# Patient Record
Sex: MALE | Race: WHITE | NOT HISPANIC OR LATINO | Employment: OTHER | URBAN - METROPOLITAN AREA
[De-identification: names, ages, dates, MRNs, and addresses within clinical notes are randomized per-mention and may not be internally consistent; named-entity substitution may affect disease eponyms.]

---

## 2018-07-19 DIAGNOSIS — Z12.11 SCREENING FOR COLON CANCER: Primary | ICD-10-CM

## 2018-09-05 DIAGNOSIS — Z12.11 SCREENING FOR COLON CANCER: ICD-10-CM

## 2018-09-13 ENCOUNTER — OUTSIDE FACILITY SERVICE (OUTPATIENT)
Dept: GASTROENTEROLOGY | Facility: CLINIC | Age: 73
End: 2018-09-13

## 2018-09-13 ENCOUNTER — LAB REQUISITION (OUTPATIENT)
Dept: LAB | Facility: HOSPITAL | Age: 73
End: 2018-09-13

## 2018-09-13 DIAGNOSIS — Z86.010 HISTORY OF COLONIC POLYPS: ICD-10-CM

## 2018-09-13 DIAGNOSIS — Z12.11 ENCOUNTER FOR SCREENING FOR MALIGNANT NEOPLASM OF COLON: ICD-10-CM

## 2018-09-13 PROCEDURE — 45385 COLONOSCOPY W/LESION REMOVAL: CPT | Performed by: INTERNAL MEDICINE

## 2018-09-13 PROCEDURE — 45380 COLONOSCOPY AND BIOPSY: CPT | Performed by: INTERNAL MEDICINE

## 2018-09-13 PROCEDURE — 88305 TISSUE EXAM BY PATHOLOGIST: CPT | Performed by: INTERNAL MEDICINE

## 2018-09-13 PROCEDURE — 88342 IMHCHEM/IMCYTCHM 1ST ANTB: CPT | Performed by: INTERNAL MEDICINE

## 2018-09-13 PROCEDURE — 45381 COLONOSCOPY SUBMUCOUS NJX: CPT | Performed by: INTERNAL MEDICINE

## 2018-09-20 LAB
CYTO UR: NORMAL
LAB AP CASE REPORT: NORMAL
LAB AP CLINICAL INFORMATION: NORMAL
LAB AP DIAGNOSIS COMMENT: NORMAL
LAB AP SPECIAL STAINS: NORMAL
PATH REPORT.FINAL DX SPEC: NORMAL
PATH REPORT.GROSS SPEC: NORMAL

## 2019-03-18 RX ORDER — METOPROLOL SUCCINATE 25 MG/1
TABLET, EXTENDED RELEASE ORAL
Qty: 30 TABLET | Refills: 5 | Status: SHIPPED | OUTPATIENT
Start: 2019-03-18 | End: 2019-06-06

## 2019-03-22 ENCOUNTER — APPOINTMENT (OUTPATIENT)
Dept: PREADMISSION TESTING | Facility: HOSPITAL | Age: 74
End: 2019-03-22

## 2019-03-22 VITALS — WEIGHT: 180 LBS | BODY MASS INDEX: 25.77 KG/M2 | HEIGHT: 70 IN

## 2019-03-22 LAB
DEPRECATED RDW RBC AUTO: 45.9 FL (ref 37–54)
ERYTHROCYTE [DISTWIDTH] IN BLOOD BY AUTOMATED COUNT: 13.6 % (ref 11.3–14.5)
HCT VFR BLD AUTO: 47.7 % (ref 38.9–50.9)
HGB BLD-MCNC: 15.6 G/DL (ref 13.1–17.5)
MCH RBC QN AUTO: 30.2 PG (ref 27–31)
MCHC RBC AUTO-ENTMCNC: 32.7 G/DL (ref 32–36)
MCV RBC AUTO: 92.4 FL (ref 80–99)
PLATELET # BLD AUTO: 218 10*3/MM3 (ref 150–450)
PMV BLD AUTO: 11.3 FL (ref 6–12)
RBC # BLD AUTO: 5.16 10*6/MM3 (ref 4.2–5.76)
WBC NRBC COR # BLD: 5.32 10*3/MM3 (ref 3.5–10.8)

## 2019-03-22 PROCEDURE — 85027 COMPLETE CBC AUTOMATED: CPT | Performed by: ANESTHESIOLOGY

## 2019-03-22 PROCEDURE — 93010 ELECTROCARDIOGRAM REPORT: CPT | Performed by: INTERNAL MEDICINE

## 2019-03-22 PROCEDURE — 36415 COLL VENOUS BLD VENIPUNCTURE: CPT

## 2019-03-22 PROCEDURE — 93005 ELECTROCARDIOGRAM TRACING: CPT

## 2019-03-22 RX ORDER — ALLOPURINOL 100 MG/1
100 TABLET ORAL EVERY MORNING
COMMUNITY
End: 2019-12-24

## 2019-03-22 RX ORDER — NIFEDIPINE 60 MG/1
60 TABLET, EXTENDED RELEASE ORAL DAILY
COMMUNITY
End: 2019-06-07 | Stop reason: HOSPADM

## 2019-03-22 RX ORDER — ENALAPRIL MALEATE 20 MG/1
20 TABLET ORAL 2 TIMES DAILY
COMMUNITY
End: 2019-06-11 | Stop reason: HOSPADM

## 2019-03-22 NOTE — PAT
EKG results and past EKG faxed to anesthesia and discussed with Dr. Taylor.  Per Dr. Taylor ok to proceed with procedure.      Patient instructed to drink 20 ounces (or until full) of Gatorade or 20 ounces of G2 (if diabetic) and complete 1 hour before arrival time for procedure (NO RED Gatorade or G2)    Patient verbalized understanding.

## 2019-03-25 PROBLEM — Z85.51 HISTORY OF BLADDER CANCER: Status: ACTIVE | Noted: 2019-03-25

## 2019-03-28 ENCOUNTER — ANESTHESIA EVENT (OUTPATIENT)
Dept: PERIOP | Facility: HOSPITAL | Age: 74
End: 2019-03-28

## 2019-03-28 RX ORDER — FAMOTIDINE 10 MG/ML
20 INJECTION, SOLUTION INTRAVENOUS ONCE
Status: CANCELLED | OUTPATIENT
Start: 2019-03-28 | End: 2019-03-28

## 2019-03-29 ENCOUNTER — ANESTHESIA (OUTPATIENT)
Dept: PERIOP | Facility: HOSPITAL | Age: 74
End: 2019-03-29

## 2019-03-29 ENCOUNTER — HOSPITAL ENCOUNTER (OUTPATIENT)
Facility: HOSPITAL | Age: 74
Setting detail: HOSPITAL OUTPATIENT SURGERY
Discharge: HOME | End: 2019-03-29
Attending: UROLOGY | Admitting: UROLOGY

## 2019-03-29 ENCOUNTER — DOCUMENTATION (OUTPATIENT)
Dept: ONCOLOGY | Facility: HOSPITAL | Age: 74
End: 2019-03-29

## 2019-03-29 VITALS
OXYGEN SATURATION: 95 % | BODY MASS INDEX: 25.77 KG/M2 | RESPIRATION RATE: 16 BRPM | HEIGHT: 70 IN | HEART RATE: 59 BPM | TEMPERATURE: 97.6 F | WEIGHT: 180 LBS | SYSTOLIC BLOOD PRESSURE: 178 MMHG | DIASTOLIC BLOOD PRESSURE: 76 MMHG

## 2019-03-29 DIAGNOSIS — C67.9 LOCAL RECURRENCE OF CANCER OF URINARY BLADDER (HCC): ICD-10-CM

## 2019-03-29 LAB — POTASSIUM BLDA-SCNC: 3.74 MMOL/L (ref 3.5–5.3)

## 2019-03-29 PROCEDURE — 25010000002 MITOMYCIN PER 5 MG: Performed by: UROLOGY

## 2019-03-29 PROCEDURE — 25010000002 ONDANSETRON PER 1 MG: Performed by: NURSE ANESTHETIST, CERTIFIED REGISTERED

## 2019-03-29 PROCEDURE — 25010000002 FENTANYL CITRATE (PF) 100 MCG/2ML SOLUTION: Performed by: NURSE ANESTHETIST, CERTIFIED REGISTERED

## 2019-03-29 PROCEDURE — 88305 TISSUE EXAM BY PATHOLOGIST: CPT | Performed by: UROLOGY

## 2019-03-29 PROCEDURE — 84132 ASSAY OF SERUM POTASSIUM: CPT | Performed by: ANESTHESIOLOGY

## 2019-03-29 PROCEDURE — 25010000002 PROPOFOL 10 MG/ML EMULSION: Performed by: NURSE ANESTHETIST, CERTIFIED REGISTERED

## 2019-03-29 PROCEDURE — 25010000002 DEXAMETHASONE PER 1 MG: Performed by: NURSE ANESTHETIST, CERTIFIED REGISTERED

## 2019-03-29 PROCEDURE — 25010000002 HYDROMORPHONE PER 4 MG: Performed by: NURSE ANESTHETIST, CERTIFIED REGISTERED

## 2019-03-29 PROCEDURE — 25010000002 VANCOMYCIN 10 G RECONSTITUTED SOLUTION: Performed by: UROLOGY

## 2019-03-29 RX ORDER — ONDANSETRON 2 MG/ML
4 INJECTION INTRAMUSCULAR; INTRAVENOUS ONCE AS NEEDED
Status: DISCONTINUED | OUTPATIENT
Start: 2019-03-29 | End: 2019-03-29 | Stop reason: HOSPADM

## 2019-03-29 RX ORDER — SODIUM CHLORIDE 0.9 % (FLUSH) 0.9 %
3-10 SYRINGE (ML) INJECTION AS NEEDED
Status: DISCONTINUED | OUTPATIENT
Start: 2019-03-29 | End: 2019-03-29 | Stop reason: HOSPADM

## 2019-03-29 RX ORDER — SODIUM CHLORIDE 0.9 % (FLUSH) 0.9 %
3 SYRINGE (ML) INJECTION EVERY 12 HOURS SCHEDULED
Status: DISCONTINUED | OUTPATIENT
Start: 2019-03-29 | End: 2019-03-29 | Stop reason: HOSPADM

## 2019-03-29 RX ORDER — FENTANYL CITRATE 50 UG/ML
50 INJECTION, SOLUTION INTRAMUSCULAR; INTRAVENOUS
Status: DISCONTINUED | OUTPATIENT
Start: 2019-03-29 | End: 2019-03-29 | Stop reason: HOSPADM

## 2019-03-29 RX ORDER — GABAPENTIN 300 MG/1
600 CAPSULE ORAL ONCE
Status: COMPLETED | OUTPATIENT
Start: 2019-03-29 | End: 2019-03-29

## 2019-03-29 RX ORDER — MELOXICAM 7.5 MG/1
15 TABLET ORAL ONCE
Status: COMPLETED | OUTPATIENT
Start: 2019-03-29 | End: 2019-03-29

## 2019-03-29 RX ORDER — IPRATROPIUM BROMIDE AND ALBUTEROL SULFATE 2.5; .5 MG/3ML; MG/3ML
3 SOLUTION RESPIRATORY (INHALATION) ONCE AS NEEDED
Status: DISCONTINUED | OUTPATIENT
Start: 2019-03-29 | End: 2019-03-29 | Stop reason: HOSPADM

## 2019-03-29 RX ORDER — ACETAMINOPHEN 500 MG
1000 TABLET ORAL ONCE
Status: COMPLETED | OUTPATIENT
Start: 2019-03-29 | End: 2019-03-29

## 2019-03-29 RX ORDER — MAGNESIUM HYDROXIDE 1200 MG/15ML
LIQUID ORAL AS NEEDED
Status: DISCONTINUED | OUTPATIENT
Start: 2019-03-29 | End: 2019-03-29 | Stop reason: HOSPADM

## 2019-03-29 RX ORDER — DEXAMETHASONE SODIUM PHOSPHATE 4 MG/ML
INJECTION, SOLUTION INTRA-ARTICULAR; INTRALESIONAL; INTRAMUSCULAR; INTRAVENOUS; SOFT TISSUE AS NEEDED
Status: DISCONTINUED | OUTPATIENT
Start: 2019-03-29 | End: 2019-03-29 | Stop reason: SURG

## 2019-03-29 RX ORDER — LABETALOL HYDROCHLORIDE 5 MG/ML
5 INJECTION, SOLUTION INTRAVENOUS
Status: DISCONTINUED | OUTPATIENT
Start: 2019-03-29 | End: 2019-03-29 | Stop reason: HOSPADM

## 2019-03-29 RX ORDER — PROPOFOL 10 MG/ML
VIAL (ML) INTRAVENOUS AS NEEDED
Status: DISCONTINUED | OUTPATIENT
Start: 2019-03-29 | End: 2019-03-29 | Stop reason: SURG

## 2019-03-29 RX ORDER — MEPERIDINE HYDROCHLORIDE 25 MG/ML
12.5 INJECTION INTRAMUSCULAR; INTRAVENOUS; SUBCUTANEOUS
Status: DISCONTINUED | OUTPATIENT
Start: 2019-03-29 | End: 2019-03-29 | Stop reason: HOSPADM

## 2019-03-29 RX ORDER — ONDANSETRON 2 MG/ML
INJECTION INTRAMUSCULAR; INTRAVENOUS AS NEEDED
Status: DISCONTINUED | OUTPATIENT
Start: 2019-03-29 | End: 2019-03-29 | Stop reason: SURG

## 2019-03-29 RX ORDER — SODIUM CHLORIDE 0.9 % (FLUSH) 0.9 %
1-10 SYRINGE (ML) INJECTION AS NEEDED
Status: DISCONTINUED | OUTPATIENT
Start: 2019-03-29 | End: 2019-03-29 | Stop reason: HOSPADM

## 2019-03-29 RX ORDER — FAMOTIDINE 20 MG/1
20 TABLET, FILM COATED ORAL ONCE
Status: COMPLETED | OUTPATIENT
Start: 2019-03-29 | End: 2019-03-29

## 2019-03-29 RX ORDER — SODIUM CHLORIDE, SODIUM LACTATE, POTASSIUM CHLORIDE, CALCIUM CHLORIDE 600; 310; 30; 20 MG/100ML; MG/100ML; MG/100ML; MG/100ML
9 INJECTION, SOLUTION INTRAVENOUS CONTINUOUS
Status: DISCONTINUED | OUTPATIENT
Start: 2019-03-29 | End: 2019-03-29 | Stop reason: HOSPADM

## 2019-03-29 RX ORDER — PROMETHAZINE HYDROCHLORIDE 25 MG/ML
6.25 INJECTION, SOLUTION INTRAMUSCULAR; INTRAVENOUS ONCE AS NEEDED
Status: DISCONTINUED | OUTPATIENT
Start: 2019-03-29 | End: 2019-03-29 | Stop reason: HOSPADM

## 2019-03-29 RX ORDER — LIDOCAINE HYDROCHLORIDE 10 MG/ML
INJECTION, SOLUTION EPIDURAL; INFILTRATION; INTRACAUDAL; PERINEURAL AS NEEDED
Status: DISCONTINUED | OUTPATIENT
Start: 2019-03-29 | End: 2019-03-29 | Stop reason: SURG

## 2019-03-29 RX ORDER — NALOXONE HCL 0.4 MG/ML
0.4 VIAL (ML) INJECTION AS NEEDED
Status: DISCONTINUED | OUTPATIENT
Start: 2019-03-29 | End: 2019-03-29 | Stop reason: HOSPADM

## 2019-03-29 RX ORDER — HYDROCODONE BITARTRATE AND ACETAMINOPHEN 5; 325 MG/1; MG/1
1 TABLET ORAL ONCE AS NEEDED
Status: DISCONTINUED | OUTPATIENT
Start: 2019-03-29 | End: 2019-03-29 | Stop reason: HOSPADM

## 2019-03-29 RX ORDER — HYDROMORPHONE HYDROCHLORIDE 1 MG/ML
0.5 INJECTION, SOLUTION INTRAMUSCULAR; INTRAVENOUS; SUBCUTANEOUS
Status: DISCONTINUED | OUTPATIENT
Start: 2019-03-29 | End: 2019-03-29 | Stop reason: HOSPADM

## 2019-03-29 RX ORDER — PROMETHAZINE HYDROCHLORIDE 25 MG/1
25 TABLET ORAL ONCE AS NEEDED
Status: DISCONTINUED | OUTPATIENT
Start: 2019-03-29 | End: 2019-03-29 | Stop reason: HOSPADM

## 2019-03-29 RX ORDER — HYDRALAZINE HYDROCHLORIDE 20 MG/ML
5 INJECTION INTRAMUSCULAR; INTRAVENOUS
Status: DISCONTINUED | OUTPATIENT
Start: 2019-03-29 | End: 2019-03-29 | Stop reason: HOSPADM

## 2019-03-29 RX ORDER — LIDOCAINE HYDROCHLORIDE 10 MG/ML
0.5 INJECTION, SOLUTION EPIDURAL; INFILTRATION; INTRACAUDAL; PERINEURAL ONCE AS NEEDED
Status: COMPLETED | OUTPATIENT
Start: 2019-03-29 | End: 2019-03-29

## 2019-03-29 RX ORDER — PROMETHAZINE HYDROCHLORIDE 25 MG/1
25 SUPPOSITORY RECTAL ONCE AS NEEDED
Status: DISCONTINUED | OUTPATIENT
Start: 2019-03-29 | End: 2019-03-29 | Stop reason: HOSPADM

## 2019-03-29 RX ORDER — FENTANYL CITRATE 50 UG/ML
INJECTION, SOLUTION INTRAMUSCULAR; INTRAVENOUS AS NEEDED
Status: DISCONTINUED | OUTPATIENT
Start: 2019-03-29 | End: 2019-03-29 | Stop reason: SURG

## 2019-03-29 RX ADMIN — DEXAMETHASONE SODIUM PHOSPHATE 4 MG: 4 INJECTION, SOLUTION INTRAMUSCULAR; INTRAVENOUS at 07:42

## 2019-03-29 RX ADMIN — HYDROMORPHONE HYDROCHLORIDE 0.5 MG: 1 INJECTION, SOLUTION INTRAMUSCULAR; INTRAVENOUS; SUBCUTANEOUS at 09:28

## 2019-03-29 RX ADMIN — PROPOFOL 200 MG: 10 INJECTION, EMULSION INTRAVENOUS at 07:43

## 2019-03-29 RX ADMIN — ONDANSETRON 4 MG: 2 INJECTION INTRAMUSCULAR; INTRAVENOUS at 07:51

## 2019-03-29 RX ADMIN — EPHEDRINE SULFATE 5 MG: 50 INJECTION INTRAMUSCULAR; INTRAVENOUS; SUBCUTANEOUS at 07:57

## 2019-03-29 RX ADMIN — FAMOTIDINE 20 MG: 20 TABLET ORAL at 06:25

## 2019-03-29 RX ADMIN — FENTANYL CITRATE 50 MCG: 50 INJECTION INTRAMUSCULAR; INTRAVENOUS at 09:15

## 2019-03-29 RX ADMIN — ACETAMINOPHEN 1000 MG: 500 TABLET ORAL at 06:25

## 2019-03-29 RX ADMIN — FENTANYL CITRATE 25 MCG: 50 INJECTION, SOLUTION INTRAMUSCULAR; INTRAVENOUS at 08:16

## 2019-03-29 RX ADMIN — VANCOMYCIN HYDROCHLORIDE 1250 MG: 10 INJECTION, POWDER, LYOPHILIZED, FOR SOLUTION INTRAVENOUS at 06:27

## 2019-03-29 RX ADMIN — LIDOCAINE HYDROCHLORIDE 50 MG: 10 INJECTION, SOLUTION EPIDURAL; INFILTRATION; INTRACAUDAL; PERINEURAL at 07:43

## 2019-03-29 RX ADMIN — FENTANYL CITRATE 25 MCG: 50 INJECTION, SOLUTION INTRAMUSCULAR; INTRAVENOUS at 07:51

## 2019-03-29 RX ADMIN — FENTANYL CITRATE 25 MCG: 50 INJECTION, SOLUTION INTRAMUSCULAR; INTRAVENOUS at 07:42

## 2019-03-29 RX ADMIN — GABAPENTIN 600 MG: 300 CAPSULE ORAL at 06:25

## 2019-03-29 RX ADMIN — LIDOCAINE HYDROCHLORIDE 0.2 ML: 10 INJECTION, SOLUTION EPIDURAL; INFILTRATION; INTRACAUDAL; PERINEURAL at 06:26

## 2019-03-29 RX ADMIN — FENTANYL CITRATE 25 MCG: 50 INJECTION, SOLUTION INTRAMUSCULAR; INTRAVENOUS at 08:03

## 2019-03-29 RX ADMIN — SODIUM CHLORIDE, POTASSIUM CHLORIDE, SODIUM LACTATE AND CALCIUM CHLORIDE 9 ML/HR: 600; 310; 30; 20 INJECTION, SOLUTION INTRAVENOUS at 06:26

## 2019-03-29 RX ADMIN — FENTANYL CITRATE 50 MCG: 50 INJECTION INTRAMUSCULAR; INTRAVENOUS at 09:22

## 2019-03-29 RX ADMIN — MELOXICAM 15 MG: 7.5 TABLET ORAL at 06:25

## 2019-03-29 RX ADMIN — EPHEDRINE SULFATE 5 MG: 50 INJECTION INTRAMUSCULAR; INTRAVENOUS; SUBCUTANEOUS at 08:12

## 2019-03-29 RX ADMIN — DEXAMETHASONE SODIUM PHOSPHATE 4 MG: 4 INJECTION, SOLUTION INTRAMUSCULAR; INTRAVENOUS at 07:47

## 2019-03-29 NOTE — ANESTHESIA POSTPROCEDURE EVALUATION
Patient: Jair Flores    Procedure Summary     Date:  03/29/19 Room / Location:   [facility]    Anesthesia Start:  0737 Anesthesia Stop:  0850    Procedure:  TURBT WITH MITOMYCIN (N/A Bladder) Diagnosis:      Surgeon:  Jair Braxton MD Provider:  Sylvain Escobedo MD    Anesthesia Type:  general ASA Status:  2          Anesthesia Type: general  Last vitals  BP   160/74   Temp   97.6 °F (36.4 °C) (03/29/19 0614)   Pulse 65   Resp 18   SpO2 95%     Post Anesthesia Care and Evaluation    Patient location during evaluation: PACU  Patient participation: complete - patient participated  Level of consciousness: awake  Pain score: 0  Pain management: adequate  Airway patency: patent  Anesthetic complications: No anesthetic complications  PONV Status: none  Cardiovascular status: hemodynamically stable and acceptable  Respiratory status: nonlabored ventilation, acceptable and nasal cannula  Hydration status: acceptable

## 2019-03-29 NOTE — ANESTHESIA PREPROCEDURE EVALUATION
Anesthesia Evaluation     Patient summary reviewed and Nursing notes reviewed   history of anesthetic complications: difficult airway               Airway   Mallampati: III  TM distance: >3 FB  Neck ROM: limited  Possible difficult intubation  Dental - normal exam     Pulmonary - normal exam   (+) sleep apnea (suspected),   Cardiovascular - normal exam    (+) hypertension,       Neuro/Psych- negative ROS  GI/Hepatic/Renal/Endo    (+)  GERD well controlled,      Musculoskeletal (-) negative ROS    Abdominal  - normal exam    Bowel sounds: normal.   Substance History - negative use     OB/GYN negative ob/gyn ROS         Other      history of cancer (bladder)                  Anesthesia Plan    ASA 2     general     intravenous induction   Anesthetic plan, all risks, benefits, and alternatives have been provided, discussed and informed consent has been obtained with: patient.    Plan discussed with CRNA.

## 2019-04-03 LAB
CYTO UR: NORMAL
LAB AP CASE REPORT: NORMAL
LAB AP CLINICAL INFORMATION: NORMAL
PATH REPORT.FINAL DX SPEC: NORMAL
PATH REPORT.GROSS SPEC: NORMAL

## 2019-04-29 PROBLEM — M19.90 OSTEOARTHRITIS: Status: ACTIVE | Noted: 2019-04-29

## 2019-04-29 PROBLEM — R31.9 HEMATURIA: Status: ACTIVE | Noted: 2019-04-29

## 2019-04-29 PROBLEM — I10 BENIGN ESSENTIAL HYPERTENSION: Status: ACTIVE | Noted: 2019-04-29

## 2019-04-29 PROBLEM — I35.9 AORTIC VALVE DISEASE: Status: ACTIVE | Noted: 2019-04-29

## 2019-04-29 PROBLEM — R09.89 RIGHT CAROTID BRUIT: Status: ACTIVE | Noted: 2019-04-29

## 2019-04-29 PROBLEM — C67.9 BLADDER CANCER (HCC): Status: ACTIVE | Noted: 2019-04-29

## 2019-04-29 PROBLEM — I05.9 MITRAL VALVE DISEASE: Status: ACTIVE | Noted: 2019-04-29

## 2019-04-29 PROBLEM — G47.00 INSOMNIA: Status: ACTIVE | Noted: 2019-04-29

## 2019-04-29 PROBLEM — E78.5 HYPERLIPIDEMIA: Status: ACTIVE | Noted: 2019-04-29

## 2019-04-29 PROBLEM — Z96.652 STATUS POST LEFT PARTIAL KNEE REPLACEMENT: Status: ACTIVE | Noted: 2019-04-29

## 2019-04-29 PROBLEM — M15.9 PRIMARY OSTEOARTHRITIS INVOLVING MULTIPLE JOINTS: Status: ACTIVE | Noted: 2019-04-29

## 2019-04-29 PROBLEM — Z87.898 HISTORY OF DISEASE: Status: ACTIVE | Noted: 2019-04-29

## 2019-04-29 PROBLEM — M10.9 ACUTE GOUT OF FOOT: Status: ACTIVE | Noted: 2019-04-29

## 2019-04-29 PROBLEM — J81.0 FLASH PULMONARY EDEMA (HCC): Status: ACTIVE | Noted: 2019-04-29

## 2019-05-14 ENCOUNTER — TELEPHONE (OUTPATIENT)
Dept: INTERNAL MEDICINE | Facility: CLINIC | Age: 74
End: 2019-05-14

## 2019-05-14 ENCOUNTER — OFFICE VISIT (OUTPATIENT)
Dept: INTERNAL MEDICINE | Facility: CLINIC | Age: 74
End: 2019-05-14

## 2019-05-14 ENCOUNTER — LAB (OUTPATIENT)
Dept: LAB | Facility: HOSPITAL | Age: 74
End: 2019-05-14

## 2019-05-14 VITALS
HEART RATE: 60 BPM | SYSTOLIC BLOOD PRESSURE: 128 MMHG | DIASTOLIC BLOOD PRESSURE: 60 MMHG | WEIGHT: 168 LBS | HEIGHT: 66 IN | BODY MASS INDEX: 27 KG/M2

## 2019-05-14 DIAGNOSIS — M10.079 IDIOPATHIC GOUT OF FOOT, UNSPECIFIED CHRONICITY, UNSPECIFIED LATERALITY: ICD-10-CM

## 2019-05-14 DIAGNOSIS — E78.2 MIXED HYPERLIPIDEMIA: ICD-10-CM

## 2019-05-14 DIAGNOSIS — I10 BENIGN ESSENTIAL HYPERTENSION: ICD-10-CM

## 2019-05-14 DIAGNOSIS — J81.0 FLASH PULMONARY EDEMA (HCC): ICD-10-CM

## 2019-05-14 DIAGNOSIS — Z96.652 STATUS POST LEFT PARTIAL KNEE REPLACEMENT: ICD-10-CM

## 2019-05-14 DIAGNOSIS — I10 BENIGN ESSENTIAL HYPERTENSION: Primary | ICD-10-CM

## 2019-05-14 DIAGNOSIS — C67.9 MALIGNANT NEOPLASM OF URINARY BLADDER, UNSPECIFIED SITE (HCC): ICD-10-CM

## 2019-05-14 PROBLEM — M10.9 GOUT: Status: ACTIVE | Noted: 2019-05-14

## 2019-05-14 LAB
ALBUMIN SERPL-MCNC: 3.6 G/DL (ref 3.5–5.2)
ALBUMIN/GLOB SERPL: 1.1 G/DL
ALP SERPL-CCNC: 67 U/L (ref 39–117)
ALT SERPL W P-5'-P-CCNC: 18 U/L (ref 1–41)
ANION GAP SERPL CALCULATED.3IONS-SCNC: 10.8 MMOL/L
AST SERPL-CCNC: 22 U/L (ref 1–40)
BASOPHILS # BLD AUTO: 0.02 10*3/MM3 (ref 0–0.2)
BASOPHILS NFR BLD AUTO: 0.5 % (ref 0–1.5)
BILIRUB SERPL-MCNC: 0.3 MG/DL (ref 0.2–1.2)
BUN BLD-MCNC: 23 MG/DL (ref 8–23)
BUN/CREAT SERPL: 23 (ref 7–25)
CALCIUM SPEC-SCNC: 9.4 MG/DL (ref 8.6–10.5)
CHLORIDE SERPL-SCNC: 102 MMOL/L (ref 98–107)
CHOLEST SERPL-MCNC: 213 MG/DL (ref 0–200)
CO2 SERPL-SCNC: 28.2 MMOL/L (ref 22–29)
CREAT BLD-MCNC: 1 MG/DL (ref 0.76–1.27)
DEPRECATED RDW RBC AUTO: 51.3 FL (ref 37–54)
EOSINOPHIL # BLD AUTO: 0.05 10*3/MM3 (ref 0–0.4)
EOSINOPHIL NFR BLD AUTO: 1.3 % (ref 0.3–6.2)
ERYTHROCYTE [DISTWIDTH] IN BLOOD BY AUTOMATED COUNT: 14.9 % (ref 12.3–15.4)
GFR SERPL CREATININE-BSD FRML MDRD: 73 ML/MIN/1.73
GLOBULIN UR ELPH-MCNC: 3.4 GM/DL
GLUCOSE BLD-MCNC: 110 MG/DL (ref 65–99)
HCT VFR BLD AUTO: 42.9 % (ref 37.5–51)
HDLC SERPL-MCNC: 39 MG/DL (ref 40–60)
HGB BLD-MCNC: 13.8 G/DL (ref 13–17.7)
IMM GRANULOCYTES # BLD AUTO: 0.01 10*3/MM3 (ref 0–0.05)
IMM GRANULOCYTES NFR BLD AUTO: 0.3 % (ref 0–0.5)
LDLC SERPL CALC-MCNC: 142 MG/DL (ref 0–100)
LDLC/HDLC SERPL: 3.64 {RATIO}
LYMPHOCYTES # BLD AUTO: 1.71 10*3/MM3 (ref 0.7–3.1)
LYMPHOCYTES NFR BLD AUTO: 43.8 % (ref 19.6–45.3)
MCH RBC QN AUTO: 30.1 PG (ref 26.6–33)
MCHC RBC AUTO-ENTMCNC: 32.2 G/DL (ref 31.5–35.7)
MCV RBC AUTO: 93.7 FL (ref 79–97)
MONOCYTES # BLD AUTO: 0.5 10*3/MM3 (ref 0.1–0.9)
MONOCYTES NFR BLD AUTO: 12.8 % (ref 5–12)
NEUTROPHILS # BLD AUTO: 1.61 10*3/MM3 (ref 1.7–7)
NEUTROPHILS NFR BLD AUTO: 41.3 % (ref 42.7–76)
NRBC BLD AUTO-RTO: 0 /100 WBC (ref 0–0.2)
PLATELET # BLD AUTO: 183 10*3/MM3 (ref 140–450)
PMV BLD AUTO: 10.7 FL (ref 6–12)
POTASSIUM BLD-SCNC: 3.6 MMOL/L (ref 3.5–5.2)
PROT SERPL-MCNC: 7 G/DL (ref 6–8.5)
RBC # BLD AUTO: 4.58 10*6/MM3 (ref 4.14–5.8)
SODIUM BLD-SCNC: 141 MMOL/L (ref 136–145)
TRIGL SERPL-MCNC: 160 MG/DL (ref 0–150)
TSH SERPL DL<=0.05 MIU/L-ACNC: 2.1 MIU/ML (ref 0.27–4.2)
URATE SERPL-MCNC: 5 MG/DL (ref 3.4–7)
VLDLC SERPL-MCNC: 32 MG/DL (ref 5–40)
WBC NRBC COR # BLD: 3.9 10*3/MM3 (ref 3.4–10.8)

## 2019-05-14 PROCEDURE — 93000 ELECTROCARDIOGRAM COMPLETE: CPT | Performed by: INTERNAL MEDICINE

## 2019-05-14 PROCEDURE — 36415 COLL VENOUS BLD VENIPUNCTURE: CPT

## 2019-05-14 PROCEDURE — 84443 ASSAY THYROID STIM HORMONE: CPT

## 2019-05-14 PROCEDURE — 85025 COMPLETE CBC W/AUTO DIFF WBC: CPT

## 2019-05-14 PROCEDURE — 80053 COMPREHEN METABOLIC PANEL: CPT

## 2019-05-14 PROCEDURE — 99397 PER PM REEVAL EST PAT 65+ YR: CPT | Performed by: INTERNAL MEDICINE

## 2019-05-14 PROCEDURE — 84550 ASSAY OF BLOOD/URIC ACID: CPT

## 2019-05-14 PROCEDURE — 80061 LIPID PANEL: CPT

## 2019-05-14 NOTE — ASSESSMENT & PLAN NOTE
History of mixed hyperlipidemia with lab today being cholesterol 213 and  patient is not on therapy he has lost weight down to 168 pounds with a BMI 27.1 suggest statin therapy Lipitor 10 mg daily.

## 2019-05-14 NOTE — ASSESSMENT & PLAN NOTE
Recent resection of recurrent bladder cancer March 29, 2019 was followed by urinary tract infection treated initially with doxycycline- allergy later changed to Septra DS for which she has finished therapy.

## 2019-05-14 NOTE — PROGRESS NOTES
[facility]     Jair Flores  1945   [id number]      Patient Care Team:  Osmar Clemons MD as PCP - General  Osmar Clemons MD as PCP - Family Medicine    Chief Complaint::   Chief Complaint   Patient presents with   • Annual Exam   • recurrent bladder cancer            HPI  Patient is 74-year-old male returning presents for annual preventative exam physical examination he is recently undergone cystoscopy and surgery for recurrent bladder carcinoma resected by Jair Braxton on March 29, 2019 this was followed by urinary tract infection which was treated initially with doxycycline-developed an allergy and intolerance finished with therapy of Septra DS with resolution and no intolerance.  The patient completed his annual wellness visit form sees Dr. Jair Braxton urology Dr. Matt Carlin ophthalmology has a regular dental exam regular eye exam does exercise 4-5 times per week with walking 1/2 to 2 miles per day the patient does not smoke consumes 3 cups of coffee per day he has occasional alcohol very infrequent his last colonoscopy was 2018 he had partial left knee replacement 2005 in [place] at which point he developed flash pulmonary edema likely related to volume overload during the procedure and a history of bladder cancer first diagnosed in 2015 resected with recurrence in 2019 resected March 29, 2019.  She overall feels well he has lost weight with the last procedure      Patient Active Problem List   Diagnosis   • History of bladder cancer   • Hematuria   • Insomnia   • Status post left partial knee replacement   • Benign essential hypertension   • Acute gout of foot   • Flash pulmonary edema (CMS/HCC)   • Hyperlipidemia   • Osteoarthritis   • Right carotid bruit   • Aortic valve disease   • Primary osteoarthritis involving multiple joints   • Mitral valve disease   • Bladder cancer (CMS/HCC)   • History of disease   • Gout        Past Medical History:   Diagnosis Date   • Cancer (CMS/HCC)      bladder; cysto surveillance 07/23/2017; free of disease   • Cataract     right eye   • Degenerative arthritis     right; knee replacement; successful   • Fluid overload 2015    after knee surgery   • GERD (gastroesophageal reflux disease)    • Hard to intubate     has been told he is difficult to intubate   • Hemorrhoids 09/12/2011    hemorrhoid ablation   • Hypertension    • Skin cancer     head   • Wears glasses        Past Surgical History:   Procedure Laterality Date   • COLONOSCOPY      routinely every 10 years   • CYSTOSCOPY      x3   • CYSTOSCOPY  07/23/2017    bladder cancer; cysto surveillance; free of disease   • EXCISIONAL HEMORRHOIDECTOMY      hemorrhoid ablation; hemorrhoid onset 09/12/2011   • EYE SURGERY      as a child after cutting eye with barbwire fence   • KNEE ARTHROPLASTY, PARTIAL REPLACEMENT Left    • SKIN CANCER EXCISION     • TONSILLECTOMY     • TOTAL KNEE ARTHROPLASTY Right     Dgen arthritis   • TRANSURETHRAL RESECTION OF BLADDER TUMOR N/A 3/29/2019    Procedure: TRANSURETHRAL RESECTION OF BLADDER TUMOR WITH MITOMYCIN;  Surgeon: Jair Braxton MD;  Location: [facility];  Service: Urology       No family history on file.    Social History     Socioeconomic History   • Marital status:      Spouse name: Not on file   • Number of children: Not on file   • Years of education: Not on file   • Highest education level: Not on file   Tobacco Use   • Smoking status: Never Smoker   • Smokeless tobacco: Never Used   Substance and Sexual Activity   • Alcohol use: Yes     Alcohol/week: 1.8 oz     Types: 3 Shots of liquor per week     Comment: socially   • Drug use: Defer   • Sexual activity: Defer       Allergies   Allergen Reactions   • Penicillins Anaphylaxis   • Rocephin [Ceftriaxone] Anaphylaxis   • Ciprofloxacin Swelling   • Macrobid [Nitrofurantoin Macrocrystal] Swelling         There is no immunization history on file for this patient.     Health Maintenance Due   Topic Date Due   •  "TDAP/TD VACCINES (1 - Tdap) 05/08/1965   • ZOSTER VACCINE (1 of 2) 05/08/1996   • PNEUMOCOCCAL VACCINES (65+ LOW/MEDIUM RISK) (1 of 2 - PCV13) 05/08/2011   • HEPATITIS C SCREENING  07/02/2018        Review of Systems     HEENT: Has retinal disease on the right is been told he has a mature cataract on the right and will probably have cataract surgery in the near future he sees Matt Carlin the patient is thinking about using Dr. Bonilla in [facility].    NECK: Denies pain stiffness swelling dysphasia or reflux or thyromegaly  CHEST: Denies cough wheeze shortness of breath is a non-smoker  CARDIAC: No chest pain pressure tightness palpitations remote history in 2005 for volume overload with flash pulmonary edema resolved  ABD: Denies nausea vomiting abdominal pain colonoscopy Jaime on 2018  : History of bladder cancer 2015 with recurrence 2019 resected with recent bladder infection currently stable  NEURO: Denies syncope concussion or neuropathy  PSYCH: Denies stress anxiety depression  EXTREM: Left knee remote partial replacement stable    Vital Signs  Vitals:    05/14/19 0902   BP: 128/60   Pulse: 60   Weight: 76.2 kg (168 lb)   Height: 167.6 cm (66\")     Body mass index is 27.12 kg/m².        Current Outpatient Medications:   •  allopurinol (ZYLOPRIM) 100 MG tablet, Take 100 mg by mouth Daily., Disp: , Rfl:   •  enalapril (VASOTEC) 20 MG tablet, Take 40 mg by mouth Daily., Disp: , Rfl:   •  metoprolol succinate XL (TOPROL-XL) 25 MG 24 hr tablet, TAKE 1 TABLET BY MOUTH DAILY, Disp: 30 tablet, Rfl: 5  •  Multiple Vitamins-Minerals (MULTIVITAMIN ADULT PO), Take 1 tablet by mouth Daily., Disp: , Rfl:   •  NIFEdipine XL (PROCARDIA XL) 60 MG 24 hr tablet, Take 60 mg by mouth Daily., Disp: , Rfl:   •  vitamin C (ASCORBIC ACID) 500 MG tablet, Take 500 mg by mouth Daily., Disp: , Rfl:     Physical Exam   HEENT: Pupils equal reactive ENT clear no facial asymmetry pharynx is clear  NECK: No masses bruits thyromegaly or " neck vein distention  CHEST: Clear to P&A without rales wheezes or rhonchi  CARDIAC: Regular sinus rhythm without gallop rub click or murmur  ABD: Liver and spleen normal positive bowel sounds no bruits  :   NEURO: CNS is intact no neuropathy  PSYCH: No evidence of anxiety depression  EXTREM: Remote left knee partial arthroplasty no edema  Skin: Clear     Results Review:    Fasting labs pending  EKG sinus rhythm inferior Q waves no ischemic change unchanged from previous.    ECG 12 Lead  Date/Time: 5/14/2019 5:48 PM  Performed by: Osmar Clemons MD  Authorized by: Osmar Clemons MD   Comparison: not compared with previous ECG   Rhythm: sinus rhythm  Rate: normal  Conduction: conduction normal  Q waves: II, III and aVF    ST Segments: ST segments normal  T Waves: T waves normal  QRS axis: normal  Other: no other findings    Clinical impression: abnormal EKG  Comments: Inferior wall Q waves sinus rhythm no ischemia no change from previous        Patient Wellness Counseling:   Plan of care reviewed with patient at the conclusion of today's visit. Education was provided in regards to diagnosis, diet and exercise, prostate cancer screening discussed including benefit of early detection, potential need for follow-up, and harms associated with additional management. Patient agrees to screening.    Nutrition, physical activity, healthy weight,ways to reduce stress, adequate sleep, injury prevention, misuse of tobacco, alcohol and drugs, sexual behavior and STD's, dental health, mental health, and immunizations.    Plan of care reviewed with patient at the conclusion of today's visit. Education was provided regarding diagnosis, management and any prescribed or recommended OTC medications.  Patient verbalizes understanding of and agreement with management plan.        Medication Review: Medications reviewed and noted    Patient wellness counseling  Exercise: Suggest walking exercise  Diet: Continue healthy cardiac  diet  Smoking: Non-smoker  Alcohol: Infrequent alcohol  Screening: Last colonoscopy 2018    Assessment/Plan:  Problem List Items Addressed This Visit        Cardiovascular and Mediastinum    Benign essential hypertension - Primary    Current Assessment & Plan     Patient has history of essential hypertension blood pressure 128/60 left and right sitting he is on enalapril 20 mg daily nifedipine 60 mg daily metoprolol succinate 25 mg daily pressure good control denies headache cough or leg swelling.         Relevant Medications    metoprolol succinate XL (TOPROL-XL) 25 MG 24 hr tablet    enalapril (VASOTEC) 20 MG tablet    NIFEdipine XL (PROCARDIA XL) 60 MG 24 hr tablet    Other Relevant Orders    CBC & Differential (Completed)    Comprehensive Metabolic Panel (Completed)    TSH (Completed)    ECG 12 Lead    Hyperlipidemia    Overview     Not on statin therapy         Current Assessment & Plan     History of mixed hyperlipidemia with lab today being cholesterol 213 and  patient is not on therapy he has lost weight down to 168 pounds with a BMI 27.1 suggest statin therapy Lipitor 10 mg daily.         Relevant Orders    Lipid Panel (Completed)       Respiratory    Flash pulmonary edema (CMS/HCC)    Overview     Episode of flash pulmonary edema thought to be related to volume overload at the time of left knee surgery in 2005 performed in [place] he has had no recurrence his EKG does show inferior Q waves which are unchanged.         Current Assessment & Plan     Currently asymptomatic with a stable abnormal EKG showing inferior Q waves            Genitourinary    Bladder cancer (CMS/HCC)    Overview     Regional surgery was 2015 with bladder carcinoma presenting as hematuria with recent resection of recurrent bladder carcinoma March 29 2019 patient is currently stable         Current Assessment & Plan     Recent resection of recurrent bladder cancer March 29, 2019 was followed by urinary tract infection treated  initially with doxycycline- allergy later changed to Septra DS for which she has finished therapy.            Other    Status post left partial knee replacement    Overview     Left knee surgery 2005 [place] with complication of flash pulmonary edema likely due to anesthesia total resolution without sequela but with stable abnormal EKG with inferior wall Q waves no change and asymptomatic.         Current Assessment & Plan     No pain in good range of motion.         Gout    Current Assessment & Plan     History of gouty arthritis on allopurinol 100 mg daily his uric acid this day was 5.0 he said no recent episodes of hyperuricemia or gouty arthritic pain.         Relevant Orders    Uric acid (Completed)           Patient Instructions   Await fasting lab-notify results  Continue current medical therapy  Follow-up with Dr. Braxton regards bladder  Follow-up with Dr. Carlin-and Dr. Bonilla regards cataract right eye  Return visit in 6 months or as needed.       CMP:  Lab Results   Component Value Date    BUN 23 05/14/2019    CREATININE 1.00 05/14/2019    EGFRIFNONA 73 05/14/2019    BCR 23.0 05/14/2019     05/14/2019    K 3.6 05/14/2019    CO2 28.2 05/14/2019    CALCIUM 9.4 05/14/2019    ALBUMIN 3.60 05/14/2019    BILITOT 0.3 05/14/2019    ALKPHOS 67 05/14/2019    AST 22 05/14/2019    ALT 18 05/14/2019     HbA1c:  No results found for: HGBA1C  Microalbumin:  No results found for: MICROALBUR, POCMALB, POCCREAT  Lipid Panel  Lab Results   Component Value Date    CHOL 213 (H) 05/14/2019    TRIG 160 (H) 05/14/2019    HDL 39 (L) 05/14/2019     (H) 05/14/2019    AST 22 05/14/2019    ALT 18 05/14/2019        Counseling was given to patient for the following topics: appropriate exercise     Plan of care reviewed with patient at the conclusion of today's visit. Education was provided regarding diagnosis, management, and any prescribed or recommended OTC medications.Patient verbalizes understanding of and agreement with  management plan.         Osmar Clemons MD    Note: Part of this note may be an electronic transcription/translation of spoken language to printed text using Dragon Dictation System.

## 2019-05-14 NOTE — PATIENT INSTRUCTIONS
Await fasting lab-notify results  Continue current medical therapy  Follow-up with Dr. Braxton regards bladder  Follow-up with Dr. Carlin-and Dr. Bonilla regards cataract right eye  Return visit in 6 months or as needed.

## 2019-05-14 NOTE — ASSESSMENT & PLAN NOTE
Patient has history of essential hypertension blood pressure 128/60 left and right sitting he is on enalapril 20 mg daily nifedipine 60 mg daily metoprolol succinate 25 mg daily pressure good control denies headache cough or leg swelling.

## 2019-05-14 NOTE — ASSESSMENT & PLAN NOTE
History of gouty arthritis on allopurinol 100 mg daily his uric acid this day was 5.0 he said no recent episodes of hyperuricemia or gouty arthritic pain.

## 2019-05-21 DIAGNOSIS — E78.2 MIXED HYPERLIPIDEMIA: Primary | ICD-10-CM

## 2019-05-21 RX ORDER — ATORVASTATIN CALCIUM 20 MG/1
20 TABLET, FILM COATED ORAL DAILY
Qty: 30 TABLET | Refills: 5 | Status: SHIPPED | OUTPATIENT
Start: 2019-05-21 | End: 2019-06-06

## 2019-06-06 ENCOUNTER — APPOINTMENT (OUTPATIENT)
Dept: CT IMAGING | Facility: HOSPITAL | Age: 74
End: 2019-06-06

## 2019-06-06 ENCOUNTER — APPOINTMENT (OUTPATIENT)
Dept: GENERAL RADIOLOGY | Facility: HOSPITAL | Age: 74
End: 2019-06-06

## 2019-06-06 ENCOUNTER — APPOINTMENT (OUTPATIENT)
Dept: MRI IMAGING | Facility: HOSPITAL | Age: 74
End: 2019-06-06

## 2019-06-06 ENCOUNTER — TELEPHONE (OUTPATIENT)
Dept: INTERNAL MEDICINE | Facility: CLINIC | Age: 74
End: 2019-06-06

## 2019-06-06 ENCOUNTER — HOSPITAL ENCOUNTER (INPATIENT)
Facility: HOSPITAL | Age: 74
LOS: 1 days | Discharge: HOME | End: 2019-06-07
Attending: EMERGENCY MEDICINE | Admitting: INTERNAL MEDICINE

## 2019-06-06 DIAGNOSIS — Z92.82 RECEIVED INTRAVENOUS TISSUE PLASMINOGEN ACTIVATOR (TPA) IN EMERGENCY DEPARTMENT: ICD-10-CM

## 2019-06-06 DIAGNOSIS — Z74.09 IMPAIRED FUNCTIONAL MOBILITY, BALANCE, GAIT, AND ENDURANCE: ICD-10-CM

## 2019-06-06 DIAGNOSIS — Z74.09 IMPAIRED MOBILITY AND ADLS: ICD-10-CM

## 2019-06-06 DIAGNOSIS — Z78.9 IMPAIRED MOBILITY AND ADLS: ICD-10-CM

## 2019-06-06 DIAGNOSIS — I63.9 ACUTE CVA (CEREBROVASCULAR ACCIDENT) (HCC): Primary | ICD-10-CM

## 2019-06-06 LAB
ALT SERPL W P-5'-P-CCNC: 15 U/L (ref 1–41)
APTT PPP: 33.2 SECONDS (ref 24–37)
AST SERPL-CCNC: 26 U/L (ref 1–40)
BASOPHILS # BLD AUTO: 0.02 10*3/MM3 (ref 0–0.2)
BASOPHILS NFR BLD AUTO: 0.4 % (ref 0–1.5)
BUN BLDA-MCNC: 20 MG/DL (ref 8–26)
CA-I BLDA-SCNC: 1.21 MMOL/L (ref 1.2–1.32)
CHLORIDE BLDA-SCNC: 104 MMOL/L (ref 98–109)
CO2 BLDA-SCNC: 26 MMOL/L (ref 24–29)
CREAT BLDA-MCNC: 1 MG/DL (ref 0.6–1.3)
DEPRECATED RDW RBC AUTO: 53.1 FL (ref 37–54)
EOSINOPHIL # BLD AUTO: 0.18 10*3/MM3 (ref 0–0.4)
EOSINOPHIL NFR BLD AUTO: 3.2 % (ref 0.3–6.2)
ERYTHROCYTE [DISTWIDTH] IN BLOOD BY AUTOMATED COUNT: 15.3 % (ref 12.3–15.4)
GLUCOSE BLDC GLUCOMTR-MCNC: 107 MG/DL (ref 70–130)
GLUCOSE BLDC GLUCOMTR-MCNC: 122 MG/DL (ref 70–130)
GLUCOSE BLDC GLUCOMTR-MCNC: 98 MG/DL (ref 70–130)
HBA1C MFR BLD: 5.2 % (ref 4.8–5.6)
HCT VFR BLD AUTO: 41.2 % (ref 37.5–51)
HCT VFR BLDA CALC: 41 % (ref 38–51)
HGB BLD-MCNC: 13.5 G/DL (ref 13–17.7)
HGB BLDA-MCNC: 13.9 G/DL (ref 12–17)
HOLD SPECIMEN: NORMAL
HOLD SPECIMEN: NORMAL
IMM GRANULOCYTES # BLD AUTO: 0.02 10*3/MM3 (ref 0–0.05)
IMM GRANULOCYTES NFR BLD AUTO: 0.4 % (ref 0–0.5)
INR PPP: 1 (ref 0.8–1.2)
LYMPHOCYTES # BLD AUTO: 2.03 10*3/MM3 (ref 0.7–3.1)
LYMPHOCYTES NFR BLD AUTO: 36.5 % (ref 19.6–45.3)
MCH RBC QN AUTO: 30.6 PG (ref 26.6–33)
MCHC RBC AUTO-ENTMCNC: 32.8 G/DL (ref 31.5–35.7)
MCV RBC AUTO: 93.4 FL (ref 79–97)
MONOCYTES # BLD AUTO: 0.67 10*3/MM3 (ref 0.1–0.9)
MONOCYTES NFR BLD AUTO: 12.1 % (ref 5–12)
NEUTROPHILS # BLD AUTO: 2.64 10*3/MM3 (ref 1.7–7)
NEUTROPHILS NFR BLD AUTO: 47.4 % (ref 42.7–76)
NRBC BLD AUTO-RTO: 0 /100 WBC (ref 0–0.2)
PLATELET # BLD AUTO: 171 10*3/MM3 (ref 140–450)
PMV BLD AUTO: 10.7 FL (ref 6–12)
POTASSIUM BLDA-SCNC: 3.7 MMOL/L (ref 3.5–4.9)
PROTHROMBIN TIME: 12.2 SECONDS (ref 12.8–15.2)
RBC # BLD AUTO: 4.41 10*6/MM3 (ref 4.14–5.8)
SODIUM BLDA-SCNC: 143 MMOL/L (ref 138–146)
TROPONIN T SERPL-MCNC: <0.01 NG/ML (ref 0–0.03)
WBC NRBC COR # BLD: 5.56 10*3/MM3 (ref 3.4–10.8)
WHOLE BLOOD HOLD SPECIMEN: NORMAL
WHOLE BLOOD HOLD SPECIMEN: NORMAL

## 2019-06-06 PROCEDURE — 71045 X-RAY EXAM CHEST 1 VIEW: CPT

## 2019-06-06 PROCEDURE — 99291 CRITICAL CARE FIRST HOUR: CPT

## 2019-06-06 PROCEDURE — 99223 1ST HOSP IP/OBS HIGH 75: CPT | Performed by: PSYCHIATRY & NEUROLOGY

## 2019-06-06 PROCEDURE — 0 GADOBENATE DIMEGLUMINE 529 MG/ML SOLUTION: Performed by: INTERNAL MEDICINE

## 2019-06-06 PROCEDURE — 99221 1ST HOSP IP/OBS SF/LOW 40: CPT | Performed by: PHYSICIAN ASSISTANT

## 2019-06-06 PROCEDURE — 85014 HEMATOCRIT: CPT

## 2019-06-06 PROCEDURE — 84484 ASSAY OF TROPONIN QUANT: CPT | Performed by: EMERGENCY MEDICINE

## 2019-06-06 PROCEDURE — 92523 SPEECH SOUND LANG COMPREHEN: CPT

## 2019-06-06 PROCEDURE — 83036 HEMOGLOBIN GLYCOSYLATED A1C: CPT | Performed by: INTERNAL MEDICINE

## 2019-06-06 PROCEDURE — 85025 COMPLETE CBC W/AUTO DIFF WBC: CPT | Performed by: EMERGENCY MEDICINE

## 2019-06-06 PROCEDURE — 82962 GLUCOSE BLOOD TEST: CPT

## 2019-06-06 PROCEDURE — 93005 ELECTROCARDIOGRAM TRACING: CPT | Performed by: EMERGENCY MEDICINE

## 2019-06-06 PROCEDURE — 84450 TRANSFERASE (AST) (SGOT): CPT | Performed by: EMERGENCY MEDICINE

## 2019-06-06 PROCEDURE — 85610 PROTHROMBIN TIME: CPT

## 2019-06-06 PROCEDURE — 70553 MRI BRAIN STEM W/O & W/DYE: CPT

## 2019-06-06 PROCEDURE — 80047 BASIC METABLC PNL IONIZED CA: CPT

## 2019-06-06 PROCEDURE — A9577 INJ MULTIHANCE: HCPCS | Performed by: INTERNAL MEDICINE

## 2019-06-06 PROCEDURE — 70450 CT HEAD/BRAIN W/O DYE: CPT

## 2019-06-06 PROCEDURE — 3E03317 INTRODUCTION OF OTHER THROMBOLYTIC INTO PERIPHERAL VEIN, PERCUTANEOUS APPROACH: ICD-10-PCS | Performed by: INTERNAL MEDICINE

## 2019-06-06 PROCEDURE — 70496 CT ANGIOGRAPHY HEAD: CPT

## 2019-06-06 PROCEDURE — 84460 ALANINE AMINO (ALT) (SGPT): CPT | Performed by: EMERGENCY MEDICINE

## 2019-06-06 PROCEDURE — 0 IOPAMIDOL PER 1 ML: Performed by: EMERGENCY MEDICINE

## 2019-06-06 PROCEDURE — 85730 THROMBOPLASTIN TIME PARTIAL: CPT | Performed by: EMERGENCY MEDICINE

## 2019-06-06 PROCEDURE — 0042T HC CT CEREBRAL PERFUSION W/WO CONTRAST: CPT

## 2019-06-06 PROCEDURE — 99291 CRITICAL CARE FIRST HOUR: CPT | Performed by: INTERNAL MEDICINE

## 2019-06-06 PROCEDURE — 25010000002 ALTEPLASE PER 1 MG: Performed by: EMERGENCY MEDICINE

## 2019-06-06 PROCEDURE — 70498 CT ANGIOGRAPHY NECK: CPT

## 2019-06-06 PROCEDURE — 25010000002 ALTEPLASE PER 1 MG

## 2019-06-06 RX ORDER — SODIUM CHLORIDE 0.9 % (FLUSH) 0.9 %
3 SYRINGE (ML) INJECTION EVERY 12 HOURS SCHEDULED
Status: DISCONTINUED | OUTPATIENT
Start: 2019-06-06 | End: 2019-06-07 | Stop reason: HOSPADM

## 2019-06-06 RX ORDER — ATORVASTATIN CALCIUM 40 MG/1
80 TABLET, FILM COATED ORAL NIGHTLY
Status: DISCONTINUED | OUTPATIENT
Start: 2019-06-06 | End: 2019-06-07 | Stop reason: HOSPADM

## 2019-06-06 RX ORDER — ACETAMINOPHEN 325 MG/1
650 TABLET ORAL EVERY 4 HOURS PRN
Status: DISCONTINUED | OUTPATIENT
Start: 2019-06-06 | End: 2019-06-07 | Stop reason: HOSPADM

## 2019-06-06 RX ORDER — ALUMINA, MAGNESIA, AND SIMETHICONE 2400; 2400; 240 MG/30ML; MG/30ML; MG/30ML
7.5 SUSPENSION ORAL EVERY 4 HOURS PRN
Status: DISCONTINUED | OUTPATIENT
Start: 2019-06-06 | End: 2019-06-06

## 2019-06-06 RX ORDER — ASPIRIN 325 MG
325 TABLET ORAL DAILY
Status: DISCONTINUED | OUTPATIENT
Start: 2019-06-07 | End: 2019-06-07 | Stop reason: HOSPADM

## 2019-06-06 RX ORDER — ASPIRIN 300 MG/1
300 SUPPOSITORY RECTAL DAILY
Status: DISCONTINUED | OUTPATIENT
Start: 2019-06-07 | End: 2019-06-07 | Stop reason: HOSPADM

## 2019-06-06 RX ORDER — SODIUM CHLORIDE 0.9 % (FLUSH) 0.9 %
10 SYRINGE (ML) INJECTION AS NEEDED
Status: DISCONTINUED | OUTPATIENT
Start: 2019-06-06 | End: 2019-06-07 | Stop reason: HOSPADM

## 2019-06-06 RX ORDER — BISACODYL 10 MG
10 SUPPOSITORY, RECTAL RECTAL DAILY PRN
Status: DISCONTINUED | OUTPATIENT
Start: 2019-06-06 | End: 2019-06-07 | Stop reason: HOSPADM

## 2019-06-06 RX ORDER — DOCUSATE SODIUM 100 MG/1
100 CAPSULE, LIQUID FILLED ORAL DAILY
Status: DISCONTINUED | OUTPATIENT
Start: 2019-06-06 | End: 2019-06-07 | Stop reason: HOSPADM

## 2019-06-06 RX ORDER — METOPROLOL SUCCINATE 25 MG/1
25 TABLET, EXTENDED RELEASE ORAL DAILY
Status: ON HOLD | COMMUNITY
End: 2019-06-11 | Stop reason: SDUPTHER

## 2019-06-06 RX ORDER — ONDANSETRON 2 MG/ML
4 INJECTION INTRAMUSCULAR; INTRAVENOUS EVERY 6 HOURS PRN
Status: DISCONTINUED | OUTPATIENT
Start: 2019-06-06 | End: 2019-06-07 | Stop reason: HOSPADM

## 2019-06-06 RX ORDER — ACETAMINOPHEN 650 MG/1
650 SUPPOSITORY RECTAL EVERY 4 HOURS PRN
Status: DISCONTINUED | OUTPATIENT
Start: 2019-06-06 | End: 2019-06-06

## 2019-06-06 RX ORDER — SODIUM CHLORIDE 0.9 % (FLUSH) 0.9 %
3-10 SYRINGE (ML) INJECTION AS NEEDED
Status: DISCONTINUED | OUTPATIENT
Start: 2019-06-06 | End: 2019-06-07 | Stop reason: HOSPADM

## 2019-06-06 RX ORDER — PANTOPRAZOLE SODIUM 40 MG/10ML
40 INJECTION, POWDER, LYOPHILIZED, FOR SOLUTION INTRAVENOUS
Status: DISCONTINUED | OUTPATIENT
Start: 2019-06-07 | End: 2019-06-06

## 2019-06-06 RX ADMIN — IOPAMIDOL 115 ML: 755 INJECTION, SOLUTION INTRAVENOUS at 09:40

## 2019-06-06 RX ADMIN — ALTEPLASE 62 MG: KIT at 09:58

## 2019-06-06 RX ADMIN — ATORVASTATIN CALCIUM 80 MG: 40 TABLET, FILM COATED ORAL at 20:08

## 2019-06-06 RX ADMIN — GADOBENATE DIMEGLUMINE 15 ML: 529 INJECTION, SOLUTION INTRAVENOUS at 17:30

## 2019-06-06 RX ADMIN — Medication 3 ML: at 21:06

## 2019-06-06 NOTE — ED PROVIDER NOTES
"Subjective   Mr. Jair Flores is a 74 y.o. male who presents to the ED with c/o right-sided numbness. He reports he was sitting at his desk at 0830 today when he experienced a sudden onset of right-sided numbness in his leg and lip. He is able to move his right side normally but states it \"feels like it's asleep\". The sensation is improving. He denies palpitations and trouble walking. He has a history of hypertension. He denies a history of stroke, atrial fibrillation, diabetes, and hyperlipidemia. There are no other acute complaints at this time.        History provided by:  Patient  Stroke   Presenting symptoms: sensory loss    Onset quality:  Sudden  Last known well:  0830  Timing:  Constant  Progression:  Improving  Similar to previous episodes: no        Review of Systems   Cardiovascular: Negative for palpitations.   Musculoskeletal: Negative for gait problem.   Neurological: Positive for facial asymmetry and numbness.   All other systems reviewed and are negative.      Past Medical History:   Diagnosis Date   • Cancer (CMS/HCC)     bladder; cysto surveillance 07/23/2017; free of disease   • Cataract     right eye   • Degenerative arthritis     right; knee replacement; successful   • Fluid overload 2015    after knee surgery   • GERD (gastroesophageal reflux disease)    • Hard to intubate     has been told he is difficult to intubate   • Hemorrhoids 09/12/2011    hemorrhoid ablation   • Hypertension    • Skin cancer     head   • Wears glasses        Allergies   Allergen Reactions   • Penicillins Anaphylaxis   • Rocephin [Ceftriaxone] Anaphylaxis   • Ciprofloxacin Swelling   • Macrobid [Nitrofurantoin Macrocrystal] Swelling       Past Surgical History:   Procedure Laterality Date   • COLONOSCOPY      routinely every 10 years   • CYSTOSCOPY      x3   • CYSTOSCOPY  07/23/2017    bladder cancer; cysto surveillance; free of disease   • EXCISIONAL HEMORRHOIDECTOMY      hemorrhoid ablation; hemorrhoid onset " 09/12/2011   • EYE SURGERY      as a child after cutting eye with barbwire fence   • KNEE ARTHROPLASTY, PARTIAL REPLACEMENT Left    • SKIN CANCER EXCISION     • TONSILLECTOMY     • TOTAL KNEE ARTHROPLASTY Right     Dgen arthritis   • TRANSURETHRAL RESECTION OF BLADDER TUMOR N/A 3/29/2019    Procedure: TRANSURETHRAL RESECTION OF BLADDER TUMOR WITH MITOMYCIN;  Surgeon: Jair Braxton MD;  Location: [facility];  Service: Urology       History reviewed. No pertinent family history.    Social History     Socioeconomic History   • Marital status:      Spouse name: Not on file   • Number of children: Not on file   • Years of education: Not on file   • Highest education level: Not on file   Tobacco Use   • Smoking status: Never Smoker   • Smokeless tobacco: Never Used   Substance and Sexual Activity   • Alcohol use: Yes     Alcohol/week: 1.8 oz     Types: 3 Shots of liquor per week     Comment: socially   • Drug use: Defer   • Sexual activity: Defer         Objective   Physical Exam   Constitutional: He is oriented to person, place, and time. He appears well-developed and well-nourished.   HENT:   Head: Normocephalic and atraumatic.   Nose: Nose normal.   Eyes: Conjunctivae are normal. No scleral icterus.   Neck: Normal range of motion. Neck supple.   Cardiovascular: Normal rate, regular rhythm and normal heart sounds.   No murmur heard.  Right carotid bruit.   Pulmonary/Chest: Effort normal and breath sounds normal. No respiratory distress.   Abdominal: Soft. There is no tenderness.   Musculoskeletal: Normal range of motion.   Neurological: He is alert and oriented to person, place, and time.   Mild right facial weakness.  Mild right lower extremity weakness.   Skin: Skin is warm and dry.   Psychiatric: His mood appears anxious.   The patient is anxious.   Nursing note and vitals reviewed.      Critical Care  Performed by: Brayden Mcfarland MD  Authorized by: Brayden Mcfarland MD     Critical care provider  statement:     Critical care time (minutes):  35    Critical care time was exclusive of:  Separately billable procedures and treating other patients    Critical care was necessary to treat or prevent imminent or life-threatening deterioration of the following conditions:  CNS failure or compromise    Critical care was time spent personally by me on the following activities:  Discussions with consultants, development of treatment plan with patient or surrogate, examination of patient, review of old charts, ordering and review of laboratory studies, ordering and review of radiographic studies, ordering and performing treatments and interventions, re-evaluation of patient's condition, obtaining history from patient or surrogate, pulse oximetry and evaluation of patient's response to treatment  Comments:      I attended to the patient immediately upon arrival.  Early recognition of CVA, recommendation and initiation of tPA therapy after prolonged and multiple discussions as he was reluctant to consent to tPA.  Stable hemodynamically and improved neurologically while in ED.               ED Course  ED Course as of Jun 06 1556   Thu Jun 06, 2019   0938 Talked to Mr. Flores about TPA and he requests I consult with Dr. Palmer before he makes a decision. -LJI  [HF]   0955 Mr. Flores has been hesitant to accept tPA recommendation after hearing about bleeding risks, has insisted on going to the bathroom twice, further delaying tPA decision/administration.  He finally accepted the recommendation after hearing that Dr. Palmer recommended he receive it.  He has no obvious contraindications.  He had a bladder procedure in March but no other more recent surgery, no history of head bleed, no melena.  BP borderline but within tPA guidelines.  [LI]   1047 Upon reexamination, the patient no longer has facial asymmetry or right lower extremity drift. -LJI  [HF]      ED Course User Index  [HF] Cristy Fajardo  [LI] Brayden Mcfarland,  MD       Recent Results (from the past 24 hour(s))   POC Protime / INR    Collection Time: 06/06/19  9:32 AM   Result Value Ref Range    Protime 12.2 (L) 12.8 - 15.2 seconds    INR 1.0 0.8 - 1.2   POC CHEM 8    Collection Time: 06/06/19  9:33 AM   Result Value Ref Range    Glucose 107 70 - 130 mg/dL    BUN 20 8 - 26 mg/dL    Creatinine 1.00 0.60 - 1.30 mg/dL    Sodium 143 138 - 146 mmol/L    Potassium 3.7 3.5 - 4.9 mmol/L    Chloride 104 98 - 109 mmol/L    Total CO2 26 24 - 29 mmol/L    Hemoglobin 13.9 12.0 - 17.0 g/dL    Hematocrit 41 38 - 51 %    Ionized Calcium 1.21 1.20 - 1.32 mmol/L   aPTT    Collection Time: 06/06/19  9:36 AM   Result Value Ref Range    PTT 33.2 24.0 - 37.0 seconds   Light Blue Top    Collection Time: 06/06/19  9:36 AM   Result Value Ref Range    Extra Tube hold for add-on    Troponin    Collection Time: 06/06/19  9:37 AM   Result Value Ref Range    Troponin T <0.010 0.000 - 0.030 ng/mL   AST    Collection Time: 06/06/19  9:37 AM   Result Value Ref Range    AST (SGOT) 26 1 - 40 U/L   ALT    Collection Time: 06/06/19  9:37 AM   Result Value Ref Range    ALT (SGPT) 15 1 - 41 U/L   Green Top (Gel)    Collection Time: 06/06/19  9:37 AM   Result Value Ref Range    Extra Tube Hold for add-ons.    Lavender Top    Collection Time: 06/06/19  9:37 AM   Result Value Ref Range    Extra Tube hold for add-on    Gold Top - SST    Collection Time: 06/06/19  9:37 AM   Result Value Ref Range    Extra Tube Hold for add-ons.    CBC Auto Differential    Collection Time: 06/06/19  9:37 AM   Result Value Ref Range    WBC 5.56 3.40 - 10.80 10*3/mm3    RBC 4.41 4.14 - 5.80 10*6/mm3    Hemoglobin 13.5 13.0 - 17.7 g/dL    Hematocrit 41.2 37.5 - 51.0 %    MCV 93.4 79.0 - 97.0 fL    MCH 30.6 26.6 - 33.0 pg    MCHC 32.8 31.5 - 35.7 g/dL    RDW 15.3 12.3 - 15.4 %    RDW-SD 53.1 37.0 - 54.0 fl    MPV 10.7 6.0 - 12.0 fL    Platelets 171 140 - 450 10*3/mm3    Neutrophil % 47.4 42.7 - 76.0 %    Lymphocyte % 36.5 19.6 - 45.3 %     Monocyte % 12.1 (H) 5.0 - 12.0 %    Eosinophil % 3.2 0.3 - 6.2 %    Basophil % 0.4 0.0 - 1.5 %    Immature Grans % 0.4 0.0 - 0.5 %    Neutrophils, Absolute 2.64 1.70 - 7.00 10*3/mm3    Lymphocytes, Absolute 2.03 0.70 - 3.10 10*3/mm3    Monocytes, Absolute 0.67 0.10 - 0.90 10*3/mm3    Eosinophils, Absolute 0.18 0.00 - 0.40 10*3/mm3    Basophils, Absolute 0.02 0.00 - 0.20 10*3/mm3    Immature Grans, Absolute 0.02 0.00 - 0.05 10*3/mm3    nRBC 0.0 0.0 - 0.2 /100 WBC   Hemoglobin A1c    Collection Time: 06/06/19  9:37 AM   Result Value Ref Range    Hemoglobin A1C 5.20 4.80 - 5.60 %     Note: In addition to lab results from this visit, the labs listed above may include labs taken at another facility or during a different encounter within the last 24 hours. Please correlate lab times with ED admission and discharge times for further clarification of the services performed during this visit.    XR Chest 1 View   Preliminary Result   Enlargement of the heart with no evidence of acute   parenchymal disease.       D:  06/06/2019   E:  06/06/2019                  CT Angiogram Head With & Without Contrast   Preliminary Result   1. Focal stenosis of the proximal left posterior cerebral artery,   difficult to characterize by NASCET criteria.   2. Suspected left M1/M2 stenosis.   3. Tortuosity of the distal left ICA mimicking an aneurysm on the   coronal series only. Remaining series appear normal and no aneurysm is   suspected.       D:  06/06/2019   E:  06/06/2019          CT Angiogram Neck With & Without Contrast   Preliminary Result   Irregular appearing noncalcified left ICA origin plaque,   cause only mild stenosis, but potentially unstable.   2. No evidence of hemodynamically significant carotid or vertebral   stenosis elsewhere in the neck.              CT Cerebral Perfusion With & Without Contrast   Preliminary Result   Old left frontoparietal infarct corresponding to CT scan   abnormality.  Otherwise negative perfusion  scan.       D:  06/06/2019   E:  06/06/2019          CT Head Without Contrast Stroke Protocol   Preliminary Result   Some low-density seen in the left posterior parietal region   as well as near the vertex on the left. There is some atrophy identified   near the left vertex. Findings suggest either old insult or possibly   underlying lesion in which MRI with contrast is recommended for further   evaluation.       Examination performed 06/06/2019 at 0920 hours and examination results   were given to the emergency room physician 06/06/2019 at the scanner at   time of performance of the examination.       D:  06/06/2019   E:  06/06/2019              CT Head Without Contrast    (Results Pending)   MRI Brain With & Without Contrast    (Results Pending)     Vitals:    06/06/19 1200 06/06/19 1215 06/06/19 1230 06/06/19 1300   BP: 148/58 131/61 141/55 160/57   BP Location:    Left arm   Patient Position:    Lying   Pulse: 56 51 51 57   Resp:    16   Temp:    98.3 °F (36.8 °C)   TempSrc:    Oral   SpO2: 94% 92% 93% 92%   Weight:       Height:         Medications   sodium chloride 0.9 % flush 10 mL (not administered)   sodium chloride 0.9 % flush 3 mL (not administered)   sodium chloride 0.9 % flush 3-10 mL (not administered)   atorvastatin (LIPITOR) tablet 80 mg (not administered)   aspirin tablet 325 mg (not administered)     Or   aspirin suppository 300 mg (not administered)   acetaminophen (TYLENOL) tablet 650 mg (not administered)   ondansetron (ZOFRAN) injection 4 mg (not administered)   docusate sodium (COLACE) capsule 100 mg (not administered)   bisacodyl (DULCOLAX) suppository 10 mg (not administered)   iopamidol (ISOVUE-370) 76 % injection 150 mL (115 mL Intravenous Given 6/6/19 0940)   alteplase (ACTIVASE) 0.09 mg/kg = 6.8 mg in sterile water (preservative free) 6.8 mL bolus (6.8 mg Intravenous Bolus from Bag 6/6/19 0957)   alteplase (ACTIVASE) 62 mg in sterile water (preservative free) 62 mL (1 mg/mL) infusion (0  mg/kg × 76.2 kg Intravenous Stopped 6/6/19 1058)     ECG/EMG Results (last 24 hours)     ** No results found for the last 24 hours. **        ECG 12 Lead   Final Result   Test Reason : Acute Stroke Protocol (onset < 12 hrs)   Blood Pressure : **/** mmHG   Vent. Rate : 061 BPM     Atrial Rate : 061 BPM      P-R Int : 180 ms          QRS Dur : 096 ms       QT Int : 460 ms       P-R-T Axes : 076 -09 -05 degrees      QTc Int : 463 ms      Normal sinus rhythm   Inferior infarct (cited on or before 22-MAR-2019)   Abnormal ECG   When compared with ECG of 22-MAR-2019 09:28,   No significant change was found   Confirmed by BRAYDEN MCFARLAND MD ([id number]) on 6/6/2019 10:43:07 AM      Referred By:  EDMD           Confirmed By:BRAYDEN MCFARLAND MD                        [facility]    Final diagnoses:   Acute CVA (cerebrovascular accident) (CMS/HCC)   Received intravenous tissue plasminogen activator (tPA) in emergency department       Documentation assistance provided by mayuri Fajardo.  Information recorded by the scribe was done at my direction and has been verified and validated by me.    I, Brayden Mcfarland MD, attest that this documentation has been prepared under the direction and in the presence of (Dr. Bartolo MD.)  Electronically signed: Brayden Mcfarland MD, carolibe. 06/06/19 3:56 PM         Cristy Fajardo  06/06/19 1015       Cristy Fajardo  06/06/19 1027       Brayden Mcfarland MD  06/06/19 7556

## 2019-06-06 NOTE — TELEPHONE ENCOUNTER
Pt's wife called wanting to inform you that pt had an episode this morning at work , he has numbing in cheek,ear and top of lip doesn't know which side ,funny feeling in shoulder  Pt is on the way to  in an ambulance

## 2019-06-06 NOTE — PLAN OF CARE
Problem: Patient Care Overview  Goal: Plan of Care Review  Outcome: Ongoing (interventions implemented as appropriate)   06/06/19 2702   Coping/Psychosocial   Plan of Care Reviewed With patient   SLP evaluation completed. No SLP intervention indicated. Will sign-off. Please see note for further details and recommendations.

## 2019-06-06 NOTE — PROGRESS NOTES
Discharge Planning Assessment  [facility]     Patient Name: Jair Flores  MRN: [id number]  Today's Date: 6/6/2019    Admit Date: 6/6/2019    Discharge Needs Assessment     Row Name 06/06/19 1104       Living Environment    Lives With  spouse pt resides in [facility]    Name(s) of Who Lives With Patient  wife- Carey    Current Living Arrangements  home/apartment/condo    Primary Care Provided by  self    Provides Primary Care For  no one    Family Caregiver if Needed  spouse    Family Caregiver Names  Carey    Quality of Family Relationships  helpful;involved;supportive    Able to Return to Prior Arrangements  yes       Transition Planning    Patient/Family Anticipates Transition to  home with family    Transportation Anticipated  family or friend will provide       Discharge Needs Assessment    Readmission Within the Last 30 Days  no previous admission in last 30 days    Concerns to be Addressed  discharge planning    Equipment Currently Used at Home  none    Anticipated Changes Related to Illness  none    Equipment Needed After Discharge  other (see comments) TBD        Discharge Plan     Row Name 06/06/19 1104       Plan    Plan  home    Patient/Family in Agreement with Plan  yes    Plan Comments  CM spoke with pt and wife Carey at bedside. Pt resides in [facility] and was independent of adl's prior to admission. Pt denies current home health or outpt services. Pt plans to return home and denies needs at this time. CM will continue to follow for discharge needs.     Final Discharge Disposition Code  01 - home or self-care        Destination      No service coordination in this encounter.      Durable Medical Equipment      No service coordination in this encounter.      Dialysis/Infusion      No service coordination in this encounter.      Home Medical Care      No service coordination in this encounter.      Therapy      No service coordination in this encounter.      Community Resources      No service  coordination in this encounter.          Demographic Summary     Row Name 06/06/19 1100       General Information    Referral Source  admission list    Reason for Consult  discharge planning    Preferred Language  English    General Information Comments  PCP- Osmar Clemons       Contact Information    Permission Granted to Share Info With      Contact Information Comments  [phone] or [phone]        Functional Status     Row Name 06/06/19 1100       Functional Status    Usual Activity Tolerance  good    Current Activity Tolerance  moderate       Functional Status, IADL    Medications  independent    Meal Preparation  independent    Housekeeping  independent    Laundry  independent    Shopping  independent       Employment/    Employment/ Comments  Pt confirms he has [employer] and medicare as secondary. Pt denies concerns or disruption in coverage. Pt confirms he has prescription drug coverage and denies issues obtaining or affording current medications.         Psychosocial    No documentation.       Abuse/Neglect    No documentation.       Legal    No documentation.       Substance Abuse    No documentation.       Patient Forms    No documentation.           Ele Mckinley

## 2019-06-06 NOTE — PLAN OF CARE
Problem: Patient Care Overview  Goal: Plan of Care Review  Outcome: Ongoing (interventions implemented as appropriate)   06/06/19 1600 06/06/19 1654   Coping/Psychosocial   Plan of Care Reviewed With patient --    Plan of Care Review   Progress --  no change   OTHER   Outcome Summary --  Patient admitted from ED with a CVA s/p TPA. Inital NIH was a 3 with facial, R leg, R foot numbness and weakness. Patient reported resolution of symptoms within 5 minutes of TPA administration. No neuro deficits at this time - NIH continues to be a 0 at this time. MRI with and without obtained per neurology, will get ECHO and carotid duplex as well. Patient is afebrile, HR 50-60s, -170s, RA and tolerating well. No complaints of pain, HA, vertigo, numbess, or tingling. Will continue to monitor in ICU and continue with post TPA protocol.     Goal: Discharge Needs Assessment   06/06/19 1104 06/06/19 1319 06/06/19 1324   Discharge Needs Assessment   Readmission Within the Last 30 Days no previous admission in last 30 days --  --    Concerns to be Addressed discharge planning --  --    Patient/Family Anticipates Transition to --  --  home with family   Patient/Family Anticipated Services at Transition --  --  none   Transportation Anticipated --  --  family or friend will provide   Anticipated Changes Related to Illness none --  --    Equipment Needed After Discharge other (see comments)  (TBD) --  --    Disability   Equipment Currently Used at Home --  none --      Goal: Interprofessional Rounds/Family Conf   06/06/19 1654   Interdisciplinary Rounds/Family Conf   Participants pharmacy;physician       Problem: Stroke (Ischemic) (Adult)  Intervention: Monitor/Assist with Self Care   06/06/19 1600 06/06/19 1654   Daily Care Interventions   Self-Care Promotion --  independence encouraged   Activity   Activity Assistance Provided assistance, 1 person --      Intervention: Provide Oxygenation/Ventilation/Perfusion Support   06/06/19  1600 06/06/19 1654   Positioning   Head of Bed (HOB) --  HOB at 30-45 degrees   Activity   Activity Management bedrest --      Intervention: Support Psychosocial Response to Stroke   06/06/19 1600 06/06/19 1654   Coping/Psychosocial Interventions   Supportive Measures --  active listening utilized;goal setting facilitated;positive reinforcement provided;self-reflection promoted   Environmental Support --  calm environment promoted   Psychosocial Support   Family/Support System Care involvement promoted;presence promoted --      Intervention: Manage Hypertension/Promote Hemodynamic Stability   06/06/19 1600 06/06/19 1654   Safety Interventions   Bleeding Precautions --  blood pressure closely monitored;monitored for signs of bleeding   Safety Management   Medication Review/Management medications reviewed --      Intervention: Protect/Optimize Cerebral Perfusion   06/06/19 1654   Neurological Interventions   Cerebral Edema Prevention/Management blood pressure monitored     Intervention: Protect Affected Joints and Extremities   06/06/19 1600   Positioning   Positioning/Transfer Devices pillows;in use   Body Position position maintained     Intervention: Prevent/Minimize Shear/Friction Injuries   06/06/19 1600 06/06/19 1654   Skin Interventions   Pressure Reduction Devices --  positioning supports utilized   Positioning   Positioning/Transfer Devices pillows;in use --      Intervention: Prevent/Manage DVT/VTE Risk   06/06/19 1300   Interventions   VTE Prevention/Management bleeding risk factor(s) identified  (s/p TPA)     Intervention: Prevent or Minimize Pressure   06/06/19 1600   Positioning   Body Position position maintained     Intervention: Promote/Optimize Sensory/Motor Ability   06/06/19 1654   Cognitive Interventions   Sensory Stimulation Regulation quiet environment promoted       Goal: Signs and Symptoms of Listed Potential Problems Will be Absent, Minimized or Managed (Stroke)  Outcome: Ongoing  (interventions implemented as appropriate)   06/06/19 2882   Goal/Outcome Evaluation   Problems Assessed (Stroke (Ischemic)) all   Problems Assessed (Stroke (Ischemic)) motor/sensory impairment

## 2019-06-06 NOTE — H&P
INTENSIVIST   HOSPITAL VISIT NOTE     Hospital:  LOS: 0 days     Subjective   S     Mr. Jair Flores, 74 y.o. male is followed for:    Stroke    As an Intensivist, we provide an integrated approach to the ICU patient and family, medical management of comorbid conditions, including but not limited to electrolytes, glycemic control, organ dysfunction, lead interdisciplinary rounds and coordinate the care with all other services, including those from other specialists.     HPI:  Mr. Flores is a 74 year-old male came to the ED this morning with complaints of right-sided numbness in his leg and lip.    He is being admitted to the ICU, s/p tPA administration. He is able to move his right side normally but says it feels like it's asleep.     Last known well time is approximately 0830. 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 He noticed numbness in his lips and right side of his body.    The sensation was improving in the ED even prior to beginning tPA, and resolved completely during its infusion. His original NIH score was a 3 ? NIH was a 0, post tPA.      PMH: He  has a past medical history of Cancer (CMS/[facility]), Cataract, Degenerative arthritis, Fluid overload (2015), GERD (gastroesophageal reflux disease), Hard to intubate, Hemorrhoids (09/12/2011), Hypertension, Skin cancer, and Wears glasses.   PSxH: He  has a past surgical history that includes Skin cancer excision; Tonsillectomy; Eye surgery; knee arthroplasty, partial replacement (Left); Colonoscopy; Cystoscopy; Transurethral resection of bladder tumor (N/A, 3/29/2019); Excisional hemorrhoidectomy; Total knee arthroplasty (Right); and Cystoscopy (07/23/2017).      Medications:  No current facility-administered medications on file prior to encounter.      Current Outpatient Medications on File Prior to Encounter   Medication Sig   • allopurinol (ZYLOPRIM) 100 MG tablet Take 100 mg by mouth Daily.   •  Ascorbic Acid (VITAMIN C) 500 MG chewable tablet Chew 500 mg Daily.   • enalapril (VASOTEC) 20 MG tablet Take 40 mg by mouth Daily. 2 tabs (40mg total) once daily   • metoprolol succinate XL (TOPROL-XL) 25 MG 24 hr tablet Take 25 mg by mouth Daily.   • Multiple Vitamins-Minerals (HM MULTIVITAMIN ADULT GUMMY) chewable tablet Chew 1 tablet Daily.   • NIFEdipine XL (PROCARDIA XL) 60 MG 24 hr tablet Take 60 mg by mouth Daily.   • [DISCONTINUED] metoprolol succinate XL (TOPROL-XL) 25 MG 24 hr tablet TAKE 1 TABLET BY MOUTH DAILY   • [DISCONTINUED] Multiple Vitamins-Minerals (MULTIVITAMIN ADULT PO) Take 1 tablet by mouth Daily.   • [DISCONTINUED] atorvastatin (LIPITOR) 20 MG tablet Take 1 tablet by mouth Daily.       Allergies: He is allergic to penicillins; rocephin [ceftriaxone]; ciprofloxacin; and macrobid [nitrofurantoin macrocrystal].   FH: His family history is not on file.   SH: He  reports that he has never smoked. He has never used smokeless tobacco. He reports that he drinks about 1.8 oz of alcohol per week. Drug use questions deferred to the physician.     ROS:   As described in the HPI. All other systems reviewed and negative.    Objective   O     .Vitals:  Temp: 98.1 °F (36.7 °C) (06/06/19 1000) Temp  Min: 98.1 °F (36.7 °C)  Max: 98.1 °F (36.7 °C)   BP: 158/61 (06/06/19 1045) BP  Min: 151/63  Max: 180/82   Pulse: 58 (06/06/19 1042) Pulse  Min: 58  Max: 75   Resp: 20 (06/06/19 0953) Resp  Min: 20  Max: 20   SpO2: 95 % (06/06/19 1049) SpO2  Min: 94 %  Max: 98 %   Device:      Flow Rate:   No Data Recorded     Physical Examination    Telemetry:            Constitutional:  No acute distress.   Cardiovascular: Normal rate, regular and rhythm. Normal heart sounds.  No murmurs, gallop or rub.   Respiratory: No respiratory distress.  Normal breath sounds  No adventitious sounds    Abdominal:  Soft with no tenderness  No distension. No HSM.   Extremities: Warm with no cyanosis.  No peripheral edema.   Neurological:    Alert and Oriented to person, place, and time.  Best Eye Response: 4-->(E4) spontaneous (06/06/19 1037)  Best Motor Response: 6-->(M6) obeys commands (06/06/19 1037)  Best Verbal Response: 5-->(V5) oriented (06/06/19 1037)  [place] Coma Scale Score: 15 (06/06/19 1037)   Lines/Drains/Airways: Peripheral IV(s)     NIH Stroke Scale  Interval: baseline (06/06/19 0935)  1a. Level of Consciousness: 0-->Alert, keenly responsive (06/06/19 1054)  1b. LOC Questions: 0-->Answers both questions correctly (06/06/19 1054)  1c. LOC Commands: 0-->Performs both tasks correctly (06/06/19 1054)  2. Best Gaze: 0-->Normal (06/06/19 1054)  3. Visual: 0-->No visual loss (06/06/19 1054)  4. Facial Palsy: 0-->Normal symmetrical movements (06/06/19 1054)  5a. Motor Arm, Left: 0-->No drift, limb holds 90 (or 45) degrees for full 10 secs (06/06/19 1054)  5b. Motor Arm, Right: 0-->No drift, limb holds 90 (or 45) degrees for full 10 secs (06/06/19 1054)  6a. Motor Leg, Left: 0-->No drift, leg holds 30 degree position for full 5 secs (06/06/19 1054)  6b. Motor Leg, Right: 0-->No drift, leg holds 30 degree position for full 5 secs (06/06/19 1054)  7. Limb Ataxia: 0-->Absent (06/06/19 1054)  8. Sensory: 0-->Normal, no sensory loss (06/06/19 1054)  9. Best Language: 0-->No aphasia, normal (06/06/19 1054)  10. Dysarthria: 0-->Normal (06/06/19 1054)  11. Extinction and Inattention (formerly Neglect): 0-->No abnormality (06/06/19 1054)  Total (NIH Stroke Scale): 0 (06/06/19 1054)    Hematology:  Results from last 7 days   Lab Units 06/06/19  0933   HEMOGLOBIN, POC g/dL 13.9     Chemistry:  Estimated Creatinine Clearance: 70.9 mL/min (by C-G formula based on SCr of 1 mg/dL).        Results from last 7 days   Lab Units 06/06/19  0933   CREATININE mg/dL 1.00     Hepatic:  Results from last 7 days   Lab Units 06/06/19  0937   ALT (SGPT) U/L 15   AST (SGOT) U/L 26     Images:  Ct Angiogram Head With & Without Contrast    Result Date: 6/6/2019  1. Focal  stenosis of the proximal left posterior cerebral artery, difficult to characterize by NASCET criteria. 2. Suspected left M1/M2 stenosis. 3. Tortuosity of the distal left ICA mimicking an aneurysm on the coronal series only. Remaining series appear normal and no aneurysm is suspected.  D:  06/06/2019 E:  06/06/2019      Ct Angiogram Neck With & Without Contrast    Result Date: 6/6/2019  Irregular appearing noncalcified left ICA origin plaque, cause only mild stenosis, but potentially unstable. 2. No evidence of hemodynamically significant carotid or vertebral stenosis elsewhere in the neck.       Xr Chest 1 View    Result Date: 6/6/2019  Enlargement of the heart with no evidence of acute parenchymal disease.  D:  06/06/2019 E:  06/06/2019        Ct Head Without Contrast Stroke Protocol    Result Date: 6/6/2019  Some low-density seen in the left posterior parietal region as well as near the vertex on the left. There is some atrophy identified near the left vertex. Findings suggest either old insult or possibly underlying lesion in which MRI with contrast is recommended for further evaluation.  Examination performed 06/06/2019 at 0920 hours and examination results were given to the emergency room physician 06/06/2019 at the scanner at time of performance of the examination.  D:  06/06/2019 E:  06/06/2019       Ct Cerebral Perfusion With & Without Contrast    Result Date: 6/6/2019  Old left frontoparietal infarct corresponding to CT scan abnormality.  Otherwise negative perfusion scan.  D:  06/06/2019 E:  06/06/2019        Echo:       Results: Reviewed.  I reviewed the patient's new laboratory and imaging results.  I independently reviewed the patient's new images.    Medications: Reviewed.    Assessment/Plan   A / P     Assessment:    74 y.o.male, admitted on 6/6/2019 with:    1. AIS s/p tPA  2. HTN  3. Dyslipidemia - He got a prescription to start statins 2 weeks ago, but has not filled it  yet.  4. GERD  5. Gout  6. Glucose: No h/o DM    Results from last 7 days   Lab Units 06/06/19  0933   GLUCOSE mg/dL 107     No results found for: HGBA1C    Diet: NPO Diet   Advance Directives: Code Status and Medical Interventions:   Ordered at: 06/06/19 1114     Code Status:    CPR     Medical Interventions (Level of Support Prior to Arrest):    Full        Plan:    1. ICU admission  2. CVA ischemic pathway, post tpa  3. F/u CT post tPA  4. MRI per Neuro  5. Maintain Oxygen saturation > 94%  6. Keep glucose 140-180 mg/dL  7. Temperature control, avoid fevers.  8. Dysphagia evaluation  9. PT/OT evaluation  10. Neurology consult: Discussed with Dr. Eid    Plan of care and goals reviewed during interdisciplinary rounds.  I discussed the patient's findings and my recommendations with patient and nursing staff    Level of Risk is High due to:  illness with threat to life or bodily function.     Time: was greater than 35 minutes.    (This excludes time spent performing separately reportable procedures and services).  Patient was at high risk of imminent or life-threatening deterioration in his condition due to Stroke.     Manolo Betancourt MD, FACP, FCCP, CNSC  Intensive Care Medicine, Nutrition Support and Pulmonary Medicine

## 2019-06-06 NOTE — CONSULTS
Neurology    Referring provider:   No referring provider defined for this encounter.    Reason for Consultation: Stroke    Chief complaint: Right arm leg and face tingling    History of present illness: 74-year-old man seen for Dr. Hernandez for evaluation of stroke.    He experiences at 8:30 in the morning and arrived here with an NIH score of 3 and got IV TPA.    He is currently asymptomatic.    He denies previous history of stroke.    He follows with Dr. Clemons and has had his blood pressure well controlled on 3 medications.  His last pressure in the office was 128 systolic.    He does have dyslipidemia with an LDL cholesterol of 142.    He does not smoke.    He exercises regularly.    He is not diabetic.    He drinks minimally.    Denies history of stroke or heart attack in the past.        Review of Systems: She had a concussion with a total loss of consciousness when he was in school.    He had an episode of malignant hypertension which presented with the vertigo with required hospitalization overnight under the care of Dr. Clemons in the remote past.    All other systems reviewed and are negative.        Home meds:   Medications Prior to Admission   Medication Sig Dispense Refill Last Dose   • allopurinol (ZYLOPRIM) 100 MG tablet Take 100 mg by mouth Daily.   6/6/2019 at Unknown time   • Ascorbic Acid (VITAMIN C) 500 MG chewable tablet Chew 500 mg Daily.   6/6/2019 at Unknown time   • enalapril (VASOTEC) 20 MG tablet Take 40 mg by mouth Daily. 2 tabs (40mg total) once daily   6/6/2019 at Unknown time   • metoprolol succinate XL (TOPROL-XL) 25 MG 24 hr tablet Take 25 mg by mouth Daily.   6/6/2019 at Unknown time   • Multiple Vitamins-Minerals (HM MULTIVITAMIN ADULT GUMMY) chewable tablet Chew 1 tablet Daily.   6/6/2019 at Unknown time   • NIFEdipine XL (PROCARDIA XL) 60 MG 24 hr tablet Take 60 mg by mouth Daily.   6/6/2019 at Unknown time       History  Past Medical History:   Diagnosis Date   • Cancer (CMS/HCC)   "   bladder; cysto surveillance 07/23/2017; free of disease   • Cataract     right eye   • Degenerative arthritis     right; knee replacement; successful   • Fluid overload 2015    after knee surgery   • GERD (gastroesophageal reflux disease)    • Hard to intubate     has been told he is difficult to intubate   • Hemorrhoids 09/12/2011    hemorrhoid ablation   • Hypertension    • Skin cancer     head   • Wears glasses    ,   Past Surgical History:   Procedure Laterality Date   • COLONOSCOPY      routinely every 10 years   • CYSTOSCOPY      x3   • CYSTOSCOPY  07/23/2017    bladder cancer; cysto surveillance; free of disease   • EXCISIONAL HEMORRHOIDECTOMY      hemorrhoid ablation; hemorrhoid onset 09/12/2011   • EYE SURGERY      as a child after cutting eye with barbwire fence   • KNEE ARTHROPLASTY, PARTIAL REPLACEMENT Left    • SKIN CANCER EXCISION     • TONSILLECTOMY     • TOTAL KNEE ARTHROPLASTY Right     Dgen arthritis   • TRANSURETHRAL RESECTION OF BLADDER TUMOR N/A 3/29/2019    Procedure: TRANSURETHRAL RESECTION OF BLADDER TUMOR WITH MITOMYCIN;  Surgeon: Jair Braxton MD;  Location: [facility];  Service: Urology   , History reviewed. No pertinent family history.,   Social History     Tobacco Use   • Smoking status: Never Smoker   • Smokeless tobacco: Never Used   Substance Use Topics   • Alcohol use: Yes     Alcohol/week: 1.8 oz     Types: 3 Shots of liquor per week     Comment: socially   • Drug use: Defer    and Allergies:  Penicillins; Rocephin [ceftriaxone]; Ciprofloxacin; and Macrobid [nitrofurantoin macrocrystal],    Vital Signs   Blood pressure 160/57, pulse 57, temperature 98.3 °F (36.8 °C), temperature source Oral, resp. rate 16, height 177.8 cm (70\"), weight 76.2 kg (168 lb), SpO2 92 %.  Body mass index is 24.11 kg/m².    Physical Exam:   General: Pleasant white male in no distress              Head: No signs of trauma              Neck: No bruits              Resp: Normal breath              Cor: " Regular rhythm              Extremties: No edema              Skin: Warm and dry              Neuro: Alert and oriented with normal memory attention and concentration.  Fund of knowledge is normal.    Speech is articulate with no word finding difficulties.  He has no comprehension difficulties.    Cranial nerves show full visual fields to confrontation.  His optic fundi are normal.  Pupils are equal eye movements are full he has no ptosis.  Facial movement and sensation are symmetrical and normal.    Palate elevates normally tongue protrudes normally.    Reflexes are 2+ right 1+ left.    Coordination is normal finger-to-nose and fine finger movements.    Motor testing shows equal  and no pronator drift.  He has antigravity extension both legs which are quite strong.    Sensory testing is normal including double simultaneous stimulation.        Results Review: Total cholesterol is 213 with an LDL cholesterol 142.    Random glucose is 107.  Her graph CT that was personally reviewed and shows low densities in the left posterior parietal as well as over the left vertex.    Perfusion shows an old left frontoparietal infarct corresponding to the abnormality on CT.  No acute changes are seen.    CT angiogram shows extensive irregular internal carotid artery origin plaque but only mild narrowing at 30%.  Vertebrals appear normal.    CT a of the head shows focal stenosis of the proximal left posterior cerebral artery with suspected left M1 M2 stenosis.    Questionable left ICA aneurysm        Labs:  Lab Results (last 72 hours)     Procedure Component Value Units Date/Time    [place] Draw [[id number]] Collected:  06/06/19 0936    Specimen:  Blood Updated:  06/06/19 1340    Narrative:       The following orders were created for panel order [place] Draw.  Procedure                               Abnormality         Status                     ---------                               -----------         ------                      Light Blue Top[[id number]]                                   Final result               Green Top (Gel)[[id number]]                                  Final result               Lavender Top[[id number]]                                     Final result               Gold Top - SST[[id number]]                                   Final result               Green Top (No Gel)[[id number]]                                                            Please view results for these tests on the individual orders.    aPTT [[id number]]  (Normal) Collected:  06/06/19 0936    Specimen:  Blood Updated:  06/06/19 1324     PTT 33.2 seconds     Narrative:       PTT = The equivalent PTT values for the therapeutic range of heparin levels at 0.3 to 0.5 U/ml are 55 to 70 seconds.    Light Blue Top [[id number]] Collected:  06/06/19 0936    Specimen:  Blood Updated:  06/06/19 1316     Extra Tube hold for add-on     Comment: Auto resulted       Lavender Top [[id number]] Collected:  06/06/19 0937    Specimen:  Blood Updated:  06/06/19 1131     Extra Tube hold for add-on     Comment: Auto resulted       CBC & Differential [[id number]] Collected:  06/06/19 0937    Specimen:  Blood Updated:  06/06/19 1124    Narrative:       The following orders were created for panel order CBC & Differential.  Procedure                               Abnormality         Status                     ---------                               -----------         ------                     CBC Auto Differential[[id number]]        Abnormal            Final result                 Please view results for these tests on the individual orders.    CBC Auto Differential [[id number]]  (Abnormal) Collected:  06/06/19 0937    Specimen:  Blood Updated:  06/06/19 1124     WBC 5.56 10*3/mm3      RBC 4.41 10*6/mm3      Hemoglobin 13.5 g/dL      Hematocrit 41.2 %      MCV 93.4 fL      MCH 30.6 pg      MCHC 32.8 g/dL      RDW 15.3 %      RDW-SD 53.1 fl      MPV 10.7 fL      Platelets 171 10*3/mm3       Neutrophil % 47.4 %      Lymphocyte % 36.5 %      Monocyte % 12.1 %      Eosinophil % 3.2 %      Basophil % 0.4 %      Immature Grans % 0.4 %      Neutrophils, Absolute 2.64 10*3/mm3      Lymphocytes, Absolute 2.03 10*3/mm3      Monocytes, Absolute 0.67 10*3/mm3      Eosinophils, Absolute 0.18 10*3/mm3      Basophils, Absolute 0.02 10*3/mm3      Immature Grans, Absolute 0.02 10*3/mm3      nRBC 0.0 /100 WBC     Green Top (Gel) [[id number]] Collected:  06/06/19 0937    Specimen:  Blood Updated:  06/06/19 1046     Extra Tube Hold for add-ons.     Comment: Auto resulted.       Gold Top - SST [[id number]] Collected:  06/06/19 0937    Specimen:  Blood Updated:  06/06/19 1046     Extra Tube Hold for add-ons.     Comment: Auto resulted.       Troponin [[id number]]  (Normal) Collected:  06/06/19 0937    Specimen:  Blood Updated:  06/06/19 1014     Troponin T <0.010 ng/mL     Narrative:       Troponin T Reference Range:  <= 0.03 ng/mL-   Negative for AMI  >0.03 ng/mL-     Abnormal for myocardial necrosis.  Clinicians would have to utilize clinical acumen, EKG, Troponin and serial changes to determine if it is an Acute Myocardial Infarction or myocardial injury due to an underlying chronic condition.     AST [[id number]]  (Normal) Collected:  06/06/19 0937    Specimen:  Blood Updated:  06/06/19 1014     AST (SGOT) 26 U/L     ALT [[id number]]  (Normal) Collected:  06/06/19 0937    Specimen:  Blood Updated:  06/06/19 1014     ALT (SGPT) 15 U/L     POC CHEM 8 [[id number]]  (Normal) Collected:  06/06/19 0933    Specimen:  Blood Updated:  06/06/19 0937     Glucose 107 mg/dL      BUN 20 mg/dL      Creatinine 1.00 mg/dL      Sodium 143 mmol/L      Potassium 3.7 mmol/L      Chloride 104 mmol/L      Total CO2 26 mmol/L      Hemoglobin 13.9 g/dL      Comment: Serial Number: [id number]:  [id number]        Hematocrit 41 %      Ionized Calcium 1.21 mmol/L     POC Protime / INR [[id number]]  (Abnormal) Collected:  06/06/19 0932    Specimen:   Blood Updated:  06/06/19 0936     Protime 12.2 seconds      INR 1.0     Comment: Serial Number: [id number]:  [id number]             Rads:  Imaging Results (last 72 hours)     Procedure Component Value Units Date/Time    XR Chest 1 View [[id number]] Collected:  06/06/19 1010     Updated:  06/06/19 1104    Narrative:       EXAMINATION: XR CHEST 1 VW-06/06/2019:      INDICATION: Acute Stroke Protocol (onset < 12 hrs); I63.9-Cerebral  infarction, unspecified.      COMPARISON: NONE.     FINDINGS: Portable chest reveals cardiac and mediastinal silhouettes  within normal limits. The lung fields are grossly clear. Degenerative  changes seen within the spine. Pulmonary vascularity is within normal  limits. No focal parenchymal opacification present.           Impression:       Enlargement of the heart with no evidence of acute  parenchymal disease.     D:  06/06/2019  E:  06/06/2019             CT Angiogram Head With & Without Contrast [[id number]] Collected:  06/06/19 1001     Updated:  06/06/19 1036    Narrative:       EXAMINATION: CT ANGIOGRAM HEAD W WO CONTRAST-      INDICATION: Sensation loss.      TECHNIQUE: Pre and postcontrast 0.75 mm axial images through the brain  with multiplanar 2-D reconstructions.     The radiation dose reduction device was turned on for each scan per the  ALARA (As Low as Reasonably Achievable) protocol.     COMPARISON: None.     FINDINGS: Distal vertebral arteries and basilar artery appear within  normal limits. Posterior cerebral arteries appear respectively normal on  the right, and appear to show proximal very focal stenoses on the left.  Left posterior cerebral artery appears to be supplied mostly or entirely  from the left posterior communicating artery. Please refer to axial  image 17 series 7. Remainder of the left posterior cerebral artery  appears grossly normal.     Coronal image #18 of series 6 gives the impression of a supraclinoid  left ICA aneurysm, however, sagittal and coronal  images show this  segment as normal in appearance, and this appears to represent vessel  tortuosity and volume averaging effect instead. ICAs otherwise appear  unremarkable. Anterior and middle cerebral arteries appear grossly  normal. Proximal right MCA branches appear normal. On the left, there is  a suggestion of a potentially significant [word not clear] to stenosis.  Please refer to coronal image 17 series 6.       Impression:       1. Focal stenosis of the proximal left posterior cerebral artery,  difficult to characterize by NASCET criteria.  2. Suspected left M1/M2 stenosis.  3. Tortuosity of the distal left ICA mimicking an aneurysm on the  coronal series only. Remaining series appear normal and no aneurysm is  suspected.     D:  06/06/2019  E:  06/06/2019       CT Angiogram Neck With & Without Contrast [[id number]] Collected:  06/06/19 1007     Updated:  06/06/19 1013    Narrative:       EXAMINATION: CT ANGIOGRAM NECK W WO CONTRAST-      INDICATION: Bruit, neck, and/or risk factors for CVD     TECHNIQUE: Pre and postcontrast 0.75 mm axial images through the neck  with multiplanar 2-D reconstructions of the carotid arteries and  vertebral arteries     The radiation dose reduction device was turned on for each scan per the  ALARA (As Low as Reasonably Achievable) protocol.     COMPARISON: NONE     FINDINGS: The brachiocephalic vessels.     Normally from the aortic arch. Both proximal subclavian arteries are  tortuous, but no significant stenosis is seen.     On the right, no significant common carotid stenosis is seen at the  level of the carotid bulb. There is mild calcified and noncalcified  plaquing but no more than 20% ICA stenosis is suspected. Remainder of  the ICA, and the right ECA appear normal.     On the left, common carotid appears normal up to level of the carotid  bulb. Left ECA is large. There is extensive and irregular left ICA  origin plaque as seen on axial image 46 of series 5. This appears  to  cause only relatively mild narrowing, less than 30%. Remainder left ICA  appears normal.     The vertebral arteries appear codominant and grossly normal.        No mass adenopathy or inflammatory change is appreciated in the neck  soft tissues.                Impression:       Irregular appearing noncalcified left ICA origin plaque,  cause only mild stenosis, but potentially unstable.  2. No evidence of hemodynamically significant carotid or vertebral  stenosis elsewhere in the neck.          CT Cerebral Perfusion With & Without Contrast [[id number]] Collected:  06/06/19 0958     Updated:  06/06/19 1008    Narrative:       EXAMINATION: CT CEREBRAL PERFUSION W WO CONTRAST- 06/06/2019      INDICATION: Focal neuro deficit, new, fixed or worsening, <6 hours     TECHNIQUE: Cerebral perfusion analysis was performed following dynamic  IV contrast infusion, with computer analysis and mapping forming transit  time and time to drain cerebral blood flow and cerebral blood volume.     The radiation dose reduction device was turned on for each scan per the  ALARA (As Low as Reasonably Achievable) protocol.     COMPARISON: Head CT scan of same date     FINDINGS: Previous exam report indicates probable left frontoparietal  encephalomalacia but no clearly acute disease.     Perfusion images show increased mean transit time and time to drain in  the area of encephalomalacia, decreased blood line and blood flow,  consistent with old infarct. No significant asymmetry of perfusion is  seen to suggest ischemia/infarct elsewhere.       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  Otherwise negative perfusion scan.     D:  06/06/2019  E:  06/06/2019       CT Head Without Contrast Stroke Protocol [[id number]] Collected:  06/06/19 0931     Updated:  06/06/19 1001    Narrative:       EXAMINATION: CT HEAD WO CONTRAST STROKE PROTOCOL- 06/06/2019      INDICATION: Stroke; right lower extremity  numbness     TECHNIQUE: Multiple axial CT imaging was obtained of the brain without  the administration of gadolinium contrast.     The radiation dose reduction device was turned on for each scan per the  ALARA (As Low as Reasonably Achievable) protocol.     COMPARISON: NONE     FINDINGS: There is low-density area seen posteriorly within the left  parietal lobe. There is some atrophy identified of the left vertex.  Findings may suggest prior insult however an underlying lesion at this  time cannot be completely excluded. MRI with contrast is recommended for  further evaluation. There is no hemorrhage or hydrocephalus. No definite  mass or midline shift. Bony structures are unremarkable.       Impression:       Some low-density seen in the left posterior parietal region  as well as near the vertex on the left. There is some atrophy identified  near the left vertex. Findings suggest either old insult or possibly  underlying lesion in which MRI with contrast is recommended for further  evaluation.     Examination performed 06/06/2019 at 0920 hours and examination results  were given to the emergency room physician 06/06/2019 at the scanner at  time of performance of the examination.     D:  06/06/2019  E:  06/06/2019                  Assessment: Acute stroke with excellent resolution on TPA administration    Stenosis proximal left posterior cerebral artery.    Mrs. affected left M1 M2 stenosis.    Extensive atherosclerosis of the left internal carotid origin without focal stenosis.    Abnormal CT scan suggestive of old stroke in the left parietal and frontal area       Plan:    MRI brain with and without infusion.    24 hours after TPA I think he should be on aspirin and Plavix.    Likely can resume home blood pressure medications after the CT scan is done tomorrow.    Comment:   Clinically the patient's neurologic exam other than minor reflex asymmetry is normal.  The changes on his CT scan actually are quite  extensive would suggest a possibility of a childhood injury.    Hopefully the MRI with and without infusion will further clarify this issue.    The extent of his atherosclerosis would suggest the likelihood of needing to be on aspirin and Plavix indefinitely.        I discussed the patients findings and my recommendations with patient, family and primary care team      Gordon Eid MD  06/06/19  2:22 PM

## 2019-06-06 NOTE — CONSULTS
[facility]    Referring Provider: Dr Mcfarland  Reason for Consultation: stroke  Primary Provider: Clemons    [phone]    Jair Flores is a 74 y.o. male     Chief Complaint   Patient presents with   • Stroke   • Numbness     HPI  73 yo  in his usual state of health, was as his office this am preparing a brief when he developed sudden numbness in the right side of his lips, arm and foot. 911 was called and in the ED his CT perfusion scan was negative and he received IV tpa. His symptoms did resolve. He feels he is at his baseline. No HA, vision change, speech or weakness.    Allergies   Allergen Reactions   • Penicillins Anaphylaxis   • Rocephin [Ceftriaxone] Anaphylaxis   • Ciprofloxacin Swelling   • Macrobid [Nitrofurantoin Macrocrystal] Swelling         Current Facility-Administered Medications:   •  acetaminophen (TYLENOL) tablet 650 mg, 650 mg, Oral, Q4H PRN **OR** [DISCONTINUED] acetaminophen (TYLENOL) suppository 650 mg, 650 mg, Rectal, Q4H PRN, Rosita Wagner APRN  •  [START ON 6/7/2019] aspirin tablet 325 mg, 325 mg, Oral, Daily **OR** [START ON 6/7/2019] aspirin suppository 300 mg, 300 mg, Rectal, Daily, Rosita Wagner APRN  •  atorvastatin (LIPITOR) tablet 80 mg, 80 mg, Oral, Nightly, Rosita Wagner APRN  •  bisacodyl (DULCOLAX) suppository 10 mg, 10 mg, Rectal, Daily PRN, Rosita Wagner APRN  •  docusate sodium (COLACE) capsule 100 mg, 100 mg, Oral, Daily, Rosita Wagner APRN  •  ondansetron (ZOFRAN) injection 4 mg, 4 mg, Intravenous, Q6H PRN, Rosita Wagner APRN  •  sodium chloride 0.9 % flush 10 mL, 10 mL, Intravenous, PRN, Brayden Mcfarland MD  •  sodium chloride 0.9 % flush 3 mL, 3 mL, Intravenous, Q12H, Rosita Wagner APRN  •  sodium chloride 0.9 % flush 3-10 mL, 3-10 mL, Intravenous, PRN, Bernard Rosita Tavo, ANTIONE    Past Medical History:   Diagnosis Date   • Cancer (CMS/HCC)     bladder;  "cysto surveillance 07/23/2017; free of disease   • Cataract     right eye   • Degenerative arthritis     right; knee replacement; successful   • Fluid overload 2015    after knee surgery   • GERD (gastroesophageal reflux disease)    • Hard to intubate     has been told he is difficult to intubate   • Hemorrhoids 09/12/2011    hemorrhoid ablation   • Hypertension    • Skin cancer     head   • Wears glasses        Past Surgical History:   Procedure Laterality Date   • COLONOSCOPY      routinely every 10 years   • CYSTOSCOPY      x3   • CYSTOSCOPY  07/23/2017    bladder cancer; cysto surveillance; free of disease   • EXCISIONAL HEMORRHOIDECTOMY      hemorrhoid ablation; hemorrhoid onset 09/12/2011   • EYE SURGERY      as a child after cutting eye with barbwire fence   • KNEE ARTHROPLASTY, PARTIAL REPLACEMENT Left    • SKIN CANCER EXCISION     • TONSILLECTOMY     • TOTAL KNEE ARTHROPLASTY Right     Dgen arthritis   • TRANSURETHRAL RESECTION OF BLADDER TUMOR N/A 3/29/2019    Procedure: TRANSURETHRAL RESECTION OF BLADDER TUMOR WITH MITOMYCIN;  Surgeon: Jair Braxton MD;  Location: [facility];  Service: Urology       Social History     Socioeconomic History   • Marital status:      Spouse name: Not on file   • Number of children: Not on file   • Years of education: Not on file   • Highest education level: Not on file   Tobacco Use   • Smoking status: Never Smoker   • Smokeless tobacco: Never Used   Substance and Sexual Activity   • Alcohol use: Yes     Alcohol/week: 1.8 oz     Types: 3 Shots of liquor per week     Comment: socially   • Drug use: Defer   • Sexual activity: Defer       History reviewed. No pertinent family history.    Review of Systems  H/o malignant htn with vertigo, remote.  14 systems complete and negative except per HPI    Physical Exam:  /53 (BP Location: Left arm, Patient Position: Lying)   Pulse 56   Temp 98.3 °F (36.8 °C) (Oral)   Resp 20   Ht 177.8 cm (70\")   Wt 76.2 kg (168 lb) "   SpO2 91%   BMI 24.11 kg/m²     HEENT:WNL, wears glasses  Neck:supple  Lungs:normal expansion  COR:RRR  Abd:non-tender  EXT:+pulses, -edema    Neuro Exam:    CRANIAL NERVES:  Cranial nerve II:  Visual fields are full to confrontation.  Cranial nerves III, IV and VI:  PERRLADC.  Extraocular movements are intact.  Nystagmus is not present.  Cranial nerve V:  Facial sensation is intact to light touch.  Cranial nerve VII:  Muscles of facial expression reveal no asymmetry.  Cranial nerve VIII:  Hearing is intact to finger rub bilaterally.  Cranial nerves IX and X:  Palate elevates symmetrically.  Cranial nerve XI:  Shoulder shrug is intact.  Cranial nerve XII:  Tongue is midline without evidence of atrophy or fasciculation.    Musculoskeletal exam: no focal weakness at exam time.    Radiological studies:  Imaging Results (last 24 hours)     Procedure Component Value Units Date/Time    MRI Brain With & Without Contrast [[id number]] Updated:  06/06/19 1719    XR Chest 1 View [[id number]] Collected:  06/06/19 1010     Updated:  06/06/19 1711    Narrative:       EXAMINATION: XR CHEST 1 VW-06/06/2019:      INDICATION: Acute Stroke Protocol (onset < 12 hrs); I63.9-Cerebral  infarction, unspecified.      COMPARISON: NONE.     FINDINGS: Portable chest reveals cardiac and mediastinal silhouettes  within normal limits. The lung fields are grossly clear. Degenerative  changes seen within the spine. Pulmonary vascularity is within normal  limits. No focal parenchymal opacification present.           Impression:       Enlargement of the heart with no evidence of acute  parenchymal disease.     D:  06/06/2019  E:  06/06/2019     This report was finalized on 6/6/2019 5:08 PM by Dr. Pati More MD.       CT Head Without Contrast Stroke Protocol [[id number]] Collected:  06/06/19 0931     Updated:  06/06/19 1711    Narrative:       EXAMINATION: CT HEAD WO CONTRAST STROKE PROTOCOL- 06/06/2019      INDICATION: Stroke; right lower  extremity numbness     TECHNIQUE: Multiple axial CT imaging was obtained of the brain without  the administration of gadolinium contrast.     The radiation dose reduction device was turned on for each scan per the  ALARA (As Low as Reasonably Achievable) protocol.     COMPARISON: NONE     FINDINGS: There is low-density area seen posteriorly within the left  parietal lobe. There is some atrophy identified of the left vertex.  Findings may suggest prior insult however an underlying lesion at this  time cannot be completely excluded. MRI with contrast is recommended for  further evaluation. There is no hemorrhage or hydrocephalus. No definite  mass or midline shift. Bony structures are unremarkable.       Impression:       Some low-density seen in the left posterior parietal region  as well as near the vertex on the left. There is some atrophy identified  near the left vertex. Findings suggest either old insult or possibly  underlying lesion in which MRI with contrast is recommended for further  evaluation.     Examination performed 06/06/2019 at 0920 hours and examination results  were given to the emergency room physician 06/06/2019 at the scanner at  time of performance of the examination.     D:  06/06/2019  E:  06/06/2019        This report was finalized on 6/6/2019 5:08 PM by Dr. Pati More MD.       CT Angiogram Head With & Without Contrast [[id number]] Collected:  06/06/19 1001     Updated:  06/06/19 1036    Narrative:       EXAMINATION: CT ANGIOGRAM HEAD W WO CONTRAST-      INDICATION: Sensation loss.      TECHNIQUE: Pre and postcontrast 0.75 mm axial images through the brain  with multiplanar 2-D reconstructions.     The radiation dose reduction device was turned on for each scan per the  ALARA (As Low as Reasonably Achievable) protocol.     COMPARISON: None.     FINDINGS: Distal vertebral arteries and basilar artery appear within  normal limits. Posterior cerebral arteries appear respectively normal  on  the right, and appear to show proximal very focal stenoses on the left.  Left posterior cerebral artery appears to be supplied mostly or entirely  from the left posterior communicating artery. Please refer to axial  image 17 series 7. Remainder of the left posterior cerebral artery  appears grossly normal.     Coronal image #18 of series 6 gives the impression of a supraclinoid  left ICA aneurysm, however, sagittal and coronal images show this  segment as normal in appearance, and this appears to represent vessel  tortuosity and volume averaging effect instead. ICAs otherwise appear  unremarkable. Anterior and middle cerebral arteries appear grossly  normal. Proximal right MCA branches appear normal. On the left, there is  a suggestion of a potentially significant [word not clear] to stenosis.  Please refer to coronal image 17 series 6.       Impression:       1. Focal stenosis of the proximal left posterior cerebral artery,  difficult to characterize by NASCET criteria.  2. Suspected left M1/M2 stenosis.  3. Tortuosity of the distal left ICA mimicking an aneurysm on the  coronal series only. Remaining series appear normal and no aneurysm is  suspected.     D:  06/06/2019  E:  06/06/2019       CT Angiogram Neck With & Without Contrast [[id number]] Collected:  06/06/19 1007     Updated:  06/06/19 1013    Narrative:       EXAMINATION: CT ANGIOGRAM NECK W WO CONTRAST-      INDICATION: Bruit, neck, and/or risk factors for CVD     TECHNIQUE: Pre and postcontrast 0.75 mm axial images through the neck  with multiplanar 2-D reconstructions of the carotid arteries and  vertebral arteries     The radiation dose reduction device was turned on for each scan per the  ALARA (As Low as Reasonably Achievable) protocol.     COMPARISON: NONE     FINDINGS: The brachiocephalic vessels.     Normally from the aortic arch. Both proximal subclavian arteries are  tortuous, but no significant stenosis is seen.     On the right, no  significant common carotid stenosis is seen at the  level of the carotid bulb. There is mild calcified and noncalcified  plaquing but no more than 20% ICA stenosis is suspected. Remainder of  the ICA, and the right ECA appear normal.     On the left, common carotid appears normal up to level of the carotid  bulb. Left ECA is large. There is extensive and irregular left ICA  origin plaque as seen on axial image 46 of series 5. This appears to  cause only relatively mild narrowing, less than 30%. Remainder left ICA  appears normal.     The vertebral arteries appear codominant and grossly normal.        No mass adenopathy or inflammatory change is appreciated in the neck  soft tissues.                Impression:       Irregular appearing noncalcified left ICA origin plaque,  cause only mild stenosis, but potentially unstable.  2. No evidence of hemodynamically significant carotid or vertebral  stenosis elsewhere in the neck.          CT Cerebral Perfusion With & Without Contrast [[id number]] Collected:  06/06/19 0958     Updated:  06/06/19 1008    Narrative:       EXAMINATION: CT CEREBRAL PERFUSION W WO CONTRAST- 06/06/2019      INDICATION: Focal neuro deficit, new, fixed or worsening, <6 hours     TECHNIQUE: Cerebral perfusion analysis was performed following dynamic  IV contrast infusion, with computer analysis and mapping forming transit  time and time to drain cerebral blood flow and cerebral blood volume.     The radiation dose reduction device was turned on for each scan per the  ALARA (As Low as Reasonably Achievable) protocol.     COMPARISON: Head CT scan of same date     FINDINGS: Previous exam report indicates probable left frontoparietal  encephalomalacia but no clearly acute disease.     Perfusion images show increased mean transit time and time to drain in  the area of encephalomalacia, decreased blood line and blood flow,  consistent with old infarct. No significant asymmetry of perfusion is  seen to  suggest ischemia/infarct elsewhere.       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  Otherwise negative perfusion scan.     D:  06/06/2019  E:  06/06/2019             LABS:   Lab Results (last 24 hours)     Procedure Component Value Units Date/Time    Hemoglobin A1c [[id number]]  (Normal) Collected:  06/06/19 0937    Specimen:  Blood Updated:  06/06/19 1514     Hemoglobin A1C 5.20 %     Narrative:       Hemoglobin A1C Ranges:    Increased Risk for Diabetes  5.7% to 6.4%  Diabetes                     >= 6.5%  Diabetic Goal                < 7.0%    [place] Draw [[id number]] Collected:  06/06/19 0936    Specimen:  Blood Updated:  06/06/19 1340    Narrative:       The following orders were created for panel order [place] Draw.  Procedure                               Abnormality         Status                     ---------                               -----------         ------                     Light Blue Top[[id number]]                                   Final result               Green Top (Gel)[[id number]]                                  Final result               Lavender Top[[id number]]                                     Final result               Gold Top - SST[[id number]]                                   Final result               Green Top (No Gel)[[id number]]                                                            Please view results for these tests on the individual orders.    aPTT [[id number]]  (Normal) Collected:  06/06/19 0936    Specimen:  Blood Updated:  06/06/19 1324     PTT 33.2 seconds     Narrative:       PTT = The equivalent PTT values for the therapeutic range of heparin levels at 0.3 to 0.5 U/ml are 55 to 70 seconds.    Light Blue Top [[id number]] Collected:  06/06/19 0936    Specimen:  Blood Updated:  06/06/19 1316     Extra Tube hold for add-on     Comment: Auto resulted       Lavender Top [[id number]] Collected:  06/06/19 0937    Specimen:  Blood  Updated:  06/06/19 1131     Extra Tube hold for add-on     Comment: Auto resulted       CBC & Differential [[id number]] Collected:  06/06/19 0937    Specimen:  Blood Updated:  06/06/19 1124    Narrative:       The following orders were created for panel order CBC & Differential.  Procedure                               Abnormality         Status                     ---------                               -----------         ------                     CBC Auto Differential[[id number]]        Abnormal            Final result                 Please view results for these tests on the individual orders.    CBC Auto Differential [[id number]]  (Abnormal) Collected:  06/06/19 0937    Specimen:  Blood Updated:  06/06/19 1124     WBC 5.56 10*3/mm3      RBC 4.41 10*6/mm3      Hemoglobin 13.5 g/dL      Hematocrit 41.2 %      MCV 93.4 fL      MCH 30.6 pg      MCHC 32.8 g/dL      RDW 15.3 %      RDW-SD 53.1 fl      MPV 10.7 fL      Platelets 171 10*3/mm3      Neutrophil % 47.4 %      Lymphocyte % 36.5 %      Monocyte % 12.1 %      Eosinophil % 3.2 %      Basophil % 0.4 %      Immature Grans % 0.4 %      Neutrophils, Absolute 2.64 10*3/mm3      Lymphocytes, Absolute 2.03 10*3/mm3      Monocytes, Absolute 0.67 10*3/mm3      Eosinophils, Absolute 0.18 10*3/mm3      Basophils, Absolute 0.02 10*3/mm3      Immature Grans, Absolute 0.02 10*3/mm3      nRBC 0.0 /100 WBC     Green Top (Gel) [[id number]] Collected:  06/06/19 0937    Specimen:  Blood Updated:  06/06/19 1046     Extra Tube Hold for add-ons.     Comment: Auto resulted.       Gold Top - SST [[id number]] Collected:  06/06/19 0937    Specimen:  Blood Updated:  06/06/19 1046     Extra Tube Hold for add-ons.     Comment: Auto resulted.       Troponin [[id number]]  (Normal) Collected:  06/06/19 0937    Specimen:  Blood Updated:  06/06/19 1014     Troponin T <0.010 ng/mL     Narrative:       Troponin T Reference Range:  <= 0.03 ng/mL-   Negative for AMI  >0.03 ng/mL-     Abnormal for  myocardial necrosis.  Clinicians would have to utilize clinical acumen, EKG, Troponin and serial changes to determine if it is an Acute Myocardial Infarction or myocardial injury due to an underlying chronic condition.     AST [[id number]]  (Normal) Collected:  06/06/19 0937    Specimen:  Blood Updated:  06/06/19 1014     AST (SGOT) 26 U/L     ALT [[id number]]  (Normal) Collected:  06/06/19 0937    Specimen:  Blood Updated:  06/06/19 1014     ALT (SGPT) 15 U/L     POC CHEM 8 [[id number]]  (Normal) Collected:  06/06/19 0933    Specimen:  Blood Updated:  06/06/19 0937     Glucose 107 mg/dL      BUN 20 mg/dL      Creatinine 1.00 mg/dL      Sodium 143 mmol/L      Potassium 3.7 mmol/L      Chloride 104 mmol/L      Total CO2 26 mmol/L      Hemoglobin 13.9 g/dL      Comment: Serial Number: [id number]:  [id number]        Hematocrit 41 %      Ionized Calcium 1.21 mmol/L     POC Protime / INR [[id number]]  (Abnormal) Collected:  06/06/19 0932    Specimen:  Blood Updated:  06/06/19 0936     Protime 12.2 seconds      INR 1.0     Comment: Serial Number: [id number]:  [id number]             MDM  1. New right face/ lip, arm, leg foot numbness and weakness  8:30am, tpa in the ED, NIH 3, now 0.  CT showed old left frontoparietal infarct, CTA neck consistent with ulcerative plaque in the left carotid bulb, CTA head showed focal stenosis of the proximal left posterior cerebral artery, M1 M2.   2. Left carotid ulcerative plaque, will determine best treatment after all studies are completed.  3. HTN - stable, on 3 meds  4. HLD - stable,    5. Required ICU admission after new onset stroke symptoms and IV tpa.  6. GERD  7. Gout      Elizabeth Rod PA-C  06/06/19  5:32 PM

## 2019-06-06 NOTE — THERAPY EVALUATION
Acute Care - Speech Language Pathology Initial Evaluation  [facility]   Cognitive-Communication Evaluation     Patient Name: Jair Flores  : 1945  MRN: [id number]  Today's Date: 2019               Admit Date: 2019     Visit Dx:    ICD-10-CM ICD-9-CM   1. Acute CVA (cerebrovascular accident) (CMS/HCC) I63.9 434.91   2. Received intravenous tissue plasminogen activator (tPA) in emergency department Z92.82 V45.88     Patient Active Problem List   Diagnosis   • History of bladder cancer   • Hematuria   • Insomnia   • Status post left partial knee replacement   • Benign essential hypertension   • Acute gout of foot   • Flash pulmonary edema (CMS/HCC)   • Hyperlipidemia   • Osteoarthritis   • Right carotid bruit   • Aortic valve disease   • Primary osteoarthritis involving multiple joints   • Mitral valve disease   • Bladder cancer (CMS/HCC)   • History of disease   • Gout   • AIS     Past Medical History:   Diagnosis Date   • Cancer (CMS/HCC)     bladder; cysto surveillance 2017; free of disease   • Cataract     right eye   • Degenerative arthritis     right; knee replacement; successful   • Fluid overload 2015    after knee surgery   • GERD (gastroesophageal reflux disease)    • Hard to intubate     has been told he is difficult to intubate   • Hemorrhoids 2011    hemorrhoid ablation   • Hypertension    • Skin cancer     head   • Wears glasses      Past Surgical History:   Procedure Laterality Date   • COLONOSCOPY      routinely every 10 years   • CYSTOSCOPY      x3   • CYSTOSCOPY  2017    bladder cancer; cysto surveillance; free of disease   • EXCISIONAL HEMORRHOIDECTOMY      hemorrhoid ablation; hemorrhoid onset 2011   • EYE SURGERY      as a child after cutting eye with barbwire fence   • KNEE ARTHROPLASTY, PARTIAL REPLACEMENT Left    • SKIN CANCER EXCISION     • TONSILLECTOMY     • TOTAL KNEE ARTHROPLASTY Right     Dgen arthritis   • TRANSURETHRAL RESECTION OF BLADDER  TUMOR N/A 3/29/2019    Procedure: TRANSURETHRAL RESECTION OF BLADDER TUMOR WITH MITOMYCIN;  Surgeon: Jair Braxton MD;  Location: [facility];  Service: Urology        SLP EVALUATION (last 72 hours)      SLP SLC Evaluation     Row Name 06/06/19 0529                   Communication Assessment/Intervention    Document Type  evaluation  -MP        Subjective Information  no complaints  -MP        Patient Observations  alert;cooperative  -MP        Patient/Family Observations  Spouse initially present  -MP        Patient Effort  good  -MP           General Information    Patient Profile Reviewed  yes  -MP        Pertinent History Of Current Problem  Presented to ED w/ c/o R side weakness. CT angio head: focal stenosis of proximal L posterior cerebral artery; suspected L M1/M2 stenosis. Now s/p tPA. Hx HTN, HLD, OA. Passed RN dysphagia screen.  -MP        Precautions/Limitations, Vision  WFL with corrective lenses  -MP        Precautions/Limitations, Hearing  WFL;for purposes of eval  -MP        Prior Level of Function-Communication  WFL  -MP        Plans/Goals Discussed with  patient;agreed upon  -MP        Barriers to Rehab  none identified  -MP        Patient's Goals for Discharge  patient did not state  -MP           Pain Assessment    Additional Documentation  Pain Scale: FACES Pre/Post-Treatment (Group)  -MP           Pain Scale: FACES Pre/Post-Treatment    Pain: FACES Scale, Pretreatment  0-->no hurt  -MP        Pain: FACES Scale, Post-Treatment  0-->no hurt  -MP           Comprehension Assessment/Intervention    Comprehension Assessment/Intervention  Auditory Comprehension;Reading Comprehension  -MP           Auditory Comprehension Assessment/Intervention    Auditory Comprehension (Communication)  WFL  -MP        Able to Identify Objects/Pictures (Communication)  body part;pictures of common objects;WFL  -MP        Answers Questions (Communication)  yes/no;wh questions;personal;simple;complex;WFL  -MP         Able to Follow Commands (Communication)  1-step;2-step;3-step;WFL  -MP        Narrative Discourse  conversational level;WFL  -MP           Reading Comprehension Assessment/Intervention    Reading Comprehension (Communication)  WFL  -MP        Scanning (Reading)  paragraphs;WFL  -MP        Paragraph Level  WFL  -MP           Expression Assessment/Intervention    Expression Assessment/Intervention  verbal expression  -MP           Verbal Expression Assessment/Intervention    Verbal Expression  WFL  -MP        Automatic Speech (Communication)  response to greeting;WFL  -MP        Phrase Completion  automatic/predictable;WFL  -MP        Responsive Naming  simple;WFL  -MP        Confrontational Naming  high frequency;WFL  -MP        Conversational Discourse/Fluency  WFL  -MP           Oral Motor Structure and Function    Oral Motor Structure and Function  WFL  -MP        Dentition Assessment  natural, present and adequate  -           Oral Musculature and Cranial Nerve Assessment    Oral Motor General Assessment  WFL  -MP           Motor Speech Assessment/Intervention    Motor Speech Function  WFL  -MP           Cognitive Assessment Intervention- SLP    Cognitive Function (Cognition)  WFL  -MP        Orientation Status (Cognition)  awareness of basic personal information;person;place;time;situation;WFL  -MP        Memory (Cognitive)  short-term;WFL  -MP        Attention (Cognitive)  selective;sustained;WFL  -MP        Thought Organization (Cognitive)  concrete convergent;abstract convergent;drawing conclusions;WFL  -MP        Reasoning (Cognitive)  simple;deductive;WFL  -MP        Problem Solving (Cognitive)  simple;WFL  -MP           SLP Clinical Impressions    SLP Diagnosis  Basic cog-comm skills WFL. No SLP intervention indicated. Will sign-off. Please re-consult if any further concerns.  -MP        SLC Criteria for Skilled Therapy Interventions Met  no problems identified which require skilled intervention  -MP            Recommendations    Therapy Frequency (SLP SLC)  evaluation only  -MP        Anticipated Dischage Disposition  unknown  -          User Key  (r) = Recorded By, (t) = Taken By, (c) = Cosigned By    Initials Name Effective Dates    Aaron Pal MS, ALEAH-SLP 08/15/18 -              EDUCATION  The patient has been educated in the following areas:     Cognitive Impairment Communication Impairment.    SLP Recommendation and Plan  SLP Diagnosis: Basic cog-comm skills WFL. No SLP intervention indicated. Will sign-off. Please re-consult if any further concerns.     SLC Criteria for Skilled Therapy Interventions Met: no problems identified which require skilled intervention  SLP Diagnosis: Basic cog-comm skills WFL. No SLP intervention indicated. Will sign-off. Please re-consult if any further concerns.  Anticipated Dischage Disposition: unknown          Plan of Care Reviewed With: patient  Plan of Care Review  Plan of Care Reviewed With: patient                  Time Calculation:     Time Calculation- SLP     Row Name 06/06/19 1528             Time Calculation- SLP    SLP Start Time  1440  -MP      SLP Received On  06/06/19  -        User Key  (r) = Recorded By, (t) = Taken By, (c) = Cosigned By    Initials Name Provider Type    Aaron Pal MS, CFY-SLP Speech and Language Pathologist          Therapy Charges for Today     Code Description Service Date Service Provider Modifiers Qty    [id number] HC ST EVAL SPEECH AND PROD W LANG  4 6/6/2019 Aaron Nevarez MS, ALEAH-SLP GN 1                     Aaron Nevarez MS, ALEAH-JANETH  6/6/2019

## 2019-06-07 ENCOUNTER — APPOINTMENT (OUTPATIENT)
Dept: CT IMAGING | Facility: HOSPITAL | Age: 74
End: 2019-06-07

## 2019-06-07 ENCOUNTER — APPOINTMENT (OUTPATIENT)
Dept: CARDIOLOGY | Facility: HOSPITAL | Age: 74
End: 2019-06-07

## 2019-06-07 VITALS
OXYGEN SATURATION: 97 % | RESPIRATION RATE: 16 BRPM | SYSTOLIC BLOOD PRESSURE: 173 MMHG | BODY MASS INDEX: 24.34 KG/M2 | HEIGHT: 70 IN | HEART RATE: 63 BPM | DIASTOLIC BLOOD PRESSURE: 65 MMHG | TEMPERATURE: 98.3 F | WEIGHT: 170 LBS

## 2019-06-07 DIAGNOSIS — I35.9 AORTIC VALVE DISEASE: ICD-10-CM

## 2019-06-07 DIAGNOSIS — I48.91 ATRIAL FIBRILLATION, UNSPECIFIED TYPE (HCC): ICD-10-CM

## 2019-06-07 DIAGNOSIS — I10 BENIGN ESSENTIAL HYPERTENSION: Primary | ICD-10-CM

## 2019-06-07 DIAGNOSIS — R55 SYNCOPE AND COLLAPSE: ICD-10-CM

## 2019-06-07 PROBLEM — K21.9 GERD (GASTROESOPHAGEAL REFLUX DISEASE): Status: ACTIVE | Noted: 2019-06-07

## 2019-06-07 LAB
ALBUMIN SERPL-MCNC: 3.3 G/DL (ref 3.5–5.2)
ALBUMIN/GLOB SERPL: 1.3 G/DL
ALP SERPL-CCNC: 53 U/L (ref 39–117)
ALT SERPL W P-5'-P-CCNC: 14 U/L (ref 1–41)
ANION GAP SERPL CALCULATED.3IONS-SCNC: 9 MMOL/L
AST SERPL-CCNC: 18 U/L (ref 1–40)
BH CV ECHO MEAS - AI DEC SLOPE: 329.4 CM/SEC^2
BH CV ECHO MEAS - AI MAX PG: 70.6 MMHG
BH CV ECHO MEAS - AI MAX VEL: 419.5 CM/SEC
BH CV ECHO MEAS - AI P1/2T: 373 MSEC
BH CV ECHO MEAS - AO ROOT AREA (BSA CORRECTED): 1.8
BH CV ECHO MEAS - AO ROOT AREA: 9.3 CM^2
BH CV ECHO MEAS - AO ROOT DIAM: 3.4 CM
BH CV ECHO MEAS - BSA(HAYCOCK): 2 M^2
BH CV ECHO MEAS - BSA: 1.9 M^2
BH CV ECHO MEAS - BZI_BMI: 24.4 KILOGRAMS/M^2
BH CV ECHO MEAS - BZI_METRIC_HEIGHT: 177.8 CM
BH CV ECHO MEAS - BZI_METRIC_WEIGHT: 77.1 KG
BH CV ECHO MEAS - EDV(CUBED): 228.2 ML
BH CV ECHO MEAS - EDV(MOD-SP2): 129 ML
BH CV ECHO MEAS - EDV(MOD-SP4): 159 ML
BH CV ECHO MEAS - EDV(TEICH): 187.7 ML
BH CV ECHO MEAS - EF(CUBED): 74 %
BH CV ECHO MEAS - EF(MOD-BP): 64 %
BH CV ECHO MEAS - EF(MOD-SP2): 62 %
BH CV ECHO MEAS - EF(MOD-SP4): 64.2 %
BH CV ECHO MEAS - EF(TEICH): 64.9 %
BH CV ECHO MEAS - ESV(CUBED): 59.3 ML
BH CV ECHO MEAS - ESV(MOD-SP2): 49 ML
BH CV ECHO MEAS - ESV(MOD-SP4): 57 ML
BH CV ECHO MEAS - ESV(TEICH): 65.9 ML
BH CV ECHO MEAS - FS: 36.2 %
BH CV ECHO MEAS - IVS/LVPW: 1
BH CV ECHO MEAS - IVSD: 1.2 CM
BH CV ECHO MEAS - LA DIMENSION: 4.4 CM
BH CV ECHO MEAS - LA/AO: 1.3
BH CV ECHO MEAS - LAD MAJOR: 6.3 CM
BH CV ECHO MEAS - LAT PEAK E' VEL: 7.5 CM/SEC
BH CV ECHO MEAS - LATERAL E/E' RATIO: 12.1
BH CV ECHO MEAS - LV DIASTOLIC VOL/BSA (35-75): 81.6 ML/M^2
BH CV ECHO MEAS - LV MASS(C)D: 319.5 GRAMS
BH CV ECHO MEAS - LV MASS(C)DI: 164 GRAMS/M^2
BH CV ECHO MEAS - LV SYSTOLIC VOL/BSA (12-30): 29.3 ML/M^2
BH CV ECHO MEAS - LVIDD: 6.1 CM
BH CV ECHO MEAS - LVIDS: 3.9 CM
BH CV ECHO MEAS - LVLD AP2: 8.1 CM
BH CV ECHO MEAS - LVLD AP4: 8.8 CM
BH CV ECHO MEAS - LVLS AP2: 8.1 CM
BH CV ECHO MEAS - LVLS AP4: 7.8 CM
BH CV ECHO MEAS - LVPWD: 1.2 CM
BH CV ECHO MEAS - MED PEAK E' VEL: 5.8 CM/SEC
BH CV ECHO MEAS - MEDIAL E/E' RATIO: 15.5
BH CV ECHO MEAS - MV A MAX VEL: 32.6 CM/SEC
BH CV ECHO MEAS - MV DEC TIME: 0.15 SEC
BH CV ECHO MEAS - MV E MAX VEL: 92.3 CM/SEC
BH CV ECHO MEAS - MV E/A: 2.8
BH CV ECHO MEAS - PA ACC SLOPE: 615.5 CM/SEC^2
BH CV ECHO MEAS - PA ACC TIME: 0.13 SEC
BH CV ECHO MEAS - PA PR(ACCEL): 22.6 MMHG
BH CV ECHO MEAS - SI(CUBED): 86.7 ML/M^2
BH CV ECHO MEAS - SI(MOD-SP2): 41.1 ML/M^2
BH CV ECHO MEAS - SI(MOD-SP4): 52.4 ML/M^2
BH CV ECHO MEAS - SI(TEICH): 62.5 ML/M^2
BH CV ECHO MEAS - SV(CUBED): 168.9 ML
BH CV ECHO MEAS - SV(MOD-SP2): 80 ML
BH CV ECHO MEAS - SV(MOD-SP4): 102 ML
BH CV ECHO MEAS - SV(TEICH): 121.8 ML
BH CV ECHO MEAS - TAPSE (>1.6): 2.4 CM2
BH CV ECHO MEASUREMENTS AVERAGE E/E' RATIO: 13.88
BH CV XLRA - RV BASE: 2.9 CM
BH CV XLRA - RV MID: 2.6 CM
BH CV XLRA - TDI S': 13.6 CM/SEC
BH CV XLRA MEAS LEFT DIST CCA EDV: 9.6 CM/SEC
BH CV XLRA MEAS LEFT DIST CCA PSV: 128.3 CM/SEC
BH CV XLRA MEAS LEFT DIST ICA EDV: 10.3 CM/SEC
BH CV XLRA MEAS LEFT DIST ICA PSV: 74.6 CM/SEC
BH CV XLRA MEAS LEFT ICA/CCA RATIO: 0.9
BH CV XLRA MEAS LEFT MID CCA EDV: 1.7 CM/SEC
BH CV XLRA MEAS LEFT MID CCA PSV: 87.3 CM/SEC
BH CV XLRA MEAS LEFT MID ICA EDV: 17.5 CM/SEC
BH CV XLRA MEAS LEFT MID ICA PSV: 116.1 CM/SEC
BH CV XLRA MEAS LEFT PROX CCA PSV: 127 CM/SEC
BH CV XLRA MEAS LEFT PROX ECA EDV: 1.3 CM/SEC
BH CV XLRA MEAS LEFT PROX ECA PSV: 138.3 CM/SEC
BH CV XLRA MEAS LEFT PROX ICA EDV: 19.2 CM/SEC
BH CV XLRA MEAS LEFT PROX ICA PSV: 106.5 CM/SEC
BH CV XLRA MEAS LEFT PROX SCLA PSV: 118.7 CM/SEC
BH CV XLRA MEAS LEFT VERTEBRAL A EDV: 0.87 CM/SEC
BH CV XLRA MEAS LEFT VERTEBRAL A PSV: 58.5 CM/SEC
BH CV XLRA MEAS RIGHT DIST CCA EDV: 0.87 CM/SEC
BH CV XLRA MEAS RIGHT DIST CCA PSV: 100.4 CM/SEC
BH CV XLRA MEAS RIGHT DIST ICA EDV: 15.7 CM/SEC
BH CV XLRA MEAS RIGHT DIST ICA PSV: 115.2 CM/SEC
BH CV XLRA MEAS RIGHT ICA/CCA RATIO: 1.3
BH CV XLRA MEAS RIGHT MID CCA EDV: 2.5 CM/SEC
BH CV XLRA MEAS RIGHT MID CCA PSV: 111.9 CM/SEC
BH CV XLRA MEAS RIGHT MID ICA EDV: 11.4 CM/SEC
BH CV XLRA MEAS RIGHT MID ICA PSV: 114.4 CM/SEC
BH CV XLRA MEAS RIGHT PROX CCA PSV: 99 CM/SEC
BH CV XLRA MEAS RIGHT PROX ECA EDV: 1.7 CM/SEC
BH CV XLRA MEAS RIGHT PROX ECA PSV: 147.6 CM/SEC
BH CV XLRA MEAS RIGHT PROX ICA EDV: 12.2 CM/SEC
BH CV XLRA MEAS RIGHT PROX ICA PSV: 127.5 CM/SEC
BH CV XLRA MEAS RIGHT PROX SCLA EDV: 173.8 CM/SEC
BH CV XLRA MEAS RIGHT PROX SCLA PSV: 207.8 CM/SEC
BH CV XLRA MEAS RIGHT VERTEBRAL A EDV: 11.4 CM/SEC
BH CV XLRA MEAS RIGHT VERTEBRAL A PSV: 100.4 CM/SEC
BILIRUB SERPL-MCNC: 0.4 MG/DL (ref 0.2–1.2)
BUN BLD-MCNC: 17 MG/DL (ref 8–23)
BUN/CREAT SERPL: 17 (ref 7–25)
CALCIUM SPEC-SCNC: 8.6 MG/DL (ref 8.6–10.5)
CHLORIDE SERPL-SCNC: 107 MMOL/L (ref 98–107)
CHOLEST SERPL-MCNC: 200 MG/DL (ref 0–200)
CO2 SERPL-SCNC: 28 MMOL/L (ref 22–29)
CREAT BLD-MCNC: 1 MG/DL (ref 0.76–1.27)
DEPRECATED RDW RBC AUTO: 52.4 FL (ref 37–54)
ERYTHROCYTE [DISTWIDTH] IN BLOOD BY AUTOMATED COUNT: 15.1 % (ref 12.3–15.4)
GFR SERPL CREATININE-BSD FRML MDRD: 73 ML/MIN/1.73
GLOBULIN UR ELPH-MCNC: 2.6 GM/DL
GLUCOSE BLD-MCNC: 101 MG/DL (ref 65–99)
HCT VFR BLD AUTO: 38.7 % (ref 37.5–51)
HDLC SERPL-MCNC: 31 MG/DL (ref 40–60)
HGB BLD-MCNC: 12.6 G/DL (ref 13–17.7)
LDLC SERPL CALC-MCNC: 134 MG/DL (ref 0–100)
LDLC/HDLC SERPL: 4.32 {RATIO}
LEFT ATRIUM VOLUME INDEX: 58.5 ML/M^2
LEFT ATRIUM VOLUME: 114 ML
MAXIMAL PREDICTED HEART RATE: 147 BPM
MCH RBC QN AUTO: 30.2 PG (ref 26.6–33)
MCHC RBC AUTO-ENTMCNC: 32.6 G/DL (ref 31.5–35.7)
MCV RBC AUTO: 92.8 FL (ref 79–97)
PLATELET # BLD AUTO: 163 10*3/MM3 (ref 140–450)
PMV BLD AUTO: 10.6 FL (ref 6–12)
POTASSIUM BLD-SCNC: 3.6 MMOL/L (ref 3.5–5.2)
PROT SERPL-MCNC: 5.9 G/DL (ref 6–8.5)
RBC # BLD AUTO: 4.17 10*6/MM3 (ref 4.14–5.8)
RIGHT ARM BP: NORMAL MMHG
SODIUM BLD-SCNC: 144 MMOL/L (ref 136–145)
STRESS TARGET HR: 125 BPM
TRIGL SERPL-MCNC: 175 MG/DL (ref 0–150)
VLDLC SERPL-MCNC: 35 MG/DL
WBC NRBC COR # BLD: 5.96 10*3/MM3 (ref 3.4–10.8)

## 2019-06-07 PROCEDURE — 80053 COMPREHEN METABOLIC PANEL: CPT | Performed by: NURSE PRACTITIONER

## 2019-06-07 PROCEDURE — 80061 LIPID PANEL: CPT | Performed by: NURSE PRACTITIONER

## 2019-06-07 PROCEDURE — 93306 TTE W/DOPPLER COMPLETE: CPT | Performed by: INTERNAL MEDICINE

## 2019-06-07 PROCEDURE — 85027 COMPLETE CBC AUTOMATED: CPT | Performed by: NURSE PRACTITIONER

## 2019-06-07 PROCEDURE — 99233 SBSQ HOSP IP/OBS HIGH 50: CPT | Performed by: PSYCHIATRY & NEUROLOGY

## 2019-06-07 PROCEDURE — 97165 OT EVAL LOW COMPLEX 30 MIN: CPT

## 2019-06-07 PROCEDURE — 93880 EXTRACRANIAL BILAT STUDY: CPT | Performed by: INTERNAL MEDICINE

## 2019-06-07 PROCEDURE — 99222 1ST HOSP IP/OBS MODERATE 55: CPT | Performed by: INTERNAL MEDICINE

## 2019-06-07 PROCEDURE — 93880 EXTRACRANIAL BILAT STUDY: CPT

## 2019-06-07 PROCEDURE — 99239 HOSP IP/OBS DSCHRG MGMT >30: CPT | Performed by: INTERNAL MEDICINE

## 2019-06-07 PROCEDURE — 97161 PT EVAL LOW COMPLEX 20 MIN: CPT

## 2019-06-07 PROCEDURE — 70450 CT HEAD/BRAIN W/O DYE: CPT

## 2019-06-07 PROCEDURE — 93306 TTE W/DOPPLER COMPLETE: CPT

## 2019-06-07 RX ORDER — CLOPIDOGREL BISULFATE 75 MG/1
75 TABLET ORAL DAILY
Qty: 30 TABLET | Refills: 2 | Status: ON HOLD | OUTPATIENT
Start: 2019-06-08 | End: 2019-06-25 | Stop reason: SDUPTHER

## 2019-06-07 RX ORDER — CLOPIDOGREL BISULFATE 75 MG/1
75 TABLET ORAL DAILY
Status: DISCONTINUED | OUTPATIENT
Start: 2019-06-08 | End: 2019-06-07 | Stop reason: HOSPADM

## 2019-06-07 RX ORDER — POTASSIUM CHLORIDE 750 MG/1
40 CAPSULE, EXTENDED RELEASE ORAL ONCE
Status: COMPLETED | OUTPATIENT
Start: 2019-06-07 | End: 2019-06-07

## 2019-06-07 RX ORDER — CLOPIDOGREL BISULFATE 75 MG/1
300 TABLET ORAL ONCE
Status: COMPLETED | OUTPATIENT
Start: 2019-06-07 | End: 2019-06-07

## 2019-06-07 RX ORDER — CLOPIDOGREL BISULFATE 75 MG/1
300 TABLET ORAL ONCE
Status: DISCONTINUED | OUTPATIENT
Start: 2019-06-07 | End: 2019-06-07

## 2019-06-07 RX ORDER — ATORVASTATIN CALCIUM 80 MG/1
80 TABLET, FILM COATED ORAL NIGHTLY
Qty: 30 TABLET | Refills: 2 | Status: ON HOLD | OUTPATIENT
Start: 2019-06-07 | End: 2019-06-25 | Stop reason: SDUPTHER

## 2019-06-07 RX ADMIN — ASPIRIN 325 MG ORAL TABLET 325 MG: 325 PILL ORAL at 12:14

## 2019-06-07 RX ADMIN — CLOPIDOGREL BISULFATE 300 MG: 75 TABLET ORAL at 14:45

## 2019-06-07 RX ADMIN — POTASSIUM CHLORIDE 40 MEQ: 750 CAPSULE, EXTENDED RELEASE ORAL at 10:40

## 2019-06-07 NOTE — CONSULTS
[facility] at [facility]  Consultation History and Physical  Jair Flores  1945      PCP:   Osmar Clemons MD        Date of  Consultation: 6/7/2019 6:55 PM     Reason for Consultation: Stroke, ? embolic    History of Present Illness:  Jair Flores  Is a 74 y.o. male with medical history including hypertension, hyperlipidemia.  He does not have any known coronary artery disease.  He presented to the ED with perioral numbness and facial numbness.  His initial NIH stroke scale was low at 3 he did undergo TPA with resolution of his symptoms.  Here has shown an old large left frontal and parietal stroke, no acute stroke identified on MRI.  Carotid duplex showed 50 to 69% right carotid stenosis and 0 to 49% left.  On CTA there was concern that while the left internal carotid artery stenosis is nonobstructive it is unstable appearing and could have been a source of embolus.  While here the patient has also undergone an echocardiogram which shows ejection fraction of 64% with moderate left ventricular dilation negative bubble study but severe aortic regurgitation.  The patient states he walks a mile and a half daily without any symptoms of chest pain or dyspnea and does frequent push-ups.  He does note that since shoveling show in February he occasionally has left biceps pain that radiates into his chest.  But this is not exacerbated by any of his exercise activity.  His EKG shows a possible inferior infarct which is been shown since at least March, his troponin here was negative.  He had no chest pain during this admission.  He had no atrial fibrillation during this admission.    Cardiology was consulted regarding his aortic valve disease, question of chest pain related to underlying coronary artery disease, and for further stroke work-up.      Patient Active Problem List    Diagnosis Date Noted   • *AIS 06/06/2019   • GERD (gastroesophageal reflux disease) 06/07/2019   • Gout  05/14/2019   • Hematuria 04/29/2019   • Insomnia 04/29/2019   • Status post left partial knee replacement 04/29/2019     Note Last Updated: 5/14/2019     Left knee surgery 2005 [place] with complication of flash pulmonary edema likely due to anesthesia total resolution without sequela but with stable abnormal EKG with inferior wall Q waves no change and asymptomatic.     • Benign essential hypertension 04/29/2019   • Acute gout of foot 04/29/2019   • Flash pulmonary edema (CMS/HCC) 04/29/2019     Note Last Updated: 5/14/2019     Episode of flash pulmonary edema thought to be related to volume overload at the time of left knee surgery in 2005 performed in [place] he has had no recurrence his EKG does show inferior Q waves which are unchanged.     • Hyperlipidemia 04/29/2019     Note Last Updated: 5/14/2019     Not on statin therapy     • Osteoarthritis 04/29/2019   • Right carotid bruit 04/29/2019   • Aortic valve disease 04/29/2019   • Primary osteoarthritis involving multiple joints 04/29/2019   • Mitral valve disease 04/29/2019   • Bladder cancer (CMS/HCC) 04/29/2019     Note Last Updated: 5/14/2019     Regional surgery was 2015 with bladder carcinoma presenting as hematuria with recent resection of recurrent bladder carcinoma March 29 2019 patient is currently stable     • History of disease 04/29/2019     Note Last Updated: 4/29/2019     Paronychia          • History of bladder cancer 03/25/2019       Allergies   Allergen Reactions   • Penicillins Anaphylaxis   • Rocephin [Ceftriaxone] Anaphylaxis   • Ciprofloxacin Swelling   • Macrobid [Nitrofurantoin Macrocrystal] Swelling       Social History     Socioeconomic History   • Marital status:      Spouse name: Not on file   • Number of children: Not on file   • Years of education: Not on file   • Highest education level: Not on file   Tobacco Use   • Smoking status: Never Smoker   • Smokeless tobacco: Never Used   Substance and Sexual Activity   • Alcohol  use: Yes     Alcohol/week: 1.8 oz     Types: 3 Shots of liquor per week     Comment: socially   • Drug use: Defer   • Sexual activity: Defer       History reviewed. No pertinent family history.    Current Medications:    Current Facility-Administered Medications:   •  acetaminophen (TYLENOL) tablet 650 mg, 650 mg, Oral, Q4H PRN **OR** [DISCONTINUED] acetaminophen (TYLENOL) suppository 650 mg, 650 mg, Rectal, Q4H PRN, Rosita Wagner APRN  •  aspirin tablet 325 mg, 325 mg, Oral, Daily, 325 mg at 06/07/19 1214 **OR** aspirin suppository 300 mg, 300 mg, Rectal, Daily, Rosita Wagner APRN  •  atorvastatin (LIPITOR) tablet 80 mg, 80 mg, Oral, Nightly, Rosita Wagner APRN, 80 mg at 06/06/19 2008  •  bisacodyl (DULCOLAX) suppository 10 mg, 10 mg, Rectal, Daily PRN, Rosita Wagner APRN  •  [START ON 6/8/2019] clopidogrel (PLAVIX) tablet 75 mg, 75 mg, Oral, Daily, Gordon Eid MD  •  docusate sodium (COLACE) capsule 100 mg, 100 mg, Oral, Daily, Rosita Wagner APRN  •  ondansetron (ZOFRAN) injection 4 mg, 4 mg, Intravenous, Q6H PRN, Rosita Wagner APRN  •  sodium chloride 0.9 % flush 10 mL, 10 mL, Intravenous, PRN, Brayden Mcfarland MD  •  sodium chloride 0.9 % flush 3 mL, 3 mL, Intravenous, Q12H, Rosita Wagner APRN, 3 mL at 06/06/19 2106  •  sodium chloride 0.9 % flush 3-10 mL, 3-10 mL, Intravenous, PRN, Rosita Wagner APRN    Current Outpatient Medications:   •  allopurinol (ZYLOPRIM) 100 MG tablet, Take 100 mg by mouth Daily., Disp: , Rfl:   •  Ascorbic Acid (VITAMIN C) 500 MG chewable tablet, Chew 500 mg Daily., Disp: , Rfl:   •  enalapril (VASOTEC) 20 MG tablet, Take 40 mg by mouth Daily. 2 tabs (40mg total) once daily, Disp: , Rfl:   •  metoprolol succinate XL (TOPROL-XL) 25 MG 24 hr tablet, Take 25 mg by mouth Daily., Disp: , Rfl:   •  Multiple Vitamins-Minerals (HM MULTIVITAMIN ADULT GUMMY) chewable tablet, Chew 1 tablet Daily., Disp: , Rfl:   •  aspirin 81  MG tablet, Take 1 tablet by mouth Daily., Disp: 30 tablet, Rfl: 2  •  atorvastatin (LIPITOR) 80 MG tablet, Take 1 tablet by mouth Every Night., Disp: 30 tablet, Rfl: 2  •  [START ON 6/8/2019] clopidogrel (PLAVIX) 75 MG tablet, Take 1 tablet by mouth Daily., Disp: 30 tablet, Rfl: 2     Review of Systems   Cardiovascular: Negative for chest pain, dyspnea on exertion, irregular heartbeat, leg swelling, near-syncope, orthopnea, palpitations and syncope.   Respiratory: Negative for shortness of breath.    Neurological: Positive for paresthesias. Negative for brief paralysis, disturbances in coordination, focal weakness, headaches and numbness.   All other systems reviewed and are negative.      OBJECTIVE:  Vitals:    06/07/19 1400 06/07/19 1500 06/07/19 1600 06/07/19 1700   BP: 158/63 148/60 150/66 173/65   BP Location:       Patient Position:       Pulse: 58 60 60 63   Resp:       Temp:       TempSrc:       SpO2: 92% 97% 96% 97%   Weight:       Height:         I/O last 3 completed shifts:  In: 1028.8 [P.O.:960; I.V.:68.8]  Out: 1150 [Urine:1150]  I/O this shift:  In: 480 [P.O.:480]  Out: 500 [Urine:500]  Intake & Output (last 3 days)       06/04 0701 - 06/05 0700 06/05 0701 - 06/06 0700 06/06 0701 - 06/07 0700 06/07 0701 - 06/08 0700    P.O.   960 480    I.V. (mL/kg)   68.8 (0.9)     Total Intake(mL/kg)   1028.8 (13.5) 480 (6.2)    Urine (mL/kg/hr)   1150 500 (0.5)    Stool   0     Total Output   1150 500    Net   -121.2 -20            Urine Unmeasured Occurrence   5 x     Stool Unmeasured Occurrence   1 x              Physical Exam   Constitutional: He is oriented to person, place, and time. He appears well-developed and well-nourished.   HENT:   Head: Normocephalic.   Eyes: EOM are normal.   Neck: Neck supple.   Soft right carotid bruit   Cardiovascular: Normal rate, regular rhythm, S1 normal, S2 normal and intact distal pulses.   Murmur heard.   Decrescendo diastolic murmur is present with a grade of  2/6.  Pulmonary/Chest: Effort normal and breath sounds normal.   Abdominal: Soft. There is no tenderness.   Musculoskeletal: Normal range of motion.   Neurological: He is alert and oriented to person, place, and time.   Skin: Skin is warm and dry.   Psychiatric: He has a normal mood and affect.       Diagnostic Data:  Lab Results (last 24 hours)     Procedure Component Value Units Date/Time    Comprehensive Metabolic Panel [[id number]]  (Abnormal) Collected:  06/07/19 0423    Specimen:  Blood Updated:  06/07/19 0627     Glucose 101 mg/dL      BUN 17 mg/dL      Creatinine 1.00 mg/dL      Sodium 144 mmol/L      Potassium 3.6 mmol/L      Chloride 107 mmol/L      CO2 28.0 mmol/L      Calcium 8.6 mg/dL      Total Protein 5.9 g/dL      Albumin 3.30 g/dL      ALT (SGPT) 14 U/L      AST (SGOT) 18 U/L      Alkaline Phosphatase 53 U/L      Total Bilirubin 0.4 mg/dL      eGFR Non African Amer 73 mL/min/1.73      Globulin 2.6 gm/dL      A/G Ratio 1.3 g/dL      BUN/Creatinine Ratio 17.0     Anion Gap 9.0 mmol/L     Narrative:       GFR Normal >60  Chronic Kidney Disease <60  Kidney Failure <15    Lipid Panel [[id number]]  (Abnormal) Collected:  06/07/19 0423    Specimen:  Blood Updated:  06/07/19 0627     Total Cholesterol 200 mg/dL      Triglycerides 175 mg/dL      HDL Cholesterol 31 mg/dL      LDL Cholesterol  134 mg/dL      VLDL Cholesterol 35 mg/dL      LDL/HDL Ratio 4.32    Narrative:       Cholesterol Reference Ranges  (U.S. Department of Health and Human Services ATP III Classifications)    Desirable          <200 mg/dL  Borderline High    200-239 mg/dL  High Risk          >240 mg/dL      Triglyceride Reference Ranges  (U.S. Department of Health and Human Services ATP III Classifications)    Normal           <150 mg/dL  Borderline High  150-199 mg/dL  High             200-499 mg/dL  Very High        >500 mg/dL    HDL Reference Ranges  (U.S. Department of Health and Human Services ATP III Classifcations)    Low     <40 mg/dl  (major risk factor for CHD)  High    >60 mg/dl ('negative' risk factor for CHD)        LDL Reference Ranges  (U.S. Department of Health and Human Services ATP III Classifcations)    Optimal          <100 mg/dL  Near Optimal     100-129 mg/dL  Borderline High  130-159 mg/dL  High             160-189 mg/dL  Very High        >189 mg/dL    CBC (No Diff) [[id number]]  (Abnormal) Collected:  06/07/19 0423    Specimen:  Blood Updated:  06/07/19 0555     WBC 5.96 10*3/mm3      RBC 4.17 10*6/mm3      Hemoglobin 12.6 g/dL      Hematocrit 38.7 %      MCV 92.8 fL      MCH 30.2 pg      MCHC 32.6 g/dL      RDW 15.1 %      RDW-SD 52.4 fl      MPV 10.6 fL      Platelets 163 10*3/mm3     POC Glucose Once [[id number]]  (Normal) Collected:  06/06/19 2301    Specimen:  Blood Updated:  06/06/19 2313     Glucose 98 mg/dL     POC Glucose Once [[id number]]  (Normal) Collected:  06/06/19 1743    Specimen:  Blood Updated:  06/06/19 1745     Glucose 122 mg/dL         ECG/EMG Results (last 24 hours)     Procedure Component Value Units Date/Time    Adult Transthoracic Echo Complete W/ Cont if Necessary Per Protocol (With Agitated Saline) [[id number]] Collected:  06/07/19 0959     Updated:  06/07/19 1300     BSA 1.9 m^2      IVSd 1.2 cm      LVIDd 6.1 cm      LVIDs 3.9 cm      LVPWd 1.2 cm      IVS/LVPW 1.0     FS 36.2 %      EDV(Teich) 187.7 ml      ESV(Teich) 65.9 ml      EF(Teich) 64.9 %      EDV(cubed) 228.2 ml      ESV(cubed) 59.3 ml      EF(cubed) 74.0 %      LV mass(C)d 319.5 grams      LV mass(C)dI 164.0 grams/m^2      SV(Teich) 121.8 ml      SI(Teich) 62.5 ml/m^2      SV(cubed) 168.9 ml      SI(cubed) 86.7 ml/m^2      Ao root diam 3.4 cm      Ao root area 9.3 cm^2      LA dimension 4.4 cm      LA/Ao 1.3     LAd major 6.3 cm      LVLd ap4 8.8 cm      EDV(MOD-sp4) 159.0 ml      LVLs ap4 7.8 cm      ESV(MOD-sp4) 57.0 ml      EF(MOD-sp4) 64.2 %      LVLd ap2 8.1 cm      EDV(MOD-sp2) 129.0 ml      LVLs ap2 8.1 cm      ESV(MOD-sp2) 49.0 ml       EF(MOD-sp2) 62.0 %      LA volume 114.0 ml      EF(MOD-bp) 64.0 %      SV(MOD-sp4) 102.0 ml      SI(MOD-sp4) 52.4 ml/m^2      SV(MOD-sp2) 80.0 ml      SI(MOD-sp2) 41.1 ml/m^2      Ao root area (BSA corrected) 1.8     LV Espinoza Vol (BSA corrected) 81.6 ml/m^2      LV Sys Vol (BSA corrected) 29.3 ml/m^2      LA Volume Index 58.5 ml/m^2      MV E max armani 92.3 cm/sec      MV A max armani 32.6 cm/sec      MV E/A 2.8     MV dec time 0.15 sec      AI max armani 419.5 cm/sec      AI max PG 70.6 mmHg      AI dec slope 329.4 cm/sec^2      AI P1/2t 373.0 msec      PA acc slope 615.5 cm/sec^2      PA acc time 0.13 sec      PA pr(Accel) 22.6 mmHg      Lat E/e'  12.1     Med E/e' 15.5     Lat Peak E' Armani 7.5 cm/sec      Med Peak E' Armani 5.8 cm/sec       CV ECHO HAN - BZI_BMI 24.4 kilograms/m^2       CV ECHO HAN - BSA(HAYCOCK) 2.0 m^2       CV ECHO HAN - BZI_METRIC_WEIGHT 77.1 kg       CV ECHO HAN - BZI_METRIC_HEIGHT 177.8 cm      Avg E/e' ratio 13.88     Target HR (85%) 125 bpm      Max. Pred. HR (100%) 147 bpm      TDI S' 13.60 cm/sec      RV Base 2.90 cm      RV Mid 2.60 cm      TAPSE (>1.6) 2.40 cm2     Narrative:       · Left ventricular systolic function is normal.  · Estimated EF appears to be in the range of 61 - 65%.  · Left ventricular wall thickness is consistent with mild concentric   hypertrophy.  · Left atrial cavity size is moderately dilated.  · The left ventricular cavity is mildly dilated.  · Severe aortic valve regurgitation is present.  · Saline test results are negative for right to left atrial level shunt.               AIS    Benign essential hypertension    Hyperlipidemia    GERD (gastroesophageal reflux disease)      Assessment/Plan:    1.  TIA  -Discussed case with Dr. Eid and Dr. Palmer  -MRI shows no acute stroke  -Suspected mechanism was unstable left carotid plaque  -Status post TPA  -Carotid ultrasound duplex showed no obstructive left-sided carotid plaque, right carotid 50 to 69% stenosis by  duplex  -Patient treated with aspirin and Plavix as well as high intensity statin  -LDL is 175  -Treated for hypertension with enalapril and metoprolol  -Is being considered for covered stent placement by Dr. Palmer in approximately 2 weeks for the left carotid disease.  -No A. fib on telemetry we will discharge with a 30-day mobile telemetry monitor to continue to screen  -Bubble study on echo was negative for PFO    2.  Carotid plaque, risk factors for coronary artery disease, atypical chest pain  -Discussed that when patient returns for carotid stenting we can perform diagnostic angiography to evaluate his underlying coronary anatomy  -EKG shows sinus rhythm, normal axis, no acute ischemic changes, there is an inferior infarct pattern which is been shown to be present since at least March 2019    3.  Severe aortic regurgitation  -Patient generally asymptomatic, left ventricular ejection fraction 64%, left ventricle mildly dilated but not meeting criteria for surgical replacement at present  -Discussed that when patient returns for above procedures we can perform BRENDON to further evaluate his aortic valve for any other source of embolus, mechanism of AI  -Arrange for coronary angiography as above to guide additional treatment that might be needed in the event he needs future valve replacement    We will schedule follow-up with me in 6 weeks.  He is expected to return in about 2 weeks for the above procedures.            Ranjan Chadwick MD

## 2019-06-07 NOTE — PLAN OF CARE
Problem: Patient Care Overview  Goal: Plan of Care Review  Outcome: Ongoing (interventions implemented as appropriate)   06/07/19 0803   Coping/Psychosocial   Plan of Care Reviewed With patient;spouse   OTHER   Outcome Summary OT completed a brief chart review. Pt supervision/indpendent for toileting and t/f. Pt with mild pre-existing weakness LUE. No further skilled IPOT needs at this time. Recommend D/C to home with assist.

## 2019-06-07 NOTE — CONSULTS
Patient does not meet diabetes education order criteria of educate if a1C >7.5% and his was 5.2%, therefore patient was not seen for diabetes education at this time.  Please re consult as needed.

## 2019-06-07 NOTE — THERAPY DISCHARGE NOTE
Acute Care - Physical Therapy Initial Eval/Discharge  [facility]     Patient Name: Jair Flores  : 1945  MRN: [id number]  Today's Date: 2019   Onset of Illness/Injury or Date of Surgery: 19  Date of Referral to PT: 19  Referring Physician: ANTIONE Wagner      Admit Date: 2019    Visit Dx:    ICD-10-CM ICD-9-CM   1. Acute CVA (cerebrovascular accident) (CMS/HCC) I63.9 434.91   2. Received intravenous tissue plasminogen activator (tPA) in emergency department Z92.82 V45.88   3. Impaired mobility and ADLs Z74.09 799.89   4. Impaired functional mobility, balance, gait, and endurance Z74.09 V49.89     Patient Active Problem List   Diagnosis   • History of bladder cancer   • Hematuria   • Insomnia   • Status post left partial knee replacement   • Benign essential hypertension   • Acute gout of foot   • Flash pulmonary edema (CMS/HCC)   • Hyperlipidemia   • Osteoarthritis   • Right carotid bruit   • Aortic valve disease   • Primary osteoarthritis involving multiple joints   • Mitral valve disease   • Bladder cancer (CMS/HCC)   • History of disease   • Gout   • AIS     Past Medical History:   Diagnosis Date   • Cancer (CMS/HCC)     bladder; cysto surveillance 2017; free of disease   • Cataract     right eye   • Degenerative arthritis     right; knee replacement; successful   • Fluid overload 2015    after knee surgery   • GERD (gastroesophageal reflux disease)    • Hard to intubate     has been told he is difficult to intubate   • Hemorrhoids 2011    hemorrhoid ablation   • Hypertension    • Skin cancer     head   • Wears glasses      Past Surgical History:   Procedure Laterality Date   • COLONOSCOPY      routinely every 10 years   • CYSTOSCOPY      x3   • CYSTOSCOPY  2017    bladder cancer; cysto surveillance; free of disease   • EXCISIONAL HEMORRHOIDECTOMY      hemorrhoid ablation; hemorrhoid onset 2011   • EYE SURGERY      as a child after cutting eye with barbwire  fence   • KNEE ARTHROPLASTY, PARTIAL REPLACEMENT Left    • SKIN CANCER EXCISION     • TONSILLECTOMY     • TOTAL KNEE ARTHROPLASTY Right     Dgen arthritis   • TRANSURETHRAL RESECTION OF BLADDER TUMOR N/A 3/29/2019    Procedure: TRANSURETHRAL RESECTION OF BLADDER TUMOR WITH MITOMYCIN;  Surgeon: Jair Braxton MD;  Location: [facility] OR;  Service: Urology          PT ASSESSMENT (last 12 hours)      Physical Therapy Evaluation     Row Name 06/07/19 0915          PT Evaluation Time/Intention    Subjective Information  no complaints  -LS     Document Type  discharge evaluation/summary  -LS     Mode of Treatment  physical therapy  -LS     Patient Effort  excellent  -LS     Row Name 06/07/19 0915          General Information    Patient Profile Reviewed?  yes  -LS     Onset of Illness/Injury or Date of Surgery  06/06/19  -LS     Referring Physician  ANTIONE Wagner  -LS     Patient Observations  alert;cooperative;agree to therapy  -LS     Prior Level of Function  independent:;gait;transfer;ADL's;bathing;dressing;work  -LS     Equipment Currently Used at Home  cane, straight  -LS     Pertinent History of Current Functional Problem  To [facility] with acute onset of lip and R-sided numbness. 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 S/p tPA.   -LS     Existing Precautions/Restrictions  no known precautions/restrictions  -LS     Risks Reviewed  patient:;spouse/S.O.:;LOB;dizziness;increased discomfort;change in vital signs  -LS     Benefits Reviewed  patient:;spouse/S.O.:;improve function;increase independence;increase strength;increase balance;increase knowledge  -LS     Barriers to Rehab  none identified  -LS     Row Name 06/07/19 0915          Relationship/Environment    Lives With  spouse  -LS     Row Name 06/07/19 0915          Resource/Environmental Concerns    Current Living Arrangements  home/apartment/condo  -LS     Row Name 06/07/19 0915          Home Main Entrance    Number of Stairs, Main Entrance  four  -LS      Row Name 06/07/19 0915          Stairs Within Home, Primary    Stairs, Within Home, Primary  13  -     Row Name 06/07/19 0915          Cognitive Assessment/Interventions    Additional Documentation  Cognitive Assessment/Intervention (Group)  -     Row Name 06/07/19 0915          Cognitive Assessment/Intervention- PT/OT    Affect/Mental Status (Cognitive)  WFL  -     Orientation Status (Cognition)  oriented x 4  -LS     Follows Commands (Cognition)  follows one step commands;over 90% accuracy  -LS     Cognitive Function (Cognitive)  WFL  -     Personal Safety Interventions  gait belt;supervised activity;nonskid shoes/slippers when out of bed  -     Row Name 06/07/19 0915          Bed Mobility Assessment/Treatment    Sit-Supine [place] (Bed Mobility)  conditional independence  -     Row Name 06/07/19 0915          Transfer Assessment/Treatment    Transfer Assessment/Treatment  sit-stand transfer;stand-sit transfer  -LS     Sit-Stand [place] (Transfers)  independent  -     Stand-Sit [place] (Transfers)  independent  -     Row Name 06/07/19 0915          Gait/Stairs Assessment/Training    [place] Level (Gait)  independent  -LS     Distance in Feet (Gait)  700  -LS     Comment (Gait/Stairs)  No noted LOB.  -     Row Name 06/07/19 0915          General ROM    GENERAL ROM COMMENTS  BLE WFL  -     Row Name 06/07/19 0915          MMT (Manual Muscle Testing)    General MMT Comments  BLE grossly 4+/5; pt reported to OT baseline L-weakness. None noted functionally for BLE.  -     Row Name 06/07/19 0915          Motor Assessment/Intervention    Additional Documentation  Balance (Group);Therapeutic Exercise (Group)  -     Row Name 06/07/19 0915          Gross Motor Coordination    Gross Motor Impairments  coordination  -LS     Gross Motor Skill, Impairments Detail  BLE heel shin slides WFL  -     Row Name 06/07/19 0915          Balance    Balance  static sitting balance;static  standing balance  -     Row Name 06/07/19 0915          Static Sitting Balance    Level of [place] (Unsupported Sitting, Static Balance)  independent  -     Row Name 06/07/19 0915          Static Standing Balance    Level of [place] (Supported Standing, Static Balance)  independent  -     Row Name 06/07/19 0915          Sensory Assessment/Intervention    Sensory General Assessment  no sensation deficits identified BLE  -     Row Name 06/07/19 0915          Pain Assessment    Additional Documentation  Pain Scale: Numbers Pre/Post-Treatment (Group)  -     Row Name 06/07/19 0915          Pain Scale: Numbers Pre/Post-Treatment    Pain Scale: Numbers, Pretreatment  0/10 - no pain  -     Pain Scale: Numbers, Post-Treatment  0/10 - no pain  -     Row Name 06/07/19 0915          Plan of Care Review    Plan of Care Reviewed With  patient;spouse  -     Row Name 06/07/19 0915          Physical Therapy Clinical Impression    Date of Referral to PT  06/06/19  -     PT Diagnosis (PT Clinical Impression)  impaired functional mobility, balance, gait  -     Patient/Family Goals Statement (PT Clinical Impression)  return to PLOF; go home  -     Criteria for Skilled Interventions Met (PT Clinical Impression)  no problems identified which require skilled intervention  -     Care Plan Review (PT)  evaluation/treatment results reviewed  -     Care Plan Review, Other Participant (PT Clinical Impression)  spouse  -     Row Name 06/07/19 0915          Vital Signs    Pre Systolic BP Rehab  -- WFL   -     Row Name 06/07/19 0915          Physical Therapy Goals    Bed Mobility Goal Selection (PT)  --  -     Row Name 06/07/19 0915          Positioning and Restraints    Pre-Treatment Position  sitting in chair/recliner  -     Post Treatment Position  bed  -LS     In Bed  notified nsg;fowlers;call light within reach;encouraged to call for assist;with family/caregiver;with other staff  -     Row Name  06/07/19 0915          Physical Therapy Discharge Summary    Additional Documentation  Discharge Summary, PT Eval (Group)  -     Row Name 06/07/19 0915          Discharge Summary, PT Eval    Reason for Discharge (PT Discharge Summary)  no further needs identified  -     Outcomes Achieved Upon Discharge (PT Discharge Summary)  evaluation only  -     Row Name 06/07/19 0915          Living Environment    Home Accessibility  stairs to enter home;stairs within home  -       User Key  (r) = Recorded By, (t) = Taken By, (c) = Cosigned By    Initials Name Provider Type    Nahomy Conrad, PT Physical Therapist          Physical Therapy Education     Title: PT OT SLP Therapies (In Progress)     Topic: Physical Therapy (In Progress)     Point: Mobility training (Done)     Learning Progress Summary           Patient Acceptance, E,D, VU,DU by  at 6/7/2019  9:15 AM   Significant Other Acceptance, E,D, VU,DU by  at 6/7/2019  9:15 AM                   Point: Body mechanics (Done)     Learning Progress Summary           Patient Acceptance, E,D, VU,DU by  at 6/7/2019  9:15 AM   Significant Other Acceptance, E,D, VU,DU by  at 6/7/2019  9:15 AM                   Point: Precautions (Done)     Learning Progress Summary           Patient Acceptance, E,D, VU,DU by  at 6/7/2019  9:15 AM   Significant Other Acceptance, E,D, VU,DU by  at 6/7/2019  9:15 AM                               User Key     Initials Effective Dates Name Provider Type [facility] 06/19/15 -  Nahomy Kelly, PT Physical Therapist PT                PT Recommendation and Plan  Anticipated Discharge Disposition (PT): home with assist  Therapy Frequency (PT Clinical Impression): evaluation only  Outcome Summary/Treatment Plan (PT)  Anticipated Discharge Disposition (PT): home with assist  Reason for Discharge (PT Discharge Summary): no further needs identified  Plan of Care Reviewed With: patient, spouse  Outcome Summary: PT initial evaluation  completed. No further PT goals established as pt demonstrates safety and appears to be at baseline re: functional mobility. Edu pt re: home safety; would benefit from further ambulation with NSG prior to d/c. Will d/c PT at this time. Recommend d/c home with assist.     Outcome Measures     Row Name 06/07/19 0915 06/07/19 0803          How much help from another person do you currently need...    Turning from your back to your side while in flat bed without using bedrails?  4  -LS  --     Moving from lying on back to sitting on the side of a flat bed without bedrails?  4  -LS  --     Moving to and from a bed to a chair (including a wheelchair)?  4  -LS  --     Standing up from a chair using your arms (e.g., wheelchair, bedside chair)?  4  -LS  --     Climbing 3-5 steps with a railing?  4  -LS  --     To walk in hospital room?  4  -LS  --     AM-PAC 6 Clicks Score  24  -LS  --        How much help from another is currently needed...    Putting on and taking off regular lower body clothing?  --  3  (Pended)   -CH     Bathing (including washing, rinsing, and drying)  --  3  (Pended)   -CH     Toileting (which includes using toilet bed pan or urinal)  --  4  (Pended)   -CH     Putting on and taking off regular upper body clothing  --  4  (Pended)   -CH     Taking care of personal grooming (such as brushing teeth)  --  4  (Pended)   -CH     Eating meals  --  4  (Pended)   -     Score  --  22  (Pended)   -LS (r) CH (t)        Modified Humza Scale    Pre-Stroke Modified Emmons Scale  0 - No Symptoms at all.  -LS  0 - No Symptoms at all.  (Pended)   -CH     Modified Humza Scale  0 - No Symptoms at all.  -LS  1 - No significant disability despite symptoms.  Able to carry out all usual duties and activities.  (Pended)   -        Functional Assessment    Outcome Measure Options  AM-PAC 6 Clicks Basic Mobility (PT)  -LS  AM-PAC 6 Clicks Daily Activity (OT);Modified Emmons  (Pended)   -       User Key  (r) = Recorded By,  (t) = Taken By, (c) = Cosigned By    Initials Name Provider Type    Nahomy Conrad, PT Physical Therapist    Don Gomez, OT Student OT Student           Time Calculation:   PT Charges     Row Name 06/07/19 0915             Time Calculation    PT Received On  06/07/19  -        User Key  (r) = Recorded By, (t) = Taken By, (c) = Cosigned By    Initials Name Provider Type    Nahomy Conrad, PT Physical Therapist        Therapy Charges for Today     Code Description Service Date Service Provider Modifiers Qty    [id number] HC PT EVAL LOW COMPLEXITY 3 6/7/2019 Nahomy Kelly, PT GP 1          PT G-Codes  Outcome Measure Options: AM-PAC 6 Clicks Basic Mobility (PT)  AM-PAC 6 Clicks Score: 24  Score: (P) 22  Modified Humza Scale: 0 - No Symptoms at all.    PT Discharge Summary  Anticipated Discharge Disposition (PT): home with assist  Reason for Discharge: Independent, At baseline function  Outcomes Achieved: Refer to plan of care for updates on goals achieved    Nahomy Kelly, PT  6/7/2019

## 2019-06-07 NOTE — THERAPY DISCHARGE NOTE
Acute Care - Occupational Therapy Initial Eval/Discharge  [facility]     Patient Name: Jair Flores  : 1945  MRN: [id number]  Today's Date: 2019  Onset of Illness/Injury or Date of Surgery: 19  Date of Referral to OT: (P) 19  Referring Physician: ANTIONE Wagner      Admit Date: 2019       ICD-10-CM ICD-9-CM   1. Acute CVA (cerebrovascular accident) (CMS/HCC) I63.9 434.91   2. Received intravenous tissue plasminogen activator (tPA) in emergency department Z92.82 V45.88   3. Impaired mobility and ADLs Z74.09 799.89   4. Impaired functional mobility, balance, gait, and endurance Z74.09 V49.89     Patient Active Problem List   Diagnosis   • History of bladder cancer   • Hematuria   • Insomnia   • Status post left partial knee replacement   • Benign essential hypertension   • Acute gout of foot   • Flash pulmonary edema (CMS/HCC)   • Hyperlipidemia   • Osteoarthritis   • Right carotid bruit   • Aortic valve disease   • Primary osteoarthritis involving multiple joints   • Mitral valve disease   • Bladder cancer (CMS/HCC)   • History of disease   • Gout   • AIS     Past Medical History:   Diagnosis Date   • Cancer (CMS/HCC)     bladder; cysto surveillance 2017; free of disease   • Cataract     right eye   • Degenerative arthritis     right; knee replacement; successful   • Fluid overload 2015    after knee surgery   • GERD (gastroesophageal reflux disease)    • Hard to intubate     has been told he is difficult to intubate   • Hemorrhoids 2011    hemorrhoid ablation   • Hypertension    • Skin cancer     head   • Wears glasses      Past Surgical History:   Procedure Laterality Date   • COLONOSCOPY      routinely every 10 years   • CYSTOSCOPY      x3   • CYSTOSCOPY  2017    bladder cancer; cysto surveillance; free of disease   • EXCISIONAL HEMORRHOIDECTOMY      hemorrhoid ablation; hemorrhoid onset 2011   • EYE SURGERY      as a child after cutting eye with barbwire  fence   • KNEE ARTHROPLASTY, PARTIAL REPLACEMENT Left    • SKIN CANCER EXCISION     • TONSILLECTOMY     • TOTAL KNEE ARTHROPLASTY Right     Dgen arthritis   • TRANSURETHRAL RESECTION OF BLADDER TUMOR N/A 3/29/2019    Procedure: TRANSURETHRAL RESECTION OF BLADDER TUMOR WITH MITOMYCIN;  Surgeon: Jair Braxton MD;  Location: [facility];  Service: Urology          OT ASSESSMENT FLOWSHEET (last 12 hours)      Occupational Therapy Evaluation     Row Name 06/07/19 0803                   OT Evaluation Time/Intention    Subjective Information  no complaints  (Pended)   -        Document Type  discharge evaluation/summary  (Pended)   -        Mode of Treatment  occupational therapy  (Pended)   -        Patient Effort  excellent  (Pended)   -        Symptoms Noted During/After Treatment  none  (Pended)   -           General Information    Patient Profile Reviewed?  yes  (Pended)   -        Onset of Illness/Injury or Date of Surgery  06/06/19  (Pended)   -        Referring Physician  ANTIONE Wagner  (Pended)   -        Patient Observations  alert;cooperative;agree to therapy  (Pended)   -        Patient/Family Observations  Pt present in bed. Spouse entered midway through evaluation.   (Pended)   -        Prior Level of Function  independent:;transfer;all household mobility;community mobility;feeding;grooming;dressing;bathing;cooking;cleaning  (Pended)   -        Equipment Currently Used at Home  cane, straight  (Pended)  Pt has straight cane but was not using.   -        Pertinent History of Current Functional Problem  Pt presented to ED with numbness of the lips and R side of the body. Pt is S/P TPA. Imaging (-) for acute infarct, but showed old ischemic insults to the L frontal parietal and the posterior parietal.  (Pended)   -        Existing Precautions/Restrictions  no known precautions/restrictions  (Pended)   -        Risks Reviewed  patient:;LOB;nausea/vomiting;dizziness;increased  discomfort;change in vital signs;lines disloged  (Pended)   -        Benefits Reviewed  patient:;improve function;increase independence;increase strength;increase balance;decrease pain;decrease risk of DVT;increase knowledge  (Pended)   -        Barriers to Rehab  none identified  (Pended)   -           Relationship/Environment    Primary Source of Support/Comfort  spouse  (Pended)   -        Lives With  spouse  (Pended)   -        Family Caregiver if Needed  spouse  (Pended)   -           Resource/Environmental Concerns    Current Living Arrangements  home/apartment/condo  (Pended)   -        Resource/Environmental Concerns  home accessibility  (Pended)   -        Home Accessibility Concerns  stairs to access bedroom or bathroom;stairs to enter home  (Pended)   -           Home Main Entrance    Number of Stairs, Main Entrance  four  (Pended)   -        Stair Railings, Main Entrance  railing on right side (ascending)  (Pended)   -           Stairs Within Home, Primary    Stairs, Within Home, Primary  Pt notes that he has 4 stairs to enter sunroom, 2 to enter the kitchen and a full flight of stairs to enter the bedrooms. Pt does have a bathroom on the main floor.   (Pended)   -        Number of Stairs, Within Home, Primary  --  (Pended)  13 to reach bedrooms  -           Cognitive Assessment/Interventions    Additional Documentation  Cognitive Assessment/Intervention (Group)  (Pended)   -           Cognitive Assessment/Intervention- PT/OT    Affect/Mental Status (Cognitive)  WFL  (Pended)   -        Orientation Status (Cognition)  oriented x 4  (Pended)   -        Follows Commands (Cognition)  follows one step commands;over 90% accuracy  (Pended)   -        Cognitive Function (Cognitive)  WFL  (Pended)   -        Safety Deficit (Cognitive)  mild deficit;safety precautions awareness  (Pended)   -        Personal Safety Interventions  nonskid shoes/slippers when out of bed;other  (see comments);fall prevention program maintained  (Pended)  Exit alarm deferred by NSG.  -           Safety Issues, Functional Mobility    Safety Issues Affecting Function (Mobility)  safety precaution awareness  (Pended)   -           Bed Mobility Assessment/Treatment    Bed Mobility Assessment/Treatment  scooting/bridging;supine-sit;sit-supine  (Pended)   -        Scooting/Bridging [place] (Bed Mobility)  independent  (Pended)   -        Supine-Sit [place] (Bed Mobility)  independent  (Pended)   -        Sit-Supine [place] (Bed Mobility)  independent  (Pended)   -        Assistive Device (Bed Mobility)  head of bed elevated  (Pended)   -           Functional Mobility    Functional Mobility- Comment  Defer to PT.  (Pended)   -           Transfer Assessment/Treatment    Transfer Assessment/Treatment  bed-chair transfer;sit-stand transfer;stand-sit transfer  (Pended)   -        Comment (Transfers)  Pt stand to sit, sit to stand to utilize urinal. Pt with no LOB during t/f.  (Pended)   -           Bed-Chair Transfer    Bed-Chair [place] (Transfers)  independent  (Pended)   -           Sit-Stand Transfer    Sit-Stand [place] (Transfers)  independent  (Pended)   -           Stand-Sit Transfer    Stand-Sit [place] (Transfers)  independent  (Pended)   -           ADL Assessment/Intervention    BADL Assessment/Intervention  toileting  (Pended)   -           Toileting Assessment/Training    [place] Level (Toileting)  adjust/manage clothing;supervision  (Pended)   -        Assistive Devices (Toileting)  urinal  (Pended)   -        Toileting Position  unsupported standing  (Pended)   -        Comment (Toileting)  Pt completed toileting with no LOB.   (Pended)   -           BADL Safety/Performance    Impairments, BADL Safety/Performance  strength  (Pended)   -           General ROM    GENERAL ROM COMMENTS  BUE WFL  (Pended)   -           MMT (Manual  Muscle Testing)    General MMT Comments  RUE 4+/5 shoulder flexion. Remaining RUE 5/5. LUE shoulder flexion, elbow flexion/extension and pronation/supination 4-/5. Bilateral  5/5. Pt and spouse noted that pt has had L sided weakness since February that they believe to be secondary to a medication.   (Pended)   -           Motor Assessment/Interventions    Additional Documentation  Balance (Group);Fine Motor Testing & Training (Group);Gross Motor Coordination (Group)  (Pended)   -           Gross Motor Coordination    Gross Motor Impairments  finger to nose  (Pended)   -        Gross Motor Skill, Impairments Detail  BUE intact.  (Pended)   -           Balance    Balance  static sitting balance;static standing balance  (Pended)   -           Static Sitting Balance    Level of [place] (Unsupported Sitting, Static Balance)  independent  (Pended)   -        Sitting Position (Unsupported Sitting, Static Balance)  sitting on edge of bed  (Pended)   -        Time Able to Maintain Position (Unsupported Sitting, Static Balance)  2 to 3 minutes  (Pended)   -           Static Standing Balance    Level of [place] (Supported Standing, Static Balance)  independent  (Pended)   -        Time Able to Maintain Position (Supported Standing, Static Balance)  1 to 2 minutes  (Pended)   -        Comment (Supported Standing, Static Balance)  Pt with no LOB during toileting.   (Pended)   -           Fine Motor Testing & Training    Fine Motor Tests  other (see comments)  (Pended)  Thumb to small finger opposition.   -        Comment, Fine Motor Coordination  BUE Intact.  (Pended)   -           Sensory Assessment/Intervention    Sensory General Assessment  no sensation deficits identified  (Pended)   -           Positioning and Restraints    Pre-Treatment Position  in bed  (Pended)   -        Post Treatment Position  chair  (Pended)   -        In Chair  notified nsg;reclined;call light within  reach;encouraged to call for assist;with family/caregiver;waffle cushion  (Pended)  NSG deferred exit alarm.  -           Pain Assessment    Additional Documentation  Pain Scale 2: FACES Pre/Post-Treatment (Group)  (Pended)   -           Pain Scale 2: FACES Pre/Post-Treatment    Pain 2: FACES Scale, Pretreatment  0-->no hurt  (Pended)   -        Pain 2: FACES Scale, Post-Treatment  0-->no hurt  (Pended)   -        Pre/Post Treatment Pain 2 Comment  tolerated  (Pended)   -           Plan of Care Review    Plan of Care Reviewed With  patient;spouse  (Pended)   -           Clinical Impression (OT)    Date of Referral to OT  06/06/19  (Pended)   -        OT Diagnosis  Pt at baseline ADL performance.   (Pended)   -        Patient/Family Goals Statement (OT Eval)  Return home  (Pended)   -        Criteria for Skilled Therapeutic Interventions Met (OT Eval)  no problems identified which require skilled intervention  (Pended)   -        Anticipated Discharge Disposition (OT)  home with assist  (Pended)   -           Vital Signs    Pre Systolic BP Rehab  138  (Pended)   -        Pre Treatment Diastolic BP  68  (Pended)   -        Post Systolic BP Rehab  135  (Pended)   -        Post Treatment Diastolic BP  54  (Pended)   -        Pretreatment Heart Rate (beats/min)  59  (Pended)   -        Posttreatment Heart Rate (beats/min)  60  (Pended)   -        Pre SpO2 (%)  94  (Pended)   -        O2 Delivery Pre Treatment  room air  (Pended)   -        Post SpO2 (%)  96  (Pended)   -        O2 Delivery Post Treatment  room air  (Pended)   -        Pre Patient Position  Supine  (Pended)   -        Intra Patient Position  Standing  (Pended)   -        Post Patient Position  Sitting  (Pended)   -           Discharge Summary (Occupational Therapy)    Additional Documentation  Discharge Summary, OT Eval (Group)  (Pended)   -           Discharge Summary, OT Eval    Reason for Discharge (OT  Discharge Summary)  no further needs identified  (Pended)   -           Living Environment    Home Accessibility  stairs to enter home;stairs within home  (Pended)  Walk in shower.  -          User Key  (r) = Recorded By, (t) = Taken By, (c) = Cosigned By    Initials Name Effective Dates     Don Sinha, OT Student 03/13/19 -           Occupational Therapy Education     Title: PT OT SLP Therapies (In Progress)     Topic: Occupational Therapy (In Progress)     Point: ADL training (Done)     Description: Instruct learner(s) on proper safety adaptation and remediation techniques during self care or transfers.   Instruct in proper use of assistive devices.    Learning Progress Summary           Patient Acceptance, E, VU by  at 6/7/2019  8:03 AM    Comment:  Pt edcuated on safe t/f techniques, the benefits of therapy and the role of OT.   Significant Other Acceptance, E, VU by  at 6/7/2019  8:03 AM    Comment:  Pt edcuated on safe t/f techniques, the benefits of therapy and the role of OT.                   Point: Precautions (Done)     Description: Instruct learner(s) on prescribed precautions during self-care and functional transfers.    Learning Progress Summary           Patient Acceptance, E, VU by  at 6/7/2019  8:03 AM    Comment:  Pt edcuated on safe t/f techniques, the benefits of therapy and the role of OT.   Significant Other Acceptance, E, VU by  at 6/7/2019  8:03 AM    Comment:  Pt edcuated on safe t/f techniques, the benefits of therapy and the role of OT.                   Point: Body mechanics (Done)     Description: Instruct learner(s) on proper positioning and spine alignment during self-care, functional mobility activities and/or exercises.    Learning Progress Summary           Patient Acceptance, E, VU by  at 6/7/2019  8:03 AM    Comment:  Pt edcuated on safe t/f techniques, the benefits of therapy and the role of OT.   Significant Other Acceptance, E, VU by  at 6/7/2019   8:03 AM    Comment:  Pt edcuated on safe t/f techniques, the benefits of therapy and the role of OT.                               User Key     Initials Effective Dates Name Provider Type Discipline     03/13/19 -  Don Sinha, OT Student OT Student OT                OT Recommendation and Plan  Outcome Summary/Treatment Plan (OT)  Anticipated Discharge Disposition (OT): (P) home with assist  Reason for Discharge (OT Discharge Summary): (P) no further needs identified  Plan of Care Review  Plan of Care Reviewed With: (P) patient, spouse  Plan of Care Reviewed With: (P) patient, spouse  Outcome Summary: (P) OT completed a brief chart review. Pt supervision/indpendent for toileting and t/f. Pt with mild pre-existing weakness LUE. No further skilled IPOT needs at this time. Recommend D/C to home with assist.     Rehab Goal Summary     Row Name 06/07/19 0915             Physical Therapy Goals    Bed Mobility Goal Selection (PT)  --  -LS        User Key  (r) = Recorded By, (t) = Taken By, (c) = Cosigned By    Initials Name Provider Type Discipline     Nahomy Kelly, PT Physical Therapist PT          Outcome Measures     Row Name 06/07/19 0915 06/07/19 0803          How much help from another person do you currently need...    Turning from your back to your side while in flat bed without using bedrails?  4  -LS  --     Moving from lying on back to sitting on the side of a flat bed without bedrails?  4  -LS  --     Moving to and from a bed to a chair (including a wheelchair)?  4  -LS  --     Standing up from a chair using your arms (e.g., wheelchair, bedside chair)?  4  -LS  --     Climbing 3-5 steps with a railing?  4  -LS  --     To walk in hospital room?  4  -LS  --     AM-PAC 6 Clicks Score  24  -LS  --        How much help from another is currently needed...    Putting on and taking off regular lower body clothing?  --  3  (Pended)   -     Bathing (including washing, rinsing, and drying)  --  3   (Pended)   -CH     Toileting (which includes using toilet bed pan or urinal)  --  4  (Pended)   -CH     Putting on and taking off regular upper body clothing  --  4  (Pended)   -CH     Taking care of personal grooming (such as brushing teeth)  --  4  (Pended)   -CH     Eating meals  --  4  (Pended)   -CH     Score  --  22  (Pended)   -LS (r) CH (t)        Modified [place] Scale    Pre-Stroke Modified [place] Scale  0 - No Symptoms at all.  -LS  0 - No Symptoms at all.  (Pended)   -CH     Modified [place] Scale  0 - No Symptoms at all.  -LS  1 - No significant disability despite symptoms.  Able to carry out all usual duties and activities.  (Pended)   -        Functional Assessment    Outcome Measure Options  AM-PAC 6 Clicks Basic Mobility (PT)  -LS  AM-PAC 6 Clicks Daily Activity (OT);Modified [place]  (Pended)   -       User Key  (r) = Recorded By, (t) = Taken By, (c) = Cosigned By    Initials Name Provider Type    Nahomy Conrad, PT Physical Therapist     Don Sinha, OT Student OT Student          Time Calculation:   Time Calculation- OT     Row Name 06/07/19 0803             Time Calculation- OT    OT Start Time  0803  (Pended)   -      OT Received On  06/07/19  (Pended)   -      OT Goal Re-Cert Due Date  06/17/19  (Pended)   -        User Key  (r) = Recorded By, (t) = Taken By, (c) = Cosigned By    Initials Name Provider Type     Don Sinha, OT Student OT Student        Therapy Suggested Charges     Code   Minutes Charges    None           Therapy Charges for Today     Code Description Service Date Service Provider Modifiers Qty    [id number]  OT EVAL LOW COMPLEXITY 4 6/7/2019 Don Sinha, OT Student GO 1               OT Discharge Summary  Anticipated Discharge Disposition (OT): (P) home with assist  Reason for Discharge: (P) At baseline function  Outcomes Achieved: (P) Refer to plan of care for updates on goals achieved  Discharge Destination: (P) Home with  assist    Don Sinha, OT Student  6/7/2019

## 2019-06-07 NOTE — PROGRESS NOTES
Continued Stay Note  [facility]     Patient Name: Jair Flores  MRN: [id number]  Today's Date: 6/7/2019    Admit Date: 6/6/2019    Discharge Plan     Row Name 06/07/19 0712       Plan    Plan  Update    Plan Comments  Plan remains the same,  home with spouse at discharge. CM will follow as needed.        Discharge Codes    No documentation.       Expected Discharge Date and Time     Expected Discharge Date Expected Discharge Time    Jun 12, 2019             Patria Syed RN

## 2019-06-07 NOTE — PROGRESS NOTES
"Neurology       Patient Care Team:  Osmar Clemons MD as PCP - General  Osmar Clemons MD as PCP - Family Medicine    Chief complaint: Right-sided numbness    History: Patient is doing well post TPA with no symptoms currently.    He tells me that he has been having some left arm discomfort radiating to his pectoralis area particularly when shoveling snow.    He has seen Dr. Mendoza but has not seen him for a number of years.    Again denies having irregularities in his heartbeat.          Past Medical History:   Diagnosis Date   • Cancer (CMS/HCC)     bladder; cysto surveillance 07/23/2017; free of disease   • Cataract     right eye   • Degenerative arthritis     right; knee replacement; successful   • Fluid overload 2015    after knee surgery   • GERD (gastroesophageal reflux disease)    • Hard to intubate     has been told he is difficult to intubate   • Hemorrhoids 09/12/2011    hemorrhoid ablation   • Hypertension    • Skin cancer     head   • Wears glasses        Vital Signs   Vitals:    06/07/19 1215 06/07/19 1221 06/07/19 1300 06/07/19 1400   BP:   145/59 158/63   BP Location:       Patient Position:       Pulse:   50 58   Resp:       Temp:       TempSrc:       SpO2:   95% 92%   Weight: 77.1 kg (170 lb) 77.1 kg (170 lb)     Height: 177 cm (69.69\") 177 cm (69.69\")         Physical Exam:   General: Awake and alert              Neuro: Oriented to person place and time.    Speech is normal.    The patient stands with out assistance and moves all extremities well.    Symmetrical    Results Review:  Total cholesterol is 200.    LDL cholesterol is 134.    HDL is 31.    Carotid duplex shows a 50 to 60% stenosis of the [place]'s plaque.    CT angiogram was reviewed with Dr. wagner who feels that he has ulcerative plaque in the left carotid as well as diffuse intracranial atherosclerosis.    Transthoracic echocardiogram shows normal left ventricular systolic function with an ejection fraction of 61 to 65%.    The left " ventricular wall thickness shows mild concentric hypertrophy.    The left atrial cavity is moderately dilated left ventricular cavity is mildly moderately dilated.    There is severe aortic valve regurgitation present.    A bubble study is negative for PFO.          Results from last 7 days   Lab Units 06/07/19  0423   WBC 10*3/mm3 5.96   HEMOGLOBIN g/dL 12.6*   HEMATOCRIT % 38.7   PLATELETS 10*3/mm3 163     Results from last 7 days   Lab Units 06/07/19  0423 06/06/19  0937 06/06/19  0933   SODIUM mmol/L 144  --   --    POTASSIUM mmol/L 3.6  --   --    CHLORIDE mmol/L 107  --   --    CO2 mmol/L 28.0  --   --    BUN mg/dL 17  --   --    CREATININE mg/dL 1.00  --  1.00   CALCIUM mg/dL 8.6  --   --    BILIRUBIN mg/dL 0.4  --   --    ALK PHOS U/L 53  --   --    ALT (SGPT) U/L 14 15  --    AST (SGOT) U/L 18 26  --    GLUCOSE mg/dL 101*  --   --        Imaging Results (last 24 hours)     Procedure Component Value Units Date/Time    CT Head Without Contrast [[id number]] Collected:  06/07/19 1123     Updated:  06/07/19 1142    Narrative:       EXAMINATION: CT HEAD WO CONTRAST-06/07/2019:      INDICATION: Stroke; I63.9-Cerebral infarction, unspecified;  Z92.82-Status post administration of tPA (rtPA) in a different facility  within the last 24 hours prior to admission to current facility;  Z74.09-Other reduced mobility; Z74.09-Other reduced mobility.     TECHNIQUE: CT scan of the head was performed at 5 mm. No intravenous  contrast was utilized.     The radiation dose reduction device was turned on for each scan per the  ALARA (As Low as Reasonably Achievable) protocol.     COMPARISON: MRI of the brain dated 06/06/2019.     FINDINGS: CT scan of the head was performed without intravenous  contrast. There is an old infarct with postinfarction encephalomalacia  in the left parietal region and to a lesser extent in the left frontal  lobe. Most importantly, there is no evidence of intracranial hemorrhage.  The examination is not  significant changed since an MR examination of  the previous day.       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 There is no intracranial  hemorrhage.     D:  06/07/2019  E:  06/07/2019             MRI Brain With & Without Contrast [[id number]] Collected:  06/07/19 0755     Updated:  06/07/19 1019    Narrative:       EXAMINATION: MRI BRAIN W WO CONTRAST-     INDICATION: Seizure; I63.9-Cerebral infarction, unspecified;  Z92.82-Status post administration of tPA (rtPA) in a different facility  within the last 24 hours prior to admission to current facility; mental  status change.     TECHNIQUE: Routine multiplanar imaging was obtained of the brain pre and  post administration of gadolinium contrast.     COMPARISON: None.     FINDINGS: There is no evidence of restricted diffusion to suggest  evidence of an acute ischemic insult. Abnormal signal intensity is again  seen diffusely throughout the left frontal parietal region near the  vertex and left posterior parietal region where there is extensive  gliotic changes identified from prior ischemic insult. There is no  abnormal contrast enhancement to suggest evidence of an underlying mass  or lesion. The visualized vascularity is unremarkable in appearance.  There is atrophy identified of the brain with encephalomalacia change  involving the left vertex. There are chronic changes seen diffusely  throughout the periventricular and subcortical white matter from prior  chronic small vessel ischemic change. There is no hemorrhage or  hydrocephalus. No mass, mass effect, or midline shift. No abnormal  extraaxial fluid collection is identified. Bony structures reveal no  gross abnormality. The visualized paranasal sinuses are clear. Globes  and orbits are intact. The mastoid air cells are patent. No  cerebellopontine mass is identified. Pituitary and sella are  unremarkable. Craniovertebral junction is preserved.       Impression:       Old ischemic insult seen within  the left frontal parietal  and posterior parietal regions with encephalomalacia change and gliotic  changes identified. There is no abnormal contrast enhancement to suggest  evidence of metastatic focus. There is no acute intracranial abnormality  identified.     D:  06/07/2019  E:  06/07/2019         This report was finalized on 6/7/2019 10:16 AM by Dr. Pati More MD.       CT Cerebral Perfusion With & Without Contrast [[id number]] Collected:  06/06/19 0958     Updated:  06/06/19 2212    Narrative:       EXAMINATION: CT CEREBRAL PERFUSION W WO CONTRAST- 06/06/2019      INDICATION: Focal neuro deficit, new, fixed or worsening, <6 hours     TECHNIQUE: Cerebral perfusion analysis was performed following dynamic  IV contrast infusion, with computer analysis and mapping forming transit  time and time to drain cerebral blood flow and cerebral blood volume.     The radiation dose reduction device was turned on for each scan per the  ALARA (As Low as Reasonably Achievable) protocol.     COMPARISON: Head CT scan of same date     FINDINGS: Previous exam report indicates probable left frontoparietal  encephalomalacia but no clearly acute disease.     Perfusion images show increased mean transit time and time to drain in  the area of encephalomalacia, decreased blood volume and blood flow,  consistent with old infarct. No significant asymmetry of perfusion is  seen to suggest ischemia/infarct elsewhere.       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  Otherwise negative perfusion scan.     D:  06/06/2019  E:  06/06/2019     This report was finalized on 6/6/2019 10:09 PM by DR. Jamie Herrera MD.       CT Angiogram Neck With & Without Contrast [[id number]] Collected:  06/06/19 1007     Updated:  06/06/19 2211    Narrative:       EXAMINATION: CT ANGIOGRAM NECK W WO CONTRAST-      INDICATION: Bruit, neck, and/or risk factors for CVD     TECHNIQUE: Pre and postcontrast 0.75 mm  axial images through the neck  with multiplanar 2-D reconstructions of the carotid arteries and  vertebral arteries     The radiation dose reduction device was turned on for each scan per the  ALARA (As Low as Reasonably Achievable) protocol.     COMPARISON: NONE     FINDINGS: The brachiocephalic vessels arise normally from the aortic  arch. Both proximal subclavian arteries are tortuous, but no significant  stenosis is seen.     On the right, no significant common carotid stenosis is seen at the  level of the carotid bulb. There is mild calcified and noncalcified  plaquing but no more than 20% ICA stenosis is suspected. Remainder of  the ICA, and the right ECA appear normal.     On the left, common carotid appears normal up to level of the carotid  bulb. Left ECA is large. There is extensive and irregular left ICA  origin plaque as seen on axial image 46 of series 5. This appears to  cause only relatively mild narrowing, less than 30%. Remainder left ICA  appears normal.     The vertebral arteries appear codominant and grossly normal.        No mass adenopathy or inflammatory change is appreciated in the neck  soft tissues.                Impression:       1. Irregular appearing noncalcified left ICA origin plaque,  causing only mild stenosis, but potentially unstable.  2. No evidence of hemodynamically significant carotid or vertebral  stenosis elsewhere in the neck.        This report was finalized on 6/6/2019 10:08 PM by DR. Jamie Herrera MD.       CT Angiogram Head With & Without Contrast [[id number]] Collected:  06/06/19 1001     Updated:  06/06/19 2210    Narrative:       EXAMINATION: CT ANGIOGRAM HEAD W WO CONTRAST-      INDICATION: Sensation loss.      TECHNIQUE: Pre and postcontrast 0.75 mm axial images through the brain  with multiplanar 2-D reconstructions.     The radiation dose reduction device was turned on for each scan per the  ALARA (As Low as Reasonably Achievable) protocol.     COMPARISON: None.      FINDINGS: Distal vertebral arteries and basilar artery appear within  normal limits. Posterior cerebral arteries appear respectively normal on  the right, and appear to show proximal very focal stenoses on the left.  Left posterior cerebral artery appears to be supplied mostly or entirely  from the left posterior communicating artery. Please refer to axial  image 17 series 7. Remainder of the left posterior cerebral artery  appears grossly normal.     Coronal image #18 of series 6 gives the impression of a supraclinoid  left ICA aneurysm, however, sagittal and coronal images show this  segment as normal in appearance, and this appears to represent vessel  tortuosity and volume averaging effect instead. ICAs otherwise appear  unremarkable. Anterior and middle cerebral arteries appear grossly  normal. Proximal right MCA branches appear normal. On the left, there is  a suggestion of a potentially significant M1-M2  stenosis. Please refer  to coronal image 17 series 6.       Impression:       1. Focal stenosis of the proximal left posterior cerebral artery,  difficult to characterize by NASCET criteria.  2. Suspected left M1/M2 stenosis.  3. Tortuosity of the distal left ICA mimicking an aneurysm on the  coronal series only. Remaining series appear normal and no aneurysm is  suspected.     D:  06/06/2019  E:  06/06/2019     This report was finalized on 6/6/2019 10:07 PM by DR. Jamie Herrera MD.       XR Chest 1 View [[id number]] Collected:  06/06/19 1010     Updated:  06/06/19 1711    Narrative:       EXAMINATION: XR CHEST 1 VW-06/06/2019:      INDICATION: Acute Stroke Protocol (onset < 12 hrs); I63.9-Cerebral  infarction, unspecified.      COMPARISON: NONE.     FINDINGS: Portable chest reveals cardiac and mediastinal silhouettes  within normal limits. The lung fields are grossly clear. Degenerative  changes seen within the spine. Pulmonary vascularity is within normal  limits. No focal parenchymal opacification present.            Impression:       Enlargement of the heart with no evidence of acute  parenchymal disease.     D:  06/06/2019  E:  06/06/2019     This report was finalized on 6/6/2019 5:08 PM by Dr. Pati More MD.       CT Head Without Contrast Stroke Protocol [[id number]] Collected:  06/06/19 0931     Updated:  06/06/19 1711    Narrative:       EXAMINATION: CT HEAD WO CONTRAST STROKE PROTOCOL- 06/06/2019      INDICATION: Stroke; right lower extremity numbness     TECHNIQUE: Multiple axial CT imaging was obtained of the brain without  the administration of gadolinium contrast.     The radiation dose reduction device was turned on for each scan per the  ALARA (As Low as Reasonably Achievable) protocol.     COMPARISON: NONE     FINDINGS: There is low-density area seen posteriorly within the left  parietal lobe. There is some atrophy identified of the left vertex.  Findings may suggest prior insult however an underlying lesion at this  time cannot be completely excluded. MRI with contrast is recommended for  further evaluation. There is no hemorrhage or hydrocephalus. No definite  mass or midline shift. Bony structures are unremarkable.       Impression:       Some low-density seen in the left posterior parietal region  as well as near the vertex on the left. There is some atrophy identified  near the left vertex. Findings suggest either old insult or possibly  underlying lesion in which MRI with contrast is recommended for further  evaluation.     Examination performed 06/06/2019 at 0920 hours and examination results  were given to the emergency room physician 06/06/2019 at the scanner at  time of performance of the examination.     D:  06/06/2019  E:  06/06/2019        This report was finalized on 6/6/2019 5:08 PM by Dr. Pati More MD.             Assessment:  Right-sided numbness resolved post TPA.    Ulcerative plaque, nonstenotic left internal carotid.    Diffuse intracranial atherosclerosis.    Dilated  left atrium and left ventricular hypertrophy.    Aortic regurgitation    Chest pain,?  Coronary artery disease    Plan:  Cardiology consult    Event monitor.    Discuss further management with Dr. Palmer.    Comment:  The patient's combination of findings is quite concerning.    The dilated left atrium is often a marker for paroxysmal atrial fibrillation and is not in need of his offices do not stroke risk.    For the moment the patient is demonstrated diffuse atherosclerotic disease and is now on Lipitor 80 mg, aspirin 81 mg, and getting Plavix 300 mg today with 75 mg dose daily.    He is to see the cardiologist today and have an event monitor at the very least.    Leave it to their discretion whether transesophageal echocardiogram is appropriate.    We will defer to Dr. Palmer whether he feels that the left carotid warrants aggressive intervention with a stent, but given the fact that the patient is asymptomatic referable to that area would certainly seem like a good idea.    I placed a call to him regarding all the above and he is in the middle of surgery but will be chatted with him before the days of her.             I discussed the patients findings and my recommendations with patient and family    Gordon Eid MD  06/07/19  2:39 PM

## 2019-06-07 NOTE — NURSING NOTE
Patient up to BS to use the urinal. This nurse has discussed potential risks and complications regarding post-tPA falls and patient verbalizes his understanding and wishes to continue to be up to bedside to use urinal without assist. Refuses bed alarm at this time. Will offer toileting during hourly rounds to decrease risks of fall. Wife at bedside.

## 2019-06-07 NOTE — PLAN OF CARE
Problem: Stroke (Ischemic) (Adult)  Goal: Signs and Symptoms of Listed Potential Problems Will be Absent, Minimized or Managed (Stroke)  Outcome: Ongoing (interventions implemented as appropriate)   06/07/19 2097   Goal/Outcome Evaluation   Problems Assessed (Stroke (Ischemic)) cognitive impairment;communication impairment;motor/sensory impairment   Problems Assessed (Stroke (Ischemic)) none

## 2019-06-07 NOTE — DISCHARGE SUMMARY
Discharge Summary    Patient name: Jair Flores  CSN: [id number]  MRN: [id number]  : 1945  Today's date: 2019     Date of Admission: 2019  Date of Discharge:  2019    Admitting Physician:  Dr. Betancourt  Primary Care Provider: Osmar Clemons MD  Consultations:   Neurology:  Dr. Eid  NS:  Dr. Palmer    Admission Diagnosis:  CVA     Discharge Diagnoses:   Active Hospital Problems    Diagnosis   • **AIS   • Benign essential hypertension   • Hyperlipidemia     Not on statin therapy     • GERD (gastroesophageal reflux disease)     Allergies:  Penicillins; Rocephin [ceftriaxone]; Ciprofloxacin; and Macrobid [nitrofurantoin macrocrystal]    Code Status:  Code Status and Medical Interventions:   Ordered at: 19 1114     Code Status:    CPR     Medical Interventions (Level of Support Prior to Arrest):    Full     Procedures:  19 Bilateral Carotid Duplex - Right internal carotid artery stenosis of 50-69%.  Proximal left internal carotid artery plaque without significant stenosis.  Left internal carotid artery stenosis of 0-49%.    19 TTE - Left ventricular systolic function is normal.  Estimated EF appears to be in the range of 61 - 65%.  Left ventricular wall thickness is consistent with mild concentric hypertrophy.  Left atrial cavity size is moderately dilated.  The left ventricular cavity is mildly dilated.  Severe aortic valve regurgitation is present.  Saline test results are negative for right to left atrial level shunt.    History of Present Illness:  Mr. Flores is a 74 year-old male came to the ED this morning with complaints of right-sided numbness in his leg and lip.     He is being admitted to the ICU, s/p tPA administration. He is able to move his right side normally but says it feels like it's asleep.      Last known well time is approximately 0830. 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 He noticed numbness in his  "lips and right side of his body.     The sensation was improving in the ED even prior to beginning tPA, and resolved completely during its infusion. His original NIH score was a 3 ? NIH was a 0, post tPA.    Hospital Course:  MRI Brain revealed old ischemic insult seen within the left frontal parietal and posterior parietal regions with encephalomalacia change and gliotic changes identified. There is no abnormal contrast enhancement to suggest evidence of metastatic focus. There is no acute intracranial abnormality identified.  24 hour s/p tPA CT Head revealed no hemorrhage.  Bilateral carotid duplex and TTE was done with results above.  Cardiology was consulted for BRENDON vs 30 day event monitor.  They recommended 30 day event monitor. He was started on Aspirin, Plavix and Lipitor.  NS was consulted for possible carotid stent in the future.  He will have further outpatient testing and Dr. Palmer will follow up with him.  He is back to baseline.  He is eating and ambulating well.  It is felt he is ready to go home.     Vitals:  /66   Pulse 60   Temp 98.3 °F (36.8 °C) (Oral)   Resp 16   Ht 177 cm (69.69\")   Wt 77.1 kg (170 lb)   SpO2 96%   BMI 24.61 kg/m²     Physical Exam:  Constitutional:  Appears well-developed and well-nourished. No distress.   HEENT:  Normocephalic and atraumatic. PERRL  Neck:  Neck supple. No JVD present.   CV: Normal rate, regular rhythm,  intact distal pulses.  No gallop, murmur or rub.  Pulmonary/Chest: Effort normal and breath sounds normal. No respiratory distress. No wheezes, rhonchi or rales.   Abdominal: Soft. +BS. No distension and no mass. There is no tenderness.   Musculoskeletal: Normal muscle tone and strength  Neurological: Alert and oriented to person, place, and time.  No focal deficits  Skin: Skin is warm and dry. No rash noted.   Extremities:  No clubbing, edema or cyanosis  Psychiatric: Normal mood and affect. Behavior is normal.     Interval: (return from CT)  1a. " Level of Consciousness: 0-->Alert, keenly responsive  1b. LOC Questions: 0-->Answers both questions correctly  1c. LOC Commands: 0-->Performs both tasks correctly  2. Best Gaze: 0-->Normal  3. Visual: 0-->No visual loss  4. Facial Palsy: 0-->Normal symmetrical movements  5a. Motor Arm, Left: 0-->No drift, limb holds 90 (or 45) degrees for full 10 secs  5b. Motor Arm, Right: 0-->No drift, limb holds 90 (or 45) degrees for full 10 secs  6a. Motor Leg, Left: 0-->No drift, leg holds 30 degree position for full 5 secs  6b. Motor Leg, Right: 0-->No drift, leg holds 30 degree position for full 5 secs  7. Limb Ataxia: 0-->Absent  8. Sensory: 0-->Normal, no sensory loss  9. Best Language: 0-->No aphasia, normal  10. Dysarthria: 0-->Normal  11. Extinction and Inattention (formerly Neglect): 0-->No abnormality    Total (NIH Stroke Scale): 0    Labs:  Results from last 7 days   Lab Units 06/07/19  0423   WBC 10*3/mm3 5.96   HEMOGLOBIN g/dL 12.6*   HEMATOCRIT % 38.7   PLATELETS 10*3/mm3 163     Results from last 7 days   Lab Units 06/07/19  0423   SODIUM mmol/L 144   POTASSIUM mmol/L 3.6   CHLORIDE mmol/L 107   CO2 mmol/L 28.0   BUN mg/dL 17   CREATININE mg/dL 1.00   CALCIUM mg/dL 8.6   BILIRUBIN mg/dL 0.4   ALK PHOS U/L 53   ALT (SGPT) U/L 14   AST (SGOT) U/L 18   GLUCOSE mg/dL 101*         No results found for: MG, PHOS              Discharge Medications      New Medications      Instructions Start Date   aspirin 81 MG tablet   81 mg, Oral, Daily      atorvastatin 80 MG tablet  Commonly known as:  LIPITOR   80 mg, Oral, Nightly      clopidogrel 75 MG tablet  Commonly known as:  PLAVIX   75 mg, Oral, Daily   Start Date:  6/8/2019        Continue These Medications      Instructions Start Date   allopurinol 100 MG tablet  Commonly known as:  ZYLOPRIM   100 mg, Oral, Daily      enalapril 20 MG tablet  Commonly known as:  VASOTEC   40 mg, Oral, Daily, 2 tabs (40mg total) once daily      HM MULTIVITAMIN ADULT GUMMY chewable tablet    1 tablet, Oral, Daily      metoprolol succinate XL 25 MG 24 hr tablet  Commonly known as:  TOPROL-XL   25 mg, Oral, Daily      Vitamin C 500 MG chewable tablet   500 mg, Oral, Daily         Stop These Medications    NIFEdipine XL 60 MG 24 hr tablet  Commonly known as:  PROCARDIA XL          Discharge Diet:   Diet Instructions     Diet: Regular, Cardiac; Thin      Discharge Diet:   Regular  Cardiac       Fluid Consistency:  Thin        Activity at Discharge:    Activity Instructions     Activity as Tolerated          Follow-up Appointments  Future Appointments   Date Time Provider Department Center   6/11/2019  9:45 AM Osmar Clemons MD [facility]   11/14/2019  8:45 AM Osmar Clemons MD [facility]     Additional Instructions for the Follow-ups that You Need to Schedule     Discharge Follow-up with PCP   As directed       Currently Documented PCP:    Osmar Clemons MD    PCP Phone Number:    [phone]     Follow Up Details:  one week to monitor blood pressure and heart rate, possibly restart Procardia         Discharge Follow-up with Specified Provider: Dr. Palmer   As directed      To:  Dr. Palmer    Follow Up Details:  Will call him next week         Discharge Follow-up with Specified Provider: Dr. Chadwick   As directed      To:  Dr. Chadwick    Follow Up Details:  4-6 weeks with event monitor             Discharge Instructions:  Ok to go home today  Follow up as above  Scripts sent electronically  To get 30 day event monitor prior to discharge     ANTIONE Ritchie, AGACNP-BC, FNP-BC  Pulmonary & Critical Care Medicine    Time: Discharge 45 min    CC: Osmar Clemons MD

## 2019-06-07 NOTE — PROGRESS NOTES
INTENSIVIST   HOSPITAL VISIT NOTE     Hospital:  LOS: 1 day     Subjective   S     Mr. Jair Flores, 74 y.o. male is followed for:    Stroke    As an Intensivist, we provide an integrated approach to the ICU patient and family, medical management of comorbid conditions, including but not limited to electrolytes, glycemic control, organ dysfunction, lead interdisciplinary rounds and coordinate the care with all other services, including those from other specialists.     HPI:  Uneventful night.  No deficits.     ROS:   Constitutional: Negative for fever or chills.   Respiratory: Negative for dyspnea or cough.   Cardiovascular: Negative for chest pain or palpitations.   Gastrointestinal: Negative for  nausea, vomiting or diarrhea.       Objective   O     .Vitals:  Temp: 97.8 °F (36.6 °C) (06/07/19 0800) Temp  Min: 97.8 °F (36.6 °C)  Max: 98.3 °F (36.8 °C)   BP: 147/52 (06/07/19 1114) BP  Min: 116/53  Max: 174/101   Pulse: 50 (06/07/19 1114) Pulse  Min: 43  Max: 67   Resp: 20 (06/07/19 0800) Resp  Min: 14  Max: 20   SpO2: 98 % (06/07/19 1114) SpO2  Min: 89 %  Max: 98 %   Device: room air (06/07/19 1000)    Flow Rate: 2 (06/07/19 0600) Flow (L/min)  Min: 2  Max: 2     Physical Examination    Telemetry:  Rhythm: sinus bradycardia (06/07/19 1000)         Constitutional:  No acute distress.   Cardiovascular: Normal rate, regular and rhythm. Normal heart sounds.  No murmurs, gallop or rub.   Respiratory: No respiratory distress.  Normal breath sounds  No adventitious sounds    Abdominal:  Soft with no tenderness  No distension. No HSM.   Extremities: Warm with no cyanosis.  No peripheral edema.   Neurological:   Alert and Oriented to person, place, and time.  Best Eye Response: 4-->(E4) spontaneous (06/07/19 1000)  Best Motor Response: 6-->(M6) obeys commands (06/07/19 1000)  Best Verbal Response: 5-->(V5) oriented (06/07/19 1000)  [place] Coma Scale Score: 15 (06/07/19 1000)   Lines/Drains/Airways: Peripheral IV(s)      Total (NIH Stroke Scale): 0 (06/07/19 0700)    Hematology:  Results from last 7 days   Lab Units 06/07/19  0423 06/06/19 0937 06/06/19 0933   WBC 10*3/mm3 5.96 5.56  --    HEMOGLOBIN g/dL 12.6* 13.5  --    HEMOGLOBIN, POC g/dL  --   --  13.9   MCV fL 92.8 93.4  --    PLATELETS 10*3/mm3 163 171  --      Chemistry:  Estimated Creatinine Clearance: 70.9 mL/min (by C-G formula based on SCr of 1 mg/dL).    Results from last 7 days   Lab Units 06/07/19  0423   SODIUM mmol/L 144   POTASSIUM mmol/L 3.6   CHLORIDE mmol/L 107   CO2 mmol/L 28.0   ANION GAP mmol/L 9.0   GLUCOSE mg/dL 101*   CALCIUM mg/dL 8.6     Results from last 7 days   Lab Units 06/07/19 0423 06/06/19 0933   BUN mg/dL 17  --    CREATININE mg/dL 1.00 1.00     Hepatic:  Results from last 7 days   Lab Units 06/07/19 0423 06/06/19 0937   ALK PHOS U/L 53  --    BILIRUBIN mg/dL 0.4  --    ALT (SGPT) U/L 14 15   AST (SGOT) U/L 18 26   ALBUMIN g/dL 3.30*  --      Images:  Ct Angiogram Head With & Without Contrast    Result Date: 6/6/2019  1. Focal stenosis of the proximal left posterior cerebral artery, difficult to characterize by NASCET criteria. 2. Suspected left M1/M2 stenosis. 3. Tortuosity of the distal left ICA mimicking an aneurysm on the coronal series only. Remaining series appear normal and no aneurysm is suspected.  D:  06/06/2019 E:  06/06/2019  This report was finalized on 6/6/2019 10:07 PM by DR. Jamie Herrera MD.      Ct Head Without Contrast    Result Date: 6/7/2019  Old left frontoparietal infarct. There is no intracranial hemorrhage.  D:  06/07/2019 E:  06/07/2019        Ct Angiogram Neck With & Without Contrast    Result Date: 6/6/2019  1. Irregular appearing noncalcified left ICA origin plaque, causing only mild stenosis, but potentially unstable. 2. No evidence of hemodynamically significant carotid or vertebral stenosis elsewhere in the neck.   This report was finalized on 6/6/2019 10:08 PM by DR. Jamie Herrera MD.      Mri Brain With &  Without Contrast    Result Date: 6/7/2019  Old ischemic insult seen within the left frontal parietal and posterior parietal regions with encephalomalacia change and gliotic changes identified. There is no abnormal contrast enhancement to suggest evidence of metastatic focus. There is no acute intracranial abnormality identified.  D:  06/07/2019 E:  06/07/2019    This report was finalized on 6/7/2019 10:16 AM by Dr. Pati More MD.      Xr Chest 1 View    Result Date: 6/6/2019  Enlargement of the heart with no evidence of acute parenchymal disease.  D:  06/06/2019 E:  06/06/2019  This report was finalized on 6/6/2019 5:08 PM by Dr. Pati More MD.      Ct Head Without Contrast Stroke Protocol    Result Date: 6/6/2019  Some low-density seen in the left posterior parietal region as well as near the vertex on the left. There is some atrophy identified near the left vertex. Findings suggest either old insult or possibly underlying lesion in which MRI with contrast is recommended for further evaluation.  Examination performed 06/06/2019 at 0920 hours and examination results were given to the emergency room physician 06/06/2019 at the scanner at time of performance of the examination.  D:  06/06/2019 E:  06/06/2019   This report was finalized on 6/6/2019 5:08 PM by Dr. Pati More MD.      Ct Cerebral Perfusion With & Without Contrast    Result Date: 6/6/2019  Old left frontoparietal infarct corresponding to CT scan abnormality.  Otherwise negative perfusion scan.  D:  06/06/2019 E:  06/06/2019  This report was finalized on 6/6/2019 10:09 PM by DR. Jamie Herrera MD.        Echo:       Results: Reviewed.  I reviewed the patient's new laboratory and imaging results.  I independently reviewed the patient's new images.    Medications: Reviewed.    Assessment/Plan   A / P     Assessment:    74 y.o.male, admitted on 6/6/2019 with:    1. AIS s/p tPA  1. 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  2. Mild stenosis of Left ICA as per CTA  2. HTN  3. Dyslipidemia - He got a prescription to start statins 2 weeks ago, but has not filled it yet.    Lab Results   Lab Value Date/Time    CHOL 200 06/07/2019 0423    HDL 31 (L) 06/07/2019 0423     (H) 06/07/2019 0423    TRIG 175 (H) 06/07/2019 0423     4. GERD  5. Gout  6. Glucose: No h/o DM    Results from last 7 days   Lab Units 06/06/19  2301 06/06/19  1743 06/06/19  0933   GLUCOSE mg/dL 98 122 107     Lab Results   Lab Value Date/Time    HGBA1C 5.20 06/06/2019 0937       Diet: Diet Regular; Cardiac   Advance Directives: Code Status and Medical Interventions:   Ordered at: 06/06/19 1114     Code Status:    CPR     Medical Interventions (Level of Support Prior to Arrest):    Full        Plan:    1. 24h CT: No bleed.  2. Awaiting final recommendations by Neurology  3. Disposition: Discharge Home. } Soon, once OK by Neurology  1. High dose statins  2. DAPT    Plan of care and goals reviewed during interdisciplinary rounds.  I discussed the patient's findings and my recommendations with patient and nursing staff    Level of Risk is High due to:  illness with threat to life or bodily function.     Time: 25 minutes, in direct patient care, with the patient and/or on the parks coordinating care with other health care providers.     I have spent > 50% percent of this time, counseling and discussing management.       Manolo Betancourt MD, FACP, FCCP, CNSC  Intensive Care Medicine, Nutrition Support and Pulmonary Medicine

## 2019-06-07 NOTE — PLAN OF CARE
Problem: Patient Care Overview  Goal: Plan of Care Review  Outcome: Ongoing (interventions implemented as appropriate)   06/07/19 0516   Coping/Psychosocial   Plan of Care Reviewed With patient   Plan of Care Review   Progress improving   OTHER   Outcome Summary Patient VSS and afebrile tonight. Initial NIH at start of shift was 0, with patient stating that he had returned to baseline and had no deficits. He has maintained this score throughout the night, remaining neurologically intact. His blood pressure has been somewhat labile, with diastolic pressures in the 40's and 50's. He is to have a follow-up CT at 1100 today and should be on the schedule for Echo and carotid duplex as well. He has had no complaints tonight outside of wishing to rest. Ashley is he will be discharged pending post-tPA CT and other workup today. Will continue to monitor.        Problem: Stroke (Ischemic) (Adult)  Goal: Signs and Symptoms of Listed Potential Problems Will be Absent, Minimized or Managed (Stroke)  Outcome: Ongoing (interventions implemented as appropriate)   06/07/19 0516   Goal/Outcome Evaluation   Problems Assessed (Stroke (Ischemic)) all   Problems Assessed (Stroke (Ischemic)) bladder/bowel dysfunction;motor/sensory impairment       Problem: Fall Risk (Adult)  Goal: Identify Related Risk Factors and Signs and Symptoms  Outcome: Ongoing (interventions implemented as appropriate)   06/07/19 0516   Fall Risk (Adult)   Related Risk Factors (Fall Risk) age-related changes;bladder function altered;impaired vision;sensory deficits;environment unfamiliar   Signs and Symptoms (Fall Risk) presence of risk factors     Goal: Absence of Fall  Outcome: Ongoing (interventions implemented as appropriate)   06/07/19 0516   Fall Risk (Adult)   Absence of Fall making progress toward outcome

## 2019-06-07 NOTE — PROGRESS NOTES
"Clinical Nutrition Note      Patient Name: Jair Flores  MRN: [id number]  Admission date: 6/6/2019      Multidisciplinary Rounds    Additional information obtained during MDR:  RN reports pt doing well after tPA- R sided numbness and weakness improved. CT shows old CVA, MRI ordered. Anticipate dc home today after ECHO , f/u CT do at 11 this am.    Current diet: Diet Regular; Cardiac      Pertinent medical data reviewed  Unintentional wt loss of 10lbs in past 2 months and MST score \"3\" noted on nursing screen. Pt w/ 7 % loss of body wt  in March r/t antibiotic tx. He does not meet criteria for malnutrition.      Intervention:  Menu provided; pt's wife  advised of alternate selections  Plan of care and goals reviewed    Monitor:  RD to follow per protocol      Paige Garcia MS,RD,LD  06/07/19 12:33 PM  Time: 20 mins       "

## 2019-06-07 NOTE — H&P
INTENSIVIST   HOSPITAL VISIT NOTE     Hospital:  LOS: 1 day     Subjective   S     Mr. Jair Flores, 74 y.o. male is followed for:    Stroke    As an Intensivist, we provide an integrated approach to the ICU patient and family, medical management of comorbid conditions, including but not limited to electrolytes, glycemic control, organ dysfunction, lead interdisciplinary rounds and coordinate the care with all other services, including those from other specialists.     HPI:  Uneventful night.  No deficits.     ROS:   Constitutional: Negative for fever or chills.   Respiratory: Negative for dyspnea or cough.   Cardiovascular: Negative for chest pain or palpitations.   Gastrointestinal: Negative for  nausea, vomiting or diarrhea.       Objective   O     .Vitals:  Temp: 97.8 °F (36.6 °C) (06/07/19 0800) Temp  Min: 97.8 °F (36.6 °C)  Max: 98.3 °F (36.8 °C)   BP: 147/52 (06/07/19 1114) BP  Min: 116/53  Max: 174/101   Pulse: 50 (06/07/19 1114) Pulse  Min: 43  Max: 67   Resp: 20 (06/07/19 0800) Resp  Min: 14  Max: 20   SpO2: 98 % (06/07/19 1114) SpO2  Min: 89 %  Max: 98 %   Device: room air (06/07/19 1000)    Flow Rate: 2 (06/07/19 0600) Flow (L/min)  Min: 2  Max: 2     Physical Examination    Telemetry:  Rhythm: sinus bradycardia (06/07/19 1000)         Constitutional:  No acute distress.   Cardiovascular: Normal rate, regular and rhythm. Normal heart sounds.  No murmurs, gallop or rub.   Respiratory: No respiratory distress.  Normal breath sounds  No adventitious sounds    Abdominal:  Soft with no tenderness  No distension. No HSM.   Extremities: Warm with no cyanosis.  No peripheral edema.   Neurological:   Alert and Oriented to person, place, and time.  Best Eye Response: 4-->(E4) spontaneous (06/07/19 1000)  Best Motor Response: 6-->(M6) obeys commands (06/07/19 1000)  Best Verbal Response: 5-->(V5) oriented (06/07/19 1000)  [place] Coma Scale Score: 15 (06/07/19 1000)   Lines/Drains/Airways: Peripheral IV(s)      Total (NIH Stroke Scale): 0 (06/07/19 0700)    Hematology:  Results from last 7 days   Lab Units 06/07/19  0423 06/06/19 0937 06/06/19 0933   WBC 10*3/mm3 5.96 5.56  --    HEMOGLOBIN g/dL 12.6* 13.5  --    HEMOGLOBIN, POC g/dL  --   --  13.9   MCV fL 92.8 93.4  --    PLATELETS 10*3/mm3 163 171  --      Chemistry:  Estimated Creatinine Clearance: 70.9 mL/min (by C-G formula based on SCr of 1 mg/dL).    Results from last 7 days   Lab Units 06/07/19  0423   SODIUM mmol/L 144   POTASSIUM mmol/L 3.6   CHLORIDE mmol/L 107   CO2 mmol/L 28.0   ANION GAP mmol/L 9.0   GLUCOSE mg/dL 101*   CALCIUM mg/dL 8.6     Results from last 7 days   Lab Units 06/07/19 0423 06/06/19 0933   BUN mg/dL 17  --    CREATININE mg/dL 1.00 1.00     Hepatic:  Results from last 7 days   Lab Units 06/07/19 0423 06/06/19 0937   ALK PHOS U/L 53  --    BILIRUBIN mg/dL 0.4  --    ALT (SGPT) U/L 14 15   AST (SGOT) U/L 18 26   ALBUMIN g/dL 3.30*  --      Images:  Ct Angiogram Head With & Without Contrast    Result Date: 6/6/2019  1. Focal stenosis of the proximal left posterior cerebral artery, difficult to characterize by NASCET criteria. 2. Suspected left M1/M2 stenosis. 3. Tortuosity of the distal left ICA mimicking an aneurysm on the coronal series only. Remaining series appear normal and no aneurysm is suspected.  D:  06/06/2019 E:  06/06/2019  This report was finalized on 6/6/2019 10:07 PM by DR. Jamie Herrera MD.      Ct Head Without Contrast    Result Date: 6/7/2019  Old left frontoparietal infarct. There is no intracranial hemorrhage.       Ct Angiogram Neck With & Without Contrast    Result Date: 6/6/2019  1. Irregular appearing noncalcified left ICA origin plaque, causing only mild stenosis, but potentially unstable. 2. No evidence of hemodynamically significant carotid or vertebral stenosis elsewhere in the neck.   This report was finalized on 6/6/2019 10:08 PM by DR. Jamie Herrera MD.      Mri Brain With & Without Contrast    Result Date:  6/7/2019  Old ischemic insult seen within the left frontal parietal and posterior parietal regions with encephalomalacia change and gliotic changes identified. There is no abnormal contrast enhancement to suggest evidence of metastatic focus. There is no acute intracranial abnormality identified.  D:  06/07/2019 E:  06/07/2019    This report was finalized on 6/7/2019 10:16 AM by Dr. Pati More MD.      Xr Chest 1 View    Result Date: 6/6/2019  Enlargement of the heart with no evidence of acute parenchymal disease.  D:  06/06/2019 E:  06/06/2019  This report was finalized on 6/6/2019 5:08 PM by Dr. Pati More MD.      Ct Head Without Contrast Stroke Protocol    Result Date: 6/6/2019  Some low-density seen in the left posterior parietal region as well as near the vertex on the left. There is some atrophy identified near the left vertex. Findings suggest either old insult or possibly underlying lesion in which MRI with contrast is recommended for further evaluation.  Examination performed 06/06/2019 at 0920 hours and examination results were given to the emergency room physician 06/06/2019 at the scanner at time of performance of the examination.  D:  06/06/2019 E:  06/06/2019   This report was finalized on 6/6/2019 5:08 PM by Dr. Pati More MD.      Ct Cerebral Perfusion With & Without Contrast    Result Date: 6/6/2019  Old left frontoparietal infarct corresponding to CT scan abnormality.  Otherwise negative perfusion scan.  D:  06/06/2019 E:  06/06/2019  This report was finalized on 6/6/2019 10:09 PM by DR. Jamie Herrera MD.        Echo:       Results: Reviewed.  I reviewed the patient's new laboratory and imaging results.  I independently reviewed the patient's new images.    Medications: Reviewed.    Assessment/Plan   A / P     Assessment:    74 y.o.male, admitted on 6/6/2019 with:    1. AIS s/p tPA  1. Old ischemic left frontal parietal insult and posterior parietal regions with encephalomacia  as per MRI and CT Perfusion scans.  2. Mild stenosis of Left ICA as per CTA  2. HTN  3. Dyslipidemia - He got a prescription to start statins 2 weeks ago, but has not filled it yet.    Lab Results   Lab Value Date/Time    CHOL 200 06/07/2019 0423    HDL 31 (L) 06/07/2019 0423     (H) 06/07/2019 0423    TRIG 175 (H) 06/07/2019 0423     4. GERD  5. Gout  6. Glucose: No h/o DM    Results from last 7 days   Lab Units 06/06/19  2301 06/06/19  1743 06/06/19  0933   GLUCOSE mg/dL 98 122 107     Lab Results   Lab Value Date/Time    HGBA1C 5.20 06/06/2019 0937       Diet: Diet Regular; Cardiac   Advance Directives: Code Status and Medical Interventions:   Ordered at: 06/06/19 1114     Code Status:    CPR     Medical Interventions (Level of Support Prior to Arrest):    Full        Plan:    1. Awaiting 24h CT follow up and final recommendations by Neurology  2. Disposition: Discharge Home. } Soon, once OK by Neurology  1. High dose statins  2. DAPT    Plan of care and goals reviewed during interdisciplinary rounds.  I discussed the patient's findings and my recommendations with patient and nursing staff    Level of Risk is High due to:  illness with threat to life or bodily function.     Time: 25 minutes, in direct patient care, with the patient and/or on the parks coordinating care with other health care providers.     I have spent > 50% percent of this time, counseling and discussing management.       Manolo Betancourt MD, FACP, FCCP, CNSC  Intensive Care Medicine, Nutrition Support and Pulmonary Medicine

## 2019-06-07 NOTE — PLAN OF CARE
Problem: Patient Care Overview  Goal: Plan of Care Review  Outcome: Ongoing (interventions implemented as appropriate)   06/07/19 3333   Coping/Psychosocial   Plan of Care Reviewed With patient;spouse   OTHER   Outcome Summary PT initial evaluation completed. No further PT goals established as pt demonstrates safety and appears to be at baseline re: functional mobility. Edu pt re: home safety; would benefit from further ambulation with NSG prior to d/c. Will d/c PT at this time. Recommend d/c home with assist.

## 2019-06-08 ENCOUNTER — READMISSION MANAGEMENT (OUTPATIENT)
Dept: CALL CENTER | Facility: HOSPITAL | Age: 74
End: 2019-06-08

## 2019-06-08 NOTE — OUTREACH NOTE
Prep Survey      Responses   Facility patient discharged from?  [facility]   Is patient eligible?  Yes   Discharge diagnosis   Acute ischemic stroke, benign essential HTN, HLD GERD   Does the patient have one of the following disease processes/diagnoses(primary or secondary)?  Stroke (TIA)   Does the patient have Home health ordered?  No   Is there a DME ordered?  No   Comments regarding appointments  See AVS   Prep survey completed?  Yes          Rakel Noe RN

## 2019-06-10 ENCOUNTER — READMISSION MANAGEMENT (OUTPATIENT)
Dept: CALL CENTER | Facility: HOSPITAL | Age: 74
End: 2019-06-10

## 2019-06-10 ENCOUNTER — APPOINTMENT (OUTPATIENT)
Dept: CT IMAGING | Facility: HOSPITAL | Age: 74
End: 2019-06-10

## 2019-06-10 ENCOUNTER — APPOINTMENT (OUTPATIENT)
Dept: MRI IMAGING | Facility: HOSPITAL | Age: 74
End: 2019-06-10

## 2019-06-10 ENCOUNTER — HOSPITAL ENCOUNTER (INPATIENT)
Facility: HOSPITAL | Age: 74
LOS: 1 days | Discharge: HOME | End: 2019-06-11
Attending: EMERGENCY MEDICINE | Admitting: INTERNAL MEDICINE

## 2019-06-10 ENCOUNTER — APPOINTMENT (OUTPATIENT)
Dept: CARDIOLOGY | Facility: HOSPITAL | Age: 74
End: 2019-06-10

## 2019-06-10 ENCOUNTER — APPOINTMENT (OUTPATIENT)
Dept: GENERAL RADIOLOGY | Facility: HOSPITAL | Age: 74
End: 2019-06-10

## 2019-06-10 DIAGNOSIS — Z74.09 IMPAIRED MOBILITY AND ADLS: ICD-10-CM

## 2019-06-10 DIAGNOSIS — I63.9 ACUTE EMBOLIC STROKE (HCC): Primary | ICD-10-CM

## 2019-06-10 DIAGNOSIS — R20.0 RIGHT ARM NUMBNESS: ICD-10-CM

## 2019-06-10 DIAGNOSIS — Z78.9 IMPAIRED MOBILITY AND ADLS: ICD-10-CM

## 2019-06-10 DIAGNOSIS — Z74.09 IMPAIRED FUNCTIONAL MOBILITY, BALANCE, GAIT, AND ENDURANCE: ICD-10-CM

## 2019-06-10 DIAGNOSIS — R55 SYNCOPE AND COLLAPSE: Primary | ICD-10-CM

## 2019-06-10 DIAGNOSIS — I65.22 CAROTID ARTERY PLAQUE, LEFT: ICD-10-CM

## 2019-06-10 PROBLEM — J81.0 FLASH PULMONARY EDEMA (HCC): Status: RESOLVED | Noted: 2019-04-29 | Resolved: 2019-06-10

## 2019-06-10 PROBLEM — M10.9 ACUTE GOUT OF FOOT: Status: RESOLVED | Noted: 2019-04-29 | Resolved: 2019-06-10

## 2019-06-10 PROBLEM — R31.9 HEMATURIA: Status: RESOLVED | Noted: 2019-04-29 | Resolved: 2019-06-10

## 2019-06-10 PROBLEM — Z96.652 STATUS POST LEFT PARTIAL KNEE REPLACEMENT: Status: RESOLVED | Noted: 2019-04-29 | Resolved: 2019-06-10

## 2019-06-10 LAB
ALBUMIN SERPL-MCNC: 3.9 G/DL (ref 3.5–5.2)
ALBUMIN/GLOB SERPL: 1.4 G/DL
ALP SERPL-CCNC: 78 U/L (ref 39–117)
ALT SERPL W P-5'-P-CCNC: 19 U/L (ref 1–41)
ANION GAP SERPL CALCULATED.3IONS-SCNC: 10 MMOL/L
AST SERPL-CCNC: 25 U/L (ref 1–40)
BASOPHILS # BLD AUTO: 0.03 10*3/MM3 (ref 0–0.2)
BASOPHILS NFR BLD AUTO: 0.5 % (ref 0–1.5)
BH CV VAS TCD LEFT ACA: 42 CM/SEC
BH CV VAS TCD LEFT DISTAL M1: 41 CM/SEC
BH CV VAS TCD LEFT MID M1: 34 CM/SEC
BH CV VAS TCD LEFT P1: 44 CM/SEC
BH CV VAS TCD LEFT P2: 23 CM/SEC
BH CV VAS TCD LEFT PROXIMAL M1: 38 CM/SEC
BH CV VAS TCD LEFT TERMINAL ICA: 29 CM/SEC
BH CV VAS TCD RIGHT ACA: 52 CM/SEC
BH CV VAS TCD RIGHT DISTAL M1: 40 CM/SEC
BH CV VAS TCD RIGHT MID M1: 49 CM/SEC
BH CV VAS TCD RIGHT P1: 39 CM/SEC
BH CV VAS TCD RIGHT P2: 30 CM/SEC
BH CV VAS TCD RIGHT PROXIMAL M1: 38 CM/SEC
BH CV VAS TCD RIGHT TERMINAL ICA: 28 CM/SEC
BILIRUB SERPL-MCNC: 0.4 MG/DL (ref 0.2–1.2)
BUN BLD-MCNC: 19 MG/DL (ref 8–23)
BUN BLDA-MCNC: 20 MG/DL (ref 8–26)
BUN/CREAT SERPL: 20.2 (ref 7–25)
CA-I BLDA-SCNC: 1.17 MMOL/L (ref 1.2–1.32)
CALCIUM SPEC-SCNC: 9.2 MG/DL (ref 8.6–10.5)
CHLORIDE BLDA-SCNC: 103 MMOL/L (ref 98–109)
CHLORIDE SERPL-SCNC: 104 MMOL/L (ref 98–107)
CO2 BLDA-SCNC: 29 MMOL/L (ref 24–29)
CO2 SERPL-SCNC: 29 MMOL/L (ref 22–29)
CREAT BLD-MCNC: 0.94 MG/DL (ref 0.76–1.27)
CREAT BLDA-MCNC: 1 MG/DL (ref 0.6–1.3)
CRP SERPL-MCNC: 1.28 MG/DL (ref 0–0.5)
DEPRECATED RDW RBC AUTO: 52.3 FL (ref 37–54)
EOSINOPHIL # BLD AUTO: 0.27 10*3/MM3 (ref 0–0.4)
EOSINOPHIL NFR BLD AUTO: 4.2 % (ref 0.3–6.2)
ERYTHROCYTE [DISTWIDTH] IN BLOOD BY AUTOMATED COUNT: 15.1 % (ref 12.3–15.4)
ERYTHROCYTE [SEDIMENTATION RATE] IN BLOOD: 19 MM/HR (ref 0–20)
GFR SERPL CREATININE-BSD FRML MDRD: 79 ML/MIN/1.73
GLOBULIN UR ELPH-MCNC: 2.7 GM/DL
GLUCOSE BLD-MCNC: 103 MG/DL (ref 65–99)
GLUCOSE BLDC GLUCOMTR-MCNC: 106 MG/DL (ref 70–130)
GLUCOSE BLDC GLUCOMTR-MCNC: 108 MG/DL (ref 70–130)
HCT VFR BLD AUTO: 41 % (ref 37.5–51)
HCT VFR BLDA CALC: 39 % (ref 38–51)
HGB BLD-MCNC: 13.5 G/DL (ref 13–17.7)
HGB BLDA-MCNC: 13.3 G/DL (ref 12–17)
IMM GRANULOCYTES # BLD AUTO: 0.01 10*3/MM3 (ref 0–0.05)
IMM GRANULOCYTES NFR BLD AUTO: 0.2 % (ref 0–0.5)
INR PPP: 1 (ref 0.8–1.2)
LYMPHOCYTES # BLD AUTO: 2.17 10*3/MM3 (ref 0.7–3.1)
LYMPHOCYTES NFR BLD AUTO: 34 % (ref 19.6–45.3)
MCH RBC QN AUTO: 30.8 PG (ref 26.6–33)
MCHC RBC AUTO-ENTMCNC: 32.9 G/DL (ref 31.5–35.7)
MCV RBC AUTO: 93.4 FL (ref 79–97)
MONOCYTES # BLD AUTO: 0.7 10*3/MM3 (ref 0.1–0.9)
MONOCYTES NFR BLD AUTO: 11 % (ref 5–12)
NEUTROPHILS # BLD AUTO: 3.2 10*3/MM3 (ref 1.7–7)
NEUTROPHILS NFR BLD AUTO: 50.1 % (ref 42.7–76)
NRBC BLD AUTO-RTO: 0 /100 WBC (ref 0–0.2)
NT-PROBNP SERPL-MCNC: 826 PG/ML (ref 5–900)
PA ADP PRP-ACNC: 44 PRU
PLATELET # BLD AUTO: 164 10*3/MM3 (ref 140–450)
PMV BLD AUTO: 10.4 FL (ref 6–12)
POTASSIUM BLD-SCNC: 3.5 MMOL/L (ref 3.5–5.2)
POTASSIUM BLDA-SCNC: 3.5 MMOL/L (ref 3.5–4.9)
PROT SERPL-MCNC: 6.6 G/DL (ref 6–8.5)
PROTHROMBIN TIME: 11.7 SECONDS (ref 12.8–15.2)
RBC # BLD AUTO: 4.39 10*6/MM3 (ref 4.14–5.8)
SODIUM BLD-SCNC: 143 MMOL/L (ref 136–145)
SODIUM BLDA-SCNC: 144 MMOL/L (ref 138–146)
TROPONIN T SERPL-MCNC: <0.01 NG/ML (ref 0–0.03)
WBC NRBC COR # BLD: 6.38 10*3/MM3 (ref 3.4–10.8)

## 2019-06-10 PROCEDURE — 92523 SPEECH SOUND LANG COMPREHEN: CPT

## 2019-06-10 PROCEDURE — 85576 BLOOD PLATELET AGGREGATION: CPT | Performed by: NEUROLOGICAL SURGERY

## 2019-06-10 PROCEDURE — A9577 INJ MULTIHANCE: HCPCS | Performed by: INTERNAL MEDICINE

## 2019-06-10 PROCEDURE — 72156 MRI NECK SPINE W/O & W/DYE: CPT

## 2019-06-10 PROCEDURE — 86140 C-REACTIVE PROTEIN: CPT | Performed by: NEUROLOGICAL SURGERY

## 2019-06-10 PROCEDURE — 71045 X-RAY EXAM CHEST 1 VIEW: CPT

## 2019-06-10 PROCEDURE — 84484 ASSAY OF TROPONIN QUANT: CPT | Performed by: EMERGENCY MEDICINE

## 2019-06-10 PROCEDURE — 0 GADOBENATE DIMEGLUMINE 529 MG/ML SOLUTION: Performed by: INTERNAL MEDICINE

## 2019-06-10 PROCEDURE — 70548 MR ANGIOGRAPHY NECK W/DYE: CPT

## 2019-06-10 PROCEDURE — 99223 1ST HOSP IP/OBS HIGH 75: CPT | Performed by: INTERNAL MEDICINE

## 2019-06-10 PROCEDURE — 70496 CT ANGIOGRAPHY HEAD: CPT

## 2019-06-10 PROCEDURE — 70551 MRI BRAIN STEM W/O DYE: CPT

## 2019-06-10 PROCEDURE — 0 IOPAMIDOL PER 1 ML: Performed by: EMERGENCY MEDICINE

## 2019-06-10 PROCEDURE — 93005 ELECTROCARDIOGRAM TRACING: CPT | Performed by: EMERGENCY MEDICINE

## 2019-06-10 PROCEDURE — 85014 HEMATOCRIT: CPT

## 2019-06-10 PROCEDURE — 99221 1ST HOSP IP/OBS SF/LOW 40: CPT | Performed by: NEUROLOGICAL SURGERY

## 2019-06-10 PROCEDURE — 99232 SBSQ HOSP IP/OBS MODERATE 35: CPT | Performed by: INTERNAL MEDICINE

## 2019-06-10 PROCEDURE — 99223 1ST HOSP IP/OBS HIGH 75: CPT | Performed by: PSYCHIATRY & NEUROLOGY

## 2019-06-10 PROCEDURE — 82962 GLUCOSE BLOOD TEST: CPT

## 2019-06-10 PROCEDURE — 85652 RBC SED RATE AUTOMATED: CPT | Performed by: NEUROLOGICAL SURGERY

## 2019-06-10 PROCEDURE — 80047 BASIC METABLC PNL IONIZED CA: CPT

## 2019-06-10 PROCEDURE — 85025 COMPLETE CBC W/AUTO DIFF WBC: CPT | Performed by: EMERGENCY MEDICINE

## 2019-06-10 PROCEDURE — 99291 CRITICAL CARE FIRST HOUR: CPT

## 2019-06-10 PROCEDURE — 83880 ASSAY OF NATRIURETIC PEPTIDE: CPT | Performed by: INTERNAL MEDICINE

## 2019-06-10 PROCEDURE — 85610 PROTHROMBIN TIME: CPT

## 2019-06-10 PROCEDURE — 80053 COMPREHEN METABOLIC PANEL: CPT | Performed by: EMERGENCY MEDICINE

## 2019-06-10 PROCEDURE — 93893 TCD STD ICR ART VEN-ART SHNT: CPT

## 2019-06-10 PROCEDURE — 70498 CT ANGIOGRAPHY NECK: CPT

## 2019-06-10 RX ORDER — SODIUM CHLORIDE 0.9 % (FLUSH) 0.9 %
10 SYRINGE (ML) INJECTION AS NEEDED
Status: DISCONTINUED | OUTPATIENT
Start: 2019-06-10 | End: 2019-06-11 | Stop reason: HOSPADM

## 2019-06-10 RX ORDER — CLOPIDOGREL BISULFATE 75 MG/1
75 TABLET ORAL DAILY
Status: DISCONTINUED | OUTPATIENT
Start: 2019-06-10 | End: 2019-06-11 | Stop reason: HOSPADM

## 2019-06-10 RX ORDER — ASCORBIC ACID 500 MG
500 TABLET ORAL DAILY
Status: DISCONTINUED | OUTPATIENT
Start: 2019-06-10 | End: 2019-06-11 | Stop reason: HOSPADM

## 2019-06-10 RX ORDER — ALLOPURINOL 100 MG/1
100 TABLET ORAL DAILY
Status: DISCONTINUED | OUTPATIENT
Start: 2019-06-10 | End: 2019-06-11 | Stop reason: HOSPADM

## 2019-06-10 RX ORDER — ONDANSETRON 2 MG/ML
4 INJECTION INTRAMUSCULAR; INTRAVENOUS EVERY 6 HOURS PRN
Status: DISCONTINUED | OUTPATIENT
Start: 2019-06-10 | End: 2019-06-11 | Stop reason: HOSPADM

## 2019-06-10 RX ORDER — ATORVASTATIN CALCIUM 40 MG/1
80 TABLET, FILM COATED ORAL NIGHTLY
Status: DISCONTINUED | OUTPATIENT
Start: 2019-06-10 | End: 2019-06-11 | Stop reason: HOSPADM

## 2019-06-10 RX ORDER — SODIUM CHLORIDE 0.9 % (FLUSH) 0.9 %
3-10 SYRINGE (ML) INJECTION AS NEEDED
Status: DISCONTINUED | OUTPATIENT
Start: 2019-06-10 | End: 2019-06-11 | Stop reason: HOSPADM

## 2019-06-10 RX ORDER — ASPIRIN 81 MG/1
81 TABLET, CHEWABLE ORAL DAILY
Status: DISCONTINUED | OUTPATIENT
Start: 2019-06-10 | End: 2019-06-10

## 2019-06-10 RX ORDER — SODIUM CHLORIDE 0.9 % (FLUSH) 0.9 %
3 SYRINGE (ML) INJECTION EVERY 12 HOURS SCHEDULED
Status: DISCONTINUED | OUTPATIENT
Start: 2019-06-10 | End: 2019-06-11 | Stop reason: HOSPADM

## 2019-06-10 RX ADMIN — APIXABAN 5 MG: 5 TABLET, FILM COATED ORAL at 21:13

## 2019-06-10 RX ADMIN — SODIUM CHLORIDE, PRESERVATIVE FREE 3 ML: 5 INJECTION INTRAVENOUS at 08:36

## 2019-06-10 RX ADMIN — CLOPIDOGREL BISULFATE 75 MG: 75 TABLET ORAL at 15:33

## 2019-06-10 RX ADMIN — GADOBENATE DIMEGLUMINE 16 ML: 529 INJECTION, SOLUTION INTRAVENOUS at 14:15

## 2019-06-10 RX ADMIN — OXYCODONE HYDROCHLORIDE AND ACETAMINOPHEN 500 MG: 500 TABLET ORAL at 15:33

## 2019-06-10 RX ADMIN — IOPAMIDOL 75 ML: 755 INJECTION, SOLUTION INTRAVENOUS at 03:15

## 2019-06-10 RX ADMIN — ATORVASTATIN CALCIUM 80 MG: 40 TABLET, FILM COATED ORAL at 20:35

## 2019-06-10 RX ADMIN — ALLOPURINOL 100 MG: 100 TABLET ORAL at 15:33

## 2019-06-10 RX ADMIN — ASPIRIN 81 MG CHEWABLE TABLET 81 MG: 81 TABLET CHEWABLE at 15:33

## 2019-06-10 RX ADMIN — APIXABAN 5 MG: 5 TABLET, FILM COATED ORAL at 03:38

## 2019-06-10 RX ADMIN — SODIUM CHLORIDE, PRESERVATIVE FREE 3 ML: 5 INJECTION INTRAVENOUS at 20:35

## 2019-06-10 RX ADMIN — SODIUM CHLORIDE 500 ML: 9 INJECTION, SOLUTION INTRAVENOUS at 02:41

## 2019-06-10 NOTE — CONSULTS
Attempted to see for diabetes education, current A1c 5.2, doesn't meet criteria for consult.  Thank you.

## 2019-06-10 NOTE — PROGRESS NOTES
"Neurosurgery follow-up:    This 74-year-old male well-known to neurosurgical services having recently been evaluated for left hemispheric transient cerebral ischemia.  Diagnostic studies at that time indicated a litany of etiologies including intra-and extracranial vascular disease and cardiac disease.  He was discharged from hospital with Plavix, aspirin and statin.  He had a recurrence of his symptoms all of which are sensory involving the left hemisphere manifesting as numbness in his right arm and leg which has subsequently resolved.    He has been placed on Eliquis.    There are several issues which, in my opinion, need to be resolved.  His long-standing and difficult control hypertension may well result from sleep apnea.  According to his wife he has clinical manifestations of sleep apnea but has not had a \"formal\" sleep analysis.  That can be arranged as an outpatient.    I believe he needs to have \"formal\" angiography of the extra and intracranial vascular to determine the degree of atheromatous disease present intracranially and the best management thereof.  His previous CTA and carotid duplex indicate stenosis more so on the right than the left although he has what appears to be a \"ulcerative plaque\" on the left with minimal stenosis.    His cardiac abnormalities likewise are problematic with significant aortic insufficiency with the possibility of intermittent atrial fibrillation or flutter.    I have asked cardiology to reevaluate.  In the meantime we should continue him with Eliquis.  "

## 2019-06-10 NOTE — PLAN OF CARE
Problem: Patient Care Overview  Goal: Plan of Care Review   06/10/19 1701   OTHER   Outcome Summary Pt is alert and oriented, pleasant. NIH 0. Pt refuses SCDs, up ad larisa in room,. Sinus Hector, VSS

## 2019-06-10 NOTE — PLAN OF CARE
Problem: Patient Care Overview  Goal: Plan of Care Review  Outcome: Ongoing (interventions implemented as appropriate)   06/10/19 0551   Coping/Psychosocial   Plan of Care Reviewed With patient;spouse   Plan of Care Review   Progress no change   OTHER   Outcome Summary A & O x 4, Sinus Hector, RA, NIH 1, no facial droop present/passed bedside dysphagia screen, still c/o tingling in R forearm, up with standby, SCDs in place, Mauri 20, wife at bedside, will continue to monitor        Problem: Stroke (Ischemic) (Adult)  Goal: Signs and Symptoms of Listed Potential Problems Will be Absent, Minimized or Managed (Stroke)  Outcome: Ongoing (interventions implemented as appropriate)      Problem: Fall Risk (Adult)  Goal: Identify Related Risk Factors and Signs and Symptoms  Outcome: Ongoing (interventions implemented as appropriate)    Goal: Absence of Fall  Outcome: Outcome(s) achieved Date Met: 06/10/19

## 2019-06-10 NOTE — THERAPY EVALUATION
Acute Care - Speech Language Pathology Initial Evaluation  [facility]   Cognitive-Communication Evaluation       Patient Name: Jair Flores  : 1945  MRN: [id number]  Today's Date: 6/10/2019               Admit Date: 6/10/2019     Visit Dx:    ICD-10-CM ICD-9-CM   1. Acute embolic stroke (CMS/HCC) I63.9 434.11   2. Right arm numbness R20.2 782.0     Patient Active Problem List   Diagnosis   • History of bladder cancer   • Insomnia   • Benign essential hypertension   • Hyperlipidemia   • Osteoarthritis   • Right carotid bruit   • Aortic valve disease   • Primary osteoarthritis involving multiple joints   • Mitral valve disease   • Bladder cancer (CMS/HCC)   • History of disease   • Gout   • GERD (gastroesophageal reflux disease)   • CVA (cerebral vascular accident) (CMS/HCC)   • Acute embolic stroke (CMS/HCC)     Past Medical History:   Diagnosis Date   • Acute gout of foot 2019   • Cancer (CMS/HCC)     bladder; cysto surveillance 2017; free of disease   • Cataract     right eye   • Degenerative arthritis     right; knee replacement; successful   • Flash pulmonary edema (CMS/HCC) 2019    Episode of flash pulmonary edema thought to be related to volume overload at the time of left knee surgery in  performed in [place] he has had no recurrence his EKG does show inferior Q waves which are unchanged.   • Fluid overload     after knee surgery   • GERD (gastroesophageal reflux disease)    • Hard to intubate     has been told he is difficult to intubate   • Hematuria 2019   • Hemorrhoids 2011    hemorrhoid ablation   • Hypertension    • Skin cancer     head   • Status post left partial knee replacement 2019    Left knee surgery  [place] with complication of flash pulmonary edema likely due to anesthesia total resolution without sequela but with stable abnormal EKG with inferior wall Q waves no change and asymptomatic.   • Wears glasses      Past Surgical History:    Procedure Laterality Date   • COLONOSCOPY      routinely every 10 years   • CYSTOSCOPY      x3   • CYSTOSCOPY  07/23/2017    bladder cancer; cysto surveillance; free of disease   • EXCISIONAL HEMORRHOIDECTOMY      hemorrhoid ablation; hemorrhoid onset 09/12/2011   • EYE SURGERY      as a child after cutting eye with barbwire fence   • KNEE ARTHROPLASTY, PARTIAL REPLACEMENT Left    • SKIN CANCER EXCISION     • TONSILLECTOMY     • TOTAL KNEE ARTHROPLASTY Right     Dgen arthritis   • TRANSURETHRAL RESECTION OF BLADDER TUMOR N/A 3/29/2019    Procedure: TRANSURETHRAL RESECTION OF BLADDER TUMOR WITH MITOMYCIN;  Surgeon: Jair Braxton MD;  Location: [facility];  Service: Urology        SLP EVALUATION (last 72 hours)      SLP SLC Evaluation     Row Name 06/10/19 1500                   Communication Assessment/Intervention    Document Type  evaluation  -AV        Subjective Information  no complaints  -AV        Patient Observations  alert;cooperative  -AV        Patient/Family Observations  wife present   -AV        Patient Effort  good  -AV           General Information    Patient Profile Reviewed  yes  -AV        Pertinent History Of Current Problem  recently inpatient for CVA work up with TPA, readmitted last night for new onset symptoms.   -AV        Precautions/Limitations, Vision  WFL with corrective lenses  -AV        Precautions/Limitations, Hearing  WFL;for purposes of eval  -AV        Patient Level of Education  law school   -AV        Prior Level of Function-Communication  WFL  -AV        Plans/Goals Discussed with  patient;spouse/S.O.  -AV        Barriers to Rehab  none identified  -AV        Patient's Goals for Discharge  return to all previous roles/activities  -AV        Family Goals for Discharge  patient able to return to previous activities/roles  -AV           Pain Assessment    Additional Documentation  Pain Scale: FACES Pre/Post-Treatment (Group)  -AV           Pain Scale: FACES Pre/Post-Treatment     Pain: FACES Scale, Pretreatment  0-->no hurt  -AV        Pain: FACES Scale, Post-Treatment  0-->no hurt  -AV           Comprehension Assessment/Intervention    Comprehension Assessment/Intervention  Auditory Comprehension  -AV           Auditory Comprehension Assessment/Intervention    Auditory Comprehension (Communication)  WFL  -AV        Able to Identify Objects/Pictures (Communication)  WFL  -AV        Answers Questions (Communication)  WFL  -AV        Able to Follow Commands (Communication)  WFL  -AV        Narrative Discourse  WFL  -AV           Expression Assessment/Intervention    Expression Assessment/Intervention  verbal expression  -AV           Verbal Expression Assessment/Intervention    Verbal Expression  WFL  -AV        Automatic Speech (Communication)  WFL  -AV        Repetition  WFL  -AV        Phrase Completion  WFL  -AV        Responsive Naming  WFL  -AV        Confrontational Naming  WFL  -AV        Spontaneous/Functional Words  WFL  -AV        Sentence Formulation  WFL  -AV        Conversational Discourse/Fluency  WFL  -AV           Oral Motor Structure and Function    Oral Motor Structure and Function  WFL  -AV        Dentition Assessment  natural, present and adequate  -AV        Mucosal Quality  moist, healthy  -AV           Oral Musculature and Cranial Nerve Assessment    Oral Motor General Assessment  WFL  -AV           Motor Speech Assessment/Intervention    Motor Speech Function  WFL  -AV           Cognitive Assessment Intervention- SLP    Cognitive Function (Cognition)  WFL  -AV        Orientation Status (Cognition)  WFL  -AV        Memory (Cognitive)  WFL  -AV        Attention (Cognitive)  WFL  -AV        Thought Organization (Cognitive)  WFL  -AV        Reasoning (Cognitive)  WFL  -AV        Problem Solving (Cognitive)  WFL  -AV        Functional Math (Cognitive)  WFL  -AV        Executive Function (Cognition)  WFL  -AV        Pragmatics (Communication)  WFL  -AV        Right  Hemisphere Function  WFL  -AV           SLP Clinical Impressions    SLP Diagnosis  baseline cog/comm function at this time   -AV        Rehab Potential/Prognosis  good  -AV        SLC Criteria for Skilled Therapy Interventions Met  baseline status  -AV        Functional Impact  no impact on function  -AV           Recommendations    Therapy Frequency (SLP SLC)  evaluation only  -AV        Anticipated Dischage Disposition  home  -AV          User Key  (r) = Recorded By, (t) = Taken By, (c) = Cosigned By    Initials Name Effective Dates    AV Roberta Riggs MS CCC-SLP 05/23/19 -              EDUCATION  The patient has been educated in the following areas:     Cognitive Impairment Communication Impairment.    SLP Recommendation and Plan  SLP Diagnosis: baseline cog/comm function at this time      SLC Criteria for Skilled Therapy Interventions Met: baseline status  SLP Diagnosis: baseline cog/comm function at this time   Anticipated Dischage Disposition: home          Plan of Care Reviewed With: patient, spouse  Plan of Care Review  Plan of Care Reviewed With: patient, spouse  Progress: (eval)                  Time Calculation:     Time Calculation- SLP     Row Name 06/10/19 1546             Time Calculation- SLP    SLP Start Time  1546  -AV      SLP Received On  06/10/19  -AV        User Key  (r) = Recorded By, (t) = Taken By, (c) = Cosigned By    Initials Name Provider Type    AV Roberta Riggs MS CCC-SLP Speech and Language Pathologist          Therapy Charges for Today     Code Description Service Date Service Provider Modifiers Qty    59402112683 HC ST EVAL SPEECH AND PROD W LANG  4 6/10/2019 Roberta Riggs, MS CCC-SLP GN 1                     Roberta Valente MS CCC-SLP  6/10/2019

## 2019-06-10 NOTE — CONSULTS
[facility] at [facility]  CARDIOLOGY CONSULTATION NOTE    Jair Flores  : 1945  MRN:[id number]    Date of Admission:6/10/2019  Date of Consultation: 06/10/19    PCP: Osmar Clemons MD    IDENTIFICATION: A 74 y.o. male resident of [place], [place]     Chief Complaint   Patient presents with   • Stroke     PROBLEM LIST:   1. Recurrent left hemispheric ischemic CVA  A. [facility] admission 19 with right sided numbness, s/p tPA with resolution of symptoms   B. MRI Brain 19: Old left frontoparietal and posterior parietal infarcts, no acute abnormality  C. CTA Head and Neck 19: ulcerated LICA plaque; focal stenosis of proximal left PCA, suspected left M1/M2 stenosis  D. Carotid duplex 19: LAURYN 50-69%, LICA <50%  E. Echo 19: EF 61-65%, LA is mod dilated, Severe AI, negative bubble study   F. Readmission 6/10 with recurrent right foot and arm paresthesias   G. MRI brain 6/10/19: multiple punctate foci along left hemisphere, suggestive of embolic infarcts  2. Aortic insufficiency  A. Severe AI by TTE 2019, EF 61-65%  3. Carotid artery stenosis   A. Carotid duplex 19: LAURYN 50-69%, <50% LICA stenosis however with signs of possible ulceration on CTA neck   4. Hypertension   5. Hyperlipidemia  6. Probable BEATRIZ  7. History of bladder cancer, s/p resection, followed by Dr. Braxton  8. Surgical history:   A. Left knee partial replacement   B. Bladder tumor resection    ALLERGIES:   Allergies   Allergen Reactions   • Penicillins Anaphylaxis   • Rocephin [Ceftriaxone] Anaphylaxis   • Ciprofloxacin Swelling   • Macrobid [Nitrofurantoin Macrocrystal] Swelling     HPI: Mr. Flores is a pleasant 75 y/o WM with history of HTN and HLD who was recently discharged from our hospital 3 days ago after admission for an acute left hemispheric CVA/TIA. He presented with right sided paresthesias and was given tPA in the ER with resolution of symptoms. MRI at the time did not demonstrate an acute  ischemic infarct, however did show old left frontoparietal and left posterior parietal infarcts, of which patient had been asymptomatic. He was found to have intracranial and extracranial disease at the time with ulcerated LICA plaque as well as intracranial stenoses. His bubble study on transthoracic echo was negative, however he was found to have severe AI with a normal LVEF. He was asymptomatic in this regard. He was discharged home on Friday wearing a mobile telemetry monitor and on DAPT with ASA and Plavix as well as high dose statin. Plan was for patient to return in 2 weeks for LICA stent placement, as well as possible coronary angiography at the same time. However, last night he woke up with recurrent right forearm and right foot numbness. MRI showed multiple punctate ischemic foci along left hemisphere. Cardiology has been re-consulted to rule out cardioembolic source. He was given a 1 time dose of Eliquis.      He has no symptoms of HF BUT has left arm pain with exertion, relieved by rest.    ROS: All systems have been reviewed and are negative with the exception of those mentioned in the HPI and problem list above.    Surgical History:   Past Surgical History:   Procedure Laterality Date   • COLONOSCOPY      routinely every 10 years   • CYSTOSCOPY      x3   • CYSTOSCOPY  07/23/2017    bladder cancer; cysto surveillance; free of disease   • EXCISIONAL HEMORRHOIDECTOMY      hemorrhoid ablation; hemorrhoid onset 09/12/2011   • EYE SURGERY      as a child after cutting eye with barbwire fence   • KNEE ARTHROPLASTY, PARTIAL REPLACEMENT Left    • SKIN CANCER EXCISION     • TONSILLECTOMY     • TOTAL KNEE ARTHROPLASTY Right     Dgen arthritis   • TRANSURETHRAL RESECTION OF BLADDER TUMOR N/A 3/29/2019    Procedure: TRANSURETHRAL RESECTION OF BLADDER TUMOR WITH MITOMYCIN;  Surgeon: Jair Braxton MD;  Location: [facility];  Service: Urology       Social History:   Social History     Socioeconomic History   •  "Marital status:    Tobacco Use   • Smoking status: Never Smoker   • Smokeless tobacco: Never Used   Substance and Sexual Activity   • Alcohol use: Yes     Alcohol/week: 1.8 oz     Types: 3 Shots of liquor per week     Comment: socially   • Drug use: Defer   • Sexual activity: Defer       Family History: History reviewed. No pertinent family history.    Objective     /61 (BP Location: Right arm, Patient Position: Lying)   Pulse 58   Temp 98.5 °F (36.9 °C) (Oral)   Resp 16   Ht 177.8 cm (70\")   Wt 78.5 kg (173 lb)   SpO2 92%   BMI 24.82 kg/m²     Intake/Output Summary (Last 24 hours) at 6/10/2019 1308  Last data filed at 6/10/2019 0840  Gross per 24 hour   Intake 500 ml   Output 300 ml   Net 200 ml       PHYSICAL EXAM:  CONSTITUTIONAL: Well nourished, cooperative, in no acute distress  HEENT: Normocephalic, atraumatic, PERRLA, no JVD  CARDIOVASCULAR:  Regular rhythm and normal rate, + diastolic murmur, gallop, rub. Peripheral pulses are present and equal bilaterally  RESPIRATORY: Few crackles in bases, normal respiratory effort, no wheezing, or rhonchi  GI: Soft, nontender, normal bowel sounds  MUSCULOSKELETAL: No gross deformities, no edema  SKIN: Warm, dry. No bleeding, bruising or rash  NEUROLOGICAL: No gross focal deficits, mild right facial droop; detailed exam per Neurology and NS  PSYCHIATRIC: Normal mood and affect. Behavior is normal     Labs/Diagnostic Data  Results from last 7 days   Lab Units 06/10/19  0240 06/10/19  0139 06/07/19  0423   SODIUM mmol/L 143  --  144   POTASSIUM mmol/L 3.5  --  3.6   CHLORIDE mmol/L 104  --  107   CO2 mmol/L 29.0  --  28.0   BUN mg/dL 19  --  17   CREATININE mg/dL 0.94 1.00 1.00   GLUCOSE mg/dL 103*  --  101*   CALCIUM mg/dL 9.2  --  8.6     Results from last 7 days   Lab Units 06/10/19  0240 06/06/19  0937   TROPONIN T ng/mL <0.010 <0.010     Results from last 7 days   Lab Units 06/10/19  0240 06/10/19  0139 06/07/19  0423 06/06/19  0937   WBC 10*3/mm3 " 6.38  --  5.96 5.56   HEMOGLOBIN g/dL 13.5  --  12.6* 13.5   HEMOGLOBIN, POC g/dL  --  13.3  --   --    HEMATOCRIT % 41.0  --  38.7 41.2   HEMATOCRIT POC %  --  39  --   --    PLATELETS 10*3/mm3 164  --  163 171         Results from last 7 days   Lab Units 06/07/19  0423   CHOLESTEROL mg/dL 200   TRIGLYCERIDES mg/dL 175*   HDL CHOL mg/dL 31*   LDL CHOL mg/dL 134*         Results from last 7 days   Lab Units 06/06/19  0937   HEMOGLOBIN A1C % 5.20     Results from last 7 days   Lab Units 06/10/19  0240   PROBNP pg/mL 826.0     Results from last 7 days   Lab Units 06/10/19  0137 06/06/19  0936 06/06/19  0932   PROTIME seconds 11.7*  --  12.2*   INR  1.0  --  1.0   APTT seconds  --  33.2  --        I personally reviewed the patient's EKG/Telemetry data    Radiology Data:   Echo 6/7/19:  Interpretation Summary     · Left ventricular systolic function is normal.  · Estimated EF appears to be in the range of 61 - 65%.  · Left ventricular wall thickness is consistent with mild concentric hypertrophy.  · Left atrial cavity size is moderately dilated.  · The left ventricular cavity is mildly dilated.  · Severe aortic valve regurgitation is present.  · Saline test results are negative for right to left atrial level shunt.     I reviewed his TTE.    Current Medications:      allopurinol 100 mg Oral Daily   aspirin 81 mg Oral Daily   atorvastatin 80 mg Oral Nightly   clopidogrel 75 mg Oral Daily   sodium chloride 3 mL Intravenous Q12H   vitamin C 500 mg Oral Daily          Assessment and Plan:     1. Recurrent acute left hemispheric CVA  - recent admission for the same, s/p tPA on 6/6  - intracranial and extracranial disease noted with ulcerated LICA   - TTE with normal LVEF, moderate AI, negative bubble study  - Negative TCD with agitated saline  - MRI brain this admission with multiple diffuse punctate foci of ischemia in left hemisphere  - on DAPT and high dose statin  - received 1 dose of Eliquis    2. Moderate AI  -  asymptomatic and with normal LVEF  - possible angina pectoris.    3. HTN  - elevated, anti-hypertensives on hold   - parameters per Neuro     I have interviewed and examined the patient.  I spoke with the patient and his family.  I spoke with Jair Palmer MD and Mauro King MD.  This patient's transcranial Doppler and transthoracic echo (both with bubble study) failed to reveal evidence of a patent foramen ovale.  In reference to his left internal carotid artery, I suspect that a plaque MRI would be helpful and would go ahead with this.  I discussed this with Dr. Palmer.    Because the underlying pathophysiology of the patient's neuro defects is still a bit uncertain, I would go ahead and begin apixaban with clopidogrel (without aspirin).  We are instituting that now.    Further cardiac work-up will be needed.    Once further information is available, this will be communicated.    I, Francisco Bo MD, personally performed the services described as documented by the above named individual. I have made any necessary edits and it is both accurate and complete 6/10/2019  7:52 PM    Scribed for Francisco Bo MD by Maria De Jesus Roy PA-C. 6/10/2019  1:08 PM      Thank you for allowing me to participate in the care of Jair KRANTHI Walkercharline. Feel free to contact me directly with any further questions or concerns.

## 2019-06-10 NOTE — CONSULTS
NEUROSURGERY CONSULTATION    Referring Provider: No ref. provider found    Patient Care Team:  Osmar Clemons MD as PCP - General  Osmar Clemons MD as PCP - Family Medicine    Chief Complaint: Evaluation for possible angiogram at the request of Dr. Palmer  History of Present Illness:   I saw this very pleasant 74-year-old gentleman well-known to me.  He is previously healthy last week presented with symptoms of stroke involving his right arm and face and was treated with TPA and had resolution of his arm and face symptoms.  He was discharged home Friday placed on aspirin and Plavix and represented with some vague right upper extremity symptoms in the similar distribution no additional symptoms such as speech disturbance etc. was noted.    Repeat MRI demonstrated the presence of restricted diffusion areas in the frontal corpus callosum left caudate head and distal centrum semi-ovale consistent with prior location of his deep infarcts.  He denies any fevers chills recent hospitalizations.    He is been hospitalized for pulmonary edema following a knee surgery in the past I denies any additional symptoms he does have obstructive sleep apnea.    Review of Systems:  Musculoskeletal and Neurological systems were reviewed and are negative except for:  Musculoskeletal: positive for See HPI  Neurological: positive for See HPI    History:  Past Medical History:   Diagnosis Date   • Acute gout of foot 4/29/2019   • Cancer (CMS/HCC)     bladder; cysto surveillance 07/23/2017; free of disease   • Cataract     right eye   • Degenerative arthritis     right; knee replacement; successful   • Flash pulmonary edema (CMS/HCC) 4/29/2019    Episode of flash pulmonary edema thought to be related to volume overload at the time of left knee surgery in 2005 performed in [place] he has had no recurrence his EKG does show inferior Q waves which are unchanged.   • Fluid overload 2015    after knee surgery   • GERD (gastroesophageal reflux  disease)    • Hard to intubate     has been told he is difficult to intubate   • Hematuria 4/29/2019   • Hemorrhoids 09/12/2011    hemorrhoid ablation   • Hypertension    • Skin cancer     head   • Status post left partial knee replacement 4/29/2019    Left knee surgery 2005 [place] with complication of flash pulmonary edema likely due to anesthesia total resolution without sequela but with stable abnormal EKG with inferior wall Q waves no change and asymptomatic.   • Wears glasses    ,   Past Surgical History:   Procedure Laterality Date   • COLONOSCOPY      routinely every 10 years   • CYSTOSCOPY      x3   • CYSTOSCOPY  07/23/2017    bladder cancer; cysto surveillance; free of disease   • EXCISIONAL HEMORRHOIDECTOMY      hemorrhoid ablation; hemorrhoid onset 09/12/2011   • EYE SURGERY      as a child after cutting eye with barbwire fence   • KNEE ARTHROPLASTY, PARTIAL REPLACEMENT Left    • SKIN CANCER EXCISION     • TONSILLECTOMY     • TOTAL KNEE ARTHROPLASTY Right     Dgen arthritis   • TRANSURETHRAL RESECTION OF BLADDER TUMOR N/A 3/29/2019    Procedure: TRANSURETHRAL RESECTION OF BLADDER TUMOR WITH MITOMYCIN;  Surgeon: Jair Braxton MD;  Location: [facility];  Service: Urology   , History reviewed. No pertinent family history.,   Social History     Tobacco Use   • Smoking status: Never Smoker   • Smokeless tobacco: Never Used   Substance Use Topics   • Alcohol use: Yes     Alcohol/week: 1.8 oz     Types: 3 Shots of liquor per week     Comment: socially   • Drug use: Defer   ,   Medications Prior to Admission   Medication Sig Dispense Refill Last Dose   • allopurinol (ZYLOPRIM) 100 MG tablet Take 100 mg by mouth Daily.   6/9/2019 at Unknown time   • enalapril (VASOTEC) 20 MG tablet Take 20 mg by mouth 2 (Two) Times a Day. 2 tabs (40mg total) once daily   6/9/2019 at Unknown time   • Ascorbic Acid (VITAMIN C) 500 MG chewable tablet Chew 500 mg Daily.   6/6/2019 at Unknown time   • aspirin 81 MG tablet Take 1  "tablet by mouth Daily. 30 tablet 2    • atorvastatin (LIPITOR) 80 MG tablet Take 1 tablet by mouth Every Night. 30 tablet 2    • clopidogrel (PLAVIX) 75 MG tablet Take 1 tablet by mouth Daily. 30 tablet 2    • metoprolol succinate XL (TOPROL-XL) 25 MG 24 hr tablet Take 25 mg by mouth Daily.   6/6/2019 at Unknown time   • Multiple Vitamins-Minerals (HM MULTIVITAMIN ADULT GUMMY) chewable tablet Chew 1 tablet Daily.   6/6/2019 at Unknown time    and Allergies:  Penicillins; Rocephin [ceftriaxone]; Ciprofloxacin; and Macrobid [nitrofurantoin macrocrystal]  I have reviewed this note template and all pertinent parts of the review of systems social, family history, surgical history and medication list    Physical Exam:  Vital Signs: Blood pressure 173/61, pulse 58, temperature 98.5 °F (36.9 °C), temperature source Oral, resp. rate 16, height 177.8 cm (70\"), weight 78.5 kg (173 lb), SpO2 92 %.  Vital signs were reviewed and documented in the chart  Patient appeared in good neurologic function with normal comprehension fluent speech he does however some dyscalculia and some astereognosis of his right palm he has no neglect  Mood and affect are normal  Sense of smell deferred    Pupils symmetric equally reactive funduscopic exam not visualized   Visual fields intact to confrontation  Extraocular movements intact  Face motor function is symmetric  Facial sensations normal  Hearing intact to finger rub hearing intact to finger rub  Tongue is midline  Palate symmetric  Swallowing normal  Shoulder shrug normal    Muscle bulk and tone normal  5 out of 5 strength no motor drift, very subtle right-sided motor drift  Gait normal intact  Cerebellar functions normal        Data Review:  I reviewed CTAs of his head neck as well as the cranial discussed the case with Dr. Palmer    Diagnosis:  1.  Has tandem disease of the left proximal M2 likely atherosclerotic in nature from a combination of high blood pressure.  Less likely with this " represent partial nonocclusive thrombus and/or some sort of vasculitis etc.  The lesion of interest is most likely is left-sided carotid which shows calcium but mostly ulceration and comparing this with duplex sonography is clearly less than 50% on CTA and ultrasound  Treatment Recommendations:  1.  Would appreciate cardiology input as discussed by Dr. Palmer regarding his heart his dilated ventricle and mitral regurgitation.    2.  This is likely nonoperative left carotid disease there is no role for intracranial stenting either at this point in time given the more proximal location of his infarctions.  Given these findings the question will be with diagnostic catheter angiogram offer us insights regarding the need for carotid revascularization.  I think given the findings in the early course of his medical therapy (less than 36 hours) this is a risk-benefit discussion we will follow-up with the doctors in [place] and will hold tight regarding interventional assessment.    Plan would be to continue maximal medical therapy monitor for neurologic changes work on judicious blood pressure control and cardiac work-up.    We will send sed rate to make sure there is no inflammatory arteriopathy present aswell    Mauro King MD  06/10/19  1:37 PM

## 2019-06-10 NOTE — PROGRESS NOTES
73 yo man with recent admission for L hemispheric TIA, presumed cardioembolic but no structural etiology identified on stroke work-up, other than possible L atrial dilation on TTE.    Re-presents to [facility] ER with recurrent symptoms, R-sided numbness. Now with scattered diffusion restriction on MRI. NIHSS 1 with last known well outside of tPA window.    Plan is to admit to hospital, level of care telemetry. I think it's reasonable to repeat vascular imaging of the cerebrovascular tree and rule out arterial plaque rupture, etc. I suspect this study will not be significantly changed compared to his CTA from several days ago, in which case starting a NOAC would be reasonable. Given his low NIHSS and LKW > 4.5 h ago, I would not give tPA, nor would I recommend an emergency CTP.

## 2019-06-10 NOTE — H&P
[facility] Medicine Services  HISTORY AND PHYSICAL    Patient Name: Jair Flores  : 1945  MRN: [id number]  Primary Care Physician: Osmar Clemons MD  Date of admission: 6/10/2019      Subjective   Subjective     Chief Complaint:  Numbness in ext.    HPI:  Jair Flores is a 74 y.o. male with a history of HTN, HLD, GERD, aortic and mitral valve disease, was discharged from here on 19 with an acute ischemic stroke s/p tPA, was back to baseline at discharge, with plans to follow up outpatient for stenting of his carotid artery with Dr. Palmer.  Patient presents to the ED with complaints of numbness from his right wrist to right elbow, and numbness in his right foot.  Last known normal at 2200.  He denies headache, difficulty with speech, difficulty swallowing, weakness.  MRI of the brain shows multiple punctate foci of restricted diffusion in the left temporal occipital lobe, rostrum of the corpus callosum, left parietal lobe, left frontal lobe, with appearance suggestive of embolic infarcts.  Patient was given one dose of Eliquis in the ED, and is being admitted to the Hospitalist for further evaluation and management.       Review of Systems   Constitutional: Negative.    HENT: Negative.    Eyes: Negative.    Respiratory: Negative.    Cardiovascular: Negative.    Gastrointestinal: Negative.    Endocrine: Negative.    Genitourinary: Negative.    Musculoskeletal: Negative.    Skin: Negative.    Neurological: Positive for numbness (left forearm and bottom of left foot). Negative for dizziness, tremors, seizures, syncope, facial asymmetry, speech difficulty, weakness, light-headedness and headaches.   Hematological: Negative.    Psychiatric/Behavioral: Negative.           Otherwise complete ROS reviewed and is negative except as mentioned in the HPI.    Personal History     Past Medical History:   Diagnosis Date   • Acute gout of foot 2019   • Cancer (CMS/HCC)      bladder; cysto surveillance 07/23/2017; free of disease   • Cataract     right eye   • Degenerative arthritis     right; knee replacement; successful   • Flash pulmonary edema (CMS/HCC) 4/29/2019    Episode of flash pulmonary edema thought to be related to volume overload at the time of left knee surgery in 2005 performed in [place] he has had no recurrence his EKG does show inferior Q waves which are unchanged.   • Fluid overload 2015    after knee surgery   • GERD (gastroesophageal reflux disease)    • Hard to intubate     has been told he is difficult to intubate   • Hematuria 4/29/2019   • Hemorrhoids 09/12/2011    hemorrhoid ablation   • Hypertension    • Skin cancer     head   • Status post left partial knee replacement 4/29/2019    Left knee surgery 2005 [place] with complication of flash pulmonary edema likely due to anesthesia total resolution without sequela but with stable abnormal EKG with inferior wall Q waves no change and asymptomatic.   • Wears glasses        Past Surgical History:   Procedure Laterality Date   • COLONOSCOPY      routinely every 10 years   • CYSTOSCOPY      x3   • CYSTOSCOPY  07/23/2017    bladder cancer; cysto surveillance; free of disease   • EXCISIONAL HEMORRHOIDECTOMY      hemorrhoid ablation; hemorrhoid onset 09/12/2011   • EYE SURGERY      as a child after cutting eye with barbwire fence   • KNEE ARTHROPLASTY, PARTIAL REPLACEMENT Left    • SKIN CANCER EXCISION     • TONSILLECTOMY     • TOTAL KNEE ARTHROPLASTY Right     Dgen arthritis   • TRANSURETHRAL RESECTION OF BLADDER TUMOR N/A 3/29/2019    Procedure: TRANSURETHRAL RESECTION OF BLADDER TUMOR WITH MITOMYCIN;  Surgeon: Jair Braxton MD;  Location: [facility];  Service: Urology       Family History: family history is not on file. Otherwise pertinent FHx was reviewed and unremarkable.     Social History:  reports that he has never smoked. He has never used smokeless tobacco. He reports that he drinks about 1.8 oz of alcohol  per week. Drug use questions deferred to the physician.  Social History     Social History Narrative   • Not on file       Medications:    Available home medication information reviewed.    (Not in a hospital admission)    Allergies   Allergen Reactions   • Penicillins Anaphylaxis   • Rocephin [Ceftriaxone] Anaphylaxis   • Ciprofloxacin Swelling   • Macrobid [Nitrofurantoin Macrocrystal] Swelling       Objective   Objective     Vital Signs:   Temp:  [97.6 °F (36.4 °C)] 97.6 °F (36.4 °C)  Heart Rate:  [58-60] 60  Resp:  [16] 16  BP: (173-196)/(61-90) 173/61   Total (NIH Stroke Scale): 1    Physical Exam   Constitutional: He is oriented to person, place, and time. He appears well-developed and well-nourished. No distress.   HENT:   Head: Normocephalic.   Eyes: Pupils are equal, round, and reactive to light.   Neck: Normal range of motion. Neck supple.   Cardiovascular: Regular rhythm, normal heart sounds and intact distal pulses. Exam reveals no gallop and no friction rub.   No murmur heard.  bradycardia   Pulmonary/Chest: Effort normal and breath sounds normal. No stridor. No respiratory distress. He has no wheezes. He has no rales. He exhibits no tenderness.   Abdominal: Soft. Bowel sounds are normal. He exhibits no distension and no mass. There is no tenderness. There is no rebound and no guarding.   Musculoskeletal: Normal range of motion. He exhibits no edema, tenderness or deformity.   Neurological: He is alert and oriented to person, place, and time. No cranial nerve deficit.   Speech is clear, strength equal bilaterally, no sensory deficit   Skin: Skin is warm and dry. No rash noted. He is not diaphoretic. No erythema. No pallor.   Psychiatric: He has a normal mood and affect. His behavior is normal. Judgment and thought content normal.        Results Reviewed:  I have personally reviewed current lab, radiology, and data and agree.    Results from last 7 days   Lab Units 06/10/19  0240  06/10/19  0137   WBC  10*3/mm3 6.38  --   --    HEMOGLOBIN g/dL 13.5  --   --    HEMOGLOBIN, POC   --    < >  --    HEMATOCRIT % 41.0  --   --    HEMATOCRIT POC   --    < >  --    PLATELETS 10*3/mm3 164  --   --    INR   --   --  1.0    < > = values in this interval not displayed.     Results from last 7 days   Lab Units 06/10/19  0240 06/06/19  0937   SODIUM mmol/L 143   < >  --    POTASSIUM mmol/L 3.5   < >  --    CHLORIDE mmol/L 104   < >  --    CO2 mmol/L 29.0   < >  --    BUN mg/dL 19   < >  --    CREATININE mg/dL 0.94   < >  --    GLUCOSE mg/dL 103*   < >  --    CALCIUM mg/dL 9.2   < >  --    ALT (SGPT) U/L 19   < > 15   AST (SGOT) U/L 25   < > 26   TROPONIN T ng/mL <0.010  --  <0.010    < > = values in this interval not displayed.     Estimated Creatinine Clearance: 76.3 mL/min (by C-G formula based on SCr of 0.94 mg/dL).  Brief Urine Lab Results     None        Imaging Results (last 24 hours)     Procedure Component Value Units Date/Time    MRI Brain Without Contrast [[id number]] Collected:  06/10/19 0241     Updated:  06/10/19 0241    Narrative:       MRI Brain WO     INDICATION:    Right arm stiffness from his elbow to his fingertips, numbness on bottom of right foot since last night sometime past 2200;4 days ago he developed numbness in his RUE, RLE, and lips on the right ;He was administered tPA, admitted to the hospital, and was  not given a definitive diagnosis; Hx of skin cancer and bladder cancer; No reported hx of CVA, seizures, or head injury.    Brain MRI:  No hemorrhage. There are multiple punctate foci of restricted diffusion for example left temporal occipital lobe (series 5#75), rostrum of the corpus callosum (series 5#18), left parietal lobe series 5#83 and left frontal lobe on the same image, left  parietal lobe (series 5#87). The appearance is suggestive of embolic infarcts. There is a large old left-sided frontoparietal infarct again noted with encephalomalacia. T2/FLAIR signal prolongation in the subcortical  and periventricular white matter is  nonspecific but most likely reflects small vessel ischemic disease. No hemorrhage detected on gradient images. Flow voids preserved within the large intracranial vessels. Cortical atrophy with ex vacuo ventricular and sulcal dilation. No mass lesion.    This is a preliminary wet read provided 2:34 AM 6/10/2019. Final interpretation will be provided by neuroradiology. Please refer to final interpretation for detailed findings and any further recommendations.    NOTIFICATION: Critical Value/emergent results were called by telephone at the time of interpretation on 6/10/2019 2:35 AM to KOURTNEY BARTHOLOMEW M.D. who verbally acknowledged these results.            Results for orders placed during the hospital encounter of 06/06/19   Adult Transthoracic Echo Complete W/ Cont if Necessary Per Protocol (With Agitated Saline)    Narrative · Left ventricular systolic function is normal.  · Estimated EF appears to be in the range of 61 - 65%.  · Left ventricular wall thickness is consistent with mild concentric   hypertrophy.  · Left atrial cavity size is moderately dilated.  · The left ventricular cavity is mildly dilated.  · Severe aortic valve regurgitation is present.  · Saline test results are negative for right to left atrial level shunt.          Assessment/Plan   Assessment / Plan     Active Hospital Problems    Diagnosis POA   • **CVA (cerebral vascular accident) (CMS/HCC) [I63.9] Yes   • GERD (gastroesophageal reflux disease) [K21.9] Yes   • Aortic valve disease [I35.9] Yes   • Benign essential hypertension [I10] Yes   • Hyperlipidemia [E78.5] Yes     Not on statin therapy     • Mitral valve disease [I05.9] Yes       ASSESSMENT and PLAN:    1.  CVA  -received tPA on 6/6/19   -was given 1 dose of Eliquis in the ED, will defer further dosage to neurology.   -consult neuro in the am   -neuro checks   -pt/ot/case management/speech consult in the am   -aspirin, high intensity  statin   -continue plavix   -CTA head and neck pending       2.  HTN   -hold vasotec, metoprolol for now    3.  HLD   -high intensity statin    4.  Aortic valve disease  5.  Mitral valve disease  6.  GERD      DVT prophylaxis:  1 dose of Eliquis in the ED, mechanical    CODE STATUS:    There are no questions and answers to display.       Admission Status:  I believe this patient meets OBSERVATION status, however if further evaluation or treatment plans warrant, status may change.  Based upon current information, I predict patient's care encounter to be less than or equal to 2 midnights.     Electronically signed by ANTIONE Ontiveros, 06/10/19, 3:26 AM.    Brief Attending Admission Attestation          Vitals:    06/10/19 0240   BP:  196/90   Pulse: 60   Resp:  16   Temp:  97.6   SpO2: 95% on room air       EXAM :  RS-bilateral inspiratory crackles more on the left base as compared to the right.  CVS- s1s2 regular, no murmur.  ABD- soft, non tender, not distended, no organomegaly.  EXT-minimal pedal edema  NEURO- AAO-3, no focal defecit, dec sensation on R.hand, R. Great toe.      Old records reviewed and summarized in PM hx.    HPI: 74-year-old male presented to ED  With a chief complaint of right upper extremity/right foot numbness/tightness- started around 12:00, denies any focal weakness, speech deficit or blurry vision.  Worrisome for stroke symptoms-NIH-1.  On work-up MRI was positive for embolic phenomenon- though patient had no documented A. fib on the monitor.  Neurologist/neurosurgeons were consulted from ED.  In ER patient got Eliquis 5 mg.  Patient was admitted from 6/6-6/7/2019-secondary to similar symptoms but also had some facial numbness-was thought to have acute stroke, treated with TPA, had extensive work-up including bilateral carotid duplex-right internal carotid showed 50 to 69% stenosis, left internal carotid 0 to 49% stenosis.  Patient was discharged on aspirin/Plavix, and Holter  monitoring.    TTE ejection fraction of 60%, severe AR, no shunt, LA was moderately dilated.    PM HX :  Hyperlipidemia-Lipitor 80 mg  CVA-aspirin/Plavix  Hypertension- metoprolol XL 25 mg/D, Enalapril 40 mg/D  Gout  History of bladder cancer    LABS:  Troponin less than 0.01, blood glucose 103, BUN/creatinine 19/0.9, LFTs within normal limit,  WBC of 6, hemoglobin of 15, platelet of 164,  MRI brain- no hemorrhage, multiple punctate foci of restricted diffusion in left occipital lobe, corpus callosum, left parietal lobe, left frontal lobe-suggestive of embolic phenomenon.  Large old left sided frontoparietal infarct with encephalomalacia again noted.  EKG-sinus rhythm bradycardic-56 bpm, , Q waves in lead III, aVF with T wave inversion, similar to old EKG.    A/P:  -Consult neurology-Dr. Eid/Dr. Palmer- further anticoagulation as per neurologist recommendation.  -Hypertension in ED- will allow systolic blood pressure around 180s, close neuro checks monitor for any bleeding.    See above for further detailed assessment and plan developed with APC which I have reviewed and/or edited.     I have discussed with findings, diagnosis and plans with the patient and family.     Arielle Eric MD 06/10/19 3:23 AM

## 2019-06-10 NOTE — PROGRESS NOTES
I just spoke with Dr. Francisco Bo of cardiology and he has requested a transcranial Doppler with saline and MRI of the carotids.  I will get the studies ordered and he will see him today.    Elizabeth Rod PA-C

## 2019-06-10 NOTE — CONSULTS
Neurology    Referring provider:   No referring provider defined for this encounter.    Reason for Consultation: Recurrent stroke symptoms    Chief complaint: Transient right arm and right foot numbness    History of present illness: 74-year-old man discharged from [facility] on Friday afternoon following a work-up for stroke.  He came in with a NIH of 1 got TPA with complete resolution of his symptoms.    He was found to have a variety of problems including intracranial athero-sclerosis, noncritical left carotid stenosis with an ulcerative plaque, old infarct in the left hemisphere which is asymptomatic.    He had some complaints of left arm pain with shoveling snow over the wintertime.    He also was found to have severe aortic regurgitation, moderately dilated left atrium, dilated left ventricle, and left ventricular hypertrophy.    He was discharged on aspirin Plavix and 80 mg Lipitor Friday afternoon with the thought that he would have a covered's stent in his left carotid by Dr. Palmer in several weeks.        Review of Systems: All systems reviewed and other than the HPI were negative    Home meds:   Medications Prior to Admission   Medication Sig Dispense Refill Last Dose   • allopurinol (ZYLOPRIM) 100 MG tablet Take 100 mg by mouth Daily.   6/9/2019 at Unknown time   • enalapril (VASOTEC) 20 MG tablet Take 20 mg by mouth 2 (Two) Times a Day. 2 tabs (40mg total) once daily   6/9/2019 at Unknown time   • Ascorbic Acid (VITAMIN C) 500 MG chewable tablet Chew 500 mg Daily.   6/6/2019 at Unknown time   • aspirin 81 MG tablet Take 1 tablet by mouth Daily. 30 tablet 2    • atorvastatin (LIPITOR) 80 MG tablet Take 1 tablet by mouth Every Night. 30 tablet 2    • clopidogrel (PLAVIX) 75 MG tablet Take 1 tablet by mouth Daily. 30 tablet 2    • metoprolol succinate XL (TOPROL-XL) 25 MG 24 hr tablet Take 25 mg by mouth Daily.   6/6/2019 at Unknown time   • Multiple Vitamins-Minerals (HM MULTIVITAMIN ADULT GUMMY)  chewable tablet Chew 1 tablet Daily.   6/6/2019 at Unknown time       History  Past Medical History:   Diagnosis Date   • Acute gout of foot 4/29/2019   • Cancer (CMS/HCC)     bladder; cysto surveillance 07/23/2017; free of disease   • Cataract     right eye   • Degenerative arthritis     right; knee replacement; successful   • Flash pulmonary edema (CMS/HCC) 4/29/2019    Episode of flash pulmonary edema thought to be related to volume overload at the time of left knee surgery in 2005 performed in [place] he has had no recurrence his EKG does show inferior Q waves which are unchanged.   • Fluid overload 2015    after knee surgery   • GERD (gastroesophageal reflux disease)    • Hard to intubate     has been told he is difficult to intubate   • Hematuria 4/29/2019   • Hemorrhoids 09/12/2011    hemorrhoid ablation   • Hypertension    • Skin cancer     head   • Status post left partial knee replacement 4/29/2019    Left knee surgery 2005 [place] with complication of flash pulmonary edema likely due to anesthesia total resolution without sequela but with stable abnormal EKG with inferior wall Q waves no change and asymptomatic.   • Wears glasses    ,   Past Surgical History:   Procedure Laterality Date   • COLONOSCOPY      routinely every 10 years   • CYSTOSCOPY      x3   • CYSTOSCOPY  07/23/2017    bladder cancer; cysto surveillance; free of disease   • EXCISIONAL HEMORRHOIDECTOMY      hemorrhoid ablation; hemorrhoid onset 09/12/2011   • EYE SURGERY      as a child after cutting eye with barbwire fence   • KNEE ARTHROPLASTY, PARTIAL REPLACEMENT Left    • SKIN CANCER EXCISION     • TONSILLECTOMY     • TOTAL KNEE ARTHROPLASTY Right     Dgen arthritis   • TRANSURETHRAL RESECTION OF BLADDER TUMOR N/A 3/29/2019    Procedure: TRANSURETHRAL RESECTION OF BLADDER TUMOR WITH MITOMYCIN;  Surgeon: Jair Braxton MD;  [facility];  Service: Urology   , History reviewed. No pertinent family history.,   Social History  "    Tobacco Use   • Smoking status: Never Smoker   • Smokeless tobacco: Never Used   Substance Use Topics   • Alcohol use: Yes     Alcohol/week: 1.8 oz     Types: 3 Shots of liquor per week     Comment: socially   • Drug use: Defer    and Allergies:  Penicillins; Rocephin [ceftriaxone]; Ciprofloxacin; and Macrobid [nitrofurantoin macrocrystal],    Vital Signs   Blood pressure 170/68, pulse 63, temperature 98.3 °F (36.8 °C), temperature source Oral, resp. rate 16, height 177.8 cm (70\"), weight 78.7 kg (173 lb 8 oz), SpO2 93 %.  Body mass index is 24.89 kg/m².    Physical Exam:   General: Well-developed white male in no distress              Head: No signs of trauma              Neck: No bruit              Resp: Normal breath sounds              Cor: Regular rhythm              Extremties: No edema              Skin: Dry              Neuro: Mentally alert and oriented with normal memory attention and concentration.    Speech is articulate with no word finding problems.    Coordination is normal and finger-nose and fine finger movements.    Cranial nerves show full eye movements symmetrical face.  He opens his mouth and sticks out his tongue without difficulty.    Reflexes again are 2+ right 1+ left.    Motor testing shows symmetrical strength and tone in all muscle groups.    Sensory testing is grossly normal    Results Review: MRI of the brain shows acute strokes in the left hemisphere including the left frontal and left occipitotemporal areas.    P2 Y 12 is 44.        Labs:  Lab Results (last 72 hours)     Procedure Component Value Units Date/Time    P2Y12 Platelet Inhibition [[id number]] Collected:  06/10/19 0908    Specimen:  Blood Updated:  06/10/19 0949     P2Y12 Reactivity Unit 44 PRU     Narrative:       P2Y12 Interpretation:  Pre-Drug normal reference range is 194-418 PRU.  Test results are reported in P2Y12 reaction units (PRU). This measures the extent of platelet aggregation in the presence of P2Y12 " inhibitor drugs, such as clopidogrel (Plavix), prasugrel (Effient), ticagrelor (Brilinta), ticlopidine (Ticlid).  P2Y12 values <194 PRU (low end of reference range) are specific evidence of a P2Y12 inhibitor effect.  Patients who have been treated with Glycoprotein IIb/IIIa inhibitors should not be tested until platelet function has recovered. This time period is approximately 14 days after discontinuation of abciximab (ReoPro) and up to 48 hours after discontinuation of eptifibatide (Integrilin) and tirofiban (Aggrastat).   The P2Y12 test results should be interpreted in conjunction with other clinical and lab data available to the clinician.    POC Glucose Once [[id number]]  (Normal) Collected:  06/10/19 0554    Specimen:  Blood Updated:  06/10/19 0555     Glucose 106 mg/dL     proBNP [[id number]]  (Normal) Collected:  06/10/19 0240    Specimen:  Blood Updated:  06/10/19 0500     proBNP 826.0 pg/mL     Narrative:       Among patients with dyspnea, NT-proBNP is highly sensitive for the detection of acute congestive heart failure. In addition NT-proBNP of <300 pg/ml effectively rules out acute congestive heart failure with 99% negative predictive value.    Comprehensive Metabolic Panel [[id number]]  (Abnormal) Collected:  06/10/19 0240    Specimen:  Blood Updated:  06/10/19 0308     Glucose 103 mg/dL      BUN 19 mg/dL      Creatinine 0.94 mg/dL      Sodium 143 mmol/L      Potassium 3.5 mmol/L      Chloride 104 mmol/L      CO2 29.0 mmol/L      Calcium 9.2 mg/dL      Total Protein 6.6 g/dL      Albumin 3.90 g/dL      ALT (SGPT) 19 U/L      AST (SGOT) 25 U/L      Comment: Specimen hemolyzed.  Results may be affected.        Alkaline Phosphatase 78 U/L      Total Bilirubin 0.4 mg/dL      eGFR Non African Amer 79 mL/min/1.73      Globulin 2.7 gm/dL      A/G Ratio 1.4 g/dL      BUN/Creatinine Ratio 20.2     Anion Gap 10.0 mmol/L     Narrative:       GFR Normal >60  Chronic Kidney Disease <60  Kidney Failure <15    Troponin  [[id number]]  (Normal) Collected:  06/10/19 0240    Specimen:  Blood Updated:  06/10/19 0304     Troponin T <0.010 ng/mL     Narrative:       Troponin T Reference Range:  <= 0.03 ng/mL-   Negative for AMI  >0.03 ng/mL-     Abnormal for myocardial necrosis.  Clinicians would have to utilize clinical acumen, EKG, Troponin and serial changes to determine if it is an Acute Myocardial Infarction or myocardial injury due to an underlying chronic condition.     CBC & Differential [[id number]] Collected:  06/10/19 0240    Specimen:  Blood Updated:  06/10/19 0248    Narrative:       The following orders were created for panel order CBC & Differential.  Procedure                               Abnormality         Status                     ---------                               -----------         ------                     CBC Auto Differential[[id number]]        Normal              Final result                 Please view results for these tests on the individual orders.    CBC Auto Differential [[id number]]  (Normal) Collected:  06/10/19 0240    Specimen:  Blood Updated:  06/10/19 0248     WBC 6.38 10*3/mm3      RBC 4.39 10*6/mm3      Hemoglobin 13.5 g/dL      Hematocrit 41.0 %      MCV 93.4 fL      MCH 30.8 pg      MCHC 32.9 g/dL      RDW 15.1 %      RDW-SD 52.3 fl      MPV 10.4 fL      Platelets 164 10*3/mm3      Neutrophil % 50.1 %      Lymphocyte % 34.0 %      Monocyte % 11.0 %      Eosinophil % 4.2 %      Basophil % 0.5 %      Immature Grans % 0.2 %      Neutrophils, Absolute 3.20 10*3/mm3      Lymphocytes, Absolute 2.17 10*3/mm3      Monocytes, Absolute 0.70 10*3/mm3      Eosinophils, Absolute 0.27 10*3/mm3      Basophils, Absolute 0.03 10*3/mm3      Immature Grans, Absolute 0.01 10*3/mm3      nRBC 0.0 /100 WBC     POC CHEM 8 [[id number]]  (Abnormal) Collected:  06/10/19 0139    Specimen:  Blood Updated:  06/10/19 0144     Glucose 108 mg/dL      BUN 20 mg/dL      Creatinine 1.00 mg/dL      Sodium 144 mmol/L      Potassium  3.5 mmol/L      Chloride 103 mmol/L      Total CO2 29 mmol/L      Hemoglobin 13.3 g/dL      Comment: Serial Number: [id number]:  [id number]        Hematocrit 39 %      Ionized Calcium 1.17 mmol/L     POC Protime / INR [[id number]]  (Abnormal) Collected:  06/10/19 0137    Specimen:  Blood Updated:  06/10/19 0140     Protime 11.7 seconds      INR 1.0     Comment: Serial Number: [id number]:  [id number]             Rads:  Imaging Results (last 72 hours)     Procedure Component Value Units Date/Time    MRI Brain Without Contrast [[id number]] Collected:  06/10/19 0241     Updated:  06/10/19 0824    Narrative:       MRI Brain WO     INDICATION:    Right arm stiffness from his elbow to his fingertips, numbness on bottom of right foot since last night sometime past 2200;4 days ago he developed numbness in his RUE, RLE, and lips on the right ;He was administered tPA, admitted to the hospital, and was  not given a definitive diagnosis; Hx of skin cancer and bladder cancer; No reported hx of CVA, seizures, or head injury.    Preliminary report:  No hemorrhage. There are multiple punctate foci of restricted diffusion for example left temporal occipital lobe (series 5#75), rostrum of the corpus callosum (series 5#18), left parietal lobe series 5#83 and left frontal lobe on the same image, left  parietal lobe (series 5#87). The appearance is suggestive of embolic infarcts. There is a large old left-sided frontoparietal infarct again noted with encephalomalacia. T2/FLAIR signal prolongation in the subcortical and periventricular white matter is  nonspecific but most likely reflects small vessel ischemic disease. No hemorrhage detected on gradient images. Flow voids preserved within the large intracranial vessels. Cortical atrophy with ex vacuo ventricular and sulcal dilation. No mass lesion.    This is a preliminary wet read provided 2:34 AM 6/10/2019, by Dr. Anuel Alberts. Final interpretation will be provided by neuroradiology.  Please refer to final interpretation for detailed findings and any further recommendations.    NOTIFICATION: Critical Value/emergent results were called by telephone at the time of interpretation on 6/10/2019 2:35 AM to KOURTNEY BARTHOLOMEW M.D. who verbally acknowledged these results.    Final report:    TECHNIQUE: Unenhanced brain MRI    FINDINGS: There is moderately advanced volume loss, and there are extensive chronic ischemic changes, but there is no hydrocephalus or extra-axial fluid collection, nor evidence of acute or chronic intracranial hemorrhage. Superimposed on this are  multiple small acute ischemic insults, in the right frontal and parietal cortex, right frontal periventricular white matter, and in the genuine of the corpus callosum. A posterior inferior temporal lobe cortical lesion is seen as well. These are mostly  tiny, few millimeters in size, and presumably represent small embolic infarcts. Mass effect is negligible.    Normal flow voids are seen in the cerebral vessels. The extracranial soft tissues are unremarkable.      Impression:       Final interpretation concurs with the wet read above. Extensive chronic ischemic changes with superimposed small punctate ischemic lesions mostly left-sided though there is a lesion in the genu the corpus callosum. No evidence of acute  intracranial hemorrhage. No other acute abnormality.    Signer Name: Derek Gil MD   Signed: 6/10/2019 8:22 AM   Workstation Name: [id number]       XR Chest 1 View [[id number]] Collected:  06/10/19 0409     Updated:  06/10/19 0411    Narrative:       CR Chest 1 Vw    SIGNS AND SYMPTOMS:  numbness; Cerebral infarction, unspecified; Paresthesia of skin Pt woke up a few hours ago with numbness in the right arm/hand.  (just left the hospital 3 days ago - HX: Acute ischemic stroke, benign essential HTN, HLD GERD, TIA)    COMPARISONS:  None    FINDINGS:    A portable AP view of the chest was obtained  Fully upright.    The lungs are  clear. The cardiomediastinal silhouette and pulmonary vascularity are normal. Descending aorta is tortuous. Esophageal hiatal hernia also suspected.No pneumothorax or pleural effusion is identified. The bones are normal for age.      Impression:         1.  No acute abnormality.    Signer Name: Anuel Alberts MD   Signed: 6/10/2019 4:09 AM   Workstation Name: [id number]       CT Angiogram Head With & Without Contrast [[id number]] Collected:  06/10/19 0355     Updated:  06/10/19 0407    Narrative:       CTA Head, CTA Neck, CTA Neck     INDICATION:    Cerebral infarction, unspecified; Paresthesia of skin Right facial droop and right arm numbness since around 2330 last evening; Abnormal MRI this admission; Reported tpa within past few days;    Head and neck CTA:    Neck - There is focal narrowing of the proximal left internal carotid artery (series 4#245 for example) just distal to the bifurcation due to hard and soft atherosclerotic plaque. Right internal carotid artery displays atherosclerotic vascular  calcification at the origin. No right cervical ICA high-grade stenosis seen. Vertebral arteries patent. No apparent dissection.   Head - there is focal stenosis of the proximal left M2 segment (series 8#23 for example). No apparent aneurysm. No dural sinus occlusion. There is an old left frontal parietal infarct present. The small foci of presumably embolic infarct within the left  hemisphere seen on recent MR not evident by CT.    This is a preliminary wet read provided at 3:54 AM 6/10/2019. Final interpretation will be provided by neuroradiology. Please refer to final interpretation for detailed findings and any further recommendations.    CT Angiogram Neck With & Without Contrast [[id number]] Collected:  06/10/19 0407     Updated:  06/10/19 0407    Narrative:       CTA Head, CTA Neck, CTA Neck     INDICATION:    Cerebral infarction, unspecified; Paresthesia of skin Right facial droop and right arm numbness since  around 2330 last evening; Abnormal MRI this admission; Reported tpa within past few days;    Head and neck CTA:    Neck - There is focal narrowing of the proximal left internal carotid artery (series 4#245 for example) just distal to the bifurcation due to hard and soft atherosclerotic plaque. Right internal carotid artery displays atherosclerotic vascular  calcification at the origin. No right cervical ICA high-grade stenosis seen. Vertebral arteries patent. No apparent dissection.   Head - there is focal stenosis of the proximal left M2 segment (series 8#23 for example). No apparent aneurysm. No dural sinus occlusion. There is an old left frontal parietal infarct present. The small foci of presumably embolic infarct within the left  hemisphere seen on recent MR not evident by CT.    This is a preliminary wet read provided at 3:54 AM 6/10/2019. Final interpretation will be provided by neuroradiology. Please refer to final interpretation for detailed findings and any further recommendations.            Assessment: Multiple acute left hemisphere infarcts widely distributed    Noncritical left carotid stenosis with ulcerative plaque    TTE showing dilated left atrium, dilated left ventricle, LVH, aortic regurgitation, negative bubble study for PFO    Hypertension    Dyslipidemia    History of left arm pain with shoveling snow     Plan:    Spoken to Dr. Bo and Dr. King regarding the case.  Dr. Bo would like an MRI of the neck with the crest to protocol for carotid disease.    Transcranial Doppler is also been ordered.    Eliquis is been discontinued.    Continue aspirin Plavix and Lipitor.        Comment:   Complex problem with multiple consultants.  A great deal have to do with whether or not the patient is done frustrated to have significant risk for cardioembolic disease.    The distribution of the acute emboli would suggest carotid rather than middle cerebral disease as etiology.  However does not exclude  the possibility that these are embolic from the heart    I discussed the patients findings and my recommendations with patient, family, nursing staff and consulting provider      Gordon Eid MD  06/10/19  11:10 AM

## 2019-06-10 NOTE — ED PROVIDER NOTES
"Subjective   Mr. Jair Flores is a 74 y.o. male who presents to the ED with c/o neurologic problem. He reports 4 days ago he developed numbness in his right lips, right upper extremity, and right lower extremity. He was seen here and administered tPA and he was admitted. He had multiple scans of his head including CT and MRI and was not given definitive diagnosis. He was discharged 3 days ago with plan for stenting of carotid artery. Last night he went to sleep at 2200 and woke up with \"tightness\" in his right upper extremity and numbness in his right foot. He denies chest pain, shortness of breath, abdominal pain, nausea, or vomiting. There are no other acute symptoms at this time.          History provided by:  Patient  Neurologic Problem   The patient's primary symptoms include focal sensory loss. This is a recurrent problem. The current episode started today. The neurological problem developed suddenly. The last time the patient was known to be well was 6/9/2019 9:30 PM.  The problem is unchanged. There was right-sided focality noted. Pertinent negatives include no abdominal pain, back pain, chest pain, confusion, dizziness, fatigue, fever, nausea, palpitations, shortness of breath or vomiting. Past treatments include nothing. The treatment provided no relief. His past medical history is significant for a CVA. There is no history of a bleeding disorder, dementia or head trauma.       Review of Systems   Constitutional: Negative for fatigue and fever.   HENT: Negative for congestion.    Respiratory: Negative for shortness of breath.    Cardiovascular: Negative for chest pain and palpitations.   Gastrointestinal: Negative for abdominal pain, nausea and vomiting.   Genitourinary: Negative for difficulty urinating, flank pain and frequency.   Musculoskeletal: Negative for back pain.   Skin: Negative for color change, rash and wound.   Allergic/Immunologic: Negative for immunocompromised state.   Neurological: " Positive for numbness. Negative for dizziness and speech difficulty.   Psychiatric/Behavioral: Negative for confusion.   All other systems reviewed and are negative.      Past Medical History:   Diagnosis Date   • Acute gout of foot 4/29/2019   • Cancer (CMS/HCC)     bladder; cysto surveillance 07/23/2017; free of disease   • Cataract     right eye   • Degenerative arthritis     right; knee replacement; successful   • Flash pulmonary edema (CMS/HCC) 4/29/2019    Episode of flash pulmonary edema thought to be related to volume overload at the time of left knee surgery in 2005 performed in [place] he has had no recurrence his EKG does show inferior Q waves which are unchanged.   • Fluid overload 2015    after knee surgery   • GERD (gastroesophageal reflux disease)    • Hard to intubate     has been told he is difficult to intubate   • Hematuria 4/29/2019   • Hemorrhoids 09/12/2011    hemorrhoid ablation   • Hypertension    • Skin cancer     head   • Status post left partial knee replacement 4/29/2019    Left knee surgery 2005 [place] with complication of flash pulmonary edema likely due to anesthesia total resolution without sequela but with stable abnormal EKG with inferior wall Q waves no change and asymptomatic.   • Wears glasses        Allergies   Allergen Reactions   • Penicillins Anaphylaxis   • Rocephin [Ceftriaxone] Anaphylaxis   • Ciprofloxacin Swelling   • Macrobid [Nitrofurantoin Macrocrystal] Swelling       Past Surgical History:   Procedure Laterality Date   • COLONOSCOPY      routinely every 10 years   • CYSTOSCOPY      x3   • CYSTOSCOPY  07/23/2017    bladder cancer; cysto surveillance; free of disease   • EXCISIONAL HEMORRHOIDECTOMY      hemorrhoid ablation; hemorrhoid onset 09/12/2011   • EYE SURGERY      as a child after cutting eye with barbwire fence   • KNEE ARTHROPLASTY, PARTIAL REPLACEMENT Left    • SKIN CANCER EXCISION     • TONSILLECTOMY     • TOTAL KNEE ARTHROPLASTY Right     Dgen arthritis    • TRANSURETHRAL RESECTION OF BLADDER TUMOR N/A 3/29/2019    Procedure: TRANSURETHRAL RESECTION OF BLADDER TUMOR WITH MITOMYCIN;  Surgeon: Jair Braxton MD;  Location: [facility];  Service: Urology       History reviewed. No pertinent family history.    Social History     Socioeconomic History   • Marital status:      Spouse name: Not on file   • Number of children: Not on file   • Years of education: Not on file   • Highest education level: Not on file   Tobacco Use   • Smoking status: Never Smoker   • Smokeless tobacco: Never Used   Substance and Sexual Activity   • Alcohol use: Yes     Alcohol/week: 1.8 oz     Types: 3 Shots of liquor per week     Comment: socially   • Drug use: Defer   • Sexual activity: Defer         Objective   Physical Exam   Constitutional: He is oriented to person, place, and time. He appears well-developed and well-nourished. No distress.   HENT:   Head: Normocephalic and atraumatic.   Nose: Nose normal.   Eyes: Conjunctivae are normal. No scleral icterus.   Neck: Normal range of motion. Neck supple.   Cardiovascular: Regular rhythm and normal heart sounds. Bradycardia present.   No murmur heard.  Pulmonary/Chest: Effort normal and breath sounds normal. No respiratory distress.   Abdominal: Soft. There is no tenderness.   Musculoskeletal: Normal range of motion. He exhibits no edema.   Neurological: He is alert and oriented to person, place, and time. He has normal strength. Coordination normal. GCS eye subscore is 4. GCS verbal subscore is 5. GCS motor subscore is 6.   Right face might be slightly lower than the left but this is not felt to be acute rather than chronic appearance. Wife and patient agree with this assesment. Speech is normal. He moves all extremities equally with normal strength. Subjectively reports decreased sensation in the sole of the right foot and right forearm.    Skin: Skin is warm and dry.   Psychiatric: He has a normal mood and affect. His behavior is  normal.   Nursing note and vitals reviewed.      Critical Care  Performed by: Niesha Max MD  Authorized by: Niesha Max MD     Critical care provider statement:     Critical care time (minutes):  45    Critical care time was exclusive of:  Separately billable procedures and treating other patients    Critical care was necessary to treat or prevent imminent or life-threatening deterioration of the following conditions:  CNS failure or compromise    Critical care was time spent personally by me on the following activities:  Blood draw for specimens, development of treatment plan with patient or surrogate, discussions with consultants, evaluation of patient's response to treatment, examination of patient, obtaining history from patient or surrogate, ordering and review of laboratory studies, ordering and performing treatments and interventions, ordering and review of radiographic studies, pulse oximetry, re-evaluation of patient's condition and review of old charts               ED Course     Recent Results (from the past 24 hour(s))   POC Protime / INR    Collection Time: 06/10/19  1:37 AM   Result Value Ref Range    Protime 11.7 (L) 12.8 - 15.2 seconds    INR 1.0 0.8 - 1.2   POC CHEM 8    Collection Time: 06/10/19  1:39 AM   Result Value Ref Range    Glucose 108 70 - 130 mg/dL    BUN 20 8 - 26 mg/dL    Creatinine 1.00 0.60 - 1.30 mg/dL    Sodium 144 138 - 146 mmol/L    Potassium 3.5 3.5 - 4.9 mmol/L    Chloride 103 98 - 109 mmol/L    Total CO2 29 24 - 29 mmol/L    Hemoglobin 13.3 12.0 - 17.0 g/dL    Hematocrit 39 38 - 51 %    Ionized Calcium 1.17 (L) 1.20 - 1.32 mmol/L   Comprehensive Metabolic Panel    Collection Time: 06/10/19  2:40 AM   Result Value Ref Range    Glucose 103 (H) 65 - 99 mg/dL    BUN 19 8 - 23 mg/dL    Creatinine 0.94 0.76 - 1.27 mg/dL    Sodium 143 136 - 145 mmol/L    Potassium 3.5 3.5 - 5.2 mmol/L    Chloride 104 98 - 107 mmol/L    CO2 29.0 22.0 - 29.0 mmol/L    Calcium 9.2  8.6 - 10.5 mg/dL    Total Protein 6.6 6.0 - 8.5 g/dL    Albumin 3.90 3.50 - 5.20 g/dL    ALT (SGPT) 19 1 - 41 U/L    AST (SGOT) 25 1 - 40 U/L    Alkaline Phosphatase 78 39 - 117 U/L    Total Bilirubin 0.4 0.2 - 1.2 mg/dL    eGFR Non African Amer 79 >60 mL/min/1.73    Globulin 2.7 gm/dL    A/G Ratio 1.4 g/dL    BUN/Creatinine Ratio 20.2 7.0 - 25.0    Anion Gap 10.0 mmol/L   Troponin    Collection Time: 06/10/19  2:40 AM   Result Value Ref Range    Troponin T <0.010 0.000 - 0.030 ng/mL   CBC Auto Differential    Collection Time: 06/10/19  2:40 AM   Result Value Ref Range    WBC 6.38 3.40 - 10.80 10*3/mm3    RBC 4.39 4.14 - 5.80 10*6/mm3    Hemoglobin 13.5 13.0 - 17.7 g/dL    Hematocrit 41.0 37.5 - 51.0 %    MCV 93.4 79.0 - 97.0 fL    MCH 30.8 26.6 - 33.0 pg    MCHC 32.9 31.5 - 35.7 g/dL    RDW 15.1 12.3 - 15.4 %    RDW-SD 52.3 37.0 - 54.0 fl    MPV 10.4 6.0 - 12.0 fL    Platelets 164 140 - 450 10*3/mm3    Neutrophil % 50.1 42.7 - 76.0 %    Lymphocyte % 34.0 19.6 - 45.3 %    Monocyte % 11.0 5.0 - 12.0 %    Eosinophil % 4.2 0.3 - 6.2 %    Basophil % 0.5 0.0 - 1.5 %    Immature Grans % 0.2 0.0 - 0.5 %    Neutrophils, Absolute 3.20 1.70 - 7.00 10*3/mm3    Lymphocytes, Absolute 2.17 0.70 - 3.10 10*3/mm3    Monocytes, Absolute 0.70 0.10 - 0.90 10*3/mm3    Eosinophils, Absolute 0.27 0.00 - 0.40 10*3/mm3    Basophils, Absolute 0.03 0.00 - 0.20 10*3/mm3    Immature Grans, Absolute 0.01 0.00 - 0.05 10*3/mm3    nRBC 0.0 0.0 - 0.2 /100 WBC   proBNP    Collection Time: 06/10/19  2:40 AM   Result Value Ref Range    proBNP 826.0 5.0 - 900.0 pg/mL     Note: In addition to lab results from this visit, the labs listed above may include labs taken at another facility or during a different encounter within the last 24 hours. Please correlate lab times with ED admission and discharge times for further clarification of the services performed during this visit.    XR Chest 1 View   Final Result      1.  No acute abnormality.      Signer  "Name: Anuel Alberts MD    Signed: 6/10/2019 4:09 AM    Workstation Name: [id number]          CT Angiogram Head With & Without Contrast         CT Angiogram Neck With & Without Contrast         MRI Brain Without Contrast           Vitals:    06/10/19 0240 06/10/19 0300 06/10/19 0400 06/10/19 0429   BP:  (!) 185/69 (!) 183/82 177/67   BP Location:    Right arm   Patient Position:    Lying   Pulse: 60  66 55   Resp:    16   Temp:    97.9 °F (36.6 °C)   TempSrc:    Oral   SpO2: 95%  96% 95%   Weight:    78.7 kg (173 lb 8 oz)   Height:    177.8 cm (70\")     Medications   sodium chloride 0.9 % flush 10 mL (not administered)   sodium chloride 0.9 % bolus 500 mL (0 mL Intravenous Stopped 6/10/19 0352)   apixaban (ELIQUIS) tablet 5 mg (5 mg Oral Given 6/10/19 0338)   iopamidol (ISOVUE-370) 76 % injection 150 mL (75 mL Intravenous Given 6/10/19 0315)     ECG/EMG Results (last 24 hours)     ** No results found for the last 24 hours. **        ECG 12 Lead                           MDM  Number of Diagnoses or Management Options  Acute embolic stroke (CMS/HCC): new and requires workup  Right arm numbness: new and requires workup  Diagnosis management comments: The patient presented with the complaint of right forearm numbness and right foot numbness.  His motor function, however, was still intact and he had normal strength and movement to the bilateral upper and lower extremities. He was felt to have right lower facial droop by nursing staff, and therefore given an NIH of 1. I, however, did not observe this and wife agrees that he does not appear to have acute facial droop by her observation.  The patient reports going to bed approximately at approximately 10 PM, and denies symptoms at that given time.  The wife reports that he turned the lights out to go to be at approximately 9 to 9:30 PM, and she reports he was asymptomatic at that time.    Patient denied any facial symptoms or speech abnormalities on today's current " presentation.    Patient recently presented approximately 4 days earlier with symptoms involving the right upper lip, right forearm, and the right foot.  He reports that his symptoms were improving while he was getting TPA on the previous work-up.  He ultimately had an MRI that did not show any acute intracranial normality.  He did have carotid evaluation that showed left internal carotid artery plaque questionable stability.    The patient, on previous hospitalization, also was identified to have a dilated left atrium with the theory that he may be having paroxysmal A. fib present.  A. fib was not definitely identified on previous evaluation.    AT the end of the patients previous hospitalization he was sent home with a combination of aspirin and Plavix, but does not take any anticoagulation.    I elected to send the patient directly to MRI while the patient was actively having symptoms due to the patient having an extensive recent evaluation, and having an NIH of 1 per nursing staff.     At the patient's initial, presentation he was approximate 3-1/2 to 4 1/2 hours since last known well time.  I discussed the patient with the neurologist, Dr. Victoria, who agrees with no TPA.    I discussed the patient with the neuro interventionalist, Dr. Eisenberg, who will not pursue acute intervention at this time, and agrees with no TPA.    Dr. Eisenberg agrees with performing CT angiograms of the head and neck, and recommends anticoagulation in the form of Eliquis.  This has been initiated.    Patient has remained stable throughout ER course.  The blood pressure has trended down still remains elevated.    The patient was discussed with the hospitalist, Dr. Eric, who will admit.       Amount and/or Complexity of Data Reviewed  Clinical lab tests: ordered and reviewed  Tests in the radiology section of CPT®: ordered and reviewed  Obtain history from someone other than the patient: yes  Review and summarize past medical records:  yes  Discuss the patient with other providers: yes  Independent visualization of images, tracings, or specimens: yes    Risk of Complications, Morbidity, and/or Mortality  Presenting problems: high  Diagnostic procedures: high  Management options: high    Critical Care  Total time providing critical care: 30-74 minutes    Patient Progress  Patient progress: stable      Final diagnoses:   Acute embolic stroke (CMS/HCC)   Right arm numbness       Documentation assistance provided by mayuri Medrano.  Information recorded by the scribe was done at my direction and has been verified and validated by me.     Abner Medrano  06/10/19 0157       Niesha Max MD  06/10/19 0595

## 2019-06-10 NOTE — PROGRESS NOTES
Discharge Planning Assessment  [facility]     Patient Name: Jair Flores  MRN: [id number]  Today's Date: 6/10/2019    Admit Date: 6/10/2019    Discharge Needs Assessment     Row Name 06/10/19 1621       Living Environment    Lives With  spouse    Name(s) of Who Lives With Patient  Carey Flores (spouse) [phone]    Current Living Arrangements  home/apartment/condo    Primary Care Provided by  self    Provides Primary Care For  no one    Family Caregiver if Needed  spouse    Family Caregiver Names  Carey Flores (spouse) [phone]    Quality of Family Relationships  helpful;involved;supportive    Able to Return to Prior Arrangements  yes       Resource/Environmental Concerns    Resource/Environmental Concerns  home accessibility    Home Accessibility Concerns  stairs to enter home 4 stairs to enter home, 14 stairs to bedroom/bathroom       Transition Planning    Patient/Family Anticipates Transition to  home with family    Patient/Family Anticipated Services at Transition      Transportation Anticipated  family or friend will provide       Discharge Needs Assessment    Concerns to be Addressed  discharge planning    Equipment Currently Used at Home  none    Anticipated Changes Related to Illness  none    Equipment Needed After Discharge  none    Offered/Gave Vendor List  no        Discharge Plan     Row Name 06/10/19 8187       Plan    Plan  Home with family    Patient/Family in Agreement with Plan  yes    Plan Comments  Spoke with patient at bedside.  Patient [place] wife.  States that he is independent at baseline.  He has no DME, home health or services at current.  States that his PCP is Osmar Clemons M.D. and insurance provider is Medicare A and B and [facility].  Patient states that he can afford his medications.  Plans for discharge home with family when medically ready.  Wife will transport.  Case management will continue to follow for discharge planning.     Final Discharge  Disposition Code  01 - home or self-care        Destination      No service coordination in this encounter.      Durable Medical Equipment      No service coordination in this encounter.      Dialysis/Infusion      No service coordination in this encounter.      Home Medical Care      No service coordination in this encounter.      Therapy      No service coordination in this encounter.      Community Resources      No service coordination in this encounter.        Expected Discharge Date and Time     Expected Discharge Date Expected Discharge Time    Jun 11, 2019         Demographic Summary     Row Name 06/10/19 1619       General Information    Admission Type  inpatient    Arrived From  home    Referral Source  admission list    Reason for Consult  discharge planning    Preferred Language  English     Used During This Interaction  no    General Information Comments  PCP:  Osmar Clemons M.D. confirmed with patient        Functional Status     Row Name 06/10/19 1620       Functional Status    Usual Activity Tolerance  good    Current Activity Tolerance  good       Functional Status, IADL    Medications  independent    Meal Preparation  independent    Housekeeping  independent    Laundry  independent    Shopping  independent        Psychosocial    No documentation.       Abuse/Neglect    No documentation.       Legal    No documentation.       Substance Abuse    No documentation.       Patient Forms    No documentation.           Ani Conway RN

## 2019-06-10 NOTE — PLAN OF CARE
Problem: Patient Care Overview  Goal: Plan of Care Review  Outcome: Ongoing (interventions implemented as appropriate)   06/10/19 7383   Coping/Psychosocial   Plan of Care Reviewed With patient;spouse   Plan of Care Review   Progress (eval)   SLP evaluation completed. Will sign-off cog/comm deficit. Please see note for further details and recommendations.

## 2019-06-11 VITALS
HEART RATE: 65 BPM | TEMPERATURE: 98.2 F | SYSTOLIC BLOOD PRESSURE: 158 MMHG | WEIGHT: 173 LBS | HEIGHT: 70 IN | RESPIRATION RATE: 18 BRPM | OXYGEN SATURATION: 95 % | BODY MASS INDEX: 24.77 KG/M2 | DIASTOLIC BLOOD PRESSURE: 61 MMHG

## 2019-06-11 PROBLEM — I35.1 NONRHEUMATIC AORTIC VALVE INSUFFICIENCY: Status: ACTIVE | Noted: 2019-04-29

## 2019-06-11 PROBLEM — I65.22 CAROTID ARTERY PLAQUE, LEFT: Status: ACTIVE | Noted: 2019-06-11

## 2019-06-11 PROCEDURE — 99231 SBSQ HOSP IP/OBS SF/LOW 25: CPT | Performed by: PSYCHIATRY & NEUROLOGY

## 2019-06-11 PROCEDURE — 97165 OT EVAL LOW COMPLEX 30 MIN: CPT

## 2019-06-11 PROCEDURE — 97161 PT EVAL LOW COMPLEX 20 MIN: CPT

## 2019-06-11 PROCEDURE — 86038 ANTINUCLEAR ANTIBODIES: CPT | Performed by: PSYCHIATRY & NEUROLOGY

## 2019-06-11 PROCEDURE — 99232 SBSQ HOSP IP/OBS MODERATE 35: CPT | Performed by: PHYSICIAN ASSISTANT

## 2019-06-11 PROCEDURE — 99239 HOSP IP/OBS DSCHRG MGMT >30: CPT | Performed by: INTERNAL MEDICINE

## 2019-06-11 RX ORDER — METOPROLOL SUCCINATE 25 MG/1
25 TABLET, EXTENDED RELEASE ORAL DAILY
Qty: 30 TABLET | Refills: 1
Start: 2019-06-11 | End: 2019-06-25 | Stop reason: HOSPADM

## 2019-06-11 RX ORDER — FAMOTIDINE 20 MG/1
20 TABLET, FILM COATED ORAL 2 TIMES DAILY
Qty: 60 TABLET | Refills: 1 | Status: SHIPPED | OUTPATIENT
Start: 2019-06-11 | End: 2019-06-12 | Stop reason: SDUPTHER

## 2019-06-11 RX ADMIN — APIXABAN 5 MG: 5 TABLET, FILM COATED ORAL at 08:58

## 2019-06-11 RX ADMIN — OXYCODONE HYDROCHLORIDE AND ACETAMINOPHEN 500 MG: 500 TABLET ORAL at 08:58

## 2019-06-11 RX ADMIN — CLOPIDOGREL BISULFATE 75 MG: 75 TABLET ORAL at 08:58

## 2019-06-11 RX ADMIN — ALLOPURINOL 100 MG: 100 TABLET ORAL at 08:58

## 2019-06-11 NOTE — PLAN OF CARE
Problem: Patient Care Overview  Goal: Plan of Care Review  Outcome: Outcome(s) achieved Date Met: 06/11/19 06/11/19 1020   Coping/Psychosocial   Plan of Care Reviewed With patient;spouse   OTHER   Outcome Summary OT IE completed. No POC established at pt is at baseline functional independence. Pt up ad larisa in the room/to BR - completed all showering and UB/LB dressing independently and without difficulty. OT to d/c at this time as skilled intervention is not indicated. Pt is safe to d/c home with spouse.

## 2019-06-11 NOTE — THERAPY DISCHARGE NOTE
Acute Care - Occupational Therapy Initial Eval/Discharge  [facility]     Patient Name: Jair Flores  : 1945  MRN: [id number]  Today's Date: 2019  Onset of Illness/Injury or Date of Surgery: 06/10/19  Date of Referral to OT: 06/10/19  Referring Physician: Himanshu      Admit Date: 6/10/2019       ICD-10-CM ICD-9-CM   1. Acute embolic stroke (CMS/HCC) I63.9 434.11   2. Right arm numbness R20.2 782.0   3. Impaired functional mobility, balance, gait, and endurance Z74.09 V49.89   4. Impaired mobility and ADLs Z74.09 799.89     Patient Active Problem List   Diagnosis   • History of bladder cancer   • Insomnia   • Benign essential hypertension   • Hyperlipidemia   • Osteoarthritis   • Right carotid bruit   • Aortic valve disease   • Primary osteoarthritis involving multiple joints   • Mitral valve disease   • Bladder cancer (CMS/HCC)   • History of disease   • Gout   • GERD (gastroesophageal reflux disease)   • CVA (cerebral vascular accident) (CMS/HCC)   • Acute embolic stroke (CMS/HCC)     Past Medical History:   Diagnosis Date   • Acute gout of foot 2019   • Cancer (CMS/HCC)     bladder; cysto surveillance 2017; free of disease   • Cataract     right eye   • Degenerative arthritis     right; knee replacement; successful   • Flash pulmonary edema (CMS/HCC) 2019    Episode of flash pulmonary edema thought to be related to volume overload at the time of left knee surgery in  performed in [place] he has had no recurrence his EKG does show inferior Q waves which are unchanged.   • Fluid overload     after knee surgery   • GERD (gastroesophageal reflux disease)    • Hard to intubate     has been told he is difficult to intubate   • Hematuria 2019   • Hemorrhoids 2011    hemorrhoid ablation   • Hypertension    • Skin cancer     head   • Status post left partial knee replacement 2019    Left knee surgery  [place] with complication of flash pulmonary edema likely due  "to anesthesia total resolution without sequela but with stable abnormal EKG with inferior wall Q waves no change and asymptomatic.   • Wears glasses      Past Surgical History:   Procedure Laterality Date   • COLONOSCOPY      routinely every 10 years   • CYSTOSCOPY      x3   • CYSTOSCOPY  07/23/2017    bladder cancer; cysto surveillance; free of disease   • EXCISIONAL HEMORRHOIDECTOMY      hemorrhoid ablation; hemorrhoid onset 09/12/2011   • EYE SURGERY      as a child after cutting eye with barbwire fence   • KNEE ARTHROPLASTY, PARTIAL REPLACEMENT Left    • SKIN CANCER EXCISION     • TONSILLECTOMY     • TOTAL KNEE ARTHROPLASTY Right     Dgen arthritis   • TRANSURETHRAL RESECTION OF BLADDER TUMOR N/A 3/29/2019    Procedure: TRANSURETHRAL RESECTION OF BLADDER TUMOR WITH MITOMYCIN;  Surgeon: Jair Braxton MD;  Location: [facility];  Service: Urology          OT ASSESSMENT FLOWSHEET (last 12 hours)      Occupational Therapy Evaluation     Row Name 06/11/19 1020                   OT Evaluation Time/Intention    Subjective Information  no complaints  -TB        Document Type  discharge evaluation/summary  -TB        Mode of Treatment  occupational therapy;individual therapy  -TB        Patient Effort  good  -TB        Symptoms Noted During/After Treatment  none  -TB        Comment  Pt reports acute/chronic \"tight sensation\" in R forearm - reports initially \"started years ago after taking Cipro\".  -TB           General Information    Patient Profile Reviewed?  yes  -TB        Onset of Illness/Injury or Date of Surgery  06/10/19  -TB        Referring Physician  Himanshu  -TB        Patient Observations  alert;cooperative  -TB        Patient/Family Observations  Spouse present  -TB        General Observations of Patient  Pt UIC, RA, tele, IV heplocked - Pt up ad larisa in room/to BR - has showered and dressed self without difficulty this morning  -TB        Prior Level of Function  independent:;all household " mobility;community mobility;ADL's;driving;shopping;using stairs;work  -TB        Equipment Currently Used at Home  none  -TB        Pertinent History of Current Functional Problem  Pt to ED with numbness R wrist to elbow (increased from baseline) and new onset R foot numbness. MRI (+) multiple embolic infarcts. Pt recently d/c'd from [facility] 6/7/2019 s/p CVA with tPA with plans to follow up for OP stent.  -TB        Existing Precautions/Restrictions  no known precautions/restrictions  -TB        Equipment Issued to Patient  -- None  -TB        Equipment Ordered for Patient  -- None  -TB        Risks Reviewed  patient and family:;LOB;increased discomfort;change in vital signs;lines disloged  -TB        Benefits Reviewed  patient and family:;improve function;increase strength;increase knowledge  -TB        Barriers to Rehab  none identified  -TB           Relationship/Environment    Primary Source of Support/Comfort  spouse  -TB        Name of Support/Comfort Primary Source  [place]  -TB        Lives With  spouse  -TB        Family Caregiver if Needed  spouse  -TB        Primary Roles/Responsibilities  wage earner, full time  -TB           Resource/Environmental Concerns    Current Living Arrangements  home/apartment/condo  -TB           Home Main Entrance    Number of Stairs, Main Entrance  five  -TB           Cognitive Assessment/Intervention- PT/OT    Orientation Status (Cognition)  oriented to;person;place;situation  -TB        Follows Commands (Cognition)  WFL  -TB           Safety Issues, Functional Mobility    Comment, Safety Issues/Impairments (Mobility)  Good safety  -TB           Bed Mobility Assessment/Treatment    Comment (Bed Mobility)  UIC - independent with PT  -TB           Transfer Assessment/Treatment    Transfer Assessment/Treatment  sit-stand transfer;stand-sit transfer;toilet transfer  -TB        Comment (Transfers)  Pt up ad larisa in room/to BR  -TB           Sit-Stand Transfer    Sit-Stand [place]  (Transfers)  independent  -TB           Stand-Sit Transfer    Stand-Sit [place] (Transfers)  independent  -TB           Toilet Transfer    [place] Level (Toilet Transfer)  independent  -TB           ADL Assessment/Intervention    BADL Assessment/Intervention  upper body dressing;lower body dressing;bathing  -TB           Bathing Assessment/Intervention    Bathing [place] Level  bathing skills;independent  -TB           Upper Body Dressing Assessment/Training    Upper Body Dressing [place] Level  upper body dressing skills;independent  -TB           Lower Body Dressing Assessment/Training    Lower Body Dressing [place] Level  lower body dressing skills;independent  -TB           BADL Safety/Performance    Impairments, BADL Safety/Performance  sensory awareness  -TB           General ROM    RT Upper Ext  Comments  -TB        LT Upper Ext  Comment  -TB           Right Upper Ext    Rt Upper Extremity Comments   AROM WFL - at baseline, minimal deficits at shoulder end range FE/ABduction  -TB           Left Upper Ext    Lt Upper Extremity Comments   AROM WFL - at baseline, minimal deficits at shoulder end range FE/ABduction  -TB           MMT (Manual Muscle Testing)    Rt Upper Ext  Comments  -TB        Lt Upper Ext  Comments  -TB           MMT Right Upper Ext    Rt Upper Extremity Comments   Generalized weakness; functionally 4/5  -TB           MMT Left Upper Ext    Lt Upper Extremity Comments   Generalized weakness; functionally 4/5  -TB           Motor Assessment/Interventions    Additional Documentation  Fine Motor Testing & Training (Group);Balance (Group);Therapeutic Exercise (Group);Therapeutic Exercise Interventions (Group)  -TB           Therapeutic Exercise    Therapeutic Exercise  seated, upper extremities  -TB        Additional Documentation  Therapeutic Exercise (Row)  -TB           Upper Extremity Seated Therapeutic Exercise    Performed, Seated Upper Extremity (Therapeutic Exercise)   shoulder flexion/extension;shoulder abduction/adduction;shoulder external/internal rotation;shoulder horizontal abduction/adduction;elbow flexion/extension;forearm supination/pronation;wrist flexion/extension;digit flexion/extension  -TB        Exercise Type, Seated Upper Extremity (Therapeutic Exercise)  AROM (active range of motion)  -TB        Restrictions, Seated Upper Extremity (Therapeutic Exercise)  None  -TB           Balance    Balance  dynamic sitting balance;dynamic standing balance  -TB           Dynamic Sitting Balance    Level of [place], Reaches Outside Midline (Sitting, Dynamic Balance)  independent  -TB           Dynamic Standing Balance    Level of [place], Reaches Outside Midline (Standing, Dynamic Balance)  independent  -TB           Fine Motor Testing & Training    Comment, Fine Motor Coordination  Pt able to use B hands cooperatively at midline to manage buttons, zipper; denies any difficulty managing utensils for self-feeding  -TB           Sensory Assessment/Intervention    Sensory General Assessment  light touch sensation deficits identified  -TB           Light Touch Sensation Assessment    Left Upper Extremity: Light Touch Sensation Assessment  intact  -TB        Right Upper Extremity: Light Touch Sensation Assessment  mild impairment, 75% or more correct responses  -TB        Comment, Right Upper Extremity: Light Touch Sensation Assessment  Pt reports loss of forearm sensation following course of Cipro (remote occurance) - presents with increased deficit at admission  -TB           Vision Assessment/Intervention    Vision Assessment Comment  Pt denies vision changes; no deficit noted in tracking; pt able to read without difficulty  -TB           Positioning and Restraints    Pre-Treatment Position  sitting in chair/recliner  -TB        Post Treatment Position  chair  -TB        In Chair  reclined;call light within reach;encouraged to call for assist;with family/caregiver;with  other staff MD present  -TB           Pain Assessment    Additional Documentation  Pain Scale: Numbers Pre/Post-Treatment (Group)  -TB           Pain Scale: Numbers Pre/Post-Treatment    Pain Scale: Numbers, Pretreatment  0/10 - no pain  -TB        Pain Scale: Numbers, Post-Treatment  0/10 - no pain  -TB           Coping    Observed Emotional State  calm;cooperative  -TB        Verbalized Emotional State  hopefulness  -TB           Plan of Care Review    Plan of Care Reviewed With  patient;spouse  -TB           Clinical Impression (OT)    Date of Referral to OT  06/10/19  -TB        OT Diagnosis  Impaired ADL  -TB        Criteria for Skilled Therapeutic Interventions Met (OT Eval)  no;treatment indicated  -TB        Therapy Frequency (OT Eval)  evaluation only  -TB        Care Plan Review (OT)  evaluation/treatment results reviewed;patient/other agree to care plan  -TB        Care Plan Review, Other Participant (OT Eval)  spouse  -TB        Anticipated Equipment Needs at Discharge (OT)  -- None  -TB        Anticipated Discharge Disposition (OT)  home  -TB           Vital Signs    Pre Systolic BP Rehab  149  -TB        Pre Treatment Diastolic BP  59  -TB        O2 Delivery Post Treatment  room air  -TB        Pre Patient Position  Sitting  -TB        Intra Patient Position  Standing  -TB        Post Patient Position  Sitting  -TB           Discharge Summary (Occupational Therapy)    Additional Documentation  Discharge Summary, OT Eval (Group)  -TB           Discharge Summary, OT Eval    Reason for Discharge (OT Discharge Summary)  no further needs identified  -TB           Living Environment    Home Accessibility  stairs to enter home;stairs within home walk-in shower  -TB          User Key  (r) = Recorded By, (t) = Taken By, (c) = Cosigned By    Initials Name Effective Dates    TB Geraldine Moran, OT 06/08/18 -           Occupational Therapy Education     Title: PT OT SLP Therapies (Done)     Topic:  Occupational Therapy (Done)     Point: ADL training (Done)     Description: Instruct learner(s) on proper safety adaptation and remediation techniques during self care or transfers.   Instruct in proper use of assistive devices.    Learning Progress Summary           Patient Acceptance, E, VU by TB at 6/11/2019 11:09 AM    Comment:  Education completed for benefits of activity, role of OT   Significant Other Acceptance, E, VU by TB at 6/11/2019 11:09 AM    Comment:  Education completed for benefits of activity, role of OT                               User Key     Initials Effective Dates Name Provider Type Discipline     06/08/18 -  Geraldine Moran, SOFI Occupational Therapist OT                OT Recommendation and Plan  Outcome Summary/Treatment Plan (OT)  Anticipated Equipment Needs at Discharge (OT): (None)  Anticipated Discharge Disposition (OT): home  Reason for Discharge (OT Discharge Summary): no further needs identified  Therapy Frequency (OT Eval): evaluation only  Plan of Care Review  Plan of Care Reviewed With: patient, spouse  Plan of Care Reviewed With: patient, spouse  Outcome Summary: OT IE completed. No POC established at pt is at baseline functional independence. Pt up ad larisa in the room/to BR - completed all showering and UB/LB dressing independently and without difficulty. OT to d/c at this time as skilled intervention is not indicated.  Pt is safe to d/c home with spouse.          Outcome Measures     Row Name 06/11/19 1020 06/11/19 0948          How much help from another person do you currently need...    Turning from your back to your side while in flat bed without using bedrails?  --  4  -CD     Moving from lying on back to sitting on the side of a flat bed without bedrails?  --  4  -CD     Moving to and from a bed to a chair (including a wheelchair)?  --  4  -CD     Standing up from a chair using your arms (e.g., wheelchair, bedside chair)?  --  4  -CD     Climbing 3-5 steps with a  railing?  --  4  -CD     To walk in hospital room?  --  4  -CD     AM-PAC 6 Clicks Score  --  24  -CD        How much help from another is currently needed...    Putting on and taking off regular lower body clothing?  4  -TB  --     Bathing (including washing, rinsing, and drying)  3  -TB  --     Toileting (which includes using toilet bed pan or urinal)  4  -TB  --     Putting on and taking off regular upper body clothing  4  -TB  --     Taking care of personal grooming (such as brushing teeth)  4  -TB  --     Eating meals  4  -TB  --     Score  23  -TB  --        Modified Humza Scale    Modified Humza Scale  1 - No significant disability despite symptoms.  Able to carry out all usual duties and activities.  -TB  1 - No significant disability despite symptoms.  Able to carry out all usual duties and activities.  -CD        Functional Assessment    Outcome Measure Options  AM-PAC 6 Clicks Daily Activity (OT);Modified [place]  -TB  AM-PAC 6 Clicks Basic Mobility (PT);Modified [place]  -CD       User Key  (r) = Recorded By, (t) = Taken By, (c) = Cosigned By    Initials Name Provider Type    TB Geraldine Moran OT Occupational Therapist    CD Shakila Carrera PT Physical Therapist          Time Calculation:   Time Calculation- OT     Row Name 06/11/19 1111             Time Calculation- OT    OT Start Time  1020  -TB      OT Received On  06/11/19  -TB        User Key  (r) = Recorded By, (t) = Taken By, (c) = Cosigned By    Initials Name Provider Type    Geraldine Boland OT Occupational Therapist        Therapy Suggested Charges     Code   Minutes Charges    None           Therapy Charges for Today     Code Description Service Date Service Provider Modifiers Qty    [id number]  OT EVAL LOW COMPLEXITY 4 6/11/2019 Geraldine Moran OT GO 1               OT Discharge Summary  Anticipated Discharge Disposition (OT): home    Geraldine Moran OT  6/11/2019

## 2019-06-11 NOTE — PROGRESS NOTES
Continued Stay Note   [facility]     Patient Name: Jair Flores  MRN: [id number]  Today's Date: 6/11/2019    Admit Date: 6/10/2019    Discharge Plan     Row Name 06/11/19 1237       Plan    Plan  Home    Patient/Family in Agreement with Plan  yes    Plan Comments  Met with patient and his wife in the room to discuss the discharge plan. PT and OT have recommended home at discharge without any recommendations for outpatient PT/OT. Patient denies any discharge needs at this time. CM will continue to follow.     Final Discharge Disposition Code  01 - home or self-care        Discharge Codes    No documentation.       Expected Discharge Date and Time     Expected Discharge Date Expected Discharge Time    Jun 12, 2019             Sharon García

## 2019-06-11 NOTE — THERAPY DISCHARGE NOTE
Acute Care - Physical Therapy Initial Eval/Discharge  [facility]     Patient Name: Jair Flores  : 1945  MRN: [id number]  Today's Date: 2019   Onset of Illness/Injury or Date of Surgery: 06/10/19  Date of Referral to PT: 06/10/19  Referring Physician: ANTIONE GOULD      Admit Date: 6/10/2019    Visit Dx:    ICD-10-CM ICD-9-CM   1. Acute embolic stroke (CMS/HCC) I63.9 434.11   2. Right arm numbness R20.2 782.0   3. Impaired functional mobility, balance, gait, and endurance Z74.09 V49.89     Patient Active Problem List   Diagnosis   • History of bladder cancer   • Insomnia   • Benign essential hypertension   • Hyperlipidemia   • Osteoarthritis   • Right carotid bruit   • Aortic valve disease   • Primary osteoarthritis involving multiple joints   • Mitral valve disease   • Bladder cancer (CMS/HCC)   • History of disease   • Gout   • GERD (gastroesophageal reflux disease)   • CVA (cerebral vascular accident) (CMS/HCC)   • Acute embolic stroke (CMS/HCC)     Past Medical History:   Diagnosis Date   • Acute gout of foot 2019   • Cancer (CMS/HCC)     bladder; cysto surveillance 2017; free of disease   • Cataract     right eye   • Degenerative arthritis     right; knee replacement; successful   • Flash pulmonary edema (CMS/HCC) 2019    Episode of flash pulmonary edema thought to be related to volume overload at the time of left knee surgery in  performed in [place] he has had no recurrence his EKG does show inferior Q waves which are unchanged.   • Fluid overload     after knee surgery   • GERD (gastroesophageal reflux disease)    • Hard to intubate     has been told he is difficult to intubate   • Hematuria 2019   • Hemorrhoids 2011    hemorrhoid ablation   • Hypertension    • Skin cancer     head   • Status post left partial knee replacement 2019    Left knee surgery  [place] with complication of flash pulmonary edema likely due to anesthesia total resolution  without sequela but with stable abnormal EKG with inferior wall Q waves no change and asymptomatic.   • Wears glasses      Past Surgical History:   Procedure Laterality Date   • COLONOSCOPY      routinely every 10 years   • CYSTOSCOPY      x3   • CYSTOSCOPY  07/23/2017    bladder cancer; cysto surveillance; free of disease   • EXCISIONAL HEMORRHOIDECTOMY      hemorrhoid ablation; hemorrhoid onset 09/12/2011   • EYE SURGERY      as a child after cutting eye with barbwire fence   • KNEE ARTHROPLASTY, PARTIAL REPLACEMENT Left    • SKIN CANCER EXCISION     • TONSILLECTOMY     • TOTAL KNEE ARTHROPLASTY Right     Dgen arthritis   • TRANSURETHRAL RESECTION OF BLADDER TUMOR N/A 3/29/2019    Procedure: TRANSURETHRAL RESECTION OF BLADDER TUMOR WITH MITOMYCIN;  Surgeon: Jair Braxton MD;  Location: Replaced by [facility];  Service: Urology          PT ASSESSMENT (last 12 hours)      Physical Therapy Evaluation     Row Name 06/11/19 0948          PT Evaluation Time/Intention    Subjective Information  complains of SYMTOMS RESOLVED EXCEPT TIGHTNESS SENSATION R FOREARM.   -CD     Document Type  discharge evaluation/summary  -CD     Mode of Treatment  physical therapy  -CD     Patient Effort  good  -CD     Row Name 06/11/19 0948          General Information    Patient Profile Reviewed?  yes  -CD     Onset of Illness/Injury or Date of Surgery  06/10/19  -CD     Referring Physician  ANTIONE GOULD  -CD     Patient Observations  alert;cooperative;agree to therapy  -CD     Patient/Family Observations  WIFE PRESENT.   -CD     General Observations of Patient  PT IN FOWLERS UPON ARRIVAL ON RA AND WITH IV HEPLOCKED. PT JUST FINISHED SHOWER AND HAS BEEN UP AD JOE.   -CD     Prior Level of Function  independent:;all household mobility;community mobility;ADL's;driving WORKS   -CD     Equipment Currently Used at Home  none HAS A STRAIGHT CANE AND R WALKER BUT WAS NOT USING.   -CD     Pertinent History of Current Functional Problem  PT TO ED WITH  NUMBNESS R WRIST TO ELBOW AND R FOOT. MRI REVEALED MULTIPLE EMBOLIC INFARCTS. PT RECENTLY D/C HOME FROM [facility] 6/7/19 FOLLOWING CVA S/P tPA WITH PLANS TO F/U FOR OP STENT.   -CD     Existing Precautions/Restrictions  no known precautions/restrictions  -CD     Risks Reviewed  patient and family:;change in vital signs;LOB  -CD     Benefits Reviewed  patient:;increase knowledge  -CD     Barriers to Rehab  none identified  -CD     Row Name 06/11/19 0948          Relationship/Environment    Primary Source of Support/Comfort  spouse  -CD     Lives With  spouse  -CD     Row Name 06/11/19 0948          Resource/Environmental Concerns    Current Living Arrangements  home/apartment/condo  -CD     Home Accessibility Concerns  stairs to enter home;stairs to access bedroom or bathroom  -CD     Row Name 06/11/19 0948          Home Main Entrance    Number of Stairs, Main Entrance  five  -CD     Stair Railings, Main Entrance  railings on both sides of stairs  -CD     Row Name 06/11/19 0948          Cognitive Assessment/Intervention- PT/OT    Orientation Status (Cognition)  oriented x 3  -CD     Follows Commands (Cognition)  WFL  -CD     Row Name 06/11/19 0948          Bed Mobility Assessment/Treatment    Bed Mobility Assessment/Treatment  supine-sit  -CD     Supine-Sit [place] (Bed Mobility)  independent  -CD     Row Name 06/11/19 0948          Transfer Assessment/Treatment    Transfer Assessment/Treatment  sit-stand transfer;stand-sit transfer  -CD     Sit-Stand [place] (Transfers)  independent  -CD     Stand-Sit [place] (Transfers)  independent  -CD     Row Name 06/11/19 0948          Gait/Stairs Assessment/Training    Gait/Stairs Assessment/Training  gait/ambulation independence;gait/ambulation assistive device;distance ambulated  -CD     [place] Level (Gait)  independent  -CD     Assistive Device (Gait)  -- GAIT BELT.   -CD     Distance in Feet (Gait)  350   -CD     Deviations/Abnormal Patterns (Gait)  -- TOE  IN ON R- PT/WIFE REPORTS IS BASELINE   -CD     Comment (Gait/Stairs)  PT ABLE TO WALK BACKWARDS, TURN 360 DEGREES AND RETRIEVE ITEM FROM FLOOR WITHOUT LOB. MILDLY GUARDED.   -CD     Row Name 06/11/19 0948          General ROM    GENERAL ROM COMMENTS  B LE AROM WFL' S  -CD     Row Name 06/11/19 0948          MMT (Manual Muscle Testing)    General MMT Comments  B LE GROSSLY 5/5.   -CD     Row Name 06/11/19 0948          Motor Assessment/Intervention    Additional Documentation  Gross Motor Coordination (Group)  -CD     Row Name 06/11/19 0948          Gross Motor Coordination    Gross Motor Skill, Impairments Detail  HEEL TO SHIN INTACT B.   -CD     Row Name 06/11/19 0948          Sensory Assessment/Intervention    Sensory General Assessment  other (see comments) REPORTS ODD SENSATION BOTTOM R FOOT.   -CD     Row Name 06/11/19 0948          Vision Assessment/Intervention    Vision Assessment Comment  DID NOT FORMALLY ASSESS. PT WEARS GLASSES. ABLE TO READ SIGNS IN DOZIER ON R/L WITHOUT DIFFICULTY.   -CD     Row Name 06/11/19 0948          Pain Scale: FACES Pre/Post-Treatment    Pain: FACES Scale, Pretreatment  0-->no hurt  -CD     Pain: FACES Scale, Post-Treatment  0-->no hurt  -CD     Row Name 06/11/19 0948          Plan of Care Review    Plan of Care Reviewed With  patient;spouse  -CD     Row Name 06/11/19 0948          Physical Therapy Clinical Impression    Date of Referral to PT  06/10/19  -CD     PT Diagnosis (PT Clinical Impression)  CVA.   -CD     Patient/Family Goals Statement (PT Clinical Impression)  TO GO HOME.   -CD     Criteria for Skilled Interventions Met (PT Clinical Impression)  no problems identified which require skilled intervention  -CD     Care Plan Review (PT)  evaluation/treatment results reviewed;care plan/treatment goals reviewed;risks/benefits reviewed;current/potential barriers reviewed;patient/other agree to care plan  -CD     Care Plan Review, Other Participant (PT Clinical Impression)   spouse  -CD     Row Name 06/11/19 0948          Vital Signs    Pre Systolic BP Rehab  145  -CD     Pre Treatment Diastolic BP  64  -CD     Post Systolic BP Rehab  149  -CD     Post Treatment Diastolic BP  59  -CD     Pretreatment Heart Rate (beats/min)  88  -CD     Posttreatment Heart Rate (beats/min)  75  -CD     Row Name 06/11/19 0948          Positioning and Restraints    Pre-Treatment Position  in bed  -CD     Post Treatment Position  chair  -CD     In Chair  reclined;call light within reach;notified nsg;encouraged to call for assist PT IS SAFE TO CONTINUE UP AD JOE WITH NSG OK.   -CD     Row Name 06/11/19 0948          Physical Therapy Discharge Summary    Additional Documentation  Discharge Summary, PT Eval (Group)  -CD     Row Name 06/11/19 0948          Discharge Summary, PT Eval    Reason for Discharge (PT Discharge Summary)  no further needs identified PT AT BASELINE WITH FUNCTIONAL MOBILITY.   -CD     Row Name 06/11/19 0948          Living Environment    Home Accessibility  stairs to enter home;stairs within home  -CD       User Key  (r) = Recorded By, (t) = Taken By, (c) = Cosigned By    Initials Name Provider Type    CD Shakila Carrera, PT Physical Therapist          Physical Therapy Education     Title: PT OT SLP Therapies (Done)     Topic: Physical Therapy (Done)     Point: Precautions (Done)     Learning Progress Summary           Patient Acceptance, E, VU by CD at 6/11/2019 10:41 AM    Comment:  BENEFITS OF OOB ACTIVITY. D/C PLANS.                               User Key     Initials Effective Dates Name Provider Type Discipline     06/19/15 -  Shakila Carrera, PT Physical Therapist PT                PT Recommendation and Plan  Anticipated Discharge Disposition (PT): home  Therapy Frequency (PT Clinical Impression): evaluation only  Outcome Summary/Treatment Plan (PT)  Anticipated Discharge Disposition (PT): home  Reason for Discharge (PT Discharge Summary): no further needs identified(PT AT BASELINE  "WITH FUNCTIONAL MOBILITY. )  Plan of Care Reviewed With: patient  Outcome Summary: GOALS NOT ESTABLISHED AS PT IS AT BASELINE WITH FUNCTIONAL MOBILITY. PT HAS BEEN UP AD JOE AND SHOWERED THIS MORNING. NOTED R TOE IN WITH GAIT BUT PT/WIFE STATE THIS IS BASELINE. PT AMBULATED 350 FEET INDEPENDENTLY WITHOUT LOB. RECOMMEND D/C HOME. PT REPORTS CONTINUED \"TIGHTNESS\" SENSATION R FOREARM AND BOTTOM OF R FOOT.     Outcome Measures     Row Name 06/11/19 0948             How much help from another person do you currently need...    Turning from your back to your side while in flat bed without using bedrails?  4  -CD      Moving from lying on back to sitting on the side of a flat bed without bedrails?  4  -CD      Moving to and from a bed to a chair (including a wheelchair)?  4  -CD      Standing up from a chair using your arms (e.g., wheelchair, bedside chair)?  4  -CD      Climbing 3-5 steps with a railing?  4  -CD      To walk in hospital room?  4  -CD      AM-PAC 6 Clicks Score  24  -CD         Modified [place] Scale    Modified [place] Scale  1 - No significant disability despite symptoms.  Able to carry out all usual duties and activities.  -CD         Functional Assessment    Outcome Measure Options  AM-PAC 6 Clicks Basic Mobility (PT);Modified [place]  -CD        User Key  (r) = Recorded By, (t) = Taken By, (c) = Cosigned By    Initials Name Provider Type    Shakila Camp PT Physical Therapist           Time Calculation:   PT Charges     Row Name 06/11/19 1046             Time Calculation    Start Time  0948  -CD      PT Received On  06/11/19  -        User Key  (r) = Recorded By, (t) = Taken By, (c) = Cosigned By    Initials Name Provider Type    Shakila Camp PT Physical Therapist        Therapy Charges for Today     Code Description Service Date Service Provider Modifiers Qty    10757113968 HC PT EVAL LOW COMPLEXITY 4 6/11/2019 Shakila Carrera PT GP 1          PT G-Codes  Outcome Measure Options: AM-PAC 6 " Clicks Basic Mobility (PT), Modified [place]  AM-PAC 6 Clicks Score: 24  Modified [place] Scale: 1 - No significant disability despite symptoms.  Able to carry out all usual duties and activities.    PT Discharge Summary  Anticipated Discharge Disposition (PT): home  Reason for Discharge: At baseline function  Discharge Destination: Home    Shakila Carrera, PT  6/11/2019

## 2019-06-11 NOTE — PROGRESS NOTES
Neurology       Patient Care Team:  Osmar Clemons MD as PCP - General  Osmar Clemons MD as PCP - Family Medicine    Chief complaint: Embolic stroke    History: Patient is feeling and doing well.    Dr. Bo and Dr. Palmer spent part of the evening with him yesterday and decided to have him on Plavix and Eliquis for the short run and review the MRI of the cervical carotid by Dr. Bo his friends at the [facility].      Past Medical History:   Diagnosis Date   • Acute gout of foot 4/29/2019   • Cancer (CMS/HCC)     bladder; cysto surveillance 07/23/2017; free of disease   • Cataract     right eye   • Degenerative arthritis     right; knee replacement; successful   • Flash pulmonary edema (CMS/HCC) 4/29/2019    Episode of flash pulmonary edema thought to be related to volume overload at the time of left knee surgery in 2005 performed in [place] he has had no recurrence his EKG does show inferior Q waves which are unchanged.   • Fluid overload 2015    after knee surgery   • GERD (gastroesophageal reflux disease)    • Hard to intubate     has been told he is difficult to intubate   • Hematuria 4/29/2019   • Hemorrhoids 09/12/2011    hemorrhoid ablation   • Hypertension    • Skin cancer     head   • Status post left partial knee replacement 4/29/2019    Left knee surgery 2005 [place] with complication of flash pulmonary edema likely due to anesthesia total resolution without sequela but with stable abnormal EKG with inferior wall Q waves no change and asymptomatic.   • Wears glasses        Vital Signs   Vitals:    06/10/19 2338 06/11/19 0342 06/11/19 0722 06/11/19 1142   BP: 174/63 167/61 166/66 169/59   BP Location: Right arm Right arm Right arm Left arm   Patient Position: Lying Lying Lying Sitting   Pulse: 61 51 53 60   Resp: 16 20 20 18   Temp: 98.9 °F (37.2 °C) 98 °F (36.7 °C) 98.8 °F (37.1 °C) 98.4 °F (36.9 °C)   TempSrc: Oral Oral Oral Oral   SpO2:   93% 95%   Weight:       Height:           Physical  Exam:   General: Awake and alert              Neuro: Oriented to person place and time.    Speech is normal.    All extremities move well.    Results Review:  No new result  Results from last 7 days   Lab Units 06/10/19  0240   WBC 10*3/mm3 6.38   HEMOGLOBIN g/dL 13.5   HEMATOCRIT % 41.0   PLATELETS 10*3/mm3 164     Results from last 7 days   Lab Units 06/10/19  0240 06/10/19  0139 06/07/19  0423 06/06/19  0937   SODIUM mmol/L 143  --  144  --    POTASSIUM mmol/L 3.5  --  3.6  --    CHLORIDE mmol/L 104  --  107  --    CO2 mmol/L 29.0  --  28.0  --    BUN mg/dL 19  --  17  --    CREATININE mg/dL 0.94 1.00 1.00  --    CALCIUM mg/dL 9.2  --  8.6  --    BILIRUBIN mg/dL 0.4  --  0.4  --    ALK PHOS U/L 78  --  53  --    ALT (SGPT) U/L 19  --  14 15   AST (SGOT) U/L 25  --  18 26   GLUCOSE mg/dL 103*  --  101*  --        Imaging Results (last 24 hours)     Procedure Component Value Units Date/Time    CT Angiogram Head With & Without Contrast [[id number]] Collected:  06/10/19 0355     Updated:  06/11/19 1252    Narrative:       CTA Head, CTA Neck     INDICATION:    Cerebral infarction, unspecified; Paresthesia of skin Right facial droop and right arm numbness since around 2330 last evening; Abnormal MRI this admission; Reported tpa within past few days;    Preliminary report:  Neck - There is focal narrowing of the proximal left internal carotid artery (series 4#245 for example) just distal to the bifurcation due to hard and soft atherosclerotic plaque. Right internal carotid artery displays atherosclerotic vascular  calcification at the origin. No right cervical ICA high-grade stenosis seen. Vertebral arteries patent. No apparent dissection.   Head - there is focal stenosis of the proximal left M2 segment (series 8#23 for example). No apparent aneurysm. No dural sinus occlusion. There is an old left frontal parietal infarct present. The small foci of presumably embolic infarct within the left  hemisphere seen on recent MR  not evident by CT.    This is a preliminary wet read provided at 3:54 AM 6/10/2019, by Dr. Anuel Alberts. Final interpretation will be provided by neuroradiology. Please refer to final interpretation for detailed findings and any further recommendations.    Final report/over read:    INDICATION:   Right side weakness and numbness/paresthesias    TECHNIQUE:   CT angiogram of the head and neck with contrast. 3-D postprocessing was performed and reviewed.  Evaluation for a significant carotid arterial stenosis is based on the NASCET criteria. Radiation dose reduction techniques included automated exposure  control or exposure modulation based on body size. Radiation audit for number of CT and nuclear cardiology exams performed in the last year:  4.      COMPARISON: The dural venous sinuses are normal.  Head and neck CT angiogram 6/6/2019    FINDINGS:     CTA neck:  There is a bovine type arch branching pattern without proximal great vessel stenosis. Both vertebral arteries are patent from origins both cervical common carotids are widely patent. Vertebral arteries are patent throughout the neck and both  contribute to the basilar artery    There is plaque at both cervical carotid bifurcations, with about 20% right ICA stenosis by NASCET criteria. On the left, there is ulcerated plaque at the base of the carotid bulb with about 50% left ICA stenosis by NASCET criteria. Both upper cervical  internal carotid arteries appear normal.    CTA head:  The basilar artery and both internal carotid arteries appear normally patent. There is a large left posterior communicator. The anterior communicator is patent. The right posterior communicators are not convincingly demonstrated. There is no  evidence of intracranial aneurysm or critical intracranial flow limiting stenosis, though there is narrowing just proximal to the left MCA bifurcation, with mild to moderate distal left M1 stenosis. There are also areas of narrowing with some  element of  stenosis in the right P2 posterior cerebral artery. The dural venous sinuses are normal.    Extravascular structures:There is no intracranial mass or abnormal enhancement, though there are chronic ischemic changes in the left hemisphere. The extracranial and cervical soft tissue structures are normal. The lung apices are normal. The bony  structures are unremarkable.      Impression:       Plaque at both cervical carotid bifurcations with about 20% right ICA stenosis, and ulcerated plaque in the left carotid bulb producing about 50% ICA stenosis by NASCET criteria.    Areas of intracranial mild to moderate stenosis but no intracranial aneurysm or branch vessel occlusion, mass or abnormal enhancement.    Final interpretation concurs with the initial wet reading    Signer Name: Derek Gil MD   Signed: 6/11/2019 12:50 PM   Workstation Name: [email or web address]       CT Angiogram Neck With & Without Contrast [[id number]] Collected:  06/10/19 0407     Updated:  06/11/19 1252    Narrative:       CTA Head, CTA Neck     INDICATION:    Cerebral infarction, unspecified; Paresthesia of skin Right facial droop and right arm numbness since around 2330 last evening; Abnormal MRI this admission; Reported tpa within past few days;    Preliminary report:  Neck - There is focal narrowing of the proximal left internal carotid artery (series 4#245 for example) just distal to the bifurcation due to hard and soft atherosclerotic plaque. Right internal carotid artery displays atherosclerotic vascular  calcification at the origin. No right cervical ICA high-grade stenosis seen. Vertebral arteries patent. No apparent dissection.   Head - there is focal stenosis of the proximal left M2 segment (series 8#23 for example). No apparent aneurysm. No dural sinus occlusion. There is an old left frontal parietal infarct present. The small foci of presumably embolic infarct within the left  hemisphere seen on recent MR not evident by  CT.    This is a preliminary wet read provided at 3:54 AM 6/10/2019, by Dr. Anuel Alberts. Final interpretation will be provided by neuroradiology. Please refer to final interpretation for detailed findings and any further recommendations.    Final report/over read:    INDICATION:   Right side weakness and numbness/paresthesias    TECHNIQUE:   CT angiogram of the head and neck with contrast. 3-D postprocessing was performed and reviewed.  Evaluation for a significant carotid arterial stenosis is based on the NASCET criteria. Radiation dose reduction techniques included automated exposure  control or exposure modulation based on body size. Radiation audit for number of CT and nuclear cardiology exams performed in the last year:  4.      COMPARISON: The dural venous sinuses are normal.  Head and neck CT angiogram 6/6/2019    FINDINGS:     CTA neck:  There is a bovine type arch branching pattern without proximal great vessel stenosis. Both vertebral arteries are patent from origins both cervical common carotids are widely patent. Vertebral arteries are patent throughout the neck and both  contribute to the basilar artery    There is plaque at both cervical carotid bifurcations, with about 20% right ICA stenosis by NASCET criteria. On the left, there is ulcerated plaque at the base of the carotid bulb with about 50% left ICA stenosis by NASCET criteria. Both upper cervical  internal carotid arteries appear normal.    CTA head:  The basilar artery and both internal carotid arteries appear normally patent. There is a large left posterior communicator. The anterior communicator is patent. The right posterior communicators are not convincingly demonstrated. There is no  evidence of intracranial aneurysm or critical intracranial flow limiting stenosis, though there is narrowing just proximal to the left MCA bifurcation, with mild to moderate distal left M1 stenosis. There are also areas of narrowing with some element  of  stenosis in the right P2 posterior cerebral artery. The dural venous sinuses are normal.    Extravascular structures:There is no intracranial mass or abnormal enhancement, though there are chronic ischemic changes in the left hemisphere. The extracranial and cervical soft tissue structures are normal. The lung apices are normal. The bony  structures are unremarkable.      Impression:       Plaque at both cervical carotid bifurcations with about 20% right ICA stenosis, and ulcerated plaque in the left carotid bulb producing about 50% ICA stenosis by NASCET criteria.    Areas of intracranial mild to moderate stenosis but no intracranial aneurysm or branch vessel occlusion, mass or abnormal enhancement.    Final interpretation concurs with the initial wet reading    Signer Name: Derek Gil MD   Signed: 6/11/2019 12:50 PM   Workstation Name: [email or web address]       MRI Angiogram Neck With Contrast [[id number]] Collected:  06/11/19 1151     Updated:  06/11/19 1153    Narrative:       MRA Neck W & W/O    INDICATION:    Right arm weakness and right arm and leg numbness and right perioral numbness    TECHNIQUE:   Axial time-of-flight MRA of the neck with 3-dimensional reformats, and coronally acquired gadolinium bolus MRA of the neck with 3-dimensional reformats.  Evaluation for a significant carotid arterial stenosis is based on the NASCET criteria.    COMPARISON:    Head and neck CTA images from 6/10/2019 3:23 AM    FINDINGS:  Both common carotid and vertebral arteries are patent. Both vertebral arteries contribute to the basilar artery which appears patent.    There is plaque at both cervical carotid bifurcations. On the right, plaque in the base of the bulb produces about 30% ICA stenosis by NASCET criteria. The upper cervical internal carotid is normally patent.    On the left, there is about 50% ICA stenosis by NASCET criteria. The upper cervical internal carotid is normally patent there is ulceration in the  plaque at the bulb of a left, but there is no MRA evidence to suggest acute dissection      Impression:       MRA findings correspond well with the CTA findings. There is plaque at both cervical carotid bifurcations with about 30% right and 50% left ICA stenosis by NASCET criteria.    There is ulceration in the plaque at the base of the bulb on the left, but no evidence suggests acute dissection.    Both vertebral arteries are normally patent.    Signer Name: Derek Gil MD   Signed: 6/11/2019 11:51 AM   [email or web address]             Assessment:  Discussed with Dr. Bo okay to discharge with the follow-up by he and Dr. Palmer    Plan:  Main consideration is whether to do a carotid stent or not.        Comment:  Follow-up with Dr. Bo         I discussed the patients findings and my recommendations with patient, family, primary care team and consulting provider    Gordon Eid MD  06/11/19  3:33 PM

## 2019-06-11 NOTE — PLAN OF CARE
"Problem: Patient Care Overview  Goal: Plan of Care Review  Outcome: Ongoing (interventions implemented as appropriate)   06/11/19 1042   Coping/Psychosocial   Plan of Care Reviewed With patient   OTHER   Outcome Summary GOALS NOT ESTABLISHED AS PT IS AT BASELINE WITH FUNCTIONAL MOBILITY. PT HAS BEEN UP AD JOE AND SHOWERED THIS MORNING. NOTED R TOE IN WITH GAIT BUT PT/WIFE STATE THIS IS BASELINE. PT AMBULATED 350 FEET INDEPENDENTLY WITHOUT LOB. RECOMMEND D/C HOME. PT REPORTS CONTINUED \"TIGHTNESS\" SENSATION R FOREARM AND BOTTOM OF R FOOT.        Problem: Stroke (Ischemic) (Adult)  Goal: Signs and Symptoms of Listed Potential Problems Will be Absent, Minimized or Managed (Stroke)  Outcome: Outcome(s) achieved Date Met: 06/11/19 06/11/19 1042   Goal/Outcome Evaluation   Problems Assessed (Stroke (Ischemic)) cognitive impairment;communication impairment;motor/sensory impairment   Problems Assessed (Stroke (Ischemic)) motor/sensory impairment         "

## 2019-06-11 NOTE — PLAN OF CARE
Problem: Patient Care Overview  Goal: Plan of Care Review  Outcome: Ongoing (interventions implemented as appropriate)   06/11/19 9533   Coping/Psychosocial   Plan of Care Reviewed With patient;spouse   Plan of Care Review   Progress no change   OTHER   Outcome Summary A & O x 4, Liane, RA, NIH 1 for sensory only, still has tingling/numbness R forearm, BP remains elevated but neuro notes say it is okay as long as SBP stays around 180s/Pt asymptomatic, will continue to monitor       Problem: Stroke (Ischemic) (Adult)  Goal: Signs and Symptoms of Listed Potential Problems Will be Absent, Minimized or Managed (Stroke)  Outcome: Ongoing (interventions implemented as appropriate)      Problem: Fall Risk (Adult)  Goal: Identify Related Risk Factors and Signs and Symptoms  Outcome: Ongoing (interventions implemented as appropriate)

## 2019-06-11 NOTE — PROGRESS NOTES
"  [facility]  PROGRESS NOTE    Date of Admission: 6/10/2019  Length of Stay: 1  Primary Care Physician: Osmar Clemons MD    Chief Complaint: f/u CVA, AI   Problem List:     1. Recurrent left hemispheric ischemic CVA  A. [facility] admission 6/6/19 with right sided numbness, s/p tPA with resolution of symptoms   B. MRI Brain 6/6/19: Old left frontoparietal and posterior parietal infarcts, no acute abnormality  C. CTA Head and Neck 6/6/19: ulcerated LICA plaque; focal stenosis of proximal left PCA, suspected left M1/M2 stenosis  D. Carotid duplex 6/7/19: LAURYN 50-69%, LICA <50%  E. Echo 6/7/19: EF 61-65%, LA is mod dilated, Severe AI, negative bubble study   F. Readmission 6/10 with recurrent right foot and arm paresthesias   G. MRI brain 6/10/19: multiple punctate foci along left hemisphere, suggestive of embolic infarcts  H. Negative TCD  2. Aortic insufficiency  A. Moderate AI by TTE June 2019, EF 61-65%  3. Carotid artery stenosis   A. Carotid duplex 6/7/19: LAURYN 50-69%, <50% LICA stenosis however with signs of possible ulceration on CTA neck   4. Hypertension   5. Hyperlipidemia  6. Probable BEATRIZ  7. History of bladder cancer, s/p resection, followed by Dr. Braxton  8. Surgical history:              A. Left knee partial replacement              B. Bladder tumor resection    Subjective      Patient remains stable, no new focal deficits.     Objective   Vitals: /66 (BP Location: Right arm, Patient Position: Lying)   Pulse 53   Temp 98.8 °F (37.1 °C) (Oral)   Resp 20   Ht 177.8 cm (70\")   Wt 78.5 kg (173 lb)   SpO2 93%   BMI 24.82 kg/m²     Physical Exam:  GENERAL: Alert, cooperative, in no acute distress.   HEENT: Normocephalic, no jugular venous distention  HEART: No discrete PMI is noted. Regular rhythm, normal rate, and + diastolic murmur, gallops, or rubs.   LUNGS: Clear to auscultation bilaterally. No wheezing, rales or rhonchi.  ABDOMEN: Soft, bowel sounds present, " non-tender   NEUROLOGIC: per Neuro   EXTREMITIES: No clubbing, cyanosis, or edema noted.     Results:  Results from last 7 days   Lab Units 06/10/19  0240 06/10/19  0139 06/07/19  0423 06/06/19  0937   WBC 10*3/mm3 6.38  --  5.96 5.56   HEMOGLOBIN g/dL 13.5  --  12.6* 13.5   HEMOGLOBIN, POC g/dL  --  13.3  --   --    HEMATOCRIT % 41.0  --  38.7 41.2   HEMATOCRIT POC %  --  39  --   --    PLATELETS 10*3/mm3 164  --  163 171     Results from last 7 days   Lab Units 06/10/19  0240 06/10/19  0139 06/07/19  0423   SODIUM mmol/L 143  --  144   POTASSIUM mmol/L 3.5  --  3.6   CHLORIDE mmol/L 104  --  107   CO2 mmol/L 29.0  --  28.0   BUN mg/dL 19  --  17   CREATININE mg/dL 0.94 1.00 1.00   GLUCOSE mg/dL 103*  --  101*      Lab Results   Component Value Date    CHOL 200 06/07/2019    TRIG 175 (H) 06/07/2019    HDL 31 (L) 06/07/2019     (H) 06/07/2019    AST 25 06/10/2019    ALT 19 06/10/2019     Results from last 7 days   Lab Units 06/06/19  0937   HEMOGLOBIN A1C % 5.20     Results from last 7 days   Lab Units 06/07/19  0423   CHOLESTEROL mg/dL 200   TRIGLYCERIDES mg/dL 175*   HDL CHOL mg/dL 31*   LDL CHOL mg/dL 134*         Results from last 7 days   Lab Units 06/10/19  0137 06/06/19  0936 06/06/19  0932   PROTIME seconds 11.7*  --  12.2*   INR  1.0  --  1.0   APTT seconds  --  33.2  --      Results from last 7 days   Lab Units 06/10/19  0240 06/06/19  0937   TROPONIN T ng/mL <0.010 <0.010     Results from last 7 days   Lab Units 06/10/19  0240   PROBNP pg/mL 826.0       No intake or output data in the 24 hours ending 06/11/19 1058    I personally reviewed the patient's EKG/Telemetry data    Radiology Data:   Transcranial doppler 6/10/19:  Interpretation Summary     Normal mean velocities and waveforms are seen in the right and left MCA, terminal ICA, KRYA, and PCA     Following injection of agitated saline, both before and after Valsalva, no HITS are seen in the left MCA     NEGATIVE bubble study                                                                Current Medications:    allopurinol 100 mg Oral Daily   apixaban 5 mg Oral Q12H   atorvastatin 80 mg Oral Nightly   clopidogrel 75 mg Oral Daily   sodium chloride 3 mL Intravenous Q12H   vitamin C 500 mg Oral Daily          Assessment and Plan:     1. Recurrent acute left hemispheric CVA  - recent admission for the same, s/p tPA on 6/6  - intracranial and extracranial disease noted with ulcerated LICA   - TTE with normal LVEF, moderate AI, negative bubble study  - Negative TCD with agitated saline  - MRI brain this admission with multiple diffuse punctate foci of ischemia in left hemisphere  - on Eliquis and Plavix with no aspirin, as well as high dose statin   - carotid MRI pending     2. Moderate AI  - asymptomatic and with normal LVEF  - possible angina pectoris.     3. HTN  - elevated, anti-hypertensives on hold   - parameters per Neuro       Electronically signed by Maria De Jesus Roy PA-C, 06/11/19, 11:09 AM.

## 2019-06-12 ENCOUNTER — READMISSION MANAGEMENT (OUTPATIENT)
Dept: CALL CENTER | Facility: HOSPITAL | Age: 74
End: 2019-06-12

## 2019-06-12 ENCOUNTER — TRANSITIONAL CARE MANAGEMENT TELEPHONE ENCOUNTER (OUTPATIENT)
Dept: INTERNAL MEDICINE | Facility: CLINIC | Age: 74
End: 2019-06-12

## 2019-06-12 DIAGNOSIS — I35.1 NONRHEUMATIC AORTIC VALVE INSUFFICIENCY: ICD-10-CM

## 2019-06-12 DIAGNOSIS — I10 BENIGN ESSENTIAL HYPERTENSION: ICD-10-CM

## 2019-06-12 DIAGNOSIS — I63.9 ACUTE EMBOLIC STROKE (HCC): Primary | ICD-10-CM

## 2019-06-12 LAB — ANA SER QL: NEGATIVE

## 2019-06-12 RX ORDER — FAMOTIDINE 20 MG/1
20 TABLET, FILM COATED ORAL 2 TIMES DAILY
Qty: 60 TABLET | Refills: 1 | Status: SHIPPED | OUTPATIENT
Start: 2019-06-12 | End: 2019-09-26 | Stop reason: SDUPTHER

## 2019-06-12 NOTE — OUTREACH NOTE
"TCM call completed.  See TCM flowsheet for details.  Does have upcoming hospital follow up appt with Dr. Clemons 6/18/19  \"I am doing OK, I think\"  Is up and about.  Eating and drinking, no trouble swollowing.  Denies any pain, numbess, tingling, or facial drooping, h/a.   Speech was clear and appropriate.  Could recall all events from both admissions.  Denies any problems with his \"blood thinner\" except bruising.  States he is waiting to hear from Anupam Schwarz and [facility] regarding stenting a carotid artery possibly next week.  The 6/18/19 appt with Dr. Clemons was kept on schedule, however he says he may need to reschedule this and he will let us know.  Denies any needs at present and appreciated the call.   "

## 2019-06-12 NOTE — DISCHARGE SUMMARY
[facility] Medicine Services  DISCHARGE SUMMARY    Patient Name: Jair Flores  : 1945  MRN: [id number]    Date of Admission: 6/10/2019  Date of Discharge: 19  Length of Stay: 1  Primary Care Physician: Osmar Clemons MD    Consults     Date and Time Order Name Status Description    6/10/2019 0831 Inpatient Cardiology Consult Completed     6/10/2019 0651 Inpatient Neurology Consult Stroke Completed     2019 1331 Inpatient Cardiology Consult Completed     2019 1308 Inpatient Neurology Consult Stroke Completed     2019 0918 Inpatient Neurology Consult Stroke Completed         Hospital Course     Presenting Problem:   Acute embolic stroke (CMS/HCC) [I63.9]      Active Hospital Problems    Diagnosis  POA   • Acute embolic stroke (CMS/HCC) [I63.9]  Yes     Priority: High   • Carotid artery plaque, left [I65.22]  Yes   • GERD (gastroesophageal reflux disease) [K21.9]  Yes   • Nonrheumatic aortic valve insufficiency [I35.1]  Yes   • Benign essential hypertension [I10]  Yes   • Hyperlipidemia [E78.5]  Yes   • Mitral valve disease [I05.9]  Yes      Resolved Hospital Problems   No resolved problems to display.          Hospital Course:  Jair Flores is a 74 y.o. male with a history of HTN, HLD, GERD, aortic and mitral valve disease, was discharged from here on 19 with an acute ischemic stroke and given tPA.  He was back to baseline at discharge, with plans to follow up outpatient for stenting of his carotid artery with Dr. Palmer.  Patient presents to the ED with complaints of numbness from his right wrist to right elbow, and numbness in his right foot.  Last known normal at 2200.  He denied headache, difficulty with speech, difficulty swallowing, weakness.  MRI of the brain showed multiple punctate foci of restricted diffusion in the left temporal occipital lobe, rostrum of the corpus callosum, left parietal lobe, left frontal lobe, with appearance suggestive of  embolic infarcts.  Patient was given one dose of Eliquis in the ED, and was admitted to the Hospitalist for further evaluation and management.      Patient's neurologic deficits improved, except for some numbness in his right forearm.  There was much discussion about next steps.  There is apparent left carotid plaques with some stenosis and patient may benefit from stenting.  Current plan are to maintain a generous BP of 140-160, further discussed surgical intervention next week with Dr Bo and Neurosurgery.  Patient to remain on Eliquis and plavix for now.  ALSO he does have a PFO with Shunt but transcranial dopplers were negative.           Day of Discharge     HPI:   Patient denies headaches, fever, chills, sore throat, shortness of breath, chest pain, cough, nausea or vomiting, diarrhea, abdominal pain or distension, joint pain, rash, itching or bleeding.  Vital Signs:       BP 140s  Physical Exam:  Patient is alert and talkative in no distress at rest  Neck is without mass or JVD  Heart is Reg wo murmur  Lungs are clear wo wheeze or crackle  Abd is soft without HSM or mass, not tender or distended  MAEW  Skin is without rash  Neurologic exam in nonfocal - CNs intact, speech clear  Mood is appropriate      Pertinent  and/or Most Recent Results       Results from last 7 days   Lab Units 06/10/19  0240 06/10/19  0139   WBC 10*3/mm3 6.38  --    HEMOGLOBIN g/dL 13.5  --    HEMOGLOBIN, POC g/dL  --  13.3   HEMATOCRIT % 41.0  --    HEMATOCRIT POC %  --  39   PLATELETS 10*3/mm3 164  --    SODIUM mmol/L 143  --    POTASSIUM mmol/L 3.5  --    CHLORIDE mmol/L 104  --    CO2 mmol/L 29.0  --    BUN mg/dL 19  --    CREATININE mg/dL 0.94 1.00   GLUCOSE mg/dL 103*  --    CALCIUM mg/dL 9.2  --    PROBNP pg/mL 826.0  --      Results from last 7 days   Lab Units 06/10/19  0240 06/10/19  0137   BILIRUBIN mg/dL 0.4  --    ALK PHOS U/L 78  --    ALT (SGPT) U/L 19  --    AST (SGOT) U/L 25  --    PROTIME seconds  --  11.7*   INR    --  1.0          Results from last 7 days   Lab Units 06/10/19  0240   TROPONIN T ng/mL <0.010     Brief Urine Lab Results     None               Ct Angiogram Head With & Without Contrast    Result Date: 6/11/2019  Impression: Plaque at both cervical carotid bifurcations with about 20% right ICA stenosis, and ulcerated plaque in the left carotid bulb producing about 50% ICA stenosis by NASCET criteria. Areas of intracranial mild to moderate stenosis but no intracranial aneurysm or branch vessel occlusion, mass or abnormal enhancement. Final interpretation concurs with the initial wet reading Signer Name: Derek Gil MD  Signed: 6/11/2019 12:50 PM  Workstation Name: [id number]     Ct Angiogram Head With & Without Contrast    Result Date: 6/6/2019  Impression: 1. Focal stenosis of the proximal left posterior cerebral artery, difficult to characterize by NASCET criteria. 2. Suspected left M1/M2 stenosis. 3. Tortuosity of the distal left ICA mimicking an aneurysm on the coronal series only. Remaining series appear normal and no aneurysm is suspected.  D:  06/06/2019 E:  06/06/2019  This report was finalized on 6/6/2019 10:07 PM by DR. Jamie Herrera MD.      Ct Head Without Contrast    Result Date: 6/7/2019  Impression: Old left frontoparietal infarct. There is no intracranial hemorrhage.  D:  06/07/2019 E:  06/07/2019    This report was finalized on 6/7/2019 3:02 PM by Dr. Derek Cordero MD.      Ct Angiogram Neck With & Without Contrast    Result Date: 6/11/2019  Impression: Plaque at both cervical carotid bifurcations with about 20% right ICA stenosis, and ulcerated plaque in the left carotid bulb producing about 50% ICA stenosis by NASCET criteria. Areas of intracranial mild to moderate stenosis but no intracranial aneurysm or branch vessel occlusion, mass or abnormal enhancement. Final interpretation concurs with the initial wet reading Signer Name: Derek Gil MD  Signed: 6/11/2019 12:50 PM  Workstation Name:  [id number]     Ct Angiogram Neck With & Without Contrast    Result Date: 6/6/2019  Impression: 1. Irregular appearing noncalcified left ICA origin plaque, causing only mild stenosis, but potentially unstable. 2. No evidence of hemodynamically significant carotid or vertebral stenosis elsewhere in the neck.   This report was finalized on 6/6/2019 10:08 PM by DR. Jamie Herrera MD.      Mri Angiogram Neck With Contrast    Result Date: 6/11/2019  Impression: MRA findings correspond well with the CTA findings. There is plaque at both cervical carotid bifurcations with about 30% right and 50% left ICA stenosis by NASCET criteria. There is ulceration in the plaque at the base of the bulb on the left, but no evidence suggests acute dissection. Both vertebral arteries are normally patent. Signer Name: Derek Gil MD  Signed: 6/11/2019 11:51 AM  Workstation Name: [id number]     Mri Brain Without Contrast    Result Date: 6/10/2019  Impression: Final interpretation concurs with the wet read above. Extensive chronic ischemic changes with superimposed small punctate ischemic lesions mostly left-sided though there is a lesion in the genu the corpus callosum. No evidence of acute intracranial hemorrhage. No other acute abnormality. Signer Name: Derek Gil MD  Signed: 6/10/2019 8:22 AM  Workstation Name: [id number]     Mri Brain With & Without Contrast    Result Date: 6/7/2019  Impression: Old ischemic insult seen within the left frontal parietal and posterior parietal regions with encephalomalacia change and gliotic changes identified. There is no abnormal contrast enhancement to suggest evidence of metastatic focus. There is no acute intracranial abnormality identified.  D:  06/07/2019 E:  06/07/2019    This report was finalized on 6/7/2019 10:16 AM by Dr. Pati More MD.      Xr Chest 1 View    Result Date: 6/10/2019  Impression: 1.  No acute abnormality. Signer Name: Anuel Alberts MD  Signed: 6/10/2019 4:09 AM   Workstation Name: [id number]     Xr Chest 1 View    Result Date: 6/6/2019  Impression: Enlargement of the heart with no evidence of acute parenchymal disease.  D:  06/06/2019 E:  06/06/2019  This report was finalized on 6/6/2019 5:08 PM by Dr. Pati More MD.      Ct Head Without Contrast Stroke Protocol    Result Date: 6/6/2019  Impression: Some low-density seen in the left posterior parietal region as well as near the vertex on the left. There is some atrophy identified near the left vertex. Findings suggest either old insult or possibly underlying lesion in which MRI with contrast is recommended for further evaluation.  Examination performed 06/06/2019 at 0920 hours and examination results were given to the emergency room physician 06/06/2019 at the scanner at time of performance of the examination.  D:  06/06/2019 E:  06/06/2019   This report was finalized on 6/6/2019 5:08 PM by Dr. Pati More MD.      Ct Cerebral Perfusion With & Without Contrast    Result Date: 6/6/2019  Impression: Old left frontoparietal infarct corresponding to CT scan abnormality.  Otherwise negative perfusion scan.  D:  06/06/2019 E:  06/06/2019  This report was finalized on 6/6/2019 10:09 PM by DR. Jamie Herrera MD.        Results for orders placed during the hospital encounter of 06/06/19   Adult Transthoracic Echo Complete W/ Cont if Necessary Per Protocol (With Agitated Saline)    Narrative · Left ventricular systolic function is normal.  · Estimated EF appears to be in the range of 61 - 65%.  · Left ventricular wall thickness is consistent with mild concentric   hypertrophy.  · Left atrial cavity size is moderately dilated.  · The left ventricular cavity is mildly dilated.  · Severe aortic valve regurgitation is present.  · Saline test results are negative for right to left atrial level shunt.        Results for orders placed during the hospital encounter of 06/10/19   Doppler Transcranial Microbubble Injection CAR     Narrative Normal mean velocities and waveforms are seen in the right and left MCA,   terminal ICA, KYRA, and PCA    Following injection of agitated saline, both before and after Valsalva, no   HITS are seen in the left MCA    NEGATIVE bubble study                                     Results for orders placed during the hospital encounter of 06/10/19   Doppler Transcranial Microbubble Injection CAR    Narrative Normal mean velocities and waveforms are seen in the right and left MCA,   terminal ICA, KYRA, and PCA    Following injection of agitated saline, both before and after Valsalva, no   HITS are seen in the left MCA    NEGATIVE bubble study                                    Discharge Details        Discharge Medications      New Medications      Instructions Start Date   apixaban 5 MG tablet tablet  Commonly known as:  ELIQUIS   5 mg, Oral, Every 12 Hours Scheduled      famotidine 20 MG tablet  Commonly known as:  PEPCID   20 mg, Oral, 2 Times Daily         Changes to Medications      Instructions Start Date   metoprolol succinate XL 25 MG 24 hr tablet  Commonly known as:  TOPROL-XL  What changed:  additional instructions   25 mg, Oral, Daily, ONLY take if SBP over 140         Continue These Medications      Instructions Start Date   allopurinol 100 MG tablet  Commonly known as:  ZYLOPRIM   100 mg, Oral, Daily      atorvastatin 80 MG tablet  Commonly known as:  LIPITOR   80 mg, Oral, Nightly      clopidogrel 75 MG tablet  Commonly known as:  PLAVIX   75 mg, Oral, Daily      HM MULTIVITAMIN ADULT GUMMY chewable tablet   1 tablet, Oral, Daily      Vitamin C 500 MG chewable tablet   500 mg, Oral, Daily         Stop These Medications    aspirin 81 MG tablet     enalapril 20 MG tablet  Commonly known as:  VASOTEC              Discharge Disposition:  Home or Self Care    Discharge Diet:    Regular  Discharge Activity:    As Tolerated  Special Instructions:    Future Appointments   Date Time Provider Department Center    6/18/2019  3:45 PM Osmar Clemons MD [facility]   6/25/2019  1:15 PM Francisco Bo MD [facility] None   11/14/2019  8:45 AM Osmar Clemons MD [facility]       Lengthy discussions with Nicolas Eid and Anupam regarding discharge plan were made.    Time Spent on Discharge:  60 minutes    Electronically signed by Tierney Voss MD 06/16/19 8:36 AM

## 2019-06-12 NOTE — OUTREACH NOTE
Prep Survey      Responses   Facility patient discharged from?  [facility]   Is patient eligible?  Yes   Discharge diagnosis  Recurrent left hemispheric ischemic CVA   Does the patient have one of the following disease processes/diagnoses(primary or secondary)?  Stroke (TIA)   Does the patient have Home health ordered?  No   Is there a DME ordered?  No   Prep survey completed?  Yes          Bertha Maloney RN

## 2019-06-13 ENCOUNTER — READMISSION MANAGEMENT (OUTPATIENT)
Dept: CALL CENTER | Facility: HOSPITAL | Age: 74
End: 2019-06-13

## 2019-06-13 NOTE — OUTREACH NOTE
Stroke Week 1 Survey      Responses   Facility patient discharged from?  [place]   Does the patient have one of the following disease processes/diagnoses(primary or secondary)?  Stroke (TIA)   Is there a successful TCM telephone encounter documented?  Yes          Pepe Hunter RN

## 2019-06-18 ENCOUNTER — READMISSION MANAGEMENT (OUTPATIENT)
Dept: CALL CENTER | Facility: HOSPITAL | Age: 74
End: 2019-06-18

## 2019-06-18 ENCOUNTER — DOCUMENTATION (OUTPATIENT)
Dept: NEUROSURGERY | Facility: CLINIC | Age: 74
End: 2019-06-18

## 2019-06-18 DIAGNOSIS — G47.33 OBSTRUCTIVE SLEEP APNEA: ICD-10-CM

## 2019-06-18 DIAGNOSIS — I10 BENIGN ESSENTIAL HYPERTENSION: ICD-10-CM

## 2019-06-18 DIAGNOSIS — I65.23 CAROTID STENOSIS, BILATERAL: Primary | ICD-10-CM

## 2019-06-18 DIAGNOSIS — I10 BENIGN ESSENTIAL HYPERTENSION: Primary | ICD-10-CM

## 2019-06-18 NOTE — PROGRESS NOTES
"This 74-year-old male well-known to neurosurgical services having recently been evaluated for left hemispheric transient cerebral ischemia.  Diagnostic studies at that time indicated a litany of etiologies including intra-and extracranial vascular disease and cardiac disease.  He was discharged from hospital with Plavix, aspirin and statin.  He had a recurrence of his symptoms all of which are sensory involving the left hemisphere manifesting as numbness in his right arm and leg which has subsequently resolved.     He has been placed on Eliquis.     There are several issues which, in my opinion, need to be resolved.  His long-standing and difficult control hypertension may well result from sleep apnea.    He has significant symptoms consistent with a diagnosis of sleep apnea.  He has been noted to have a several episodes at night when he awakens shortness of breath after snoring and holding his breath.  Generalized fatigue and tiredness over the past several weeks which have gotten progressively worse.  In my opinion a sleep study is critical given the fact that he has poorly controlled pretension despite 3 different medications.  Identification and treatment of sleep apnea would be very meaningful to lessen his risk for continued cerebral and coronary vascular disease.    I believe he needs to have \"formal\" angiography of the extra and intracranial vascular to determine the degree of atheromatous disease present intracranially and the best management thereof.  His previous CTA and carotid duplex indicate stenosis more so on the right than the left although he has what appears to be a \"ulcerative plaque\" on the left with minimal stenosis.     His cardiac abnormalities likewise are problematic with significant aortic insufficiency with the possibility of intermittent atrial fibrillation or flutter.      "

## 2019-06-18 NOTE — OUTREACH NOTE
Stroke Week 2 Survey      Responses   Facility patient discharged from?  [place]   Does the patient have one of the following disease processes/diagnoses(primary or secondary)?  Stroke (TIA)   Week 2 attempt successful?  No   Unsuccessful attempts  Attempt 1          Pepe Hunter RN

## 2019-06-19 PROBLEM — I65.23 CAROTID STENOSIS, BILATERAL: Status: ACTIVE | Noted: 2019-06-19

## 2019-06-19 PROBLEM — G47.33 OBSTRUCTIVE SLEEP APNEA: Status: ACTIVE | Noted: 2019-06-19

## 2019-06-20 NOTE — PROGRESS NOTES
Review of carotid MRI - scanned into Epic    Per [facility] - needs LICA stent with Dr. Mata   [facility] spoke with [place]per[place] and will schedule on Monday 6/24/19.   Per [facility] - Last dose Eliquis Friday PM.  Begin ASA enteric coated 325 mg daily on Saturday.  He is to continue Plavix 75 mg daily    Follow up with [facility] after carotid stent for MCOT results and further evaluation of chest pain and AV disease.

## 2019-06-21 ENCOUNTER — READMISSION MANAGEMENT (OUTPATIENT)
Dept: CALL CENTER | Facility: HOSPITAL | Age: 74
End: 2019-06-21

## 2019-06-21 NOTE — OUTREACH NOTE
Stroke Week 2 Survey      Responses   Facility patient discharged from?  [place]   Does the patient have one of the following disease processes/diagnoses(primary or secondary)?  Stroke (TIA)   Week 2 attempt successful?  No   Unsuccessful attempts  Attempt 2          Carlie Villanueva RN

## 2019-06-24 ENCOUNTER — HOSPITAL ENCOUNTER (INPATIENT)
Facility: HOSPITAL | Age: 74
LOS: 1 days | Discharge: HOME | End: 2019-06-25
Attending: RADIOLOGY | Admitting: RADIOLOGY

## 2019-06-24 ENCOUNTER — READMISSION MANAGEMENT (OUTPATIENT)
Dept: CALL CENTER | Facility: HOSPITAL | Age: 74
End: 2019-06-24

## 2019-06-24 DIAGNOSIS — G47.33 OBSTRUCTIVE SLEEP APNEA: ICD-10-CM

## 2019-06-24 DIAGNOSIS — I10 BENIGN ESSENTIAL HYPERTENSION: ICD-10-CM

## 2019-06-24 DIAGNOSIS — I65.23 CAROTID STENOSIS, BILATERAL: ICD-10-CM

## 2019-06-24 LAB
ACT BLD: 279 SECONDS (ref 82–152)
ANION GAP SERPL CALCULATED.3IONS-SCNC: 11 MMOL/L
BUN BLD-MCNC: 13 MG/DL (ref 8–23)
BUN/CREAT SERPL: 13.7 (ref 7–25)
CALCIUM SPEC-SCNC: 9 MG/DL (ref 8.6–10.5)
CHLORIDE SERPL-SCNC: 104 MMOL/L (ref 98–107)
CO2 SERPL-SCNC: 29 MMOL/L (ref 22–29)
CREAT BLD-MCNC: 0.95 MG/DL (ref 0.76–1.27)
DEPRECATED RDW RBC AUTO: 47.8 FL (ref 37–54)
ERYTHROCYTE [DISTWIDTH] IN BLOOD BY AUTOMATED COUNT: 14 % (ref 12.3–15.4)
GFR SERPL CREATININE-BSD FRML MDRD: 78 ML/MIN/1.73
GLUCOSE BLD-MCNC: 100 MG/DL (ref 65–99)
HCT VFR BLD AUTO: 40.2 % (ref 37.5–51)
HGB BLD-MCNC: 13.2 G/DL (ref 13–17.7)
MCH RBC QN AUTO: 30.5 PG (ref 26.6–33)
MCHC RBC AUTO-ENTMCNC: 32.8 G/DL (ref 31.5–35.7)
MCV RBC AUTO: 92.8 FL (ref 79–97)
PLATELET # BLD AUTO: 151 10*3/MM3 (ref 140–450)
PMV BLD AUTO: 10.6 FL (ref 6–12)
POTASSIUM BLD-SCNC: 3.5 MMOL/L (ref 3.5–5.2)
POTASSIUM BLD-SCNC: 3.7 MMOL/L (ref 3.5–5.2)
RBC # BLD AUTO: 4.33 10*6/MM3 (ref 4.14–5.8)
SODIUM BLD-SCNC: 144 MMOL/L (ref 136–145)
WBC NRBC COR # BLD: 6.25 10*3/MM3 (ref 3.4–10.8)

## 2019-06-24 PROCEDURE — 36415 COLL VENOUS BLD VENIPUNCTURE: CPT

## 2019-06-24 PROCEDURE — 36226 PLACE CATH VERTEBRAL ART: CPT | Performed by: RADIOLOGY

## 2019-06-24 PROCEDURE — 25010000002 FENTANYL CITRATE (PF) 100 MCG/2ML SOLUTION: Performed by: RADIOLOGY

## 2019-06-24 PROCEDURE — C1769 GUIDE WIRE: HCPCS | Performed by: RADIOLOGY

## 2019-06-24 PROCEDURE — 37215 TRANSCATH STENT CCA W/EPS: CPT | Performed by: RADIOLOGY

## 2019-06-24 PROCEDURE — C1876 STENT, NON-COA/NON-COV W/DEL: HCPCS | Performed by: RADIOLOGY

## 2019-06-24 PROCEDURE — C1884 EMBOLIZATION PROTECT SYST: HCPCS | Performed by: RADIOLOGY

## 2019-06-24 PROCEDURE — 037L3DZ DILATION OF LEFT INTERNAL CAROTID ARTERY WITH INTRALUMINAL DEVICE, PERCUTANEOUS APPROACH: ICD-10-PCS | Performed by: RADIOLOGY

## 2019-06-24 PROCEDURE — 85027 COMPLETE CBC AUTOMATED: CPT | Performed by: RADIOLOGY

## 2019-06-24 PROCEDURE — 36223 PLACE CATH CAROTID/INOM ART: CPT | Performed by: RADIOLOGY

## 2019-06-24 PROCEDURE — C1894 INTRO/SHEATH, NON-LASER: HCPCS | Performed by: RADIOLOGY

## 2019-06-24 PROCEDURE — 84132 ASSAY OF SERUM POTASSIUM: CPT | Performed by: NURSE PRACTITIONER

## 2019-06-24 PROCEDURE — B315ZZZ FLUOROSCOPY OF BILATERAL COMMON CAROTID ARTERIES: ICD-10-PCS | Performed by: RADIOLOGY

## 2019-06-24 PROCEDURE — 25010000002 HEPARIN (PORCINE) PER 1000 UNITS: Performed by: RADIOLOGY

## 2019-06-24 PROCEDURE — 80048 BASIC METABOLIC PNL TOTAL CA: CPT | Performed by: RADIOLOGY

## 2019-06-24 PROCEDURE — 99233 SBSQ HOSP IP/OBS HIGH 50: CPT | Performed by: INTERNAL MEDICINE

## 2019-06-24 PROCEDURE — C1760 CLOSURE DEV, VASC: HCPCS | Performed by: RADIOLOGY

## 2019-06-24 PROCEDURE — 99232 SBSQ HOSP IP/OBS MODERATE 35: CPT | Performed by: PHYSICIAN ASSISTANT

## 2019-06-24 PROCEDURE — 0 IODIXANOL PER 1 ML: Performed by: RADIOLOGY

## 2019-06-24 PROCEDURE — 25010000002 MIDAZOLAM PER 1 MG: Performed by: RADIOLOGY

## 2019-06-24 PROCEDURE — B31FZZZ FLUOROSCOPY OF LEFT VERTEBRAL ARTERY: ICD-10-PCS | Performed by: RADIOLOGY

## 2019-06-24 PROCEDURE — 85347 COAGULATION TIME ACTIVATED: CPT

## 2019-06-24 DEVICE — CLOSED CELL SELF-EXPANDING STENT
Type: IMPLANTABLE DEVICE | Status: FUNCTIONAL
Brand: CAROTID WALLSTENT®

## 2019-06-24 RX ORDER — CLOPIDOGREL BISULFATE 75 MG/1
75 TABLET ORAL ONCE
Status: DISCONTINUED | OUTPATIENT
Start: 2019-06-24 | End: 2019-06-25 | Stop reason: HOSPADM

## 2019-06-24 RX ORDER — FAMOTIDINE 20 MG/1
20 TABLET, FILM COATED ORAL 2 TIMES DAILY
Status: DISCONTINUED | OUTPATIENT
Start: 2019-06-24 | End: 2019-06-25 | Stop reason: HOSPADM

## 2019-06-24 RX ORDER — ENALAPRIL MALEATE 5 MG/1
5 TABLET ORAL
Status: DISCONTINUED | OUTPATIENT
Start: 2019-06-24 | End: 2019-06-25 | Stop reason: HOSPADM

## 2019-06-24 RX ORDER — SODIUM CHLORIDE 0.9 % (FLUSH) 0.9 %
3 SYRINGE (ML) INJECTION EVERY 12 HOURS SCHEDULED
Status: DISCONTINUED | OUTPATIENT
Start: 2019-06-24 | End: 2019-06-25 | Stop reason: HOSPADM

## 2019-06-24 RX ORDER — ASCORBIC ACID 500 MG
500 TABLET ORAL DAILY
Status: DISCONTINUED | OUTPATIENT
Start: 2019-06-24 | End: 2019-06-25 | Stop reason: HOSPADM

## 2019-06-24 RX ORDER — LIDOCAINE HYDROCHLORIDE 10 MG/ML
INJECTION, SOLUTION EPIDURAL; INFILTRATION; INTRACAUDAL; PERINEURAL AS NEEDED
Status: DISCONTINUED | OUTPATIENT
Start: 2019-06-24 | End: 2019-06-24 | Stop reason: HOSPADM

## 2019-06-24 RX ORDER — ENALAPRIL MALEATE 20 MG/1
20 TABLET ORAL DAILY
Status: ON HOLD | COMMUNITY
End: 2019-06-25 | Stop reason: SDUPTHER

## 2019-06-24 RX ORDER — MIDAZOLAM HYDROCHLORIDE 1 MG/ML
INJECTION INTRAMUSCULAR; INTRAVENOUS AS NEEDED
Status: DISCONTINUED | OUTPATIENT
Start: 2019-06-24 | End: 2019-06-24 | Stop reason: HOSPADM

## 2019-06-24 RX ORDER — HEPARIN SODIUM 1000 [USP'U]/ML
INJECTION, SOLUTION INTRAVENOUS; SUBCUTANEOUS AS NEEDED
Status: DISCONTINUED | OUTPATIENT
Start: 2019-06-24 | End: 2019-06-24 | Stop reason: HOSPADM

## 2019-06-24 RX ORDER — POTASSIUM CHLORIDE 750 MG/1
40 CAPSULE, EXTENDED RELEASE ORAL AS NEEDED
Status: DISCONTINUED | OUTPATIENT
Start: 2019-06-24 | End: 2019-06-25 | Stop reason: HOSPADM

## 2019-06-24 RX ORDER — ASPIRIN 325 MG
325 TABLET, DELAYED RELEASE (ENTERIC COATED) ORAL DAILY
Status: DISCONTINUED | OUTPATIENT
Start: 2019-06-25 | End: 2019-06-25 | Stop reason: HOSPADM

## 2019-06-24 RX ORDER — ALLOPURINOL 100 MG/1
100 TABLET ORAL DAILY
Status: DISCONTINUED | OUTPATIENT
Start: 2019-06-24 | End: 2019-06-25 | Stop reason: HOSPADM

## 2019-06-24 RX ORDER — IODIXANOL 320 MG/ML
INJECTION, SOLUTION INTRAVASCULAR AS NEEDED
Status: DISCONTINUED | OUTPATIENT
Start: 2019-06-24 | End: 2019-06-24 | Stop reason: HOSPADM

## 2019-06-24 RX ORDER — SODIUM CHLORIDE 9 MG/ML
75 INJECTION, SOLUTION INTRAVENOUS CONTINUOUS
Status: ACTIVE | OUTPATIENT
Start: 2019-06-24 | End: 2019-06-24

## 2019-06-24 RX ORDER — SODIUM CHLORIDE 0.9 % (FLUSH) 0.9 %
3-10 SYRINGE (ML) INJECTION AS NEEDED
Status: DISCONTINUED | OUTPATIENT
Start: 2019-06-24 | End: 2019-06-25 | Stop reason: HOSPADM

## 2019-06-24 RX ORDER — CLOPIDOGREL BISULFATE 75 MG/1
75 TABLET ORAL DAILY
Status: DISCONTINUED | OUTPATIENT
Start: 2019-06-25 | End: 2019-06-25 | Stop reason: HOSPADM

## 2019-06-24 RX ORDER — ASPIRIN 325 MG
325 TABLET, DELAYED RELEASE (ENTERIC COATED) ORAL ONCE
Status: COMPLETED | OUTPATIENT
Start: 2019-06-24 | End: 2019-06-24

## 2019-06-24 RX ORDER — POTASSIUM CHLORIDE 1.5 G/1.77G
40 POWDER, FOR SOLUTION ORAL AS NEEDED
Status: DISCONTINUED | OUTPATIENT
Start: 2019-06-24 | End: 2019-06-25 | Stop reason: HOSPADM

## 2019-06-24 RX ORDER — ATORVASTATIN CALCIUM 40 MG/1
80 TABLET, FILM COATED ORAL NIGHTLY
Status: DISCONTINUED | OUTPATIENT
Start: 2019-06-24 | End: 2019-06-25 | Stop reason: HOSPADM

## 2019-06-24 RX ORDER — FENTANYL CITRATE 50 UG/ML
INJECTION, SOLUTION INTRAMUSCULAR; INTRAVENOUS AS NEEDED
Status: DISCONTINUED | OUTPATIENT
Start: 2019-06-24 | End: 2019-06-24 | Stop reason: HOSPADM

## 2019-06-24 RX ADMIN — NICARDIPINE HYDROCHLORIDE 8 MG/HR: 0.1 INJECTION, SOLUTION INTRAVENOUS at 12:03

## 2019-06-24 RX ADMIN — NICARDIPINE HYDROCHLORIDE 7 MG/HR: 0.1 INJECTION, SOLUTION INTRAVENOUS at 21:00

## 2019-06-24 RX ADMIN — ASPIRIN 325 MG: 325 TABLET, COATED ORAL at 07:38

## 2019-06-24 RX ADMIN — FAMOTIDINE 20 MG: 20 TABLET, FILM COATED ORAL at 20:11

## 2019-06-24 RX ADMIN — POTASSIUM CHLORIDE 40 MEQ: 750 CAPSULE, EXTENDED RELEASE ORAL at 16:02

## 2019-06-24 RX ADMIN — NICARDIPINE HYDROCHLORIDE 6 MG/HR: 0.1 INJECTION, SOLUTION INTRAVENOUS at 15:21

## 2019-06-24 RX ADMIN — SODIUM CHLORIDE 75 ML/HR: 9 INJECTION, SOLUTION INTRAVENOUS at 10:14

## 2019-06-24 RX ADMIN — POTASSIUM CHLORIDE 40 MEQ: 750 CAPSULE, EXTENDED RELEASE ORAL at 12:44

## 2019-06-24 RX ADMIN — SODIUM CHLORIDE 75 ML/HR: 9 INJECTION, SOLUTION INTRAVENOUS at 07:39

## 2019-06-24 RX ADMIN — OXYCODONE HYDROCHLORIDE AND ACETAMINOPHEN 500 MG: 500 TABLET ORAL at 11:08

## 2019-06-24 RX ADMIN — ENALAPRIL MALEATE 5 MG: 5 TABLET ORAL at 18:24

## 2019-06-24 RX ADMIN — NICARDIPINE HYDROCHLORIDE 5 MG/HR: 0.1 INJECTION, SOLUTION INTRAVENOUS at 10:11

## 2019-06-24 RX ADMIN — ALLOPURINOL 100 MG: 100 TABLET ORAL at 11:08

## 2019-06-24 RX ADMIN — ATORVASTATIN CALCIUM 80 MG: 40 TABLET, FILM COATED ORAL at 20:11

## 2019-06-24 RX ADMIN — FAMOTIDINE 20 MG: 20 TABLET, FILM COATED ORAL at 11:08

## 2019-06-24 NOTE — OUTREACH NOTE
Stroke Week 3 Survey      Responses   Facility patient discharged from?  [place]   Does the patient have one of the following disease processes/diagnoses(primary or secondary)?  Stroke (TIA)   Week 3 attempt successful?  No   Revoke  Readmitted          Michaela Marie RN

## 2019-06-25 ENCOUNTER — APPOINTMENT (OUTPATIENT)
Dept: CARDIOLOGY | Facility: HOSPITAL | Age: 74
End: 2019-06-25

## 2019-06-25 VITALS
RESPIRATION RATE: 18 BRPM | HEIGHT: 70 IN | WEIGHT: 160 LBS | SYSTOLIC BLOOD PRESSURE: 130 MMHG | OXYGEN SATURATION: 93 % | TEMPERATURE: 98.4 F | HEART RATE: 61 BPM | DIASTOLIC BLOOD PRESSURE: 58 MMHG | BODY MASS INDEX: 22.9 KG/M2

## 2019-06-25 LAB
ANION GAP SERPL CALCULATED.3IONS-SCNC: 11 MMOL/L
BASOPHILS # BLD AUTO: 0.01 10*3/MM3 (ref 0–0.2)
BASOPHILS NFR BLD AUTO: 0.2 % (ref 0–1.5)
BUN BLD-MCNC: 12 MG/DL (ref 8–23)
BUN/CREAT SERPL: 14.3 (ref 7–25)
CALCIUM SPEC-SCNC: 8.5 MG/DL (ref 8.6–10.5)
CHLORIDE SERPL-SCNC: 109 MMOL/L (ref 98–107)
CO2 SERPL-SCNC: 26 MMOL/L (ref 22–29)
CREAT BLD-MCNC: 0.84 MG/DL (ref 0.76–1.27)
DEPRECATED RDW RBC AUTO: 47.6 FL (ref 37–54)
EOSINOPHIL # BLD AUTO: 0.22 10*3/MM3 (ref 0–0.4)
EOSINOPHIL NFR BLD AUTO: 3.4 % (ref 0.3–6.2)
ERYTHROCYTE [DISTWIDTH] IN BLOOD BY AUTOMATED COUNT: 14 % (ref 12.3–15.4)
GFR SERPL CREATININE-BSD FRML MDRD: 90 ML/MIN/1.73
GLUCOSE BLD-MCNC: 107 MG/DL (ref 65–99)
HCT VFR BLD AUTO: 39.4 % (ref 37.5–51)
HGB BLD-MCNC: 13 G/DL (ref 13–17.7)
IMM GRANULOCYTES # BLD AUTO: 0.03 10*3/MM3 (ref 0–0.05)
IMM GRANULOCYTES NFR BLD AUTO: 0.5 % (ref 0–0.5)
LYMPHOCYTES # BLD AUTO: 1.75 10*3/MM3 (ref 0.7–3.1)
LYMPHOCYTES NFR BLD AUTO: 26.7 % (ref 19.6–45.3)
MCH RBC QN AUTO: 30.4 PG (ref 26.6–33)
MCHC RBC AUTO-ENTMCNC: 33 G/DL (ref 31.5–35.7)
MCV RBC AUTO: 92.3 FL (ref 79–97)
MONOCYTES # BLD AUTO: 0.56 10*3/MM3 (ref 0.1–0.9)
MONOCYTES NFR BLD AUTO: 8.5 % (ref 5–12)
NEUTROPHILS # BLD AUTO: 3.98 10*3/MM3 (ref 1.7–7)
NEUTROPHILS NFR BLD AUTO: 60.7 % (ref 42.7–76)
NRBC BLD AUTO-RTO: 0 /100 WBC (ref 0–0.2)
PLATELET # BLD AUTO: 159 10*3/MM3 (ref 140–450)
PMV BLD AUTO: 10.4 FL (ref 6–12)
POTASSIUM BLD-SCNC: 3.9 MMOL/L (ref 3.5–5.2)
RBC # BLD AUTO: 4.27 10*6/MM3 (ref 4.14–5.8)
SODIUM BLD-SCNC: 146 MMOL/L (ref 136–145)
TROPONIN T SERPL-MCNC: <0.01 NG/ML (ref 0–0.03)
WBC NRBC COR # BLD: 6.55 10*3/MM3 (ref 3.4–10.8)

## 2019-06-25 PROCEDURE — 80048 BASIC METABOLIC PNL TOTAL CA: CPT | Performed by: RADIOLOGY

## 2019-06-25 PROCEDURE — 99024 POSTOP FOLLOW-UP VISIT: CPT | Performed by: RADIOLOGY

## 2019-06-25 PROCEDURE — 99232 SBSQ HOSP IP/OBS MODERATE 35: CPT | Performed by: INTERNAL MEDICINE

## 2019-06-25 PROCEDURE — 85025 COMPLETE CBC W/AUTO DIFF WBC: CPT | Performed by: RADIOLOGY

## 2019-06-25 PROCEDURE — 84484 ASSAY OF TROPONIN QUANT: CPT | Performed by: RADIOLOGY

## 2019-06-25 PROCEDURE — 93882 EXTRACRANIAL UNI/LTD STUDY: CPT | Performed by: INTERNAL MEDICINE

## 2019-06-25 PROCEDURE — 93882 EXTRACRANIAL UNI/LTD STUDY: CPT

## 2019-06-25 RX ORDER — ASPIRIN 81 MG/1
81 TABLET ORAL DAILY
Qty: 100 TABLET | Refills: 4
Start: 2019-06-26

## 2019-06-25 RX ORDER — CHLORTHALIDONE 25 MG/1
25 TABLET ORAL DAILY
Status: DISCONTINUED | OUTPATIENT
Start: 2019-06-25 | End: 2019-06-25 | Stop reason: HOSPADM

## 2019-06-25 RX ORDER — ATORVASTATIN CALCIUM 80 MG/1
80 TABLET, FILM COATED ORAL NIGHTLY
Qty: 30 TABLET | Refills: 11 | Status: SHIPPED | OUTPATIENT
Start: 2019-06-25 | End: 2019-09-26 | Stop reason: SDUPTHER

## 2019-06-25 RX ORDER — CHLORTHALIDONE 25 MG/1
25 TABLET ORAL DAILY
Qty: 30 TABLET | Refills: 11 | Status: SHIPPED | OUTPATIENT
Start: 2019-06-25 | End: 2019-07-10 | Stop reason: HOSPADM

## 2019-06-25 RX ORDER — ENALAPRIL MALEATE 10 MG/1
10 TABLET ORAL DAILY
Qty: 30 TABLET | Refills: 11 | Status: ON HOLD | OUTPATIENT
Start: 2019-06-25 | End: 2019-07-10 | Stop reason: SDUPTHER

## 2019-06-25 RX ORDER — CLOPIDOGREL BISULFATE 75 MG/1
75 TABLET ORAL DAILY
Qty: 30 TABLET | Refills: 11 | Status: SHIPPED | OUTPATIENT
Start: 2019-06-25 | End: 2019-09-26

## 2019-06-25 RX ORDER — ENALAPRIL MALEATE 5 MG/1
5 TABLET ORAL
Status: DISCONTINUED | OUTPATIENT
Start: 2019-06-25 | End: 2019-06-25 | Stop reason: HOSPADM

## 2019-06-25 RX ADMIN — SODIUM CHLORIDE, PRESERVATIVE FREE 3 ML: 5 INJECTION INTRAVENOUS at 08:49

## 2019-06-25 RX ADMIN — CLOPIDOGREL BISULFATE 75 MG: 75 TABLET ORAL at 08:48

## 2019-06-25 RX ADMIN — OXYCODONE HYDROCHLORIDE AND ACETAMINOPHEN 500 MG: 500 TABLET ORAL at 08:45

## 2019-06-25 RX ADMIN — CHLORTHALIDONE 25 MG: 25 TABLET ORAL at 10:38

## 2019-06-25 RX ADMIN — FAMOTIDINE 20 MG: 20 TABLET, FILM COATED ORAL at 08:45

## 2019-06-25 RX ADMIN — ALLOPURINOL 100 MG: 100 TABLET ORAL at 08:45

## 2019-06-25 RX ADMIN — ASPIRIN 325 MG: 325 TABLET, COATED ORAL at 08:45

## 2019-06-25 RX ADMIN — NICARDIPINE HYDROCHLORIDE 5 MG/HR: 0.1 INJECTION, SOLUTION INTRAVENOUS at 06:06

## 2019-06-25 RX ADMIN — ENALAPRIL MALEATE 5 MG: 5 TABLET ORAL at 10:38

## 2019-06-25 RX ADMIN — ENALAPRIL MALEATE 5 MG: 5 TABLET ORAL at 08:46

## 2019-06-26 ENCOUNTER — READMISSION MANAGEMENT (OUTPATIENT)
Dept: CALL CENTER | Facility: HOSPITAL | Age: 74
End: 2019-06-26

## 2019-06-26 ENCOUNTER — TRANSITIONAL CARE MANAGEMENT TELEPHONE ENCOUNTER (OUTPATIENT)
Dept: INTERNAL MEDICINE | Facility: CLINIC | Age: 74
End: 2019-06-26

## 2019-06-26 LAB
BH CV ECHO MEAS - BSA(HAYCOCK): 1.9 M^2
BH CV ECHO MEAS - BSA: 1.9 M^2
BH CV ECHO MEAS - BZI_BMI: 23 KILOGRAMS/M^2
BH CV ECHO MEAS - BZI_METRIC_HEIGHT: 177.8 CM
BH CV ECHO MEAS - BZI_METRIC_WEIGHT: 72.6 KG
BH CV LEFT CCA HIDDEN LRR: 1 CM/S
BH CV MID LEFT ICA HIDDEN LRR: 1 CM
BH CV XLRA MEAS LEFT CCA RATIO VEL: 97.7 CM/SEC
BH CV XLRA MEAS LEFT DIST CCA EDV: 6.7 CM/SEC
BH CV XLRA MEAS LEFT DIST CCA PSV: 89.8 CM/SEC
BH CV XLRA MEAS LEFT DIST ICA EDV: 20.2 CM/SEC
BH CV XLRA MEAS LEFT DIST ICA PSV: 113.4 CM/SEC
BH CV XLRA MEAS LEFT ICA RATIO VEL: 114 CM/SEC
BH CV XLRA MEAS LEFT ICA/CCA RATIO: 1.2
BH CV XLRA MEAS LEFT MID CCA EDV: 1.1 CM/SEC
BH CV XLRA MEAS LEFT MID CCA PSV: 98.8 CM/SEC
BH CV XLRA MEAS LEFT MID ICA EDV: 20.2 CM/SEC
BH CV XLRA MEAS LEFT MID ICA PSV: 115.6 CM/SEC
BH CV XLRA MEAS LEFT PROX CCA EDV: 6.7 CM/SEC
BH CV XLRA MEAS LEFT PROX CCA PSV: 169.5 CM/SEC
BH CV XLRA MEAS LEFT PROX ICA EDV: 20.2 CM/SEC
BH CV XLRA MEAS LEFT PROX ICA PSV: 81.9 CM/SEC
BH CV XLRA MEAS LEFT VERTEBRAL A PSV: 93.3 CM/SEC
BH CV XLRA MEAS RIGHT PROX SCLA PSV: 241.3 CM/SEC
BH CVPROX LEFT ICA HIDDEN LRR: 1 CM
LEFT ARM BP: NORMAL MMHG

## 2019-06-26 NOTE — OUTREACH NOTE
Prep Survey      Responses   Facility patient discharged from?  [facility]   Is patient eligible?  Yes   Discharge diagnosis  Symptomatic, high-grade left carotid stenosis,     Left cervical carotid angioplasty/stent placement   Does the patient have one of the following disease processes/diagnoses(primary or secondary)?  Other   Does the patient have Home health ordered?  No   Is there a DME ordered?  No   Prep survey completed?  Yes          Bertha Maloney RN

## 2019-06-26 NOTE — OUTREACH NOTE
"TCM call completed.  See TCM flowsheet for details.  Does have upcoming hospital follow up appt with Daniela Theodore APRN 7/3/19   \"Doing good.\"  Actually is working today.  Wife driving him, but says he thinks he could have driven, just wanted to play it safe.  Left neck dressing dry and intact.  Denies any fever, chills, or SOB.  Is eating and drinking.  No chest pain or heart racing or palpitations.  Has BRENDON scheduled for 7/2/19.  Had appt with Daniela Theodore on 7/2/19, but he needed to changed since the BRENDON was that day also.  Appointment with Daniela changed to 7/3/19.   Denied any needs or questions at present and appreciated the call.   "

## 2019-06-28 ENCOUNTER — READMISSION MANAGEMENT (OUTPATIENT)
Dept: CALL CENTER | Facility: HOSPITAL | Age: 74
End: 2019-06-28

## 2019-07-02 ENCOUNTER — APPOINTMENT (OUTPATIENT)
Dept: CARDIOLOGY | Facility: HOSPITAL | Age: 74
End: 2019-07-02

## 2019-07-02 ENCOUNTER — HOSPITAL ENCOUNTER (OUTPATIENT)
Dept: CARDIOLOGY | Facility: HOSPITAL | Age: 74
Discharge: HOME | End: 2019-07-02
Admitting: INTERNAL MEDICINE

## 2019-07-02 ENCOUNTER — TELEPHONE (OUTPATIENT)
Dept: INTERNAL MEDICINE | Facility: CLINIC | Age: 74
End: 2019-07-02

## 2019-07-02 VITALS — DIASTOLIC BLOOD PRESSURE: 58 MMHG | OXYGEN SATURATION: 94 % | SYSTOLIC BLOOD PRESSURE: 142 MMHG | HEART RATE: 61 BPM

## 2019-07-02 DIAGNOSIS — I35.1 NONRHEUMATIC AORTIC VALVE INSUFFICIENCY: ICD-10-CM

## 2019-07-02 DIAGNOSIS — I35.1 NONRHEUMATIC AORTIC VALVE INSUFFICIENCY: Primary | ICD-10-CM

## 2019-07-02 LAB
ASCENDING AORTA: 4.2 CM
BH CV VAS BP LEFT ARM: NORMAL MMHG
LV EF 2D ECHO EST: 55 %
SINUS: 4.1 CM

## 2019-07-02 PROCEDURE — 93321 DOPPLER ECHO F-UP/LMTD STD: CPT | Performed by: INTERNAL MEDICINE

## 2019-07-02 PROCEDURE — 99152 MOD SED SAME PHYS/QHP 5/>YRS: CPT

## 2019-07-02 PROCEDURE — 99153 MOD SED SAME PHYS/QHP EA: CPT

## 2019-07-02 PROCEDURE — 93325 DOPPLER ECHO COLOR FLOW MAPG: CPT

## 2019-07-02 PROCEDURE — 93321 DOPPLER ECHO F-UP/LMTD STD: CPT

## 2019-07-02 PROCEDURE — 93325 DOPPLER ECHO COLOR FLOW MAPG: CPT | Performed by: INTERNAL MEDICINE

## 2019-07-02 PROCEDURE — 25010000002 FENTANYL CITRATE (PF) 100 MCG/2ML SOLUTION: Performed by: INTERNAL MEDICINE

## 2019-07-02 PROCEDURE — 99152 MOD SED SAME PHYS/QHP 5/>YRS: CPT | Performed by: INTERNAL MEDICINE

## 2019-07-02 PROCEDURE — 25010000002 MIDAZOLAM PER 1 MG: Performed by: INTERNAL MEDICINE

## 2019-07-02 PROCEDURE — 93312 ECHO TRANSESOPHAGEAL: CPT | Performed by: INTERNAL MEDICINE

## 2019-07-02 PROCEDURE — 93312 ECHO TRANSESOPHAGEAL: CPT

## 2019-07-02 RX ORDER — FENTANYL CITRATE 50 UG/ML
INJECTION, SOLUTION INTRAMUSCULAR; INTRAVENOUS
Status: COMPLETED | OUTPATIENT
Start: 2019-07-02 | End: 2019-07-02

## 2019-07-02 RX ORDER — MIDAZOLAM HYDROCHLORIDE 1 MG/ML
INJECTION INTRAMUSCULAR; INTRAVENOUS
Status: COMPLETED | OUTPATIENT
Start: 2019-07-02 | End: 2019-07-02

## 2019-07-02 RX ADMIN — METHOHEXITAL SODIUM 20 MG: 500 INJECTION, POWDER, LYOPHILIZED, FOR SOLUTION INTRAMUSCULAR; INTRAVENOUS; RECTAL at 10:32

## 2019-07-02 RX ADMIN — MIDAZOLAM HYDROCHLORIDE 2 MG: 1 INJECTION, SOLUTION INTRAMUSCULAR; INTRAVENOUS at 10:45

## 2019-07-02 RX ADMIN — FENTANYL CITRATE 50 MCG: 50 INJECTION, SOLUTION INTRAMUSCULAR; INTRAVENOUS at 10:45

## 2019-07-02 RX ADMIN — METHOHEXITAL SODIUM 20 MG: 500 INJECTION, POWDER, LYOPHILIZED, FOR SOLUTION INTRAMUSCULAR; INTRAVENOUS; RECTAL at 10:36

## 2019-07-02 RX ADMIN — FENTANYL CITRATE 50 MCG: 50 INJECTION, SOLUTION INTRAMUSCULAR; INTRAVENOUS at 10:28

## 2019-07-02 RX ADMIN — MIDAZOLAM HYDROCHLORIDE 2 MG: 1 INJECTION, SOLUTION INTRAMUSCULAR; INTRAVENOUS at 10:25

## 2019-07-02 RX ADMIN — METHOHEXITAL SODIUM 20 MG: 500 INJECTION, POWDER, LYOPHILIZED, FOR SOLUTION INTRAMUSCULAR; INTRAVENOUS; RECTAL at 10:54

## 2019-07-02 RX ADMIN — METHOHEXITAL SODIUM 20 MG: 500 INJECTION, POWDER, LYOPHILIZED, FOR SOLUTION INTRAMUSCULAR; INTRAVENOUS; RECTAL at 10:45

## 2019-07-02 RX ADMIN — FENTANYL CITRATE 50 MCG: 50 INJECTION, SOLUTION INTRAMUSCULAR; INTRAVENOUS at 10:25

## 2019-07-02 RX ADMIN — MIDAZOLAM HYDROCHLORIDE 2 MG: 1 INJECTION, SOLUTION INTRAMUSCULAR; INTRAVENOUS at 10:28

## 2019-07-02 NOTE — INTERVAL H&P NOTE
H&P reviewed. The patient was examined and there are no changes to the H&P.       Patient with recent LICA stent placed a week ago by Dr. Abad wagner.  Recent transthoracic echo performed indicated moderate aortic insufficiency.  There is a 1/6 diastolic murmur heard loudest at the left upper sternal border.  No complaints of dizziness, fatigue, syncope or shortness of breath.  BRENDON today with Dr. Thom Velasquez for further evaluation of his aortic insufficiency.  The risk, benefits, potential complications of all been discussed with the patient and he is agreeable to proceed.    Willow Velasquez MD, FACC

## 2019-07-02 NOTE — TELEPHONE ENCOUNTER
Patient's emergency contact called to inform us that he wouldn't be coming in tomorrow. She stated he was being followed by dr augustin.

## 2019-07-03 ENCOUNTER — READMISSION MANAGEMENT (OUTPATIENT)
Dept: CALL CENTER | Facility: HOSPITAL | Age: 74
End: 2019-07-03

## 2019-07-03 NOTE — OUTREACH NOTE
Medical Week 2 Survey      Responses   Facility patient discharged from?  [facility]   Does the patient have one of the following disease processes/diagnoses(primary or secondary)?  Other   Week 2 attempt successful?  No   Unsuccessful attempts  Attempt 1          Michelle Anthony RN

## 2019-07-08 ENCOUNTER — READMISSION MANAGEMENT (OUTPATIENT)
Dept: CALL CENTER | Facility: HOSPITAL | Age: 74
End: 2019-07-08

## 2019-07-08 DIAGNOSIS — I35.1 NONRHEUMATIC AORTIC VALVE INSUFFICIENCY: Primary | ICD-10-CM

## 2019-07-08 NOTE — OUTREACH NOTE
Medical Week 2 Survey      Responses   Facility patient discharged from?  [place]   Does the patient have one of the following disease processes/diagnoses(primary or secondary)?  Other   Week 2 attempt successful?  Yes   Call start time  0925   Discharge diagnosis  Symptomatic, high-grade left carotid stenosis,     Left cervical carotid angioplasty/stent placement   Rescheduled  Revoked [Doing well. Does not feel calls will be necessary.]   Revoke  Decline to participate          Beba Haney RN

## 2019-07-10 ENCOUNTER — HOSPITAL ENCOUNTER (OUTPATIENT)
Facility: HOSPITAL | Age: 74
LOS: 1 days | Discharge: HOME | End: 2019-07-10
Attending: INTERNAL MEDICINE | Admitting: INTERNAL MEDICINE

## 2019-07-10 ENCOUNTER — HOSPITAL ENCOUNTER (EMERGENCY)
Facility: HOSPITAL | Age: 74
Discharge: HOME | End: 2019-07-10
Attending: EMERGENCY MEDICINE | Admitting: EMERGENCY MEDICINE

## 2019-07-10 VITALS
WEIGHT: 168 LBS | BODY MASS INDEX: 24.05 KG/M2 | SYSTOLIC BLOOD PRESSURE: 172 MMHG | OXYGEN SATURATION: 95 % | RESPIRATION RATE: 18 BRPM | TEMPERATURE: 97.7 F | HEART RATE: 64 BPM | DIASTOLIC BLOOD PRESSURE: 63 MMHG | HEIGHT: 70 IN

## 2019-07-10 VITALS
WEIGHT: 168.87 LBS | RESPIRATION RATE: 16 BRPM | OXYGEN SATURATION: 93 % | BODY MASS INDEX: 24.18 KG/M2 | DIASTOLIC BLOOD PRESSURE: 58 MMHG | TEMPERATURE: 97.5 F | HEART RATE: 66 BPM | HEIGHT: 70 IN | SYSTOLIC BLOOD PRESSURE: 166 MMHG

## 2019-07-10 DIAGNOSIS — I35.1 NONRHEUMATIC AORTIC VALVE INSUFFICIENCY: ICD-10-CM

## 2019-07-10 DIAGNOSIS — T14.8XXA BLEEDING FROM WOUND: Primary | ICD-10-CM

## 2019-07-10 LAB
ANION GAP SERPL CALCULATED.3IONS-SCNC: 12 MMOL/L (ref 5–15)
BASOPHILS # BLD AUTO: 0.01 10*3/MM3 (ref 0–0.2)
BASOPHILS NFR BLD AUTO: 0.2 % (ref 0–1.5)
BUN BLD-MCNC: 22 MG/DL (ref 8–23)
BUN/CREAT SERPL: 19.1 (ref 7–25)
CALCIUM SPEC-SCNC: 8.8 MG/DL (ref 8.6–10.5)
CHLORIDE SERPL-SCNC: 99 MMOL/L (ref 98–107)
CHOLEST SERPL-MCNC: 99 MG/DL (ref 0–200)
CO2 SERPL-SCNC: 27 MMOL/L (ref 22–29)
CREAT BLD-MCNC: 1.15 MG/DL (ref 0.76–1.27)
DEPRECATED RDW RBC AUTO: 45.4 FL (ref 37–54)
DEPRECATED RDW RBC AUTO: 45.6 FL (ref 37–54)
EOSINOPHIL # BLD AUTO: 0.23 10*3/MM3 (ref 0–0.4)
EOSINOPHIL NFR BLD AUTO: 4.2 % (ref 0.3–6.2)
ERYTHROCYTE [DISTWIDTH] IN BLOOD BY AUTOMATED COUNT: 13.3 % (ref 12.3–15.4)
ERYTHROCYTE [DISTWIDTH] IN BLOOD BY AUTOMATED COUNT: 13.3 % (ref 12.3–15.4)
GFR SERPL CREATININE-BSD FRML MDRD: 62 ML/MIN/1.73
GLUCOSE BLD-MCNC: 107 MG/DL (ref 65–99)
HCT VFR BLD AUTO: 38.8 % (ref 37.5–51)
HCT VFR BLD AUTO: 38.8 % (ref 37.5–51)
HDLC SERPL-MCNC: 36 MG/DL (ref 40–60)
HGB BLD-MCNC: 12.6 G/DL (ref 13–17.7)
HGB BLD-MCNC: 12.8 G/DL (ref 13–17.7)
IMM GRANULOCYTES # BLD AUTO: 0.02 10*3/MM3 (ref 0–0.05)
IMM GRANULOCYTES NFR BLD AUTO: 0.4 % (ref 0–0.5)
INR PPP: 1.12 (ref 0.85–1.16)
LDLC SERPL CALC-MCNC: 46 MG/DL (ref 0–100)
LDLC/HDLC SERPL: 1.28 {RATIO}
LYMPHOCYTES # BLD AUTO: 1.63 10*3/MM3 (ref 0.7–3.1)
LYMPHOCYTES NFR BLD AUTO: 29.7 % (ref 19.6–45.3)
MCH RBC QN AUTO: 30.2 PG (ref 26.6–33)
MCH RBC QN AUTO: 30.7 PG (ref 26.6–33)
MCHC RBC AUTO-ENTMCNC: 32.5 G/DL (ref 31.5–35.7)
MCHC RBC AUTO-ENTMCNC: 33 G/DL (ref 31.5–35.7)
MCV RBC AUTO: 93 FL (ref 79–97)
MCV RBC AUTO: 93 FL (ref 79–97)
MONOCYTES # BLD AUTO: 0.55 10*3/MM3 (ref 0.1–0.9)
MONOCYTES NFR BLD AUTO: 10 % (ref 5–12)
NEUTROPHILS # BLD AUTO: 3.04 10*3/MM3 (ref 1.7–7)
NEUTROPHILS NFR BLD AUTO: 55.5 % (ref 42.7–76)
NRBC BLD AUTO-RTO: 0 /100 WBC (ref 0–0.2)
PLATELET # BLD AUTO: 147 10*3/MM3 (ref 140–450)
PLATELET # BLD AUTO: 157 10*3/MM3 (ref 140–450)
PMV BLD AUTO: 9.7 FL (ref 6–12)
PMV BLD AUTO: 9.8 FL (ref 6–12)
POTASSIUM BLD-SCNC: 3.4 MMOL/L (ref 3.5–5.2)
PROTHROMBIN TIME: 13.9 SECONDS (ref 11.2–14.3)
RBC # BLD AUTO: 4.17 10*6/MM3 (ref 4.14–5.8)
RBC # BLD AUTO: 4.17 10*6/MM3 (ref 4.14–5.8)
SODIUM BLD-SCNC: 138 MMOL/L (ref 136–145)
TRIGL SERPL-MCNC: 84 MG/DL (ref 0–150)
VLDLC SERPL-MCNC: 16.8 MG/DL
WBC NRBC COR # BLD: 5.48 10*3/MM3 (ref 3.4–10.8)
WBC NRBC COR # BLD: 5.54 10*3/MM3 (ref 3.4–10.8)

## 2019-07-10 PROCEDURE — 85027 COMPLETE CBC AUTOMATED: CPT | Performed by: INTERNAL MEDICINE

## 2019-07-10 PROCEDURE — 85610 PROTHROMBIN TIME: CPT | Performed by: NURSE PRACTITIONER

## 2019-07-10 PROCEDURE — 99284 EMERGENCY DEPT VISIT MOD MDM: CPT

## 2019-07-10 PROCEDURE — 36415 COLL VENOUS BLD VENIPUNCTURE: CPT

## 2019-07-10 PROCEDURE — 93567 NJX CAR CTH SPRVLV AORTGRPHY: CPT | Performed by: INTERNAL MEDICINE

## 2019-07-10 PROCEDURE — G0378 HOSPITAL OBSERVATION PER HR: HCPCS

## 2019-07-10 PROCEDURE — 25010000002 MIDAZOLAM PER 1 MG: Performed by: INTERNAL MEDICINE

## 2019-07-10 PROCEDURE — 0 IOPAMIDOL PER 1 ML: Performed by: INTERNAL MEDICINE

## 2019-07-10 PROCEDURE — 93460 R&L HRT ART/VENTRICLE ANGIO: CPT | Performed by: INTERNAL MEDICINE

## 2019-07-10 PROCEDURE — C1769 GUIDE WIRE: HCPCS | Performed by: INTERNAL MEDICINE

## 2019-07-10 PROCEDURE — C1751 CATH, INF, PER/CENT/MIDLINE: HCPCS | Performed by: INTERNAL MEDICINE

## 2019-07-10 PROCEDURE — C1760 CLOSURE DEV, VASC: HCPCS | Performed by: INTERNAL MEDICINE

## 2019-07-10 PROCEDURE — C1894 INTRO/SHEATH, NON-LASER: HCPCS | Performed by: INTERNAL MEDICINE

## 2019-07-10 PROCEDURE — 80048 BASIC METABOLIC PNL TOTAL CA: CPT | Performed by: INTERNAL MEDICINE

## 2019-07-10 PROCEDURE — 85025 COMPLETE CBC W/AUTO DIFF WBC: CPT | Performed by: NURSE PRACTITIONER

## 2019-07-10 PROCEDURE — 25010000002 FENTANYL CITRATE (PF) 100 MCG/2ML SOLUTION: Performed by: INTERNAL MEDICINE

## 2019-07-10 PROCEDURE — 80061 LIPID PANEL: CPT | Performed by: PHYSICIAN ASSISTANT

## 2019-07-10 RX ORDER — SODIUM CHLORIDE 0.9 % (FLUSH) 0.9 %
3 SYRINGE (ML) INJECTION EVERY 12 HOURS SCHEDULED
Status: DISCONTINUED | OUTPATIENT
Start: 2019-07-10 | End: 2019-07-10 | Stop reason: HOSPADM

## 2019-07-10 RX ORDER — ATORVASTATIN CALCIUM 40 MG/1
80 TABLET, FILM COATED ORAL NIGHTLY
Status: DISCONTINUED | OUTPATIENT
Start: 2019-07-10 | End: 2019-07-10 | Stop reason: HOSPADM

## 2019-07-10 RX ORDER — SODIUM CHLORIDE 0.9 % (FLUSH) 0.9 %
3-10 SYRINGE (ML) INJECTION AS NEEDED
Status: DISCONTINUED | OUTPATIENT
Start: 2019-07-10 | End: 2019-07-10 | Stop reason: HOSPADM

## 2019-07-10 RX ORDER — ACETAMINOPHEN 325 MG/1
650 TABLET ORAL EVERY 4 HOURS PRN
Status: DISCONTINUED | OUTPATIENT
Start: 2019-07-10 | End: 2019-07-10 | Stop reason: HOSPADM

## 2019-07-10 RX ORDER — SODIUM CHLORIDE 9 MG/ML
3 INJECTION, SOLUTION INTRAVENOUS CONTINUOUS
Status: ACTIVE | OUTPATIENT
Start: 2019-07-10 | End: 2019-07-10

## 2019-07-10 RX ORDER — ASPIRIN 81 MG/1
81 TABLET ORAL DAILY
Status: DISCONTINUED | OUTPATIENT
Start: 2019-07-11 | End: 2019-07-10 | Stop reason: HOSPADM

## 2019-07-10 RX ORDER — ENALAPRIL MALEATE 10 MG/1
10 TABLET ORAL DAILY
Status: DISCONTINUED | OUTPATIENT
Start: 2019-07-11 | End: 2019-07-10 | Stop reason: HOSPADM

## 2019-07-10 RX ORDER — CLOPIDOGREL BISULFATE 75 MG/1
75 TABLET ORAL DAILY
Status: DISCONTINUED | OUTPATIENT
Start: 2019-07-11 | End: 2019-07-10 | Stop reason: HOSPADM

## 2019-07-10 RX ORDER — FENTANYL CITRATE 50 UG/ML
INJECTION, SOLUTION INTRAMUSCULAR; INTRAVENOUS AS NEEDED
Status: DISCONTINUED | OUTPATIENT
Start: 2019-07-10 | End: 2019-07-10 | Stop reason: HOSPADM

## 2019-07-10 RX ORDER — MIDAZOLAM HYDROCHLORIDE 1 MG/ML
INJECTION INTRAMUSCULAR; INTRAVENOUS AS NEEDED
Status: DISCONTINUED | OUTPATIENT
Start: 2019-07-10 | End: 2019-07-10 | Stop reason: HOSPADM

## 2019-07-10 RX ORDER — POTASSIUM CHLORIDE 750 MG/1
40 CAPSULE, EXTENDED RELEASE ORAL AS NEEDED
Status: DISCONTINUED | OUTPATIENT
Start: 2019-07-10 | End: 2019-07-10 | Stop reason: HOSPADM

## 2019-07-10 RX ORDER — LIDOCAINE HYDROCHLORIDE AND EPINEPHRINE 10; 10 MG/ML; UG/ML
10 INJECTION, SOLUTION INFILTRATION; PERINEURAL ONCE
Status: COMPLETED | OUTPATIENT
Start: 2019-07-10 | End: 2019-07-10

## 2019-07-10 RX ORDER — POTASSIUM CHLORIDE 1.5 G/1.77G
40 POWDER, FOR SOLUTION ORAL AS NEEDED
Status: DISCONTINUED | OUTPATIENT
Start: 2019-07-10 | End: 2019-07-10 | Stop reason: HOSPADM

## 2019-07-10 RX ORDER — LIDOCAINE HYDROCHLORIDE 10 MG/ML
INJECTION, SOLUTION EPIDURAL; INFILTRATION; INTRACAUDAL; PERINEURAL AS NEEDED
Status: DISCONTINUED | OUTPATIENT
Start: 2019-07-10 | End: 2019-07-10 | Stop reason: HOSPADM

## 2019-07-10 RX ORDER — FAMOTIDINE 20 MG/1
20 TABLET, FILM COATED ORAL 2 TIMES DAILY
Status: DISCONTINUED | OUTPATIENT
Start: 2019-07-10 | End: 2019-07-10 | Stop reason: HOSPADM

## 2019-07-10 RX ORDER — ENALAPRIL MALEATE 20 MG/1
20 TABLET ORAL EVERY MORNING
Qty: 30 TABLET | Refills: 11 | Status: SHIPPED | OUTPATIENT
Start: 2019-07-10 | End: 2019-07-30 | Stop reason: SDUPTHER

## 2019-07-10 RX ADMIN — LIDOCAINE HYDROCHLORIDE,EPINEPHRINE BITARTRATE 10 ML: 10; .01 INJECTION, SOLUTION INFILTRATION; PERINEURAL at 22:20

## 2019-07-10 RX ADMIN — TRANEXAMIC ACID 500 MG: 100 INJECTION, SOLUTION INTRAVENOUS at 22:20

## 2019-07-10 RX ADMIN — POTASSIUM CHLORIDE 40 MEQ: 750 CAPSULE, EXTENDED RELEASE ORAL at 14:18

## 2019-07-10 RX ADMIN — SODIUM CHLORIDE 3 ML/KG/HR: 9 INJECTION, SOLUTION INTRAVENOUS at 08:55

## 2019-07-11 ENCOUNTER — DOCUMENTATION (OUTPATIENT)
Dept: CARDIOLOGY | Facility: CLINIC | Age: 74
End: 2019-07-11

## 2019-07-11 ENCOUNTER — DOCUMENTATION (OUTPATIENT)
Dept: CARDIAC REHAB | Facility: HOSPITAL | Age: 74
End: 2019-07-11

## 2019-07-11 NOTE — ED PROVIDER NOTES
I have seen and examined the patient.  This is a pleasant 74-year-old male who is status post right and left heart catheterization from right groin access by Dr. Bo today.  The typical fiber plug is placed in his right groin post catheterization and did well until he got home when he started to experience bleeding from the venous insertion site.  It was a steady ooze and he was instructed by Dr. Bo to come back to the ER for evaluation.    Please see Debbie Garber's note.    My exam patient was alert and oriented no acute distress.  His right groin had issues coming from the venous insertion site.  Remember the CVO view staff was holding direct pressure.    I talked the patient about repair this site is Dr. Bo wanted me to repair it and possibly place sutures or Tranxene Guanaco acid.  Patient agreed to proceed.        PROCEDURE NOTE        The right groin was prepped and draped in normal sterile fashion.  The oozing wound was anesthetized with 4 mL's of 1% lidocaine with epinephrine.  This achieved modicum of hemostasis but then another 6 mL's of txa was insufflated in and around the wound but this was just done with an atomizer.  This actually improved hemostasis quite a bit.  Then two 5-0 chromic gut sutures placed in a simple interrupted fashion and this actually gave excellent hemostasis and there is no further bleeding.  Patient was watched additional 30 minutes in the ER and there is no bleeding.  Dressing was placed.  Placed given wound care instructions.      Recent Results (from the past 24 hour(s))   CBC (No Diff)    Collection Time: 07/10/19  8:39 AM   Result Value Ref Range    WBC 5.54 3.40 - 10.80 10*3/mm3    RBC 4.17 4.14 - 5.80 10*6/mm3    Hemoglobin 12.8 (L) 13.0 - 17.7 g/dL    Hematocrit 38.8 37.5 - 51.0 %    MCV 93.0 79.0 - 97.0 fL    MCH 30.7 26.6 - 33.0 pg    MCHC 33.0 31.5 - 35.7 g/dL    RDW 13.3 12.3 - 15.4 %    RDW-SD 45.4 37.0 - 54.0 fl    MPV 9.8 6.0 - 12.0 fL    Platelets 157 140 -  450 10*3/mm3   Basic Metabolic Panel    Collection Time: 07/10/19  8:39 AM   Result Value Ref Range    Glucose 107 (H) 65 - 99 mg/dL    BUN 22 8 - 23 mg/dL    Creatinine 1.15 0.76 - 1.27 mg/dL    Sodium 138 136 - 145 mmol/L    Potassium 3.4 (L) 3.5 - 5.2 mmol/L    Chloride 99 98 - 107 mmol/L    CO2 27.0 22.0 - 29.0 mmol/L    Calcium 8.8 8.6 - 10.5 mg/dL    eGFR Non African Amer 62 >60 mL/min/1.73    BUN/Creatinine Ratio 19.1 7.0 - 25.0    Anion Gap 12.0 5.0 - 15.0 mmol/L   Lipid Panel    Collection Time: 07/10/19  8:39 AM   Result Value Ref Range    Total Cholesterol 99 0 - 200 mg/dL    Triglycerides 84 0 - 150 mg/dL    HDL Cholesterol 36 (L) 40 - 60 mg/dL    LDL Cholesterol  46 0 - 100 mg/dL    VLDL Cholesterol 16.8 mg/dL    LDL/HDL Ratio 1.28    CBC Auto Differential    Collection Time: 07/10/19  9:30 PM   Result Value Ref Range    WBC 5.48 3.40 - 10.80 10*3/mm3    RBC 4.17 4.14 - 5.80 10*6/mm3    Hemoglobin 12.6 (L) 13.0 - 17.7 g/dL    Hematocrit 38.8 37.5 - 51.0 %    MCV 93.0 79.0 - 97.0 fL    MCH 30.2 26.6 - 33.0 pg    MCHC 32.5 31.5 - 35.7 g/dL    RDW 13.3 12.3 - 15.4 %    RDW-SD 45.6 37.0 - 54.0 fl    MPV 9.7 6.0 - 12.0 fL    Platelets 147 140 - 450 10*3/mm3    Neutrophil % 55.5 42.7 - 76.0 %    Lymphocyte % 29.7 19.6 - 45.3 %    Monocyte % 10.0 5.0 - 12.0 %    Eosinophil % 4.2 0.3 - 6.2 %    Basophil % 0.2 0.0 - 1.5 %    Immature Grans % 0.4 0.0 - 0.5 %    Neutrophils, Absolute 3.04 1.70 - 7.00 10*3/mm3    Lymphocytes, Absolute 1.63 0.70 - 3.10 10*3/mm3    Monocytes, Absolute 0.55 0.10 - 0.90 10*3/mm3    Eosinophils, Absolute 0.23 0.00 - 0.40 10*3/mm3    Basophils, Absolute 0.01 0.00 - 0.20 10*3/mm3    Immature Grans, Absolute 0.02 0.00 - 0.05 10*3/mm3    nRBC 0.0 0.0 - 0.2 /100 WBC   Protime-INR    Collection Time: 07/10/19  9:30 PM   Result Value Ref Range    Protime 13.9 11.2 - 14.3 Seconds    INR 1.12 0.85 - 1.16     Note: In addition to lab results from this visit, the labs listed above may include labs  taken at another facility or during a different encounter within the last 24 hours. Please correlate lab times with ED admission and discharge times for further clarification of the services performed during this visit.    No orders to display     Vitals:    07/10/19 2250 07/10/19 2251 07/10/19 2254 07/10/19 2305   BP: 154/61  172/63    BP Location:       Patient Position:       Pulse:    64   Resp:    18   Temp:       SpO2:  94% 94%    Weight:       Height:         Medications   lidocaine-EPINEPHrine (XYLOCAINE W/EPI) 1 %-1:[id number] injection 10 mL (10 mL Injection Given 7/10/19 2220)   Tranexamic Acid Sterile Solution 500 mg (500 mg Topical Given 7/10/19 2220)     ECG/EMG Results (last 24 hours)     ** No results found for the last 24 hours. **        No orders to display         He will keep his follow-up with Dr. Bo and his primary doctor and return to the ER if worse in any way.     Pietro Nick MD  07/10/19 0468

## 2019-07-11 NOTE — ED PROVIDER NOTES
Subjective   Jair Flores is a 74 y.o.male who presents to the emergency department with complaints of post-op problem. The patient had a right and left cardiac catheterization this morning by Dr. Francisco Bo. He was discharged at 1700 today and has had blood oozing from the insertion site since. He has been through multiple 4x4 gauze pads. He reports that he was told by a nurse upon discharge to take his daily Plavix. He denies any acute pain. He denies chest pain or shortness of breath. There are no other acute complaints at this time.        History provided by:  Patient  Wound Check   Location:  Right lower extremity  Quality:  Post-op bleeding  Severity:  Moderate  Onset quality:  Sudden  Duration:  4 hours  Timing:  Constant  Progression:  Unchanged  Chronicity:  New  Context:  Patient was discharged at 1700 following a cardiac catherization. He has had bleeding from insertion site since discharge.  Associated symptoms: no chest pain and no shortness of breath        Review of Systems   Respiratory: Negative for shortness of breath.    Cardiovascular: Negative for chest pain.   Skin: Positive for wound (bleeding from insertion site following procedure).   All other systems reviewed and are negative.      Past Medical History:   Diagnosis Date   • Acute gout of foot 4/29/2019   • Cancer (CMS/HCC)     bladder; cysto surveillance 07/23/2017; free of disease   • Cataract     right eye   • Degenerative arthritis     right; knee replacement; successful   • Flash pulmonary edema (CMS/HCC) 4/29/2019    Episode of flash pulmonary edema thought to be related to volume overload at the time of left knee surgery in 2005 performed in [place] he has had no recurrence his EKG does show inferior Q waves which are unchanged.   • Fluid overload 2015    after knee surgery   • GERD (gastroesophageal reflux disease)    • Hard to intubate     has been told he is difficult to intubate   • Hematuria 4/29/2019   • Hemorrhoids  09/12/2011    hemorrhoid ablation   • Hypertension    • Skin cancer     head   • Status post left partial knee replacement 4/29/2019    Left knee surgery 2005 [place] with complication of flash pulmonary edema likely due to anesthesia total resolution without sequela but with stable abnormal EKG with inferior wall Q waves no change and asymptomatic.   • Wears glasses        Allergies   Allergen Reactions   • Penicillins Anaphylaxis   • Rocephin [Ceftriaxone] Anaphylaxis   • Ciprofloxacin Swelling   • Macrobid [Nitrofurantoin Macrocrystal] Swelling   • Bactrim [Sulfamethoxazole-Trimethoprim] Diarrhea       Past Surgical History:   Procedure Laterality Date   • CARDIAC CATHETERIZATION N/A 7/10/2019    Procedure: Right and Left Heart Cath;  Surgeon: Francisco Bo MD;  Location:  [facility];  Service: Cardiology   • COLONOSCOPY      routinely every 10 years   • CYSTOSCOPY      x3   • CYSTOSCOPY  07/23/2017    bladder cancer; cysto surveillance; free of disease   • EXCISIONAL HEMORRHOIDECTOMY      hemorrhoid ablation; hemorrhoid onset 09/12/2011   • EYE SURGERY      as a child after cutting eye with barbwire fence   • INTERVENTIONAL RADIOLOGY PROCEDURE Bilateral 6/24/2019    Procedure: Carotid Cerebral Angiogram with possible stent placement;  Surgeon: Abad Mata MD;  Location:  [facility];  Service: Interventional Radiology   • KNEE ARTHROPLASTY, PARTIAL REPLACEMENT Left    • WV TCAT IV STENT CRV CRTD ART EMBOLIC PROTECJ N/A 6/24/2019    Procedure: Carotid Stent;  Surgeon: Abad Mata MD;  Location:  [facility];  Service: Interventional Radiology   • SKIN CANCER EXCISION     • TONSILLECTOMY     • TOTAL KNEE ARTHROPLASTY Right     Dgen arthritis   • TRANSURETHRAL RESECTION OF BLADDER TUMOR N/A 3/29/2019    Procedure: TRANSURETHRAL RESECTION OF BLADDER TUMOR WITH MITOMYCIN;  Surgeon: Jair Braxton MD;  Location:  [facility];  Service: Urology        History reviewed. No pertinent family history.    Social History     Socioeconomic History   • Marital status:      Spouse name: Not on file   • Number of children: Not on file   • Years of education: Not on file   • Highest education level: Not on file   Tobacco Use   • Smoking status: Never Smoker   • Smokeless tobacco: Never Used   Substance and Sexual Activity   • Alcohol use: Yes     Alcohol/week: 1.8 oz     Types: 3 Shots of liquor per week     Comment: socially   • Drug use: Defer   • Sexual activity: Defer         Objective   Physical Exam   Constitutional: He is oriented to person, place, and time. He appears well-developed and well-nourished. No distress.   HENT:   Head: Normocephalic and atraumatic.   Nose: Nose normal.   Eyes: Conjunctivae are normal. No scleral icterus.   Neck: Normal range of motion. Neck supple.   Cardiovascular: Normal rate, regular rhythm and normal heart sounds.   Pulmonary/Chest: Effort normal and breath sounds normal. No respiratory distress.   Abdominal: Soft. There is no tenderness.   Musculoskeletal: Normal range of motion.   Neurological: He is alert and oriented to person, place, and time.   Skin: Skin is warm and dry.   Rt groin:  Blood saturated dressing observed.  Dressing removed, small incision ( Less than .5 cm) continues to bleed.    Psychiatric: He has a normal mood and affect. His behavior is normal.   Nursing note and vitals reviewed.      Procedures         ED Course  ED Course as of Jul 14 0329   Wed Jul 10, 2019   2046 Dr. Bo contacted per family request.  Site cleaned and manual pressure applied at this time.    [KG]   2100 Dr. Nick and Dr. Bo at bedside to examine patient at this time.   [KG]      ED Course User Index  [KG] Charu Garber, APRN     No results found for this or any previous visit (from the past 24 hour(s)).  Note: In addition to lab results from this visit, the labs listed above may include labs taken at another  facility or during a different encounter within the last 24 hours. Please correlate lab times with ED admission and discharge times for further clarification of the services performed during this visit.    No orders to display     Vitals:    07/10/19 2251 07/10/19 2254 07/10/19 2305 07/10/19 2333   BP:  172/63     BP Location:       Patient Position:       Pulse:   64    Resp:   18    Temp:       SpO2: 94% 94%  95%   Weight:       Height:         Medications   lidocaine-EPINEPHrine (XYLOCAINE W/EPI) 1 %-1:457626 injection 10 mL (10 mL Injection Given 7/10/19 2220)   Tranexamic Acid Sterile Solution 500 mg (500 mg Topical Given 7/10/19 2220)     ECG/EMG Results (last 24 hours)     ** No results found for the last 24 hours. **        No orders to display                       MDM    Final diagnoses:   Bleeding from wound       Documentation assistance provided by mayuri Weber.  Information recorded by the scribe was done at my direction and has been verified and validated by me.     Mal Weber  07/10/19 2030       Charu Garber, ANTIONE  07/14/19 2210

## 2019-07-11 NOTE — DISCHARGE INSTRUCTIONS
Keep dressing in place until today evening then you are able to remove it if no bleeding he may take a shower just pat it dry and covered up with another dressing.    Return to the ER if any more bleeding or issues.

## 2019-07-24 ENCOUNTER — HOSPITAL ENCOUNTER (OUTPATIENT)
Dept: SLEEP MEDICINE | Facility: HOSPITAL | Age: 74
Discharge: HOME | End: 2019-07-24
Admitting: NEUROLOGICAL SURGERY

## 2019-07-24 VITALS
WEIGHT: 175.2 LBS | HEART RATE: 65 BPM | DIASTOLIC BLOOD PRESSURE: 67 MMHG | HEIGHT: 70 IN | BODY MASS INDEX: 25.08 KG/M2 | SYSTOLIC BLOOD PRESSURE: 155 MMHG | OXYGEN SATURATION: 93 %

## 2019-07-24 PROCEDURE — 95806 SLEEP STUDY UNATT&RESP EFFT: CPT

## 2019-07-24 PROCEDURE — 95806 SLEEP STUDY UNATT&RESP EFFT: CPT | Performed by: INTERNAL MEDICINE

## 2019-07-30 RX ORDER — ENALAPRIL MALEATE 20 MG/1
40 TABLET ORAL DAILY
Qty: 60 TABLET | Refills: 11 | Status: SHIPPED | OUTPATIENT
Start: 2019-07-30 | End: 2019-09-26 | Stop reason: SDUPTHER

## 2019-07-30 NOTE — PROGRESS NOTES
Pt reported average BP at home has been 155/65, HR 60's. Per MRJ increase enalapril to 40 mg daily. Pt aware and agreeable. KH

## 2019-08-01 ENCOUNTER — TELEPHONE (OUTPATIENT)
Dept: INTERNAL MEDICINE | Facility: CLINIC | Age: 74
End: 2019-08-01

## 2019-08-01 NOTE — TELEPHONE ENCOUNTER
Can your labs order 5-21-19  for follow up CMP & Lipid be canceled?      5-21-19 lab letter recommended patient return in 2 months for repeat labs & to start Lipitor 10mg daily.   Patient was admitted 6-11-19 for CVA.  Cardiology started him on Lipitor 80mg daily.

## 2019-08-08 DIAGNOSIS — G47.33 OBSTRUCTIVE SLEEP APNEA: Primary | ICD-10-CM

## 2019-08-08 DIAGNOSIS — I10 BENIGN ESSENTIAL HYPERTENSION: ICD-10-CM

## 2019-08-14 ENCOUNTER — TELEPHONE (OUTPATIENT)
Dept: SLEEP MEDICINE | Facility: HOSPITAL | Age: 74
End: 2019-08-14

## 2019-08-14 NOTE — TELEPHONE ENCOUNTER
Left message offering patient opportunity to follow up with Dr. Pink regarding his sleep study since it does not appear that he has f/u visit scheduled to discuss sleep study results with anyone.  He was advised to call sleep center at [phone] to schedule initial consultation.  He was directly referred to sleep center for testing only, however the referring physician does not appear to have patient scheduled for any follow up visits and Dr. Pink just wants to make sure this patient gets seen by someone regarding the sleep study.

## 2019-08-26 ENCOUNTER — CALL CENTER PROGRAMS (OUTPATIENT)
Dept: CALL CENTER | Facility: HOSPITAL | Age: 74
End: 2019-08-26

## 2019-08-26 NOTE — OUTREACH NOTE
Stroke Humza Survey      Responses   Facility patient discharged from?  [place]   Attempt successful  No   Unsuccessful attempts  Attempt 1          Pati Rangel RN

## 2019-08-28 ENCOUNTER — CALL CENTER PROGRAMS (OUTPATIENT)
Dept: CALL CENTER | Facility: HOSPITAL | Age: 74
End: 2019-08-28

## 2019-08-28 NOTE — OUTREACH NOTE
Stroke Humza Survey      Responses   Facility patient discharged from?  [facility]   Attempt successful  No   Unsuccessful attempts  Attempt 2          Pati Rangel RN

## 2019-08-29 ENCOUNTER — CALL CENTER PROGRAMS (OUTPATIENT)
Dept: CALL CENTER | Facility: HOSPITAL | Age: 74
End: 2019-08-29

## 2019-08-29 NOTE — OUTREACH NOTE
Stroke Humza Survey      Responses   Facility patient discharged from?  [place]   Attempt successful  No   Unsuccessful attempts  Attempt 3   Call Center [place] Score  7          Pati Rangel RN

## 2019-09-18 ENCOUNTER — TRANSITIONAL CARE MANAGEMENT TELEPHONE ENCOUNTER (OUTPATIENT)
Dept: INTERNAL MEDICINE | Facility: CLINIC | Age: 74
End: 2019-09-18

## 2019-09-19 ENCOUNTER — TELEPHONE (OUTPATIENT)
Dept: INTERNAL MEDICINE | Facility: CLINIC | Age: 74
End: 2019-09-19

## 2019-09-19 NOTE — TELEPHONE ENCOUNTER
KARINA FROM [facility] CALLED STATING THAT SHE HAD TO REPORT THAT THERE IS A DRUG INTERACTION WITH THE THE FOLLOWING MEDS...  ASPIRIN WITH THE PLAVIX AND   PLAVIX WITH THE LASIX AND POTASSIUM   SHE STATED THAT THE PT WILL ONLY BE TAKING THE LASIX AND POTASSIUM UNTIL NEXT WEEK. IT IS A 7 DAY SUPPLY    SHE CAN BE REACHED [phone]

## 2019-09-20 NOTE — OUTREACH NOTE
Multiple attempts have been made to reach the patient by phone.  All attempts have been documented. Patient does have a hospital follow up appointment scheduled with Daniela TALBOT 9/24/19 and TCM is applicable.

## 2019-09-25 ENCOUNTER — OFFICE VISIT (OUTPATIENT)
Dept: INTERNAL MEDICINE | Facility: CLINIC | Age: 74
End: 2019-09-25

## 2019-09-25 VITALS
BODY MASS INDEX: 23.19 KG/M2 | HEART RATE: 68 BPM | TEMPERATURE: 97.5 F | WEIGHT: 162 LBS | DIASTOLIC BLOOD PRESSURE: 82 MMHG | SYSTOLIC BLOOD PRESSURE: 136 MMHG | HEIGHT: 70 IN

## 2019-09-25 DIAGNOSIS — E78.5 HYPERLIPIDEMIA, UNSPECIFIED HYPERLIPIDEMIA TYPE: ICD-10-CM

## 2019-09-25 DIAGNOSIS — C67.9 MALIGNANT NEOPLASM OF URINARY BLADDER, UNSPECIFIED SITE (HCC): ICD-10-CM

## 2019-09-25 DIAGNOSIS — I10 BENIGN ESSENTIAL HYPERTENSION: ICD-10-CM

## 2019-09-25 DIAGNOSIS — K21.9 GASTROESOPHAGEAL REFLUX DISEASE, ESOPHAGITIS PRESENCE NOT SPECIFIED: ICD-10-CM

## 2019-09-25 DIAGNOSIS — Z95.2 S/P AVR (AORTIC VALVE REPLACEMENT): Primary | ICD-10-CM

## 2019-09-25 DIAGNOSIS — I63.9 ACUTE EMBOLIC STROKE (HCC): ICD-10-CM

## 2019-09-25 DIAGNOSIS — M10.079 IDIOPATHIC GOUT OF FOOT, UNSPECIFIED CHRONICITY, UNSPECIFIED LATERALITY: ICD-10-CM

## 2019-09-25 PROCEDURE — 99496 TRANSJ CARE MGMT HIGH F2F 7D: CPT | Performed by: NURSE PRACTITIONER

## 2019-09-25 RX ORDER — ACETAMINOPHEN 325 MG/1
650 TABLET ORAL AS NEEDED
COMMUNITY
End: 2019-12-12 | Stop reason: SDDI

## 2019-09-25 RX ORDER — METOPROLOL TARTRATE 50 MG/1
1 TABLET, FILM COATED ORAL EVERY 12 HOURS
COMMUNITY
Start: 2019-09-17 | End: 2019-09-26 | Stop reason: ALTCHOICE

## 2019-09-25 NOTE — PROGRESS NOTES
Transitional Care Follow Up Visit  Subjective     Jair Flores is a 74 y.o. male who presents for a transitional care management visit.    Within 48 business hours after discharge our office contacted him via telephone to coordinate his care and needs.      I reviewed and discussed the details of that call along with the discharge summary, hospital problems, inpatient lab results, inpatient diagnostic studies, and consultation reports with Jair.     Current outpatient and discharge medications have been reconciled for the patient.  Reviewed by: ANTIONE Cortez      Date of TCM Phone Call 6/12/2019 6/26/2019 9/20/2019   [place], [place]   Date of Admission - 6/24/2019 9/12/2019   Date of Discharge - 6/25/2019 9/17/2019   Discharge Disposition Home or Self Care Home or Self Care -     Risk for Readmission (LACE) No Data Recorded    History of Present Illness   Course During Hospital Stay:       Mr. Hughes arrives here with his wife, Carey, for follow-up after aortic valve replacement which was done at [facility] in [place].  He was sent there for surgery by Dr. Cisco Bo.  Patient had a CVA earlier this year in June and was hospitalized at [facility].  He was followed by Dr. Abad wagner.  Embolic infarcts on MRI Katie 10, 2019 he sees Cisco Bo for follow-up tomorrow.  Urge instructions included activity as tolerated without heavy lifting or strenuous exertion.    CXR on discharge showed mild pleural effusions.  He was put on Lasix 20 mg daily x7 days with potassium which he has completed.  New medication added included metoprolol 50 mg twice daily.    Some chest wall pain, no sob or weight gain  No fever, night sweats, abd pain, incisional drainage     Using repirex hourly right now    Allergic rhinitis: Some allergy sx, PND, typially uses flonase, mucinex, saline spray which he has been  doing.    hh eval last week did not qualify  The following portions of the patient's history were reviewed and updated as appropriate: allergies, current medications, past family history, past medical history, past social history, past surgical history and problem list.    Review of Systems   Constitutional: Negative for chills, fatigue and fever.   HENT: Negative for congestion, ear pain and sinus pressure.    Respiratory: Negative for cough, chest tightness, shortness of breath and wheezing.    Cardiovascular: Negative for chest pain and palpitations.   Gastrointestinal: Negative for abdominal pain, blood in stool and constipation.   Skin: Negative for color change.   Allergic/Immunologic: Negative for environmental allergies.   Neurological: Negative for dizziness, speech difficulty and headaches.   Psychiatric/Behavioral: Negative for confusion. The patient is not nervous/anxious.        Objective   Physical Exam   Constitutional: He is oriented to person, place, and time. He appears well-developed and well-nourished.   HENT:   Head: Normocephalic.   Eyes: Pupils are equal, round, and reactive to light.   Cardiovascular: Normal rate and regular rhythm.   Pulmonary/Chest: Effort normal and breath sounds normal.   Abdominal: Soft.   Neurological: He is alert and oriented to person, place, and time.   Skin: Capillary refill takes less than 2 seconds.   Psychiatric: He has a normal mood and affect. His behavior is normal. Judgment and thought content normal.       Assessment/Plan   Jair was seen today for transitional care management.    Diagnoses and all orders for this visit:    S/P AVR (aortic valve replacement)  Comments:  Follow-up with Dr. Bo tomorrow    Acute embolic stroke (CMS/HCC)  Comments:  On Plavix and statin daily    Benign essential hypertension  Comments:  Continue enalapril daily and metoprolol twice daily    Hyperlipidemia, unspecified hyperlipidemia type    Gastroesophageal reflux disease,  esophagitis presence not specified  Comments:  Continue famotidine    Malignant neoplasm of urinary bladder, unspecified site (CMS/HCC)    Idiopathic gout of foot, unspecified chronicity, unspecified laterality  Comments:  Continue daily allopurinol

## 2019-09-26 ENCOUNTER — OFFICE VISIT (OUTPATIENT)
Dept: CARDIOLOGY | Facility: CLINIC | Age: 74
End: 2019-09-26

## 2019-09-26 VITALS
HEART RATE: 50 BPM | WEIGHT: 165 LBS | SYSTOLIC BLOOD PRESSURE: 111 MMHG | DIASTOLIC BLOOD PRESSURE: 80 MMHG | BODY MASS INDEX: 23.62 KG/M2 | HEIGHT: 70 IN

## 2019-09-26 DIAGNOSIS — I35.1 NONRHEUMATIC AORTIC VALVE INSUFFICIENCY: Primary | ICD-10-CM

## 2019-09-26 DIAGNOSIS — I65.23 CAROTID STENOSIS, BILATERAL: ICD-10-CM

## 2019-09-26 DIAGNOSIS — Z95.2 S/P AVR (AORTIC VALVE REPLACEMENT): ICD-10-CM

## 2019-09-26 PROCEDURE — 99213 OFFICE O/P EST LOW 20 MIN: CPT | Performed by: INTERNAL MEDICINE

## 2019-09-26 RX ORDER — ENALAPRIL MALEATE 20 MG/1
40 TABLET ORAL DAILY
Qty: 180 TABLET | Refills: 3 | Status: SHIPPED | OUTPATIENT
Start: 2019-09-26 | End: 2019-11-29 | Stop reason: HOSPADM

## 2019-09-26 RX ORDER — METOPROLOL SUCCINATE 50 MG/1
50 TABLET, EXTENDED RELEASE ORAL DAILY
Qty: 90 TABLET | Refills: 3 | Status: ON HOLD | OUTPATIENT
Start: 2019-09-26 | End: 2019-11-29 | Stop reason: SDUPTHER

## 2019-09-26 RX ORDER — FAMOTIDINE 20 MG/1
20 TABLET, FILM COATED ORAL 2 TIMES DAILY
Qty: 180 TABLET | Refills: 3 | Status: SHIPPED | OUTPATIENT
Start: 2019-09-26 | End: 2020-01-01

## 2019-09-26 RX ORDER — ATORVASTATIN CALCIUM 80 MG/1
80 TABLET, FILM COATED ORAL NIGHTLY
Qty: 90 TABLET | Refills: 3 | Status: SHIPPED | OUTPATIENT
Start: 2019-09-26 | End: 2020-01-01

## 2019-09-26 NOTE — PROGRESS NOTES
OFFICE FOLLOW UP     Date of Encounter:2019     Name: Jair Flores  : 1945  Address: [facility] [address] [facility] [place] [address]    PCP: Osmar Clemons MD  [address]  [place] [id number]    Jair Flores is a 74 y.o. male.      Chief Complaint: VHD, post AVR    Problem List:   1. Carotid artery stenosis   A. Recurrent left hemispheric ischemic CVA 2019  B. Carotid duplex 19: LAURYN 50-69%, LICA <50%  C. Echo 19: EF 61-65%, LA is mod dilated, Severe AI, negative bubble study   D. Negative TCD, no Afib on monitor   E. Readmission 6/10 with recurrent right foot and arm paresthesias   i. MRI brain 6/10/19: multiple punctate foci along left hemisphere, suggestive of embolic infarcts  F. Carotid MR ([facility] read): large LICA ulcerated plaque with very large hemorrhagic component and modest sized lipid rich core.  G. LICA stent placement 2019, AMARA Mata MD  2. Aortic insufficiency  A.  Severe AI by TTE 2019, EF 61-65%  B.  BRENDON 19: EF 55%, prolapse of right coronary leaflet with mod-severe AI  C.  AVR with 27 mm Medtronic bovine pericardial AV, 2019 ([place])  3. Coronary artery disease, nonobstructive by cath, 2019.  4. Hypertension   5. Hyperlipidemia  6. Probable BEATRIZ  7. History of bladder cancer, s/p resection, followed by Dr. Braxton  8. Surgical history:              A. Left knee partial replacement              B. Bladder tumor resection    Allergies:  Allergies   Allergen Reactions   • Penicillins Anaphylaxis   • Rocephin [Ceftriaxone] Anaphylaxis   • Ciprofloxacin Swelling   • Macrobid [Nitrofurantoin Macrocrystal] Swelling   • Bactrim [Sulfamethoxazole-Trimethoprim] Diarrhea     Abdominal pain, diarrhea, nausea        Current Medications:  •  acetaminophen (TYLENOL) 325 MG tablet, Take 650 mg by mouth As Needed for Mild Pain   •  allopurinol (ZYLOPRIM) 100 MG tablet, Take 100 mg by mouth Every Morning   •  Ascorbic Acid (VITAMIN C) 500 MG chewable  "tablet, Chew 500 mg Daily  •  aspirin EC 81 MG EC tablet, Take 1 tablet by mouth Daily.   •  atorvastatin (LIPITOR) 80 MG tablet, Take 1 tablet by mouth Every Night  •  clopidogrel (PLAVIX) 75 MG tablet, Take 1 tablet by mouth Daily. (Patient taking differently: Take 75 mg by mouth Every Morning.)  •  enalapril (VASOTEC) 20 MG tablet, Take 2 tablets by mouth Daily.  •  famotidine (PEPCID) 20 MG tablet, Take 1 tablet by mouth 2 (Two) Times a Day  •  metoprolol tartrate (LOPRESSOR) 50 MG tablet, Take 1 tablet by mouth Every 12 (Twelve) Hours.  •  Multiple Vitamins-Minerals (HM MULTIVITAMIN ADULT GUMMY) chewable tablet, Chew 1 tablet Daily.     History of Present Illness:   Jair Flores returns for follow up after valve surgery in [place]. He has done well post operatively.  He has complaints of generalized fatigue but no presyncope or syncope. He has no angina or heart failure symptoms.  He is walking daily with his wife and has not yet returned to work. He is very happy with the care he received at [facility] and has scheduled follow up with Dr. Mustafa next month. He does have the AHA \"wallet card\" re SBE prophylaxis and understands its importance and use.     The following portions of the patient's history were reviewed and updated as appropriate: allergies, current medications and problem list.    ROS: Pertinent positives as listed in the HPI.  All other systems reviewed and negative.    Objective:    Vitals:    09/26/19 1327 09/26/19 1328 09/26/19 1329   BP: 110/70 118/86 111/80   BP Location: Left arm Right arm Right arm   Patient Position: Sitting Sitting Standing   Pulse: 55 71 50   Weight: 74.8 kg (165 lb)     Height: 177.8 cm (70\")         Physical Exam:  GENERAL: Alert, cooperative, in no acute distress.   HEENT: Normocephalic, no adenopathy, no jugular venous distention  HEART: No discrete PMI is noted. Regular rhythm, normal rate, and no murmurs, gallops, or rubs.   LUNGS: Clear " "to auscultation bilaterally. No wheezing, rales or ronchi.  ABDOMEN: Soft, bowel sounds present, non-tender   NEUROLOGIC: No focal abnormalities involving strength or sensation are noted.   EXTREMITIES: No clubbing, cyanosis, or edema noted.     Diagnostic Data:  No new labs available to review.     Procedures      Assessment and Plan:   1.  VHD, s/p AVR: He is doing well postoperatively. He is reminded of SBE prophylaxis. He will keep scheduled follow up with Dr. Mustafa in [place] next month. We will be happy to set up cardiac rehab when cleared by CTS.   2.  TIA with carotid stent: We will stop Plavix today and continue lifelong aspirin and statin.   3.   Fatigue/weakness: I feel this is related to \"normal\" post operative state. He was anemic at discharge. We will change metoprolol tartrate (50 mg po BID) to metoprolol succinate 50 mg p.o. once daily.   4.  HTN: Medications will be refilled today. BP \"at target\".    Scribed for Francisco Bo MD by Nicki Mckeon RN. 09/26/2019 4:42 PM.                 "

## 2019-09-27 NOTE — PATIENT INSTRUCTIONS
Already completed 7-day Lasix and potassium dose given at discharge.  Continue using Respirex 2 regularly.  Okay to continue saline spray, daily antihistamine, Flonase and/or Mucinex for allergy symptoms as needed.  If any fever, cough, shortness of breath, wheezing follow-up in the office for evaluation.    For stroke prevention, continue daily Plavix 75 mg, Lipitor daily  For blood pressure continue metoprolol 50 mg twice a day, enalapril 20 mg daily.  For reflux, continue famotidine 20 mg daily   For gout prevention, continue allopurinol 100 mg daily

## 2019-10-03 ENCOUNTER — DOCUMENTATION (OUTPATIENT)
Dept: CARDIAC REHAB | Facility: HOSPITAL | Age: 74
End: 2019-10-03

## 2019-11-25 ENCOUNTER — APPOINTMENT (OUTPATIENT)
Dept: GENERAL RADIOLOGY | Facility: HOSPITAL | Age: 74
End: 2019-11-25

## 2019-11-25 ENCOUNTER — HOSPITAL ENCOUNTER (INPATIENT)
Facility: HOSPITAL | Age: 74
LOS: 4 days | Discharge: HOME | End: 2019-11-29
Attending: EMERGENCY MEDICINE | Admitting: INTERNAL MEDICINE

## 2019-11-25 ENCOUNTER — APPOINTMENT (OUTPATIENT)
Dept: CARDIOLOGY | Facility: HOSPITAL | Age: 74
End: 2019-11-25

## 2019-11-25 DIAGNOSIS — J06.9 UPPER RESPIRATORY TRACT INFECTION, UNSPECIFIED TYPE: ICD-10-CM

## 2019-11-25 DIAGNOSIS — I50.21 ACUTE SYSTOLIC CONGESTIVE HEART FAILURE (HCC): ICD-10-CM

## 2019-11-25 DIAGNOSIS — I10 ELEVATED BLOOD PRESSURE READING WITH DIAGNOSIS OF HYPERTENSION: ICD-10-CM

## 2019-11-25 DIAGNOSIS — I48.91 ATRIAL FIBRILLATION WITH RVR (HCC): ICD-10-CM

## 2019-11-25 DIAGNOSIS — I50.9 ACUTE CONGESTIVE HEART FAILURE, UNSPECIFIED HEART FAILURE TYPE (HCC): Primary | ICD-10-CM

## 2019-11-25 DIAGNOSIS — I48.91 NEW ONSET ATRIAL FIBRILLATION (HCC): ICD-10-CM

## 2019-11-25 PROBLEM — Z86.73 HISTORY OF CVA (CEREBROVASCULAR ACCIDENT): Status: ACTIVE | Noted: 2019-11-25

## 2019-11-25 PROBLEM — R94.31 PROLONGED QT INTERVAL: Status: ACTIVE | Noted: 2019-11-25

## 2019-11-25 PROBLEM — I25.10 CAD (CORONARY ARTERY DISEASE): Status: ACTIVE | Noted: 2019-11-25

## 2019-11-25 LAB
ALBUMIN SERPL-MCNC: 3.9 G/DL (ref 3.5–5.2)
ALBUMIN/GLOB SERPL: 1.1 G/DL
ALP SERPL-CCNC: 130 U/L (ref 39–117)
ALT SERPL W P-5'-P-CCNC: 71 U/L (ref 1–41)
ANION GAP SERPL CALCULATED.3IONS-SCNC: 13 MMOL/L (ref 5–15)
APTT PPP: 32.5 SECONDS (ref 85–120)
ASCENDING AORTA: 4 CM
AST SERPL-CCNC: 69 U/L (ref 1–40)
B PARAPERT DNA SPEC QL NAA+PROBE: NOT DETECTED
B PERT DNA SPEC QL NAA+PROBE: NOT DETECTED
BACTERIA UR QL AUTO: ABNORMAL /HPF
BASOPHILS # BLD AUTO: 0.02 10*3/MM3 (ref 0–0.2)
BASOPHILS # BLD AUTO: 0.02 10*3/MM3 (ref 0–0.2)
BASOPHILS NFR BLD AUTO: 0.3 % (ref 0–1.5)
BASOPHILS NFR BLD AUTO: 0.3 % (ref 0–1.5)
BH CV ECHO MEAS - ASC AORTA: 4 CM
BH CV ECHO MEAS - BSA(HAYCOCK): 1.9 M^2
BH CV ECHO MEAS - BSA: 1.9 M^2
BH CV ECHO MEAS - BZI_BMI: 23.7 KILOGRAMS/M^2
BH CV ECHO MEAS - BZI_METRIC_HEIGHT: 177.8 CM
BH CV ECHO MEAS - BZI_METRIC_WEIGHT: 74.8 KG
BH CV ECHO MEAS - EDV(CUBED): 147.9 ML
BH CV ECHO MEAS - EDV(MOD-SP2): 101 ML
BH CV ECHO MEAS - EDV(MOD-SP4): 130 ML
BH CV ECHO MEAS - EDV(TEICH): 134.6 ML
BH CV ECHO MEAS - EF(CUBED): 34.2 %
BH CV ECHO MEAS - EF(MOD-BP): 30 %
BH CV ECHO MEAS - EF(MOD-SP2): 27.7 %
BH CV ECHO MEAS - EF(MOD-SP4): 29.2 %
BH CV ECHO MEAS - EF(TEICH): 27.7 %
BH CV ECHO MEAS - ESV(CUBED): 97.3 ML
BH CV ECHO MEAS - ESV(MOD-SP2): 73 ML
BH CV ECHO MEAS - ESV(MOD-SP4): 92 ML
BH CV ECHO MEAS - ESV(TEICH): 97.3 ML
BH CV ECHO MEAS - FS: 13 %
BH CV ECHO MEAS - IVS/LVPW: 0.8
BH CV ECHO MEAS - IVSD: 0.8 CM
BH CV ECHO MEAS - LA DIMENSION: 4.1 CM
BH CV ECHO MEAS - LV DIASTOLIC VOL/BSA (35-75): 67.6 ML/M^2
BH CV ECHO MEAS - LV MASS(C)D: 171.7 GRAMS
BH CV ECHO MEAS - LV MASS(C)DI: 89.2 GRAMS/M^2
BH CV ECHO MEAS - LV SYSTOLIC VOL/BSA (12-30): 47.8 ML/M^2
BH CV ECHO MEAS - LVIDD: 5.3 CM
BH CV ECHO MEAS - LVIDS: 4.6 CM
BH CV ECHO MEAS - LVLD AP2: 8.6 CM
BH CV ECHO MEAS - LVLD AP4: 9.1 CM
BH CV ECHO MEAS - LVLS AP2: 8.1 CM
BH CV ECHO MEAS - LVLS AP4: 8.4 CM
BH CV ECHO MEAS - LVPWD: 1 CM
BH CV ECHO MEAS - RAP SYSTOLE: 8 MMHG
BH CV ECHO MEAS - RVSP: 25 MMHG
BH CV ECHO MEAS - SI(CUBED): 26.3 ML/M^2
BH CV ECHO MEAS - SI(MOD-SP2): 14.6 ML/M^2
BH CV ECHO MEAS - SI(MOD-SP4): 19.8 ML/M^2
BH CV ECHO MEAS - SI(TEICH): 19.4 ML/M^2
BH CV ECHO MEAS - SV(CUBED): 50.5 ML
BH CV ECHO MEAS - SV(MOD-SP2): 28 ML
BH CV ECHO MEAS - SV(MOD-SP4): 38 ML
BH CV ECHO MEAS - SV(TEICH): 37.3 ML
BH CV ECHO MEAS - TR MAX PG: 17 MMHG
BH CV ECHO MEAS - TR MAX VEL: 202.2 CM/SEC
BH CV VAS BP RIGHT ARM: NORMAL MMHG
BH CV VAS BP RIGHT ARM: NORMAL MMHG
BILIRUB SERPL-MCNC: 0.7 MG/DL (ref 0.2–1.2)
BILIRUB UR QL STRIP: NEGATIVE
BUN BLD-MCNC: 19 MG/DL (ref 8–23)
BUN/CREAT SERPL: 18.1 (ref 7–25)
C PNEUM DNA NPH QL NAA+NON-PROBE: NOT DETECTED
CALCIUM SPEC-SCNC: 9.4 MG/DL (ref 8.6–10.5)
CHLORIDE SERPL-SCNC: 100 MMOL/L (ref 98–107)
CLARITY UR: CLEAR
CO2 SERPL-SCNC: 27 MMOL/L (ref 22–29)
COLOR UR: YELLOW
CREAT BLD-MCNC: 1.05 MG/DL (ref 0.76–1.27)
DEPRECATED RDW RBC AUTO: 55.8 FL (ref 37–54)
DEPRECATED RDW RBC AUTO: 57.4 FL (ref 37–54)
EOSINOPHIL # BLD AUTO: 0.16 10*3/MM3 (ref 0–0.4)
EOSINOPHIL # BLD AUTO: 0.21 10*3/MM3 (ref 0–0.4)
EOSINOPHIL NFR BLD AUTO: 2.7 % (ref 0.3–6.2)
EOSINOPHIL NFR BLD AUTO: 3.2 % (ref 0.3–6.2)
ERYTHROCYTE [DISTWIDTH] IN BLOOD BY AUTOMATED COUNT: 17.4 % (ref 12.3–15.4)
ERYTHROCYTE [DISTWIDTH] IN BLOOD BY AUTOMATED COUNT: 17.5 % (ref 12.3–15.4)
FLUAV H1 2009 PAND RNA NPH QL NAA+PROBE: NOT DETECTED
FLUAV H1 HA GENE NPH QL NAA+PROBE: NOT DETECTED
FLUAV H3 RNA NPH QL NAA+PROBE: NOT DETECTED
FLUAV SUBTYP SPEC NAA+PROBE: NOT DETECTED
FLUBV RNA ISLT QL NAA+PROBE: NOT DETECTED
GFR SERPL CREATININE-BSD FRML MDRD: 69 ML/MIN/1.73
GLOBULIN UR ELPH-MCNC: 3.4 GM/DL
GLUCOSE BLD-MCNC: 140 MG/DL (ref 65–99)
GLUCOSE UR STRIP-MCNC: NEGATIVE MG/DL
HADV DNA SPEC NAA+PROBE: NOT DETECTED
HCOV 229E RNA SPEC QL NAA+PROBE: NOT DETECTED
HCOV HKU1 RNA SPEC QL NAA+PROBE: NOT DETECTED
HCOV NL63 RNA SPEC QL NAA+PROBE: NOT DETECTED
HCOV OC43 RNA SPEC QL NAA+PROBE: NOT DETECTED
HCT VFR BLD AUTO: 42.2 % (ref 37.5–51)
HCT VFR BLD AUTO: 43.9 % (ref 37.5–51)
HGB BLD-MCNC: 13.1 G/DL (ref 13–17.7)
HGB BLD-MCNC: 13.2 G/DL (ref 13–17.7)
HGB UR QL STRIP.AUTO: ABNORMAL
HMPV RNA NPH QL NAA+NON-PROBE: NOT DETECTED
HOLD SPECIMEN: NORMAL
HOLD SPECIMEN: NORMAL
HPIV1 RNA SPEC QL NAA+PROBE: NOT DETECTED
HPIV2 RNA SPEC QL NAA+PROBE: NOT DETECTED
HPIV3 RNA NPH QL NAA+PROBE: NOT DETECTED
HPIV4 P GENE NPH QL NAA+PROBE: NOT DETECTED
HYALINE CASTS UR QL AUTO: ABNORMAL /LPF
IMM GRANULOCYTES # BLD AUTO: 0.01 10*3/MM3 (ref 0–0.05)
IMM GRANULOCYTES # BLD AUTO: 0.02 10*3/MM3 (ref 0–0.05)
IMM GRANULOCYTES NFR BLD AUTO: 0.2 % (ref 0–0.5)
IMM GRANULOCYTES NFR BLD AUTO: 0.3 % (ref 0–0.5)
INR PPP: 1.2 (ref 0.85–1.16)
KETONES UR QL STRIP: NEGATIVE
LEUKOCYTE ESTERASE UR QL STRIP.AUTO: ABNORMAL
LV EF 2D ECHO EST: 28 %
LV EF 2D ECHO EST: 30 %
LYMPHOCYTES # BLD AUTO: 1.65 10*3/MM3 (ref 0.7–3.1)
LYMPHOCYTES # BLD AUTO: 1.88 10*3/MM3 (ref 0.7–3.1)
LYMPHOCYTES NFR BLD AUTO: 27.7 % (ref 19.6–45.3)
LYMPHOCYTES NFR BLD AUTO: 28.4 % (ref 19.6–45.3)
M PNEUMO IGG SER IA-ACNC: NOT DETECTED
MAGNESIUM SERPL-MCNC: 2 MG/DL (ref 1.6–2.4)
MAXIMAL PREDICTED HEART RATE: 147 BPM
MCH RBC QN AUTO: 26.9 PG (ref 26.6–33)
MCH RBC QN AUTO: 27.3 PG (ref 26.6–33)
MCHC RBC AUTO-ENTMCNC: 30.1 G/DL (ref 31.5–35.7)
MCHC RBC AUTO-ENTMCNC: 31 G/DL (ref 31.5–35.7)
MCV RBC AUTO: 88.1 FL (ref 79–97)
MCV RBC AUTO: 89.6 FL (ref 79–97)
MONOCYTES # BLD AUTO: 0.31 10*3/MM3 (ref 0.1–0.9)
MONOCYTES # BLD AUTO: 0.44 10*3/MM3 (ref 0.1–0.9)
MONOCYTES NFR BLD AUTO: 5.2 % (ref 5–12)
MONOCYTES NFR BLD AUTO: 6.6 % (ref 5–12)
NEUTROPHILS # BLD AUTO: 3.8 10*3/MM3 (ref 1.7–7)
NEUTROPHILS # BLD AUTO: 4.05 10*3/MM3 (ref 1.7–7)
NEUTROPHILS NFR BLD AUTO: 61.2 % (ref 42.7–76)
NEUTROPHILS NFR BLD AUTO: 63.9 % (ref 42.7–76)
NITRITE UR QL STRIP: NEGATIVE
NRBC BLD AUTO-RTO: 0 /100 WBC (ref 0–0.2)
NRBC BLD AUTO-RTO: 0 /100 WBC (ref 0–0.2)
NT-PROBNP SERPL-MCNC: ABNORMAL PG/ML (ref 5–900)
PH UR STRIP.AUTO: 5.5 [PH] (ref 5–8)
PLATELET # BLD AUTO: 105 10*3/MM3 (ref 140–450)
PLATELET # BLD AUTO: 132 10*3/MM3 (ref 140–450)
PMV BLD AUTO: 11.8 FL (ref 6–12)
PMV BLD AUTO: 12.5 FL (ref 6–12)
POTASSIUM BLD-SCNC: 4.3 MMOL/L (ref 3.5–5.2)
PROT SERPL-MCNC: 7.3 G/DL (ref 6–8.5)
PROT UR QL STRIP: ABNORMAL
PROTHROMBIN TIME: 14.6 SECONDS (ref 11.2–14.3)
RBC # BLD AUTO: 4.79 10*6/MM3 (ref 4.14–5.8)
RBC # BLD AUTO: 4.9 10*6/MM3 (ref 4.14–5.8)
RBC # UR: ABNORMAL /HPF
REF LAB TEST METHOD: ABNORMAL
RHINOVIRUS RNA SPEC NAA+PROBE: NOT DETECTED
RSV RNA NPH QL NAA+NON-PROBE: NOT DETECTED
SODIUM BLD-SCNC: 140 MMOL/L (ref 136–145)
SP GR UR STRIP: 1.01 (ref 1–1.03)
SQUAMOUS #/AREA URNS HPF: ABNORMAL /HPF
STRESS TARGET HR: 125 BPM
TROPONIN T SERPL-MCNC: 0.01 NG/ML (ref 0–0.03)
TROPONIN T SERPL-MCNC: <0.01 NG/ML (ref 0–0.03)
TSH SERPL DL<=0.05 MIU/L-ACNC: 5.61 UIU/ML (ref 0.27–4.2)
UFH PPP CHRO-ACNC: 0.1 IU/ML (ref 0.3–0.7)
UROBILINOGEN UR QL STRIP: ABNORMAL
WBC NRBC COR # BLD: 5.95 10*3/MM3 (ref 3.4–10.8)
WBC NRBC COR # BLD: 6.62 10*3/MM3 (ref 3.4–10.8)
WBC UR QL AUTO: ABNORMAL /HPF
WHOLE BLOOD HOLD SPECIMEN: NORMAL
WHOLE BLOOD HOLD SPECIMEN: NORMAL

## 2019-11-25 PROCEDURE — 93325 DOPPLER ECHO COLOR FLOW MAPG: CPT

## 2019-11-25 PROCEDURE — 25010000002 HEPARIN (PORCINE) 25000-0.45 UT/250ML-% SOLUTION: Performed by: EMERGENCY MEDICINE

## 2019-11-25 PROCEDURE — 81001 URINALYSIS AUTO W/SCOPE: CPT | Performed by: EMERGENCY MEDICINE

## 2019-11-25 PROCEDURE — 93005 ELECTROCARDIOGRAM TRACING: CPT

## 2019-11-25 PROCEDURE — 93005 ELECTROCARDIOGRAM TRACING: CPT | Performed by: EMERGENCY MEDICINE

## 2019-11-25 PROCEDURE — 93321 DOPPLER ECHO F-UP/LMTD STD: CPT

## 2019-11-25 PROCEDURE — 94799 UNLISTED PULMONARY SVC/PX: CPT

## 2019-11-25 PROCEDURE — 83880 ASSAY OF NATRIURETIC PEPTIDE: CPT | Performed by: EMERGENCY MEDICINE

## 2019-11-25 PROCEDURE — 84484 ASSAY OF TROPONIN QUANT: CPT | Performed by: PHYSICIAN ASSISTANT

## 2019-11-25 PROCEDURE — 93320 DOPPLER ECHO COMPLETE: CPT | Performed by: INTERNAL MEDICINE

## 2019-11-25 PROCEDURE — 83735 ASSAY OF MAGNESIUM: CPT | Performed by: INTERNAL MEDICINE

## 2019-11-25 PROCEDURE — 93312 ECHO TRANSESOPHAGEAL: CPT

## 2019-11-25 PROCEDURE — 84484 ASSAY OF TROPONIN QUANT: CPT | Performed by: EMERGENCY MEDICINE

## 2019-11-25 PROCEDURE — 99232 SBSQ HOSP IP/OBS MODERATE 35: CPT | Performed by: INTERNAL MEDICINE

## 2019-11-25 PROCEDURE — 93320 DOPPLER ECHO COMPLETE: CPT

## 2019-11-25 PROCEDURE — 25010000002 FENTANYL CITRATE (PF) 100 MCG/2ML SOLUTION

## 2019-11-25 PROCEDURE — 93312 ECHO TRANSESOPHAGEAL: CPT | Performed by: INTERNAL MEDICINE

## 2019-11-25 PROCEDURE — 93325 DOPPLER ECHO COLOR FLOW MAPG: CPT | Performed by: INTERNAL MEDICINE

## 2019-11-25 PROCEDURE — 84443 ASSAY THYROID STIM HORMONE: CPT | Performed by: INTERNAL MEDICINE

## 2019-11-25 PROCEDURE — 25010000002 HEPARIN (PORCINE) PER 1000 UNITS: Performed by: NURSE PRACTITIONER

## 2019-11-25 PROCEDURE — 99222 1ST HOSP IP/OBS MODERATE 55: CPT | Performed by: INTERNAL MEDICINE

## 2019-11-25 PROCEDURE — 25010000002 DIGOXIN PER 500 MCG: Performed by: INTERNAL MEDICINE

## 2019-11-25 PROCEDURE — 31500 INSERT EMERGENCY AIRWAY: CPT | Performed by: INTERNAL MEDICINE

## 2019-11-25 PROCEDURE — 85730 THROMBOPLASTIN TIME PARTIAL: CPT | Performed by: EMERGENCY MEDICINE

## 2019-11-25 PROCEDURE — 80053 COMPREHEN METABOLIC PANEL: CPT | Performed by: EMERGENCY MEDICINE

## 2019-11-25 PROCEDURE — 71046 X-RAY EXAM CHEST 2 VIEWS: CPT

## 2019-11-25 PROCEDURE — 85025 COMPLETE CBC W/AUTO DIFF WBC: CPT | Performed by: EMERGENCY MEDICINE

## 2019-11-25 PROCEDURE — 94660 CPAP INITIATION&MGMT: CPT

## 2019-11-25 PROCEDURE — 71045 X-RAY EXAM CHEST 1 VIEW: CPT

## 2019-11-25 PROCEDURE — 5A2204Z RESTORATION OF CARDIAC RHYTHM, SINGLE: ICD-10-PCS | Performed by: INTERNAL MEDICINE

## 2019-11-25 PROCEDURE — 25010000002 FUROSEMIDE PER 20 MG: Performed by: INTERNAL MEDICINE

## 2019-11-25 PROCEDURE — 25010000002 PROPOFOL 10 MG/ML EMULSION: Performed by: INTERNAL MEDICINE

## 2019-11-25 PROCEDURE — 85520 HEPARIN ASSAY: CPT | Performed by: EMERGENCY MEDICINE

## 2019-11-25 PROCEDURE — 0100U HC BIOFIRE FILMARRAY RESP PANEL 2: CPT | Performed by: PHYSICIAN ASSISTANT

## 2019-11-25 PROCEDURE — 25010000002 HEPARIN (PORCINE) PER 1000 UNITS: Performed by: EMERGENCY MEDICINE

## 2019-11-25 PROCEDURE — 94002 VENT MGMT INPAT INIT DAY: CPT

## 2019-11-25 PROCEDURE — 25010000002 SULFUR HEXAFLUORIDE MICROSPH 60.7-25 MG RECONSTITUTED SUSPENSION: Performed by: INTERNAL MEDICINE

## 2019-11-25 PROCEDURE — 85610 PROTHROMBIN TIME: CPT | Performed by: EMERGENCY MEDICINE

## 2019-11-25 PROCEDURE — 93005 ELECTROCARDIOGRAM TRACING: CPT | Performed by: INTERNAL MEDICINE

## 2019-11-25 PROCEDURE — 25010000002 FENTANYL CITRATE (PF) 100 MCG/2ML SOLUTION: Performed by: INTERNAL MEDICINE

## 2019-11-25 PROCEDURE — 93010 ELECTROCARDIOGRAM REPORT: CPT | Performed by: INTERNAL MEDICINE

## 2019-11-25 PROCEDURE — 99284 EMERGENCY DEPT VISIT MOD MDM: CPT

## 2019-11-25 PROCEDURE — 99291 CRITICAL CARE FIRST HOUR: CPT | Performed by: INTERNAL MEDICINE

## 2019-11-25 PROCEDURE — 93308 TTE F-UP OR LMTD: CPT | Performed by: INTERNAL MEDICINE

## 2019-11-25 PROCEDURE — 93308 TTE F-UP OR LMTD: CPT

## 2019-11-25 PROCEDURE — 99223 1ST HOSP IP/OBS HIGH 75: CPT | Performed by: INTERNAL MEDICINE

## 2019-11-25 RX ORDER — SODIUM CHLORIDE 0.9 % (FLUSH) 0.9 %
10 SYRINGE (ML) INJECTION EVERY 12 HOURS SCHEDULED
Status: DISCONTINUED | OUTPATIENT
Start: 2019-11-25 | End: 2019-11-29 | Stop reason: HOSPADM

## 2019-11-25 RX ORDER — ETOMIDATE 2 MG/ML
60 INJECTION INTRAVENOUS ONCE
Status: DISCONTINUED | OUTPATIENT
Start: 2019-11-25 | End: 2019-11-25

## 2019-11-25 RX ORDER — SODIUM CHLORIDE 0.9 % (FLUSH) 0.9 %
10 SYRINGE (ML) INJECTION AS NEEDED
Status: DISCONTINUED | OUTPATIENT
Start: 2019-11-25 | End: 2019-11-29

## 2019-11-25 RX ORDER — NITROGLYCERIN 20 MG/100ML
10-50 INJECTION INTRAVENOUS
Status: DISCONTINUED | OUTPATIENT
Start: 2019-11-25 | End: 2019-11-26

## 2019-11-25 RX ORDER — ONDANSETRON 2 MG/ML
4 INJECTION INTRAMUSCULAR; INTRAVENOUS ONCE
Status: DISCONTINUED | OUTPATIENT
Start: 2019-11-25 | End: 2019-11-26

## 2019-11-25 RX ORDER — ASPIRIN 81 MG/1
81 TABLET ORAL DAILY
Status: DISCONTINUED | OUTPATIENT
Start: 2019-11-25 | End: 2019-11-29 | Stop reason: HOSPADM

## 2019-11-25 RX ORDER — FAMOTIDINE 10 MG/ML
20 INJECTION, SOLUTION INTRAVENOUS EVERY 12 HOURS SCHEDULED
Status: DISCONTINUED | OUTPATIENT
Start: 2019-11-25 | End: 2019-11-26

## 2019-11-25 RX ORDER — BUMETANIDE 0.25 MG/ML
0.5 INJECTION INTRAMUSCULAR; INTRAVENOUS ONCE
Status: COMPLETED | OUTPATIENT
Start: 2019-11-25 | End: 2019-11-25

## 2019-11-25 RX ORDER — HEPARIN SODIUM 10000 [USP'U]/100ML
12 INJECTION, SOLUTION INTRAVENOUS
Status: DISCONTINUED | OUTPATIENT
Start: 2019-11-25 | End: 2019-11-25

## 2019-11-25 RX ORDER — KETAMINE HCL IN NACL, ISO-OSM 100MG/10ML
SYRINGE (ML) INJECTION
Status: COMPLETED
Start: 2019-11-25 | End: 2019-11-25

## 2019-11-25 RX ORDER — BUMETANIDE 0.25 MG/ML
1 INJECTION INTRAMUSCULAR; INTRAVENOUS ONCE
Status: COMPLETED | OUTPATIENT
Start: 2019-11-25 | End: 2019-11-25

## 2019-11-25 RX ORDER — BENZONATATE 100 MG/1
100 CAPSULE ORAL 3 TIMES DAILY PRN
Status: DISCONTINUED | OUTPATIENT
Start: 2019-11-25 | End: 2019-11-29

## 2019-11-25 RX ORDER — ESMOLOL HYDROCHLORIDE 10 MG/ML
50-300 INJECTION, SOLUTION INTRAVENOUS
Status: DISCONTINUED | OUTPATIENT
Start: 2019-11-25 | End: 2019-11-26

## 2019-11-25 RX ORDER — HEPARIN SODIUM 5000 [USP'U]/ML
5000 INJECTION, SOLUTION INTRAVENOUS; SUBCUTANEOUS EVERY 12 HOURS SCHEDULED
Status: DISCONTINUED | OUTPATIENT
Start: 2019-11-25 | End: 2019-11-26

## 2019-11-25 RX ORDER — FENTANYL CITRATE 50 UG/ML
INJECTION, SOLUTION INTRAMUSCULAR; INTRAVENOUS
Status: COMPLETED
Start: 2019-11-25 | End: 2019-11-25

## 2019-11-25 RX ORDER — HEPARIN SODIUM 1000 [USP'U]/ML
60 INJECTION, SOLUTION INTRAVENOUS; SUBCUTANEOUS AS NEEDED
Status: DISCONTINUED | OUTPATIENT
Start: 2019-11-25 | End: 2019-11-25

## 2019-11-25 RX ORDER — ALLOPURINOL 100 MG/1
100 TABLET ORAL EVERY MORNING
Status: DISCONTINUED | OUTPATIENT
Start: 2019-11-26 | End: 2019-11-29 | Stop reason: HOSPADM

## 2019-11-25 RX ORDER — DIPHENHYDRAMINE HCL 25 MG
25 CAPSULE ORAL AS NEEDED
COMMUNITY
End: 2020-01-01

## 2019-11-25 RX ORDER — ATORVASTATIN CALCIUM 40 MG/1
80 TABLET, FILM COATED ORAL NIGHTLY
Status: DISCONTINUED | OUTPATIENT
Start: 2019-11-25 | End: 2019-11-29 | Stop reason: HOSPADM

## 2019-11-25 RX ORDER — FUROSEMIDE 10 MG/ML
40 INJECTION INTRAMUSCULAR; INTRAVENOUS ONCE
Status: COMPLETED | OUTPATIENT
Start: 2019-11-25 | End: 2019-11-25

## 2019-11-25 RX ORDER — HEPARIN SODIUM 1000 [USP'U]/ML
30 INJECTION, SOLUTION INTRAVENOUS; SUBCUTANEOUS AS NEEDED
Status: DISCONTINUED | OUTPATIENT
Start: 2019-11-25 | End: 2019-11-25

## 2019-11-25 RX ORDER — ACETAMINOPHEN 325 MG/1
650 TABLET ORAL AS NEEDED
Status: DISCONTINUED | OUTPATIENT
Start: 2019-11-25 | End: 2019-11-29 | Stop reason: HOSPADM

## 2019-11-25 RX ORDER — DIGOXIN 0.25 MG/ML
250 INJECTION INTRAMUSCULAR; INTRAVENOUS ONCE
Status: COMPLETED | OUTPATIENT
Start: 2019-11-25 | End: 2019-11-25

## 2019-11-25 RX ORDER — METOPROLOL TARTRATE 5 MG/5ML
2.5 INJECTION INTRAVENOUS EVERY 12 HOURS
Status: DISCONTINUED | OUTPATIENT
Start: 2019-11-25 | End: 2019-11-26

## 2019-11-25 RX ORDER — HEPARIN SODIUM 1000 [USP'U]/ML
4000 INJECTION, SOLUTION INTRAVENOUS; SUBCUTANEOUS ONCE
Status: COMPLETED | OUTPATIENT
Start: 2019-11-25 | End: 2019-11-25

## 2019-11-25 RX ORDER — SODIUM CHLORIDE 0.9 % (FLUSH) 0.9 %
10 SYRINGE (ML) INJECTION AS NEEDED
Status: DISCONTINUED | OUTPATIENT
Start: 2019-11-25 | End: 2019-11-26

## 2019-11-25 RX ORDER — FENTANYL CITRATE 50 UG/ML
100 INJECTION, SOLUTION INTRAMUSCULAR; INTRAVENOUS ONCE
Status: COMPLETED | OUTPATIENT
Start: 2019-11-25 | End: 2019-11-25

## 2019-11-25 RX ORDER — KETAMINE HCL IN NACL, ISO-OSM 100MG/10ML
60 SYRINGE (ML) INJECTION ONCE
Status: COMPLETED | OUTPATIENT
Start: 2019-11-25 | End: 2019-11-25

## 2019-11-25 RX ADMIN — SULFUR HEXAFLUORIDE 3 ML: KIT at 12:30

## 2019-11-25 RX ADMIN — DIGOXIN 250 MCG: 0.25 INJECTION INTRAMUSCULAR; INTRAVENOUS at 10:32

## 2019-11-25 RX ADMIN — NITROGLYCERIN 10 MCG/MIN: 20 INJECTION INTRAVENOUS at 08:11

## 2019-11-25 RX ADMIN — BUMETANIDE 0.5 MG/HR: 0.25 INJECTION INTRAMUSCULAR; INTRAVENOUS at 09:39

## 2019-11-25 RX ADMIN — FAMOTIDINE 20 MG: 10 INJECTION, SOLUTION INTRAVENOUS at 21:01

## 2019-11-25 RX ADMIN — Medication 60 MG: at 13:47

## 2019-11-25 RX ADMIN — HEPARIN SODIUM 5000 UNITS: 5000 INJECTION, SOLUTION INTRAVENOUS; SUBCUTANEOUS at 21:01

## 2019-11-25 RX ADMIN — HEPARIN SODIUM 12 UNITS/KG/HR: 10000 INJECTION, SOLUTION INTRAVENOUS at 05:59

## 2019-11-25 RX ADMIN — Medication 60 MG: at 13:42

## 2019-11-25 RX ADMIN — BENZONATATE 100 MG: 100 CAPSULE ORAL at 08:32

## 2019-11-25 RX ADMIN — METHOHEXITAL SODIUM 20 MG: 500 INJECTION, POWDER, LYOPHILIZED, FOR SOLUTION INTRAMUSCULAR; INTRAVENOUS; RECTAL at 14:17

## 2019-11-25 RX ADMIN — METOPROLOL TARTRATE 2.5 MG: 5 INJECTION INTRAVENOUS at 21:01

## 2019-11-25 RX ADMIN — NITROGLYCERIN 1 INCH: 20 OINTMENT TOPICAL at 05:55

## 2019-11-25 RX ADMIN — BUMETANIDE 0.5 MG: 0.25 INJECTION INTRAMUSCULAR; INTRAVENOUS at 06:08

## 2019-11-25 RX ADMIN — FUROSEMIDE 40 MG: 10 INJECTION, SOLUTION INTRAMUSCULAR; INTRAVENOUS at 11:54

## 2019-11-25 RX ADMIN — FENTANYL CITRATE 50 MCG: 50 INJECTION INTRAMUSCULAR; INTRAVENOUS at 14:02

## 2019-11-25 RX ADMIN — PROPOFOL 10 MCG/KG/MIN: 10 INJECTION, EMULSION INTRAVENOUS at 13:51

## 2019-11-25 RX ADMIN — FENTANYL CITRATE 50 MCG: 50 INJECTION INTRAMUSCULAR; INTRAVENOUS at 14:10

## 2019-11-25 RX ADMIN — FAMOTIDINE 20 MG: 10 INJECTION, SOLUTION INTRAVENOUS at 08:12

## 2019-11-25 RX ADMIN — ESMOLOL HYDROCHLORIDE 50 MCG/KG/MIN: 10 INJECTION INTRAVENOUS at 06:00

## 2019-11-25 RX ADMIN — SODIUM CHLORIDE, PRESERVATIVE FREE 10 ML: 5 INJECTION INTRAVENOUS at 21:02

## 2019-11-25 RX ADMIN — APIXABAN 5 MG: 5 TABLET, FILM COATED ORAL at 11:06

## 2019-11-25 RX ADMIN — HEPARIN SODIUM 4000 UNITS: 1000 INJECTION INTRAVENOUS; SUBCUTANEOUS at 05:59

## 2019-11-25 RX ADMIN — DEXMEDETOMIDINE HYDROCHLORIDE 0.2 MCG/KG/HR: 100 INJECTION, SOLUTION INTRAVENOUS at 16:22

## 2019-11-25 RX ADMIN — Medication 30 MG: at 13:52

## 2019-11-25 RX ADMIN — BUMETANIDE 1 MG: 0.25 INJECTION INTRAMUSCULAR; INTRAVENOUS at 08:31

## 2019-11-25 RX ADMIN — METOPROLOL TARTRATE 12.5 MG: 25 TABLET, FILM COATED ORAL at 10:32

## 2019-11-25 RX ADMIN — ESMOLOL HYDROCHLORIDE 50 MCG/KG/MIN: 10 INJECTION INTRAVENOUS at 11:04

## 2019-11-26 ENCOUNTER — APPOINTMENT (OUTPATIENT)
Dept: GENERAL RADIOLOGY | Facility: HOSPITAL | Age: 74
End: 2019-11-26

## 2019-11-26 ENCOUNTER — TRANSCRIBE ORDERS (OUTPATIENT)
Dept: CARDIAC REHAB | Facility: HOSPITAL | Age: 74
End: 2019-11-26

## 2019-11-26 DIAGNOSIS — Z95.2 S/P AVR: Primary | ICD-10-CM

## 2019-11-26 LAB
ALBUMIN SERPL-MCNC: 3 G/DL (ref 3.5–5.2)
ALBUMIN/GLOB SERPL: 1 G/DL
ALP SERPL-CCNC: 85 U/L (ref 39–117)
ALT SERPL W P-5'-P-CCNC: 48 U/L (ref 1–41)
ANION GAP SERPL CALCULATED.3IONS-SCNC: 17 MMOL/L (ref 5–15)
AST SERPL-CCNC: 33 U/L (ref 1–40)
BILIRUB SERPL-MCNC: 0.7 MG/DL (ref 0.2–1.2)
BUN BLD-MCNC: 27 MG/DL (ref 8–23)
BUN/CREAT SERPL: 23.7 (ref 7–25)
CALCIUM SPEC-SCNC: 8.8 MG/DL (ref 8.6–10.5)
CHLORIDE SERPL-SCNC: 102 MMOL/L (ref 98–107)
CO2 SERPL-SCNC: 22 MMOL/L (ref 22–29)
CREAT BLD-MCNC: 1.14 MG/DL (ref 0.76–1.27)
DEPRECATED RDW RBC AUTO: 56.6 FL (ref 37–54)
ERYTHROCYTE [DISTWIDTH] IN BLOOD BY AUTOMATED COUNT: 17.2 % (ref 12.3–15.4)
GFR SERPL CREATININE-BSD FRML MDRD: 63 ML/MIN/1.73
GLOBULIN UR ELPH-MCNC: 3.1 GM/DL
GLUCOSE BLD-MCNC: 126 MG/DL (ref 65–99)
HCT VFR BLD AUTO: 39.9 % (ref 37.5–51)
HGB BLD-MCNC: 12.1 G/DL (ref 13–17.7)
MAGNESIUM SERPL-MCNC: 2 MG/DL (ref 1.6–2.4)
MCH RBC QN AUTO: 27.1 PG (ref 26.6–33)
MCHC RBC AUTO-ENTMCNC: 30.3 G/DL (ref 31.5–35.7)
MCV RBC AUTO: 89.5 FL (ref 79–97)
NT-PROBNP SERPL-MCNC: ABNORMAL PG/ML (ref 5–900)
PHOSPHATE SERPL-MCNC: 5.4 MG/DL (ref 2.5–4.5)
PLATELET # BLD AUTO: 94 10*3/MM3 (ref 140–450)
PMV BLD AUTO: 11.5 FL (ref 6–12)
POTASSIUM BLD-SCNC: 3.8 MMOL/L (ref 3.5–5.2)
PROT SERPL-MCNC: 6.1 G/DL (ref 6–8.5)
RBC # BLD AUTO: 4.46 10*6/MM3 (ref 4.14–5.8)
SODIUM BLD-SCNC: 141 MMOL/L (ref 136–145)
T4 FREE SERPL-MCNC: 1.18 NG/DL (ref 0.93–1.7)
WBC NRBC COR # BLD: 5.68 10*3/MM3 (ref 3.4–10.8)

## 2019-11-26 PROCEDURE — 99232 SBSQ HOSP IP/OBS MODERATE 35: CPT | Performed by: INTERNAL MEDICINE

## 2019-11-26 PROCEDURE — 25010000002 FUROSEMIDE PER 20 MG: Performed by: PHYSICIAN ASSISTANT

## 2019-11-26 PROCEDURE — 83880 ASSAY OF NATRIURETIC PEPTIDE: CPT | Performed by: INTERNAL MEDICINE

## 2019-11-26 PROCEDURE — 25010000002 HEPARIN (PORCINE) PER 1000 UNITS: Performed by: NURSE PRACTITIONER

## 2019-11-26 PROCEDURE — 93010 ELECTROCARDIOGRAM REPORT: CPT | Performed by: INTERNAL MEDICINE

## 2019-11-26 PROCEDURE — 93005 ELECTROCARDIOGRAM TRACING: CPT | Performed by: PHYSICIAN ASSISTANT

## 2019-11-26 PROCEDURE — 85027 COMPLETE CBC AUTOMATED: CPT | Performed by: INTERNAL MEDICINE

## 2019-11-26 PROCEDURE — 93005 ELECTROCARDIOGRAM TRACING: CPT | Performed by: INTERNAL MEDICINE

## 2019-11-26 PROCEDURE — 71045 X-RAY EXAM CHEST 1 VIEW: CPT

## 2019-11-26 PROCEDURE — 80053 COMPREHEN METABOLIC PANEL: CPT | Performed by: INTERNAL MEDICINE

## 2019-11-26 PROCEDURE — 99232 SBSQ HOSP IP/OBS MODERATE 35: CPT | Performed by: PHYSICIAN ASSISTANT

## 2019-11-26 PROCEDURE — 84439 ASSAY OF FREE THYROXINE: CPT | Performed by: PHYSICIAN ASSISTANT

## 2019-11-26 PROCEDURE — 83735 ASSAY OF MAGNESIUM: CPT | Performed by: INTERNAL MEDICINE

## 2019-11-26 PROCEDURE — 84100 ASSAY OF PHOSPHORUS: CPT | Performed by: INTERNAL MEDICINE

## 2019-11-26 RX ORDER — FUROSEMIDE 10 MG/ML
40 INJECTION INTRAMUSCULAR; INTRAVENOUS ONCE
Status: COMPLETED | OUTPATIENT
Start: 2019-11-26 | End: 2019-11-26

## 2019-11-26 RX ORDER — SOTALOL HYDROCHLORIDE 80 MG/1
80 TABLET ORAL EVERY 12 HOURS SCHEDULED
Status: DISCONTINUED | OUTPATIENT
Start: 2019-11-26 | End: 2019-11-27

## 2019-11-26 RX ORDER — METOPROLOL SUCCINATE 50 MG/1
50 TABLET, EXTENDED RELEASE ORAL
Status: DISCONTINUED | OUTPATIENT
Start: 2019-11-26 | End: 2019-11-26

## 2019-11-26 RX ADMIN — ALLOPURINOL 100 MG: 100 TABLET ORAL at 08:15

## 2019-11-26 RX ADMIN — ATORVASTATIN CALCIUM 80 MG: 40 TABLET, FILM COATED ORAL at 21:06

## 2019-11-26 RX ADMIN — ASPIRIN 81 MG: 81 TABLET, COATED ORAL at 08:15

## 2019-11-26 RX ADMIN — SOTALOL HYDROCHLORIDE 80 MG: 80 TABLET ORAL at 21:06

## 2019-11-26 RX ADMIN — APIXABAN 5 MG: 5 TABLET, FILM COATED ORAL at 09:31

## 2019-11-26 RX ADMIN — FAMOTIDINE 20 MG: 10 INJECTION, SOLUTION INTRAVENOUS at 08:15

## 2019-11-26 RX ADMIN — HEPARIN SODIUM 5000 UNITS: 5000 INJECTION, SOLUTION INTRAVENOUS; SUBCUTANEOUS at 08:15

## 2019-11-26 RX ADMIN — SOTALOL HYDROCHLORIDE 80 MG: 80 TABLET ORAL at 11:37

## 2019-11-26 RX ADMIN — APIXABAN 5 MG: 5 TABLET, FILM COATED ORAL at 21:06

## 2019-11-26 RX ADMIN — FUROSEMIDE 40 MG: 10 INJECTION, SOLUTION INTRAMUSCULAR; INTRAVENOUS at 11:37

## 2019-11-26 RX ADMIN — SODIUM CHLORIDE, PRESERVATIVE FREE 10 ML: 5 INJECTION INTRAVENOUS at 08:16

## 2019-11-26 RX ADMIN — SODIUM CHLORIDE, PRESERVATIVE FREE 10 ML: 5 INJECTION INTRAVENOUS at 21:13

## 2019-11-26 RX ADMIN — METOPROLOL TARTRATE 2.5 MG: 5 INJECTION INTRAVENOUS at 08:15

## 2019-11-27 LAB
ANION GAP SERPL CALCULATED.3IONS-SCNC: 15 MMOL/L (ref 5–15)
BASOPHILS # BLD AUTO: 0.02 10*3/MM3 (ref 0–0.2)
BASOPHILS NFR BLD AUTO: 0.4 % (ref 0–1.5)
BUN BLD-MCNC: 27 MG/DL (ref 8–23)
BUN/CREAT SERPL: 26.2 (ref 7–25)
CALCIUM SPEC-SCNC: 9 MG/DL (ref 8.6–10.5)
CHLORIDE SERPL-SCNC: 99 MMOL/L (ref 98–107)
CO2 SERPL-SCNC: 26 MMOL/L (ref 22–29)
CREAT BLD-MCNC: 1.03 MG/DL (ref 0.76–1.27)
DEPRECATED RDW RBC AUTO: 57.7 FL (ref 37–54)
EOSINOPHIL # BLD AUTO: 0.17 10*3/MM3 (ref 0–0.4)
EOSINOPHIL NFR BLD AUTO: 3.2 % (ref 0.3–6.2)
ERYTHROCYTE [DISTWIDTH] IN BLOOD BY AUTOMATED COUNT: 18.1 % (ref 12.3–15.4)
GFR SERPL CREATININE-BSD FRML MDRD: 71 ML/MIN/1.73
GLUCOSE BLD-MCNC: 103 MG/DL (ref 65–99)
HCT VFR BLD AUTO: 44.1 % (ref 37.5–51)
HGB BLD-MCNC: 13.5 G/DL (ref 13–17.7)
IMM GRANULOCYTES # BLD AUTO: 0.02 10*3/MM3 (ref 0–0.05)
IMM GRANULOCYTES NFR BLD AUTO: 0.4 % (ref 0–0.5)
LYMPHOCYTES # BLD AUTO: 1.81 10*3/MM3 (ref 0.7–3.1)
LYMPHOCYTES NFR BLD AUTO: 34.2 % (ref 19.6–45.3)
MAGNESIUM SERPL-MCNC: 2.1 MG/DL (ref 1.6–2.4)
MCH RBC QN AUTO: 27.4 PG (ref 26.6–33)
MCHC RBC AUTO-ENTMCNC: 30.6 G/DL (ref 31.5–35.7)
MCV RBC AUTO: 89.5 FL (ref 79–97)
MONOCYTES # BLD AUTO: 0.43 10*3/MM3 (ref 0.1–0.9)
MONOCYTES NFR BLD AUTO: 8.1 % (ref 5–12)
NEUTROPHILS # BLD AUTO: 2.85 10*3/MM3 (ref 1.7–7)
NEUTROPHILS NFR BLD AUTO: 53.7 % (ref 42.7–76)
NRBC BLD AUTO-RTO: 0 /100 WBC (ref 0–0.2)
PLATELET # BLD AUTO: 127 10*3/MM3 (ref 140–450)
PMV BLD AUTO: 10.9 FL (ref 6–12)
POTASSIUM BLD-SCNC: 3 MMOL/L (ref 3.5–5.2)
POTASSIUM BLD-SCNC: 3.8 MMOL/L (ref 3.5–5.2)
RBC # BLD AUTO: 4.93 10*6/MM3 (ref 4.14–5.8)
SODIUM BLD-SCNC: 140 MMOL/L (ref 136–145)
WBC NRBC COR # BLD: 5.3 10*3/MM3 (ref 3.4–10.8)

## 2019-11-27 PROCEDURE — 93005 ELECTROCARDIOGRAM TRACING: CPT | Performed by: INTERNAL MEDICINE

## 2019-11-27 PROCEDURE — 93005 ELECTROCARDIOGRAM TRACING: CPT | Performed by: PHYSICIAN ASSISTANT

## 2019-11-27 PROCEDURE — 84132 ASSAY OF SERUM POTASSIUM: CPT | Performed by: INTERNAL MEDICINE

## 2019-11-27 PROCEDURE — 99232 SBSQ HOSP IP/OBS MODERATE 35: CPT | Performed by: INTERNAL MEDICINE

## 2019-11-27 PROCEDURE — 93010 ELECTROCARDIOGRAM REPORT: CPT | Performed by: INTERNAL MEDICINE

## 2019-11-27 PROCEDURE — 85025 COMPLETE CBC W/AUTO DIFF WBC: CPT | Performed by: INTERNAL MEDICINE

## 2019-11-27 PROCEDURE — 94799 UNLISTED PULMONARY SVC/PX: CPT

## 2019-11-27 PROCEDURE — 80048 BASIC METABOLIC PNL TOTAL CA: CPT | Performed by: PHYSICIAN ASSISTANT

## 2019-11-27 PROCEDURE — 83735 ASSAY OF MAGNESIUM: CPT | Performed by: INTERNAL MEDICINE

## 2019-11-27 PROCEDURE — 94660 CPAP INITIATION&MGMT: CPT

## 2019-11-27 RX ORDER — POTASSIUM CHLORIDE 750 MG/1
40 CAPSULE, EXTENDED RELEASE ORAL AS NEEDED
Status: DISCONTINUED | OUTPATIENT
Start: 2019-11-27 | End: 2019-11-29

## 2019-11-27 RX ORDER — METOPROLOL SUCCINATE 50 MG/1
50 TABLET, EXTENDED RELEASE ORAL
Status: DISCONTINUED | OUTPATIENT
Start: 2019-11-27 | End: 2019-11-29

## 2019-11-27 RX ORDER — POTASSIUM CHLORIDE 1.5 G/1.77G
40 POWDER, FOR SOLUTION ORAL AS NEEDED
Status: DISCONTINUED | OUTPATIENT
Start: 2019-11-27 | End: 2019-11-29

## 2019-11-27 RX ADMIN — SOTALOL HYDROCHLORIDE 80 MG: 80 TABLET ORAL at 08:37

## 2019-11-27 RX ADMIN — SACUBITRIL AND VALSARTAN 1 TABLET: 24; 26 TABLET, FILM COATED ORAL at 21:10

## 2019-11-27 RX ADMIN — POTASSIUM CHLORIDE 40 MEQ: 750 CAPSULE, EXTENDED RELEASE ORAL at 08:38

## 2019-11-27 RX ADMIN — APIXABAN 5 MG: 5 TABLET, FILM COATED ORAL at 08:37

## 2019-11-27 RX ADMIN — METOPROLOL SUCCINATE 50 MG: 50 TABLET, EXTENDED RELEASE ORAL at 18:23

## 2019-11-27 RX ADMIN — POTASSIUM CHLORIDE 40 MEQ: 750 CAPSULE, EXTENDED RELEASE ORAL at 17:13

## 2019-11-27 RX ADMIN — ASPIRIN 81 MG: 81 TABLET, COATED ORAL at 08:37

## 2019-11-27 RX ADMIN — ATORVASTATIN CALCIUM 80 MG: 40 TABLET, FILM COATED ORAL at 21:11

## 2019-11-27 RX ADMIN — APIXABAN 5 MG: 5 TABLET, FILM COATED ORAL at 21:11

## 2019-11-27 RX ADMIN — ALLOPURINOL 100 MG: 100 TABLET ORAL at 08:37

## 2019-11-27 RX ADMIN — SODIUM CHLORIDE, PRESERVATIVE FREE 10 ML: 5 INJECTION INTRAVENOUS at 08:30

## 2019-11-27 RX ADMIN — POTASSIUM CHLORIDE 40 MEQ: 750 CAPSULE, EXTENDED RELEASE ORAL at 12:53

## 2019-11-27 RX ADMIN — SODIUM CHLORIDE, PRESERVATIVE FREE 10 ML: 5 INJECTION INTRAVENOUS at 21:10

## 2019-11-28 PROBLEM — I63.9 ACUTE EMBOLIC STROKE (HCC): Chronic | Status: ACTIVE | Noted: 2019-06-10

## 2019-11-28 PROBLEM — I50.21 ACUTE SYSTOLIC CONGESTIVE HEART FAILURE (HCC): Status: ACTIVE | Noted: 2019-11-25

## 2019-11-28 PROBLEM — I65.23 CAROTID STENOSIS, BILATERAL: Status: RESOLVED | Noted: 2019-06-19 | Resolved: 2019-11-28

## 2019-11-28 PROBLEM — I35.1 NONRHEUMATIC AORTIC VALVE INSUFFICIENCY: Status: RESOLVED | Noted: 2019-04-29 | Resolved: 2019-11-28

## 2019-11-28 PROBLEM — I63.9 ACUTE EMBOLIC STROKE (HCC): Chronic | Status: RESOLVED | Noted: 2019-06-10 | Resolved: 2019-11-28

## 2019-11-28 PROBLEM — I05.9 MITRAL VALVE DISEASE: Status: RESOLVED | Noted: 2019-04-29 | Resolved: 2019-11-28

## 2019-11-28 LAB
ANION GAP SERPL CALCULATED.3IONS-SCNC: 12 MMOL/L (ref 5–15)
BASOPHILS # BLD AUTO: 0.03 10*3/MM3 (ref 0–0.2)
BASOPHILS NFR BLD AUTO: 0.5 % (ref 0–1.5)
BUN BLD-MCNC: 14 MG/DL (ref 8–23)
BUN/CREAT SERPL: 18.4 (ref 7–25)
CALCIUM SPEC-SCNC: 8.9 MG/DL (ref 8.6–10.5)
CHLORIDE SERPL-SCNC: 103 MMOL/L (ref 98–107)
CO2 SERPL-SCNC: 24 MMOL/L (ref 22–29)
CREAT BLD-MCNC: 0.76 MG/DL (ref 0.76–1.27)
DEPRECATED RDW RBC AUTO: 56.2 FL (ref 37–54)
EOSINOPHIL # BLD AUTO: 0.25 10*3/MM3 (ref 0–0.4)
EOSINOPHIL NFR BLD AUTO: 4 % (ref 0.3–6.2)
ERYTHROCYTE [DISTWIDTH] IN BLOOD BY AUTOMATED COUNT: 18 % (ref 12.3–15.4)
GFR SERPL CREATININE-BSD FRML MDRD: 101 ML/MIN/1.73
GLUCOSE BLD-MCNC: 116 MG/DL (ref 65–99)
HCT VFR BLD AUTO: 47.8 % (ref 37.5–51)
HGB BLD-MCNC: 14.6 G/DL (ref 13–17.7)
IMM GRANULOCYTES # BLD AUTO: 0.01 10*3/MM3 (ref 0–0.05)
IMM GRANULOCYTES NFR BLD AUTO: 0.2 % (ref 0–0.5)
LYMPHOCYTES # BLD AUTO: 1.75 10*3/MM3 (ref 0.7–3.1)
LYMPHOCYTES NFR BLD AUTO: 27.9 % (ref 19.6–45.3)
MAGNESIUM SERPL-MCNC: 2.1 MG/DL (ref 1.6–2.4)
MCH RBC QN AUTO: 26.7 PG (ref 26.6–33)
MCHC RBC AUTO-ENTMCNC: 30.5 G/DL (ref 31.5–35.7)
MCV RBC AUTO: 87.5 FL (ref 79–97)
MONOCYTES # BLD AUTO: 0.57 10*3/MM3 (ref 0.1–0.9)
MONOCYTES NFR BLD AUTO: 9.1 % (ref 5–12)
NEUTROPHILS # BLD AUTO: 3.67 10*3/MM3 (ref 1.7–7)
NEUTROPHILS NFR BLD AUTO: 58.3 % (ref 42.7–76)
NRBC BLD AUTO-RTO: 0 /100 WBC (ref 0–0.2)
PLATELET # BLD AUTO: 161 10*3/MM3 (ref 140–450)
PMV BLD AUTO: 11.1 FL (ref 6–12)
POTASSIUM BLD-SCNC: 4.3 MMOL/L (ref 3.5–5.2)
RBC # BLD AUTO: 5.46 10*6/MM3 (ref 4.14–5.8)
SODIUM BLD-SCNC: 139 MMOL/L (ref 136–145)
WBC NRBC COR # BLD: 6.28 10*3/MM3 (ref 3.4–10.8)

## 2019-11-28 PROCEDURE — 83735 ASSAY OF MAGNESIUM: CPT | Performed by: INTERNAL MEDICINE

## 2019-11-28 PROCEDURE — 80048 BASIC METABOLIC PNL TOTAL CA: CPT | Performed by: INTERNAL MEDICINE

## 2019-11-28 PROCEDURE — 99231 SBSQ HOSP IP/OBS SF/LOW 25: CPT | Performed by: INTERNAL MEDICINE

## 2019-11-28 PROCEDURE — 99232 SBSQ HOSP IP/OBS MODERATE 35: CPT | Performed by: INTERNAL MEDICINE

## 2019-11-28 PROCEDURE — 94660 CPAP INITIATION&MGMT: CPT

## 2019-11-28 PROCEDURE — 93005 ELECTROCARDIOGRAM TRACING: CPT | Performed by: PHYSICIAN ASSISTANT

## 2019-11-28 PROCEDURE — 94799 UNLISTED PULMONARY SVC/PX: CPT

## 2019-11-28 PROCEDURE — 93010 ELECTROCARDIOGRAM REPORT: CPT | Performed by: INTERNAL MEDICINE

## 2019-11-28 PROCEDURE — 85025 COMPLETE CBC W/AUTO DIFF WBC: CPT | Performed by: INTERNAL MEDICINE

## 2019-11-28 RX ADMIN — SACUBITRIL AND VALSARTAN 1 TABLET: 24; 26 TABLET, FILM COATED ORAL at 20:51

## 2019-11-28 RX ADMIN — APIXABAN 5 MG: 5 TABLET, FILM COATED ORAL at 08:23

## 2019-11-28 RX ADMIN — ALLOPURINOL 100 MG: 100 TABLET ORAL at 06:39

## 2019-11-28 RX ADMIN — ATORVASTATIN CALCIUM 80 MG: 40 TABLET, FILM COATED ORAL at 20:52

## 2019-11-28 RX ADMIN — SODIUM CHLORIDE, PRESERVATIVE FREE 10 ML: 5 INJECTION INTRAVENOUS at 22:16

## 2019-11-28 RX ADMIN — METOPROLOL SUCCINATE 50 MG: 50 TABLET, EXTENDED RELEASE ORAL at 08:23

## 2019-11-28 RX ADMIN — ASPIRIN 81 MG: 81 TABLET, COATED ORAL at 08:23

## 2019-11-28 RX ADMIN — SACUBITRIL AND VALSARTAN 1 TABLET: 24; 26 TABLET, FILM COATED ORAL at 08:23

## 2019-11-28 RX ADMIN — APIXABAN 5 MG: 5 TABLET, FILM COATED ORAL at 20:52

## 2019-11-28 RX ADMIN — SODIUM CHLORIDE, PRESERVATIVE FREE 10 ML: 5 INJECTION INTRAVENOUS at 08:24

## 2019-11-29 VITALS
WEIGHT: 164 LBS | HEART RATE: 77 BPM | SYSTOLIC BLOOD PRESSURE: 130 MMHG | TEMPERATURE: 98.1 F | DIASTOLIC BLOOD PRESSURE: 107 MMHG | HEIGHT: 70 IN | RESPIRATION RATE: 16 BRPM | OXYGEN SATURATION: 96 % | BODY MASS INDEX: 23.48 KG/M2

## 2019-11-29 PROCEDURE — 99238 HOSP IP/OBS DSCHRG MGMT 30/<: CPT | Performed by: INTERNAL MEDICINE

## 2019-11-29 PROCEDURE — 94799 UNLISTED PULMONARY SVC/PX: CPT

## 2019-11-29 PROCEDURE — 99231 SBSQ HOSP IP/OBS SF/LOW 25: CPT | Performed by: INTERNAL MEDICINE

## 2019-11-29 RX ORDER — METOPROLOL SUCCINATE 100 MG/1
100 TABLET, EXTENDED RELEASE ORAL DAILY
Qty: 90 TABLET | Refills: 0 | Status: SHIPPED | OUTPATIENT
Start: 2019-11-29 | End: 2020-05-27 | Stop reason: SDUPTHER

## 2019-11-29 RX ORDER — METOPROLOL SUCCINATE 100 MG/1
100 TABLET, EXTENDED RELEASE ORAL
Status: DISCONTINUED | OUTPATIENT
Start: 2019-11-29 | End: 2019-11-29 | Stop reason: HOSPADM

## 2019-11-29 RX ADMIN — SACUBITRIL AND VALSARTAN 1 TABLET: 49; 51 TABLET, FILM COATED ORAL at 08:16

## 2019-11-29 RX ADMIN — ASPIRIN 81 MG: 81 TABLET, COATED ORAL at 08:15

## 2019-11-29 RX ADMIN — METOPROLOL SUCCINATE 100 MG: 100 TABLET, EXTENDED RELEASE ORAL at 08:15

## 2019-11-29 RX ADMIN — APIXABAN 5 MG: 5 TABLET, FILM COATED ORAL at 08:15

## 2019-11-29 RX ADMIN — ALLOPURINOL 100 MG: 100 TABLET ORAL at 06:38

## 2019-11-30 ENCOUNTER — READMISSION MANAGEMENT (OUTPATIENT)
Dept: CALL CENTER | Facility: HOSPITAL | Age: 74
End: 2019-11-30

## 2019-11-30 NOTE — OUTREACH NOTE
Prep Survey      Responses   Facility patient discharged from?  [facility]   Is patient eligible?  Yes   Discharge diagnosis  atrial fibrillation with rapid ventricular response    Does the patient have one of the following disease processes/diagnoses(primary or secondary)?  Other   Does the patient have Home health ordered?  No   Is there a DME ordered?  Yes   What DME was ordered?  [employer]   Prep survey completed?  Yes          Bertha Maloney RN

## 2019-12-02 ENCOUNTER — TRANSITIONAL CARE MANAGEMENT TELEPHONE ENCOUNTER (OUTPATIENT)
Dept: INTERNAL MEDICINE | Facility: CLINIC | Age: 74
End: 2019-12-02

## 2019-12-02 DIAGNOSIS — I10 BENIGN ESSENTIAL HYPERTENSION: Primary | ICD-10-CM

## 2019-12-02 NOTE — OUTREACH NOTE
[facility] call completed. Please see flow sheet for additional details.  Pt confirms he is wearing [employer] and has made medication changes as per discharge AVS.  He denies chest pain, episodes of palps or tachycardia, SOA, N/V, dizziness, D/C, F/C/S.  Denies immediate needs/concerns. Confirms  cardiac rehab appt 12/3/19 & cardiology f/u 12/12/19, declines [facility] appt. States he will call as needed.  Is aware of sx requiring immediate medical care.

## 2019-12-03 ENCOUNTER — TREATMENT (OUTPATIENT)
Dept: CARDIAC REHAB | Facility: HOSPITAL | Age: 74
End: 2019-12-03

## 2019-12-03 VITALS
DIASTOLIC BLOOD PRESSURE: 86 MMHG | HEART RATE: 69 BPM | HEIGHT: 70 IN | OXYGEN SATURATION: 98 % | WEIGHT: 164 LBS | BODY MASS INDEX: 23.48 KG/M2 | SYSTOLIC BLOOD PRESSURE: 142 MMHG | RESPIRATION RATE: 16 BRPM

## 2019-12-03 DIAGNOSIS — Z95.2 S/P AVR (AORTIC VALVE REPLACEMENT): ICD-10-CM

## 2019-12-03 PROCEDURE — 93798 PHYS/QHP OP CAR RHAB W/ECG: CPT

## 2019-12-03 NOTE — PROGRESS NOTES
Cardiac Rehab Initial Assessment      Name: Jair Flores  :1945 Allergies:Penicillins; Rocephin [ceftriaxone]; Ciprofloxacin; Macrobid [nitrofurantoin macrocrystal]; Bactrim [sulfamethoxazole-trimethoprim]; Sotalol; and Sulfa antibiotics   MRN: [id number] 74 y.o. Physician: Osmar Clemons MD   Primary Diagnosis:    Diagnosis Plan   1. S/P AVR (aortic valve replacement)      Event Date: 2019 Specialist: Dr. Francisco Bo   Secondary Diagnosis: HTN Risk Stratification:High Risk Note Author: Ani Valdez RN     Cardiovascular History: History of Heart Failure     EXERCISE AT HOME  no  na  N/A    EF: 30%      Source: echo          Ambulatory Status:Independent  Ambulatory Fall Risk Assessed on Initial Visit: yes 6 Minute Walk Pre- Cardiac Rehab:  Distance:  ft 1146     RPE:11  Max. HR: 83       SPO2:98    MET: 2.6  MPH: na             RPD: na  Resting BP: 142/86 LA, na RA    Peak BP: 168/110  Recovery BP: 148/100  Comments: na      NUTRITION  Lipids:yes If yes, labs as follows;  Total: No components found for: CHOLESTEROL  HDL:   HDL Cholesterol   Date Value Ref Range Status   07/10/2019 36 (L) 40 - 60 mg/dL Final    Lipids continued:  LDL:  LDL Cholesterol    Date Value Ref Range Status   07/10/2019 46 0 - 100 mg/dL Final     Triglyceride: No components found for: TRIGLYCERIDE   Weight Management:                 Weight: 164  Height: 70 inches                                   BMI: There is no height or weight on file to calculate BMI.  Waist Circumference: na  inches   Alcohol Use: social drinker Diabetes:No    Last HGBA1C with date if applicable:No components found for: A1C         SOCIAL HISTORY  Social History     Socioeconomic History   • Marital status:      Spouse name: Not on file   • Number of children: Not on file   • Years of education: Not on file   • Highest education level: Not on file   Tobacco Use   • Smoking status: Never Smoker   • Smokeless tobacco: Never Used   Substance  and Sexual Activity   • Alcohol use: Yes     Alcohol/week: 1.8 oz     Types: 3 Shots of liquor per week     Comment: socially   • Drug use: Defer   • Sexual activity: Defer       Educational Level (choose one that applies) post college graduate work or degree Learning Barriers:Ready to Learn    Family Support:yes    Living Arrangement: lives with their spouse    Risk Factors: Stress  Yes, Hyperlipidemia  Yes and Exercise prior to event  Yes If Yes: Activity walking, Minutes per day15, Days per week 4     Tobacco Adjunct: N/A  na      Comorbidities: Cerebrovascular disease and Malignancy     PSYCHOSOCIAL  Clinical Depression: no    Stress: yes     Assess presence or absence of depression using a valid screening tool: yes      PHYSICAL ASSESSMENT  Influenza vaccine: yes  Pneumococcal vaccine: no          Angina: no    Describe angina scale of 0 - 4: 0 = none    Today are you having incisional pain? Yes. If, no, Scale: na    na    Today are you having any other pain? No. If, Yes, Scale: na    na Diagnosed with Hypertension:yes    Heart Sounds: S1S2     Lung Sounds: normal air entry, lungs clear to auscultation         Assessment: na Orthopedic Problems: none    Are you being hurt, hit, or frightened by anyone at home or in your life? no    Are you being neglected by a caregiver? No Shoulder flexibility/Range of motion: Average     Recommended arm activity: Arm Ergometer    Chair sit and reach within: 0 inches   Leg flexibility: Average    Leg Strength/Balance/Five times sit to stand: 0 seconds.   na  Chose one: Balance Dysfunction    Recommended stretching: Chair    Assessment: na    Family attends [place]: yes Time of arrival: 0915  Time of departure: 1100     Patient Goals: Exercise 150 minutes per week by discharge         12/3/2019  10:02 AM  Ani Valdez RN

## 2019-12-04 ENCOUNTER — READMISSION MANAGEMENT (OUTPATIENT)
Dept: CALL CENTER | Facility: HOSPITAL | Age: 74
End: 2019-12-04

## 2019-12-04 NOTE — OUTREACH NOTE
Medical Week 1 Survey      Responses   Facility patient discharged from?  [place]   Does the patient have one of the following disease processes/diagnoses(primary or secondary)?  Other   Is there a successful TCM telephone encounter documented?  Yes          Michelle Anthony RN

## 2019-12-05 ENCOUNTER — TREATMENT (OUTPATIENT)
Dept: CARDIAC REHAB | Facility: HOSPITAL | Age: 74
End: 2019-12-05

## 2019-12-05 DIAGNOSIS — Z95.2 S/P AVR (AORTIC VALVE REPLACEMENT): Primary | ICD-10-CM

## 2019-12-05 PROCEDURE — 93798 PHYS/QHP OP CAR RHAB W/ECG: CPT

## 2019-12-05 NOTE — PROGRESS NOTES
Attended Phase II Cardiac Rehab Intro to exercise class. No medication or health history changes reported. See [facility] for details.

## 2019-12-06 ENCOUNTER — TREATMENT (OUTPATIENT)
Dept: CARDIAC REHAB | Facility: HOSPITAL | Age: 74
End: 2019-12-06

## 2019-12-06 DIAGNOSIS — Z95.2 S/P AVR (AORTIC VALVE REPLACEMENT): Primary | ICD-10-CM

## 2019-12-06 PROCEDURE — 93798 PHYS/QHP OP CAR RHAB W/ECG: CPT

## 2019-12-07 ENCOUNTER — READMISSION MANAGEMENT (OUTPATIENT)
Dept: CALL CENTER | Facility: HOSPITAL | Age: 74
End: 2019-12-07

## 2019-12-07 NOTE — OUTREACH NOTE
Medical Week 2 Survey      Responses   Facility patient discharged from?  [place]   Does the patient have one of the following disease processes/diagnoses(primary or secondary)?  Other   Week 2 attempt successful?  No   Unsuccessful attempts  Attempt 2          Kadi Pineda RN

## 2019-12-09 ENCOUNTER — TREATMENT (OUTPATIENT)
Dept: CARDIAC REHAB | Facility: HOSPITAL | Age: 74
End: 2019-12-09

## 2019-12-09 DIAGNOSIS — Z95.2 S/P AVR (AORTIC VALVE REPLACEMENT): Primary | ICD-10-CM

## 2019-12-09 PROCEDURE — 93798 PHYS/QHP OP CAR RHAB W/ECG: CPT

## 2019-12-11 ENCOUNTER — TREATMENT (OUTPATIENT)
Dept: CARDIAC REHAB | Facility: HOSPITAL | Age: 74
End: 2019-12-11

## 2019-12-11 DIAGNOSIS — Z95.2 S/P AVR (AORTIC VALVE REPLACEMENT): Primary | ICD-10-CM

## 2019-12-11 PROCEDURE — 93798 PHYS/QHP OP CAR RHAB W/ECG: CPT

## 2019-12-12 ENCOUNTER — OFFICE VISIT (OUTPATIENT)
Dept: CARDIOLOGY | Facility: CLINIC | Age: 74
End: 2019-12-12

## 2019-12-12 ENCOUNTER — LAB (OUTPATIENT)
Dept: LAB | Facility: HOSPITAL | Age: 74
End: 2019-12-12

## 2019-12-12 VITALS
SYSTOLIC BLOOD PRESSURE: 140 MMHG | HEIGHT: 70 IN | BODY MASS INDEX: 24.2 KG/M2 | WEIGHT: 169 LBS | DIASTOLIC BLOOD PRESSURE: 87 MMHG | HEART RATE: 68 BPM

## 2019-12-12 DIAGNOSIS — I50.21 ACUTE SYSTOLIC CONGESTIVE HEART FAILURE (HCC): Primary | ICD-10-CM

## 2019-12-12 DIAGNOSIS — I50.21 ACUTE SYSTOLIC CONGESTIVE HEART FAILURE (HCC): ICD-10-CM

## 2019-12-12 DIAGNOSIS — I10 BENIGN ESSENTIAL HYPERTENSION: ICD-10-CM

## 2019-12-12 DIAGNOSIS — E78.5 HYPERLIPIDEMIA LDL GOAL <70: ICD-10-CM

## 2019-12-12 DIAGNOSIS — I48.91 ATRIAL FIBRILLATION WITH RVR (HCC): ICD-10-CM

## 2019-12-12 DIAGNOSIS — Z95.2 S/P AVR (AORTIC VALVE REPLACEMENT): ICD-10-CM

## 2019-12-12 LAB
ANION GAP SERPL CALCULATED.3IONS-SCNC: 11.4 MMOL/L (ref 5–15)
BUN BLD-MCNC: 22 MG/DL (ref 8–23)
BUN/CREAT SERPL: 22.7 (ref 7–25)
CALCIUM SPEC-SCNC: 9.5 MG/DL (ref 8.6–10.5)
CHLORIDE SERPL-SCNC: 105 MMOL/L (ref 98–107)
CO2 SERPL-SCNC: 26.6 MMOL/L (ref 22–29)
CREAT BLD-MCNC: 0.97 MG/DL (ref 0.76–1.27)
GFR SERPL CREATININE-BSD FRML MDRD: 76 ML/MIN/1.73
GLUCOSE BLD-MCNC: 105 MG/DL (ref 65–99)
POTASSIUM BLD-SCNC: 4.5 MMOL/L (ref 3.5–5.2)
SODIUM BLD-SCNC: 143 MMOL/L (ref 136–145)

## 2019-12-12 PROCEDURE — 80048 BASIC METABOLIC PNL TOTAL CA: CPT

## 2019-12-12 PROCEDURE — 99213 OFFICE O/P EST LOW 20 MIN: CPT | Performed by: INTERNAL MEDICINE

## 2019-12-12 PROCEDURE — 36415 COLL VENOUS BLD VENIPUNCTURE: CPT

## 2019-12-12 RX ORDER — GUAIFENESIN 600 MG/1
1200 TABLET, EXTENDED RELEASE ORAL AS NEEDED
COMMUNITY
End: 2020-01-01

## 2019-12-12 NOTE — PROGRESS NOTES
OFFICE FOLLOW UP     Date of Encounter:2019     Name: Jair Flores  : 1945  Address: [facility] [place] [address]    PCP: Osmar Clemons MD  [address]  [place] [id number]    Jair Flores is a 74 y.o. male.      Chief Complaint: hospital follow up of CHF/Atrial fib    Problem List:   1. Aortic insufficiency  A.  Severe AI by TTE 2019, EF 61-65%  B.  BRENDON 19: EF 55%, prolapse of right coronary leaflet with mod-severe AI  C.  AVR with 27 mm Medtronic bovine pericardial AV, 2019 ([place])  2. Atrial fibrillation with RVR and CHF 2019  A. BRENDON 2019: EF 30%, mild-mod MR, moderate TR, LA mod-severely dilated, bioprosthetic aortic valve grossly normal   B. Sotalol not tolerated due to prolonged QT  C. Lifevest placed at discharge.  3. Carotid artery stenosis   A. Recurrent left hemispheric ischemic CVA 2019  B. Carotid duplex 19: LAURYN 50-69%, LICA <50%  C. Echo 19: EF 61-65%, LA is mod dilated, Severe AI, negative bubble study   D. Negative TCD, no Afib on monitor   E. Readmission 6/10 with recurrent right foot and arm paresthesias   i. MRI brain 6/10/19: multiple punctate foci along left hemisphere, suggestive of embolic infarcts  F. Carotid MR ([facility] read): large LICA ulcerated plaque with very large hemorrhagic component and modest sized lipid rich core.  G. LICA stent placement 2019, AMARA Mata MD  4. Coronary artery disease, nonobstructive by cath, 2019.  5. Hypertension   6. Hyperlipidemia  7. BEATRIZ on CPAP   8. History of bladder cancer, s/p resection, followed by Dr. Braxton  9. Surgical history:              A. Left knee partial replacement              B. Bladder tumor resection    Allergies:  Allergies   Allergen Reactions   • Penicillins Anaphylaxis   • Rocephin [Ceftriaxone] Anaphylaxis   • Bactrim [Sulfamethoxazole-Trimethoprim] Diarrhea     Abdominal pain, diarrhea, nausea    • Ciprofloxacin Swelling   •  "Macrobid [Nitrofurantoin Macrocrystal] Swelling   • Sotalol Other (See Comments)     Prolonged QT   • Sulfa Antibiotics Diarrhea       Current Medications:  •  allopurinol (ZYLOPRIM) 100 MG tablet, Take 100 mg by mouth Every Morning  •  apixaban (ELIQUIS) 5 MG tablet tablet, Take 1 tablet by mouth Every 12 (Twelve) Hours.0  •  Ascorbic Acid (VITAMIN C) 500 MG chewable tablet, Chew 500 mg Daily.  •  aspirin EC 81 MG EC tablet, Take 1 tablet by mouth Daily.   •  atorvastatin (LIPITOR) 80 MG tablet, Take 1 tablet by mouth Every Night.  •  diphenhydrAMINE (BENADRYL) 25 mg capsule, Take 25 mg by mouth Every 6 (Six) Hours As Needed for Itching  •  famotidine (PEPCID) 20 MG tablet, Take 1 tablet by mouth 2 (Two) Times a Day  •  guaiFENesin (MUCINEX) 600 MG 12 hr tablet, Take 1,200 mg by mouth As Needed for Cough  •  metoprolol succinate XL (TOPROL-XL) 100 MG 24 hr tablet, Take 1 tablet by mouth Daily  •  Multiple Vitamins-Minerals (HM MULTIVITAMIN ADULT GUMMY) chewable tablet, Chew 1 tablet Daily.  •  sacubitril-valsartan (ENTRESTO) 49-51 MG tablet, Take 1 tablet by mouth 2 (Two) Times a Day    History of Present Illness:     Jair Flores returns for follow up today after admission after Thanksgiving for heart failure and atrial fibrillation. He has done well on current medications above. He is attending cardiac rehabilitation regularly and is working at his office. Home blood pressures are running about 130's systolic. Wife reports he is \"back to himself\". He is tolerating home CPAP appliance and sleeping!!  He has not noticed any recurrent atrial fibrillation. He is wearing lifevest. The vest placement and use of amlactin lotion has created a bad rash across his side and back. He wishes to check an echo as soon as possible. He will need to start travel for work and does not wish to continue wearing lifevest if he is able.     The following portions of the patient's history were reviewed and updated as appropriate: " "allergies, current medications and problem list.    ROS: Pertinent positives as listed in the HPI.  All other systems reviewed and negative.    Objective:    Vitals:    12/12/19 1444 12/12/19 1452   BP: 140/91 140/87   BP Location: Left arm Left arm   Patient Position: Sitting Standing   Pulse: 62 68   Weight: 76.7 kg (169 lb)    Height: 177.8 cm (70\")        Physical Exam:  GENERAL: Alert, cooperative, in no acute distress.   HEENT: Normocephalic, no adenopathy, no jugular venous distention  HEART: No discrete PMI is noted. Regular rhythm, normal rate, and no murmurs, gallops, or rubs.   LUNGS: Clear to auscultation bilaterally. No wheezing, rales or ronchi.  ABDOMEN: Soft, bowel sounds present, non-tender   NEUROLOGIC: No focal abnormalities involving strength or sensation are noted.   EXTREMITIES: No clubbing, cyanosis, or edema noted.     Diagnostic Data: BMP pending today      Procedures    Assessment and Plan:   1.  PAF/CHF: he is maintaining sinus rhythm and continues Eliquis. He has not had angina and or heart failure.  We will allow a limited echocardiogram at one month (after [place]) to check LV only.This is at the patient's request. If not clearly above 35%, would leave lifevest in place and check complete echocardiogram on or after 2/25/19.  I will not change his Entresto dose as many home blood pressure readings are much lower than that recorded in the office today.  He may continue activities as tolerated in the interim. It is noted that he is not on an anti-arrhythmic (except metoprolol) due to in hospital Qt prolongation with Sotalol.    Scribed for Francisco Bo MD by Nicki Mckeon RN. 12/12/2019 3:21 PM.    EMR Dragon/Transcription Disclaimer:  Much of this encounter note is an electronic transcription/translation of spoken language to printed text.  The electronic translation of spoken language may permit erroneous, or at times, nonsensical words or phrases to be inadvertently " transcribed.  Although I have reviewed the note for such errors, some may still exist.

## 2019-12-16 ENCOUNTER — TREATMENT (OUTPATIENT)
Dept: CARDIAC REHAB | Facility: HOSPITAL | Age: 74
End: 2019-12-16

## 2019-12-16 DIAGNOSIS — Z95.2 S/P AVR (AORTIC VALVE REPLACEMENT): Primary | ICD-10-CM

## 2019-12-16 PROCEDURE — 93798 PHYS/QHP OP CAR RHAB W/ECG: CPT

## 2019-12-17 ENCOUNTER — READMISSION MANAGEMENT (OUTPATIENT)
Dept: CALL CENTER | Facility: HOSPITAL | Age: 74
End: 2019-12-17

## 2019-12-17 NOTE — OUTREACH NOTE
Medical Week 3 Survey      Responses   Facility patient discharged from?  [place]   Does the patient have one of the following disease processes/diagnoses(primary or secondary)?  Other   Week 3 attempt successful?  No   Unsuccessful attempts  Attempt 1          Beba Haney RN

## 2019-12-18 ENCOUNTER — TREATMENT (OUTPATIENT)
Dept: CARDIAC REHAB | Facility: HOSPITAL | Age: 74
End: 2019-12-18

## 2019-12-18 DIAGNOSIS — Z95.2 S/P AVR (AORTIC VALVE REPLACEMENT): Primary | ICD-10-CM

## 2019-12-18 PROCEDURE — 93798 PHYS/QHP OP CAR RHAB W/ECG: CPT

## 2019-12-19 ENCOUNTER — READMISSION MANAGEMENT (OUTPATIENT)
Dept: CALL CENTER | Facility: HOSPITAL | Age: 74
End: 2019-12-19

## 2019-12-19 NOTE — OUTREACH NOTE
Medical Week 3 Survey      Responses   Facility patient discharged from?  [place]   Does the patient have one of the following disease processes/diagnoses(primary or secondary)?  Other   Week 3 attempt successful?  No   Unsuccessful attempts  Attempt 2          Ele Taylor RN

## 2019-12-20 ENCOUNTER — TREATMENT (OUTPATIENT)
Dept: CARDIAC REHAB | Facility: HOSPITAL | Age: 74
End: 2019-12-20

## 2019-12-20 DIAGNOSIS — Z95.2 S/P AVR (AORTIC VALVE REPLACEMENT): Primary | ICD-10-CM

## 2019-12-20 PROCEDURE — 93798 PHYS/QHP OP CAR RHAB W/ECG: CPT

## 2019-12-23 ENCOUNTER — TREATMENT (OUTPATIENT)
Dept: CARDIAC REHAB | Facility: HOSPITAL | Age: 74
End: 2019-12-23

## 2019-12-23 DIAGNOSIS — Z95.2 S/P AVR (AORTIC VALVE REPLACEMENT): Primary | ICD-10-CM

## 2019-12-23 PROCEDURE — 93798 PHYS/QHP OP CAR RHAB W/ECG: CPT

## 2019-12-23 NOTE — PROGRESS NOTES
Attended Phase II Cardiac Rehab. No medication or health history changes reported. See [facility] for details.

## 2019-12-24 RX ORDER — ALLOPURINOL 100 MG/1
TABLET ORAL
Qty: 30 TABLET | Refills: 11 | Status: SHIPPED | OUTPATIENT
Start: 2019-12-24 | End: 2020-01-01

## 2019-12-27 ENCOUNTER — TREATMENT (OUTPATIENT)
Dept: CARDIAC REHAB | Facility: HOSPITAL | Age: 74
End: 2019-12-27

## 2019-12-27 DIAGNOSIS — Z95.2 S/P AVR (AORTIC VALVE REPLACEMENT): Primary | ICD-10-CM

## 2019-12-27 PROCEDURE — 93798 PHYS/QHP OP CAR RHAB W/ECG: CPT

## 2019-12-30 ENCOUNTER — TREATMENT (OUTPATIENT)
Dept: CARDIAC REHAB | Facility: HOSPITAL | Age: 74
End: 2019-12-30

## 2019-12-30 ENCOUNTER — TELEPHONE (OUTPATIENT)
Dept: CARDIOLOGY | Facility: CLINIC | Age: 74
End: 2019-12-30

## 2019-12-30 DIAGNOSIS — Z95.2 S/P AVR (AORTIC VALVE REPLACEMENT): Primary | ICD-10-CM

## 2019-12-30 PROCEDURE — 93798 PHYS/QHP OP CAR RHAB W/ECG: CPT

## 2019-12-30 NOTE — TELEPHONE ENCOUNTER
PA for Entresto approved:    VENESSA WEIR (Key: [id number])   Rx #: [id number]   Entresto 49-51MG tablets

## 2020-01-01 ENCOUNTER — OFFICE VISIT (OUTPATIENT)
Dept: CARDIOLOGY | Facility: CLINIC | Age: 75
End: 2020-01-01

## 2020-01-01 ENCOUNTER — TELEPHONE (OUTPATIENT)
Dept: INTERNAL MEDICINE | Facility: CLINIC | Age: 75
End: 2020-01-01

## 2020-01-01 ENCOUNTER — TELEPHONE (OUTPATIENT)
Dept: CARDIOLOGY | Facility: CLINIC | Age: 75
End: 2020-01-01

## 2020-01-01 ENCOUNTER — CONSULT (OUTPATIENT)
Dept: ONCOLOGY | Facility: CLINIC | Age: 75
End: 2020-01-01

## 2020-01-01 VITALS
WEIGHT: 174 LBS | OXYGEN SATURATION: 93 % | TEMPERATURE: 96.8 F | SYSTOLIC BLOOD PRESSURE: 124 MMHG | BODY MASS INDEX: 24.91 KG/M2 | DIASTOLIC BLOOD PRESSURE: 64 MMHG | RESPIRATION RATE: 16 BRPM | HEIGHT: 70 IN | HEART RATE: 69 BPM

## 2020-01-01 VITALS
DIASTOLIC BLOOD PRESSURE: 79 MMHG | BODY MASS INDEX: 27.35 KG/M2 | WEIGHT: 191 LBS | HEIGHT: 70 IN | HEART RATE: 57 BPM | SYSTOLIC BLOOD PRESSURE: 134 MMHG | OXYGEN SATURATION: 94 %

## 2020-01-01 DIAGNOSIS — R30.0 DYSURIA: Primary | ICD-10-CM

## 2020-01-01 DIAGNOSIS — N39.0 URINARY TRACT INFECTION WITHOUT HEMATURIA, SITE UNSPECIFIED: Primary | ICD-10-CM

## 2020-01-01 DIAGNOSIS — C67.9 MALIGNANT NEOPLASM OF URINARY BLADDER, UNSPECIFIED SITE (HCC): Primary | ICD-10-CM

## 2020-01-01 DIAGNOSIS — I48.0 PAF (PAROXYSMAL ATRIAL FIBRILLATION) (HCC): ICD-10-CM

## 2020-01-01 DIAGNOSIS — Z95.2 S/P AVR (AORTIC VALVE REPLACEMENT): Primary | ICD-10-CM

## 2020-01-01 DIAGNOSIS — C67.5 CANCER, BLADDER, NECK (HCC): ICD-10-CM

## 2020-01-01 PROCEDURE — 93000 ELECTROCARDIOGRAM COMPLETE: CPT | Performed by: INTERNAL MEDICINE

## 2020-01-01 PROCEDURE — 99214 OFFICE O/P EST MOD 30 MIN: CPT | Performed by: INTERNAL MEDICINE

## 2020-01-01 PROCEDURE — 99205 OFFICE O/P NEW HI 60 MIN: CPT | Performed by: INTERNAL MEDICINE

## 2020-01-01 RX ORDER — ENALAPRIL MALEATE 20 MG/1
TABLET ORAL
COMMUNITY
End: 2021-01-01

## 2020-01-01 RX ORDER — INFLUENZA A VIRUS A/MICHIGAN/45/2015 X-275 (H1N1) ANTIGEN (FORMALDEHYDE INACTIVATED), INFLUENZA A VIRUS A/SINGAPORE/INFIMH-16-0019/2016 IVR-186 (H3N2) ANTIGEN (FORMALDEHYDE INACTIVATED), INFLUENZA B VIRUS B/PHUKET/3073/2013 ANTIGEN (FORMALDEHYDE INACTIVATED), AND INFLUENZA B VIRUS B/MARYLAND/15/2016 BX-69A ANTIGEN (FORMALDEHYDE INACTIVATED) 60; 60; 60; 60 UG/.7ML; UG/.7ML; UG/.7ML; UG/.7ML
INJECTION, SUSPENSION INTRAMUSCULAR
COMMUNITY
Start: 2020-01-01 | End: 2021-01-01 | Stop reason: HOSPADM

## 2020-01-01 RX ORDER — METHOCARBAMOL 500 MG/1
TABLET, FILM COATED ORAL
COMMUNITY
Start: 2020-01-01 | End: 2021-01-01

## 2020-01-01 RX ORDER — NITROFURANTOIN 25; 75 MG/1; MG/1
CAPSULE ORAL
COMMUNITY
Start: 2020-01-01 | End: 2021-01-01

## 2020-01-01 RX ORDER — CLINDAMYCIN HYDROCHLORIDE 150 MG/1
CAPSULE ORAL EVERY 6 HOURS
COMMUNITY
Start: 2020-04-14 | End: 2021-01-01

## 2020-01-01 RX ORDER — OXYCODONE HYDROCHLORIDE 10 MG/1
TABLET ORAL
COMMUNITY
Start: 2020-01-01 | End: 2021-01-01

## 2020-01-01 RX ORDER — ATORVASTATIN CALCIUM 80 MG/1
80 TABLET, FILM COATED ORAL NIGHTLY
Qty: 90 TABLET | Refills: 3 | Status: SHIPPED | OUTPATIENT
Start: 2020-01-01 | End: 2021-01-01

## 2020-01-01 RX ORDER — GUAIFENESIN 600 MG/1
1200 TABLET, EXTENDED RELEASE ORAL
COMMUNITY
End: 2021-01-01

## 2020-01-01 RX ORDER — ALLOPURINOL 100 MG/1
100 TABLET ORAL DAILY
Qty: 90 TABLET | Refills: 3 | Status: SHIPPED | OUTPATIENT
Start: 2020-01-01

## 2020-01-03 ENCOUNTER — TREATMENT (OUTPATIENT)
Dept: CARDIAC REHAB | Facility: HOSPITAL | Age: 75
End: 2020-01-03

## 2020-01-03 DIAGNOSIS — Z95.2 S/P AVR (AORTIC VALVE REPLACEMENT): Primary | ICD-10-CM

## 2020-01-03 PROCEDURE — 93798 PHYS/QHP OP CAR RHAB W/ECG: CPT

## 2020-01-03 NOTE — PROGRESS NOTES
Attended Phase II Cardiac Rehab. No medication or health history changes reported. See [facility] for details.   Reason for call:  Patient reporting a symptom    Symptom or request: fever of 101.5     Duration (how long have symptoms been present): last night 01/30/2018    Have you been treated for this before? No    Additional comments: Mom of the patient called wondering what tempeture  Would be ideal to bring her child into the doctor    Phone Number patient can be reached at:  Home number on file [phone] (home)    Best Time:  anytime    Can we leave a detailed message on this number:  YES    Call taken on 1/31/2018 at 7:09 AM by Rahel Adams

## 2020-01-06 ENCOUNTER — TREATMENT (OUTPATIENT)
Dept: CARDIAC REHAB | Facility: HOSPITAL | Age: 75
End: 2020-01-06

## 2020-01-06 DIAGNOSIS — Z95.2 S/P AVR (AORTIC VALVE REPLACEMENT): Primary | ICD-10-CM

## 2020-01-06 PROCEDURE — 93798 PHYS/QHP OP CAR RHAB W/ECG: CPT

## 2020-01-08 ENCOUNTER — TREATMENT (OUTPATIENT)
Dept: CARDIAC REHAB | Facility: HOSPITAL | Age: 75
End: 2020-01-08

## 2020-01-08 ENCOUNTER — HOSPITAL ENCOUNTER (OUTPATIENT)
Dept: CARDIOLOGY | Facility: HOSPITAL | Age: 75
Discharge: HOME | End: 2020-01-08
Admitting: INTERNAL MEDICINE

## 2020-01-08 VITALS — BODY MASS INDEX: 24.2 KG/M2 | WEIGHT: 169 LBS | HEIGHT: 70 IN

## 2020-01-08 DIAGNOSIS — Z95.2 S/P AVR (AORTIC VALVE REPLACEMENT): Primary | ICD-10-CM

## 2020-01-08 DIAGNOSIS — I50.21 ACUTE SYSTOLIC CONGESTIVE HEART FAILURE (HCC): ICD-10-CM

## 2020-01-08 PROCEDURE — 93798 PHYS/QHP OP CAR RHAB W/ECG: CPT

## 2020-01-08 PROCEDURE — 93308 TTE F-UP OR LMTD: CPT | Performed by: INTERNAL MEDICINE

## 2020-01-08 PROCEDURE — 93308 TTE F-UP OR LMTD: CPT

## 2020-01-09 ENCOUNTER — TELEPHONE (OUTPATIENT)
Dept: CARDIOLOGY | Facility: CLINIC | Age: 75
End: 2020-01-09

## 2020-01-09 LAB
BH CV ECHO MEAS - AO MAX PG (FULL): 13.4 MMHG
BH CV ECHO MEAS - AO MAX PG: 18.1 MMHG
BH CV ECHO MEAS - AO MEAN PG (FULL): 7.9 MMHG
BH CV ECHO MEAS - AO MEAN PG: 10.5 MMHG
BH CV ECHO MEAS - AO ROOT AREA (BSA CORRECTED): 1.8
BH CV ECHO MEAS - AO ROOT AREA: 10 CM^2
BH CV ECHO MEAS - AO ROOT DIAM: 3.6 CM
BH CV ECHO MEAS - AO V2 MAX: 212.8 CM/SEC
BH CV ECHO MEAS - AO V2 MEAN: 150.1 CM/SEC
BH CV ECHO MEAS - AO V2 VTI: 45.7 CM
BH CV ECHO MEAS - AVA(I,A): 1.8 CM^2
BH CV ECHO MEAS - AVA(I,D): 1.8 CM^2
BH CV ECHO MEAS - AVA(V,A): 1.8 CM^2
BH CV ECHO MEAS - AVA(V,D): 1.8 CM^2
BH CV ECHO MEAS - BSA(HAYCOCK): 2 M^2
BH CV ECHO MEAS - BSA: 1.9 M^2
BH CV ECHO MEAS - BZI_BMI: 24.2 KILOGRAMS/M^2
BH CV ECHO MEAS - BZI_METRIC_HEIGHT: 177.8 CM
BH CV ECHO MEAS - BZI_METRIC_WEIGHT: 76.7 KG
BH CV ECHO MEAS - EDV(CUBED): 110.7 ML
BH CV ECHO MEAS - EDV(MOD-SP2): 123 ML
BH CV ECHO MEAS - EDV(MOD-SP4): 136 ML
BH CV ECHO MEAS - EDV(TEICH): 107.6 ML
BH CV ECHO MEAS - EF(CUBED): 54.5 %
BH CV ECHO MEAS - EF(MOD-BP): 63 %
BH CV ECHO MEAS - EF(MOD-SP2): 59.3 %
BH CV ECHO MEAS - EF(MOD-SP4): 64.7 %
BH CV ECHO MEAS - EF(TEICH): 46.2 %
BH CV ECHO MEAS - ESV(CUBED): 50.3 ML
BH CV ECHO MEAS - ESV(MOD-SP2): 50 ML
BH CV ECHO MEAS - ESV(MOD-SP4): 48 ML
BH CV ECHO MEAS - ESV(TEICH): 57.8 ML
BH CV ECHO MEAS - FS: 23.1 %
BH CV ECHO MEAS - IVS/LVPW: 1.1
BH CV ECHO MEAS - IVSD: 1.1 CM
BH CV ECHO MEAS - LA DIMENSION: 3.3 CM
BH CV ECHO MEAS - LA/AO: 0.93
BH CV ECHO MEAS - LAD MAJOR: 6.4 CM
BH CV ECHO MEAS - LV DIASTOLIC VOL/BSA (35-75): 70 ML/M^2
BH CV ECHO MEAS - LV MASS(C)D: 196.9 GRAMS
BH CV ECHO MEAS - LV MASS(C)DI: 101.3 GRAMS/M^2
BH CV ECHO MEAS - LV MAX PG: 4.8 MMHG
BH CV ECHO MEAS - LV MEAN PG: 2.6 MMHG
BH CV ECHO MEAS - LV SYSTOLIC VOL/BSA (12-30): 24.7 ML/M^2
BH CV ECHO MEAS - LV V1 MAX: 109.1 CM/SEC
BH CV ECHO MEAS - LV V1 MEAN: 72.9 CM/SEC
BH CV ECHO MEAS - LV V1 VTI: 24.7 CM
BH CV ECHO MEAS - LVIDD: 4.8 CM
BH CV ECHO MEAS - LVIDS: 3.7 CM
BH CV ECHO MEAS - LVLD AP2: 8.4 CM
BH CV ECHO MEAS - LVLD AP4: 8.8 CM
BH CV ECHO MEAS - LVLS AP2: 7.6 CM
BH CV ECHO MEAS - LVLS AP4: 7.5 CM
BH CV ECHO MEAS - LVOT AREA (M): 3.5 CM^2
BH CV ECHO MEAS - LVOT AREA: 3.4 CM^2
BH CV ECHO MEAS - LVOT DIAM: 2.1 CM
BH CV ECHO MEAS - LVPWD: 1.1 CM
BH CV ECHO MEAS - SI(AO): 235.4 ML/M^2
BH CV ECHO MEAS - SI(CUBED): 31.1 ML/M^2
BH CV ECHO MEAS - SI(LVOT): 43.3 ML/M^2
BH CV ECHO MEAS - SI(MOD-SP2): 37.6 ML/M^2
BH CV ECHO MEAS - SI(MOD-SP4): 45.3 ML/M^2
BH CV ECHO MEAS - SI(TEICH): 25.6 ML/M^2
BH CV ECHO MEAS - SV(AO): 457.4 ML
BH CV ECHO MEAS - SV(CUBED): 60.3 ML
BH CV ECHO MEAS - SV(LVOT): 84.2 ML
BH CV ECHO MEAS - SV(MOD-SP2): 73 ML
BH CV ECHO MEAS - SV(MOD-SP4): 88 ML
BH CV ECHO MEAS - SV(TEICH): 49.7 ML
BH CV ECHO MEAS - TR MAX PG: 24 MMHG
BH CV ECHO MEAS - TR MAX VEL: 244.4 CM/SEC
BH CV VAS BP LEFT ARM: NORMAL MMHG
BH CV XLRA - RV BASE: 4.4 CM
BH CV XLRA - RV LENGTH: 8 CM
BH CV XLRA - RV MID: 3.8 CM
LEFT ATRIUM VOLUME INDEX: 37.1 ML/M^2
LEFT ATRIUM VOLUME: 72 ML

## 2020-01-09 NOTE — TELEPHONE ENCOUNTER
Pt aware his EF is better and he may remove Lifevest. He will continue all medications and keep scheduled follow up with J in February 2020. JORGE A

## 2020-01-10 ENCOUNTER — TREATMENT (OUTPATIENT)
Dept: CARDIAC REHAB | Facility: HOSPITAL | Age: 75
End: 2020-01-10

## 2020-01-10 DIAGNOSIS — Z95.2 S/P AVR (AORTIC VALVE REPLACEMENT): Primary | ICD-10-CM

## 2020-01-10 PROCEDURE — 93798 PHYS/QHP OP CAR RHAB W/ECG: CPT

## 2020-01-10 NOTE — PROGRESS NOTES
Attended Phase II Cardiac Rehab. No medication or health history changes reported. See [facility] for details.

## 2020-01-13 ENCOUNTER — TREATMENT (OUTPATIENT)
Dept: CARDIAC REHAB | Facility: HOSPITAL | Age: 75
End: 2020-01-13

## 2020-01-13 DIAGNOSIS — Z95.2 S/P AVR (AORTIC VALVE REPLACEMENT): Primary | ICD-10-CM

## 2020-01-13 PROCEDURE — 93798 PHYS/QHP OP CAR RHAB W/ECG: CPT

## 2020-01-15 ENCOUNTER — TREATMENT (OUTPATIENT)
Dept: CARDIAC REHAB | Facility: HOSPITAL | Age: 75
End: 2020-01-15

## 2020-01-15 DIAGNOSIS — Z95.2 S/P AVR (AORTIC VALVE REPLACEMENT): Primary | ICD-10-CM

## 2020-01-15 PROCEDURE — 93798 PHYS/QHP OP CAR RHAB W/ECG: CPT

## 2020-01-17 ENCOUNTER — TREATMENT (OUTPATIENT)
Dept: CARDIAC REHAB | Facility: HOSPITAL | Age: 75
End: 2020-01-17

## 2020-01-17 DIAGNOSIS — Z95.2 S/P AVR (AORTIC VALVE REPLACEMENT): Primary | ICD-10-CM

## 2020-01-17 PROCEDURE — 93798 PHYS/QHP OP CAR RHAB W/ECG: CPT

## 2020-01-20 ENCOUNTER — TREATMENT (OUTPATIENT)
Dept: CARDIAC REHAB | Facility: HOSPITAL | Age: 75
End: 2020-01-20

## 2020-01-20 DIAGNOSIS — Z95.2 S/P AVR (AORTIC VALVE REPLACEMENT): Primary | ICD-10-CM

## 2020-01-20 PROCEDURE — 93798 PHYS/QHP OP CAR RHAB W/ECG: CPT

## 2020-01-22 ENCOUNTER — TREATMENT (OUTPATIENT)
Dept: CARDIAC REHAB | Facility: HOSPITAL | Age: 75
End: 2020-01-22

## 2020-01-22 DIAGNOSIS — Z95.2 S/P AVR (AORTIC VALVE REPLACEMENT): Primary | ICD-10-CM

## 2020-01-22 PROCEDURE — 93798 PHYS/QHP OP CAR RHAB W/ECG: CPT

## 2020-01-22 NOTE — PROGRESS NOTES
Attended Phase II Cardiac Rehab. No medication or health history changes reported. See [facility] for details.

## 2020-01-24 ENCOUNTER — TREATMENT (OUTPATIENT)
Dept: CARDIAC REHAB | Facility: HOSPITAL | Age: 75
End: 2020-01-24

## 2020-01-24 DIAGNOSIS — Z95.2 S/P AVR (AORTIC VALVE REPLACEMENT): Primary | ICD-10-CM

## 2020-01-24 PROCEDURE — 93798 PHYS/QHP OP CAR RHAB W/ECG: CPT

## 2020-01-27 ENCOUNTER — TREATMENT (OUTPATIENT)
Dept: CARDIAC REHAB | Facility: HOSPITAL | Age: 75
End: 2020-01-27

## 2020-01-27 DIAGNOSIS — Z95.2 S/P AVR (AORTIC VALVE REPLACEMENT): Primary | ICD-10-CM

## 2020-01-27 PROCEDURE — 93798 PHYS/QHP OP CAR RHAB W/ECG: CPT

## 2020-01-29 ENCOUNTER — TREATMENT (OUTPATIENT)
Dept: CARDIAC REHAB | Facility: HOSPITAL | Age: 75
End: 2020-01-29

## 2020-01-29 DIAGNOSIS — Z95.2 S/P AVR (AORTIC VALVE REPLACEMENT): Primary | ICD-10-CM

## 2020-01-29 PROCEDURE — 93798 PHYS/QHP OP CAR RHAB W/ECG: CPT

## 2020-02-03 ENCOUNTER — TREATMENT (OUTPATIENT)
Dept: CARDIAC REHAB | Facility: HOSPITAL | Age: 75
End: 2020-02-03

## 2020-02-03 DIAGNOSIS — Z95.2 S/P AVR (AORTIC VALVE REPLACEMENT): Primary | ICD-10-CM

## 2020-02-03 PROCEDURE — 93798 PHYS/QHP OP CAR RHAB W/ECG: CPT

## 2020-02-05 ENCOUNTER — TREATMENT (OUTPATIENT)
Dept: CARDIAC REHAB | Facility: HOSPITAL | Age: 75
End: 2020-02-05

## 2020-02-05 DIAGNOSIS — Z95.2 S/P AVR (AORTIC VALVE REPLACEMENT): Primary | ICD-10-CM

## 2020-02-05 PROCEDURE — 93798 PHYS/QHP OP CAR RHAB W/ECG: CPT

## 2020-02-07 ENCOUNTER — TREATMENT (OUTPATIENT)
Dept: CARDIAC REHAB | Facility: HOSPITAL | Age: 75
End: 2020-02-07

## 2020-02-07 DIAGNOSIS — Z95.2 S/P AVR (AORTIC VALVE REPLACEMENT): Primary | ICD-10-CM

## 2020-02-07 PROCEDURE — 93798 PHYS/QHP OP CAR RHAB W/ECG: CPT

## 2020-02-10 ENCOUNTER — TREATMENT (OUTPATIENT)
Dept: CARDIAC REHAB | Facility: HOSPITAL | Age: 75
End: 2020-02-10

## 2020-02-10 DIAGNOSIS — Z95.2 S/P AVR (AORTIC VALVE REPLACEMENT): Primary | ICD-10-CM

## 2020-02-10 PROCEDURE — 93798 PHYS/QHP OP CAR RHAB W/ECG: CPT

## 2020-02-12 ENCOUNTER — TREATMENT (OUTPATIENT)
Dept: CARDIAC REHAB | Facility: HOSPITAL | Age: 75
End: 2020-02-12

## 2020-02-12 DIAGNOSIS — Z95.2 S/P AVR (AORTIC VALVE REPLACEMENT): Primary | ICD-10-CM

## 2020-02-12 PROCEDURE — 93798 PHYS/QHP OP CAR RHAB W/ECG: CPT

## 2020-02-12 NOTE — PROGRESS NOTES
Attended Phase II Cardiac Rehab. No medication or health history changes reported. See [facility] for details.

## 2020-02-14 ENCOUNTER — APPOINTMENT (OUTPATIENT)
Dept: CARDIAC REHAB | Facility: HOSPITAL | Age: 75
End: 2020-02-14

## 2020-02-14 PROCEDURE — 93798 PHYS/QHP OP CAR RHAB W/ECG: CPT

## 2020-02-17 ENCOUNTER — TREATMENT (OUTPATIENT)
Dept: CARDIAC REHAB | Facility: HOSPITAL | Age: 75
End: 2020-02-17

## 2020-02-17 DIAGNOSIS — Z95.2 S/P AVR (AORTIC VALVE REPLACEMENT): Primary | ICD-10-CM

## 2020-02-17 PROCEDURE — 93798 PHYS/QHP OP CAR RHAB W/ECG: CPT

## 2020-02-18 ENCOUNTER — OFFICE VISIT (OUTPATIENT)
Dept: CARDIOLOGY | Facility: CLINIC | Age: 75
End: 2020-02-18

## 2020-02-18 VITALS
SYSTOLIC BLOOD PRESSURE: 137 MMHG | BODY MASS INDEX: 25.48 KG/M2 | HEART RATE: 59 BPM | WEIGHT: 178 LBS | DIASTOLIC BLOOD PRESSURE: 88 MMHG | HEIGHT: 70 IN

## 2020-02-18 DIAGNOSIS — I10 BENIGN ESSENTIAL HYPERTENSION: Primary | ICD-10-CM

## 2020-02-18 PROCEDURE — 99214 OFFICE O/P EST MOD 30 MIN: CPT | Performed by: INTERNAL MEDICINE

## 2020-02-18 RX ORDER — AMLODIPINE BESYLATE 5 MG/1
5 TABLET ORAL DAILY
Qty: 30 TABLET | Refills: 11 | Status: SHIPPED | OUTPATIENT
Start: 2020-02-18 | End: 2021-01-01 | Stop reason: SDUPTHER

## 2020-02-18 RX ORDER — CHLORTHALIDONE 25 MG/1
12.5 TABLET ORAL DAILY
Qty: 15 TABLET | Refills: 11 | Status: SHIPPED | OUTPATIENT
Start: 2020-02-18 | End: 2020-02-18 | Stop reason: ALTCHOICE

## 2020-02-18 NOTE — PATIENT INSTRUCTIONS
The American Heart Association recommends 150 minutes of aerobic exercise per week above and beyond your activities of daily living.  Your target heart rate is .  Your maximum heart rate for exercise is 158 and should not be sustained for long periods of time.  These numbers are based on age and should be evaluated yearly.      Exercise Guidelines   When you are recovering from a heart condition, such as from heart surgery, heart attack, or heart failure, it is important to have heart-healthy habits, including exercise routines. Discuss an appropriate exercise program with your heart specialist (cardiologist) and rehabilitation therapist.  It is important to design a program that is safe and effective for you. The program should meet your specific abilities and needs. Walking, biking, jogging, and swimming are all good aerobic activities. These take light to moderate effort. Adding some light resistance training is also important. Even simple lifestyle changes can help. These lifestyle changes may include parking farther from the store or taking the stairs instead of the elevator.  At first, you may begin exercising under supervision, such as at a hospital or clinic. Over time, you may begin exercising at home, with your health care provider's approval.  Types of exercise  Aerobic exercise  During cardiac rehabilitation, it is important to do aerobic activities. Aerobic exercise keeps joints and muscles moving. It involves large muscle groups. It is also rhythmic and must be done for a longer period of time. Doing these exercises improves circulation and endurance. Examples of aerobic exercise include:  · Swimming.  · Walking.  · Hiking.  · Jogging.  · Cross-country skiing.  · Biking.  · Dancing.    Static exercise  Static exercise (isometric exercise) uses muscles at high intensities without moving the joints. Some examples of static exercise include pushing against a heavy couch that does not move, doing a  wall sit, or holding a plank position. Static exercise improves strength but also quickly increases blood pressure. Follow these guidelines:  · If you have circulation problems or high blood pressure, talk with your health care provider before starting any static exercise routines. Do not do static exercises if your health care provider tells you not to.  · Do not hold your breath while doing static exercises. Holding your breath during static exercises can raise your blood pressure to a dangerously high level.    Weight-resistance exercise  Weight-resistance exercises are another important part of rehabilitation. These exercises strengthen your muscles by making them work against resistance. Resistance exercises may help you return to activities of daily living sooner and improve your quality of life. They also help reduce cardiac risk factors. Examples of weight-resistance exercise include using:  · Free weights.  · Weight-lifting machines.  · Large, specially designed rubber bands.  You will usually do weight-resistance exercises 2 times a week with a 2-day rest period between workouts.  Stretching  Stretching before you exercise warms up your muscles and prevents injury. Stretching also improves your flexibility, balance, coordination, and range of motion. Follow these guidelines:  · Stretch both before and after exercising.  · Do not force a muscle or joint into a painful angle. Stretching should be a relaxing part of your exercise routine.  · Once you feel resistance in your muscle, hold the stretch for a few seconds. Make sure you keep breathing while you hold the stretch.  · Go slowly when doing all stretches.  Setting a pace  · Choose a pace that is comfortable for you.  ? You should be able to talk while exercising. If you are short of breath or unable to speak while you exercise, slow down.  ? If you are able to sing while exercising, you are not exercising hard enough.  · Keep track of how hard you are  "working as you exercise (exertion level). Your rehabilitation therapist can teach you to use a mental scale to measure your level of exertion (perceived exertion). Using a mental scale, you will think about your exertion level and rate it in a range from 6 to 20.  ? A rating of 6 to 9. This means that you are doing \"very light\" exercise and are not exerting yourself enough. For a healthy person, this may be walking at a slow pace.  ? A rating of 11 to 15. This is exercise that is \"somewhat hard.\" For a healthy exercise session, you should aim for an exertion rate that is within this range.  ? A rating of 16 to 17. This is considered \"very hard\" or strenuous. For a healthy person, exercise at this rating may start to feel heavy and difficult.  ? A rating of 19 to 20. This means that you are working \"extremely hard.\" For most people, these numbers represent the hardest you've ever worked to exercise.  · Your health care provider or cardiac rehabilitation specialist may also recommend that you wear a heart rate monitor while you exercise. This will help you keep track of your heart rate zones and how hard your heart is working.  Frequency  As you are recovering, it is important to start exercising slowly and to gradually work up to your goal. Work with your health care provider to set up an exercise routine that works for you. Generally, cardiac rehabilitation exercise should include:  · 40 minutes of aerobic activity 3 - 4 days a week.  · Stretching and strength exercises 2 - 3 days a week.  Contact a doctor if:  · You have any of the following symptoms while exercising:  ? Pain, pressure, or burning in your chest, jaw, shoulder, or back (angina).  ? Lightheadedness.  ? Dizziness.  ? Irregular or fast heartbeat.  ? Shortness of breath.  · You are extremely tired after exercising.  Get help right away if:  · You have angina that does not get better with medicine and lasts for more than 5 minutes.  · You have nausea or " "you vomit.  · You have excessive sweating that is not caused by exercise.  Summary  · When you are recovering from a heart condition, it is important to have heart-healthy habits, including exercise routines.  · At first, you may begin exercising under supervision, such as at a hospital or clinic. Over time, you may begin exercising at home, with your health care provider's approval.  · Aim for 40 minutes of aerobic exercises 3 - 4 days a week.  · Aim to do stretching and strength exercises 2 - 3 days a week.  · Choose a pace that is comfortable for you. You should be able to talk while exercising.  This information is not intended to replace advice given to you by your health care provider. Make sure you discuss any questions you have with your health care provider.  Document Released: 12/23/2014 Document Revised: 11/17/2017 Document Reviewed: 11/17/2017  [place] Interactive Patient Education © 2019 [employer]    DASH Eating Plan  DASH stands for \"Dietary Approaches to Stop Hypertension.\" The DASH eating plan is a healthy eating plan that has been shown to reduce high blood pressure (hypertension). It may also reduce your risk for type 2 diabetes, heart disease, and stroke. The DASH eating plan may also help with weight loss.  What are tips for following this plan?    General guidelines  · Avoid eating more than 2,300 mg (milligrams) of salt (sodium) a day. If you have hypertension, you may need to reduce your sodium intake to 1,500 mg a day.  · Limit alcohol intake to no more than 1 drink a day for nonpregnant women and 2 drinks a day for men. One drink equals 12 oz of beer, 5 oz of wine, or 1½ oz of hard liquor.  · Work with your health care provider to maintain a healthy body weight or to lose weight. Ask what an ideal weight is for you.  · Get at least 30 minutes of exercise that causes your heart to beat faster (aerobic exercise) most days of the week. Activities may include walking, swimming, or " "biking.  · Work with your health care provider or diet and nutrition specialist (dietitian) to adjust your eating plan to your individual calorie needs.  Reading food labels    · Check food labels for the amount of sodium per serving. Choose foods with less than 5 percent of the Daily Value of sodium. Generally, foods with less than 300 mg of sodium per serving fit into this eating plan.  · To find whole grains, look for the word \"whole\" as the first word in the ingredient list.  Shopping  · Buy products labeled as \"low-sodium\" or \"no salt added.\"  · Buy fresh foods. Avoid canned foods and premade or frozen meals.  Cooking  · Avoid adding salt when cooking. Use salt-free seasonings or herbs instead of table salt or sea salt. Check with your health care provider or pharmacist before using salt substitutes.  · Do not landeros foods. Cook foods using healthy methods such as baking, boiling, grilling, and broiling instead.  · Cook with heart-healthy oils, such as olive, canola, soybean, or sunflower oil.  Meal planning  · Eat a balanced diet that includes:  ? 5 or more servings of fruits and vegetables each day. At each meal, try to fill half of your plate with fruits and vegetables.  ? Up to 6-8 servings of whole grains each day.  ? Less than 6 oz of lean meat, poultry, or fish each day. A 3-oz serving of meat is about the same size as a deck of cards. One egg equals 1 oz.  ? 2 servings of low-fat dairy each day.  ? A serving of nuts, seeds, or beans 5 times each week.  ? Heart-healthy fats. Healthy fats called Omega-3 fatty acids are found in foods such as flaxseeds and coldwater fish, like sardines, salmon, and mackerel.  · Limit how much you eat of the following:  ? Canned or prepackaged foods.  ? Food that is high in trans fat, such as fried foods.  ? Food that is high in saturated fat, such as fatty meat.  ? Sweets, desserts, sugary drinks, and other foods with added sugar.  ? Full-fat dairy products.  · Do not salt " foods before eating.  · Try to eat at least 2 vegetarian meals each week.  · Eat more home-cooked food and less restaurant, buffet, and fast food.  · When eating at a restaurant, ask that your food be prepared with less salt or no salt, if possible.  What foods are recommended?  The items listed may not be a complete list. Talk with your dietitian about what dietary choices are best for you.  Grains  Whole-grain or whole-wheat bread. Whole-grain or whole-wheat pasta. Brown rice. Oatmeal. Quinoa. Bulgur. Whole-grain and low-sodium cereals. Ivory bread. Low-fat, low-sodium crackers. Whole-wheat flour tortillas.  Vegetables  Fresh or frozen vegetables (raw, steamed, roasted, or grilled). Low-sodium or reduced-sodium tomato and vegetable juice. Low-sodium or reduced-sodium tomato sauce and tomato paste. Low-sodium or reduced-sodium canned vegetables.  Fruits  All fresh, dried, or frozen fruit. Canned fruit in natural juice (without added sugar).  Meat and other protein foods  Skinless chicken or turkey. Ground chicken or turkey. Pork with fat trimmed off. Fish and seafood. Egg whites. Dried beans, peas, or lentils. Unsalted nuts, nut butters, and seeds. Unsalted canned beans. Lean cuts of beef with fat trimmed off. Low-sodium, lean deli meat.  Dairy  Low-fat (1%) or fat-free (skim) milk. Fat-free, low-fat, or reduced-fat cheeses. Nonfat, low-sodium ricotta or cottage cheese. Low-fat or nonfat yogurt. Low-fat, low-sodium cheese.  Fats and oils  Soft margarine without trans fats. Vegetable oil. Low-fat, reduced-fat, or light mayonnaise and salad dressings (reduced-sodium). Canola, safflower, olive, soybean, and sunflower oils. Avocado.  Seasoning and other foods  Herbs. Spices. Seasoning mixes without salt. Unsalted popcorn and pretzels. Fat-free sweets.  What foods are not recommended?  The items listed may not be a complete list. Talk with your dietitian about what dietary choices are best for you.  Grains  Baked goods  made with fat, such as croissants, muffins, or some breads. Dry pasta or rice meal packs.  Vegetables  Creamed or fried vegetables. Vegetables in a cheese sauce. Regular canned vegetables (not low-sodium or reduced-sodium). Regular canned tomato sauce and paste (not low-sodium or reduced-sodium). Regular tomato and vegetable juice (not low-sodium or reduced-sodium). Pickles. Olives.  Fruits  Canned fruit in a light or heavy syrup. Fried fruit. Fruit in cream or butter sauce.  Meat and other protein foods  Fatty cuts of meat. Ribs. Fried meat. Agosto. Sausage. Bologna and other processed lunch meats. Salami. Fatback. Hotdogs. Bratwurst. Salted nuts and seeds. Canned beans with added salt. Canned or smoked fish. Whole eggs or egg yolks. Chicken or turkey with skin.  Dairy  Whole or 2% milk, cream, and half-and-half. Whole or full-fat cream cheese. Whole-fat or sweetened yogurt. Full-fat cheese. Nondairy creamers. Whipped toppings. Processed cheese and cheese spreads.  Fats and oils  Butter. Stick margarine. Lard. Shortening. Ghee. Agosto fat. Tropical oils, such as coconut, palm kernel, or palm oil.  Seasoning and other foods  Salted popcorn and pretzels. Onion salt, garlic salt, seasoned salt, table salt, and sea salt. [facility] sauce. Tartar sauce. Barbecue sauce. Teriyaki sauce. Soy sauce, including reduced-sodium. Steak sauce. Canned and packaged gravies. Fish sauce. Oyster sauce. Cocktail sauce. Horseradish that you find on the shelf. Ketchup. Mustard. Meat flavorings and tenderizers. Bouillon cubes. Hot sauce and Tabasco sauce. Premade or packaged marinades. Premade or packaged taco seasonings. Relishes. Regular salad dressings.  Where to find more information:  · National Heart, Lung, and Blood [place]: www.nhlbi.nih.gov  · American Heart Association: www.heart.org  Summary  · The DASH eating plan is a healthy eating plan that has been shown to reduce high blood pressure (hypertension). It may also reduce  your risk for type 2 diabetes, heart disease, and stroke.  · With the DASH eating plan, you should limit salt (sodium) intake to 2,300 mg a day. If you have hypertension, you may need to reduce your sodium intake to 1,500 mg a day.  · When on the DASH eating plan, aim to eat more fresh fruits and vegetables, whole grains, lean proteins, low-fat dairy, and heart-healthy fats.  · Work with your health care provider or diet and nutrition specialist (dietitian) to adjust your eating plan to your individual calorie needs.  This information is not intended to replace advice given to you by your health care provider. Make sure you discuss any questions you have with your health care provider.  Document Released: 12/06/2012 Document Revised: 12/11/2017 Document Reviewed: 12/11/2017  [employer] Interactive Patient Education © 2019 [employer]

## 2020-02-18 NOTE — PROGRESS NOTES
OFFICE FOLLOW UP     Date of Encounter:2020     Name: Jair Flores  : 1945  Address: [facility] [place] [address]    PCP: Osmar Clemons MD  [address] [facility] [address]  [place] [id number]    Jair Flores is a 74 y.o. male.      Chief Complaint: Follow up of VHD, PAF, cardiomyopathy    Problem List:   1. Aortic insufficiency  A.  Severe AI by TTE 2019, EF 61-65%  B.  BRENDON 19: EF 55%, prolapse of right coronary leaflet with mod-severe AI  C.  AVR with 27 mm Medtronic bovine pericardial AV, 2019 ([place])  2. Atrial fibrillation with RVR and CHF 2019  A. BRENDON 2019: EF 30%, mild-mod MR, moderate TR, LA mod-severely dilated, bioprosthetic aortic valve grossly normal   B. Sotalol not tolerated due to prolonged QT  C. Lifevest placed at discharge.  D. Echo, 2020: EF 63%  3. Carotid artery stenosis   A. Recurrent left hemispheric ischemic CVA 2019  B. Carotid duplex 19: LAURYN 50-69%, LICA <50%  C. Echo 19: EF 61-65%, LA is mod dilated, Severe AI, negative bubble study   D. Negative TCD, no Afib on monitor   E. Readmission 6/10 with recurrent right foot and arm paresthesias   i. MRI brain 6/10/19: multiple punctate foci along left hemisphere, suggestive of embolic infarcts  F. Carotid MR ([facility] read): large LICA ulcerated plaque with very large hemorrhagic component and modest sized lipid rich core.  G. LICA stent placement 2019, AMARA Mtaa MD  4. Coronary artery disease, nonobstructive by cath, 2019.  5. Hypertension   6. Hyperlipidemia  7. BEATRIZ on CPAP   8. History of bladder cancer, s/p resection, followed by Dr. Braxton  9. Surgical history:              A. Left knee partial replacement              B. Bladder tumor resection    Allergies:  Allergies   Allergen Reactions   • Penicillins Anaphylaxis   • Rocephin [Ceftriaxone] Anaphylaxis   • Bactrim [Sulfamethoxazole-Trimethoprim] Diarrhea     Abdominal pain, diarrhea, nausea    •  Ciprofloxacin Swelling   • Macrobid [Nitrofurantoin Macrocrystal] Swelling   • Sotalol Other (See Comments)     Prolonged QT   • Sulfa Antibiotics Diarrhea       Current Medications:  •  allopurinol (ZYLOPRIM) 100 MG tablet, TAKE 1 TABLET BY MOUTH EVERY DAY  •  apixaban (ELIQUIS) 5 MG tablet tablet, Take 1 tablet by mouth Every 12 (Twelve) Hours.  •  Ascorbic Acid (VITAMIN C) 500 MG chewable tablet, Chew 500 mg Daily.  •  aspirin EC 81 MG EC tablet, Take 1 tablet by mouth Daily.  •  atorvastatin (LIPITOR) 80 MG tablet, Take 1 tablet by mouth Every Night.  •  diphenhydrAMINE (BENADRYL) 25 mg capsule, Take 25 mg by mouth Every 6 (Six) Hours As Needed for Itching.  •  famotidine (PEPCID) 20 MG tablet, Take 1 tablet by mouth 2 (Two) Times a Day.  •  guaiFENesin (MUCINEX) 600 MG 12 hr tablet, Take 1,200 mg by mouth As Needed for Cough.  •  metoprolol succinate XL (TOPROL-XL) 100 MG 24 hr tablet, Take 1 tablet by mouth Daily.  •  Multiple Vitamins-Minerals (HM MULTIVITAMIN ADULT GUMMY) chewable tablet, Chew 1 tablet Daily   •  sacubitril-valsartan (ENTRESTO) 49-51 MG tablet, Take 1 tablet by mouth 2 (Two) Times a Day.    History of Present Illness:       Jair Flores returns for scheduled follow up today. He is active in cardiac rehab. He continues to work as an . Blood pressures at home and in cardiac rehab are 120-130's. He has purchased a pulse oximeter to monitor his pulse rates and he has not had any tachycardic readings. He has not had angina, heart failure, or recurrent neurovascular symptoms. He is tolerating medications listed above.      The following portions of the patient's history were reviewed and updated as appropriate: allergies, current medications and problem list.    ROS: Pertinent positives as listed in the HPI.  All other systems reviewed and negative.    Objective:    Vitals:    02/18/20 1356 02/18/20 1357 02/18/20 1421   BP: 157/84 167/96 137/88 - TruBP   BP Location: Left arm Left arm  "Right arm   Patient Position: Sitting Standing Sitting    Pulse: 59 66 59   Weight: 80.7 kg (178 lb)     Height: 177.8 cm (70\")         Physical Exam:  GENERAL: Alert, cooperative, in no acute distress.   HEENT: Normocephalic, no adenopathy, no jugular venous distention  HEART: No discrete PMI is noted. Regular rhythm, normal rate, and no murmurs, gallops, or rubs.   LUNGS: Clear to auscultation bilaterally. No wheezing, rales or ronchi.  ABDOMEN: Soft, bowel sounds present, non-tender   NEUROLOGIC: No focal abnormalities involving strength or sensation are noted.   EXTREMITIES: No clubbing, cyanosis, or edema noted.       Diagnostic Data:    Lab Results   Component Value Date    GLUCOSE 105 (H) 12/12/2019    CALCIUM 9.5 12/12/2019     12/12/2019    K 4.5 12/12/2019    CO2 26.6 12/12/2019     12/12/2019    BUN 22 12/12/2019    CREATININE 0.97 12/12/2019    EGFRIFNONA 76 12/12/2019    BCR 22.7 12/12/2019    ANIONGAP 11.4 12/12/2019      Procedures      Assessment and Plan:   1.  VHD:  Stable. No murmur or symptoms of heart failure.  2.  Cardiomyopathy: Resolved, normal LV function noted by echo last month. Continue Entresto.   3.  PAF:  No recurrence, continue metoprolol and Eliquis.   4.  Carotid stenosis: No neurovascular symptoms. Continue aspirin and statin.   5.  HTN:  TruBP today is elevated 137/88. We will add amlodipine 5 mg daily. He will continue to follow home blood pressures and cardiac rehab readings.     I will see Jair Flores back in 6 months or sooner on an as needed basis.  Scribed for Francisco Bo MD by Nicki Mckeon RN. 02/18/2020 2:39 PM.     EMR Dragon/Transcription Disclaimer:  Much of this encounter note is an electronic transcription/translation of spoken language to printed text.  The electronic translation of spoken language may permit erroneous, or at times, nonsensical words or phrases to be inadvertently transcribed.  Although I have reviewed the note for such " errors, some may still exist.

## 2020-02-19 ENCOUNTER — TREATMENT (OUTPATIENT)
Dept: CARDIAC REHAB | Facility: HOSPITAL | Age: 75
End: 2020-02-19

## 2020-02-19 DIAGNOSIS — Z95.2 S/P AVR (AORTIC VALVE REPLACEMENT): Primary | ICD-10-CM

## 2020-02-19 PROCEDURE — 93798 PHYS/QHP OP CAR RHAB W/ECG: CPT

## 2020-02-21 ENCOUNTER — TREATMENT (OUTPATIENT)
Dept: CARDIAC REHAB | Facility: HOSPITAL | Age: 75
End: 2020-02-21

## 2020-02-21 DIAGNOSIS — Z95.2 S/P AVR (AORTIC VALVE REPLACEMENT): Primary | ICD-10-CM

## 2020-02-21 PROCEDURE — 93798 PHYS/QHP OP CAR RHAB W/ECG: CPT

## 2020-02-21 NOTE — PROGRESS NOTES
Attended Phase II Cardiac Rehab. No medication or health history changes reported. See [facility] for details.

## 2020-02-24 ENCOUNTER — TREATMENT (OUTPATIENT)
Dept: CARDIAC REHAB | Facility: HOSPITAL | Age: 75
End: 2020-02-24

## 2020-02-24 DIAGNOSIS — Z95.2 S/P AVR (AORTIC VALVE REPLACEMENT): Primary | ICD-10-CM

## 2020-02-24 PROCEDURE — 93798 PHYS/QHP OP CAR RHAB W/ECG: CPT

## 2020-02-26 ENCOUNTER — TREATMENT (OUTPATIENT)
Dept: CARDIAC REHAB | Facility: HOSPITAL | Age: 75
End: 2020-02-26

## 2020-02-26 DIAGNOSIS — Z95.2 S/P AVR (AORTIC VALVE REPLACEMENT): Primary | ICD-10-CM

## 2020-02-26 PROCEDURE — 93798 PHYS/QHP OP CAR RHAB W/ECG: CPT

## 2020-02-28 ENCOUNTER — TREATMENT (OUTPATIENT)
Dept: CARDIAC REHAB | Facility: HOSPITAL | Age: 75
End: 2020-02-28

## 2020-02-28 DIAGNOSIS — Z95.2 S/P AVR (AORTIC VALVE REPLACEMENT): Primary | ICD-10-CM

## 2020-02-28 PROCEDURE — 93798 PHYS/QHP OP CAR RHAB W/ECG: CPT

## 2020-02-29 DIAGNOSIS — I50.21 ACUTE SYSTOLIC CONGESTIVE HEART FAILURE (HCC): ICD-10-CM

## 2020-03-02 RX ORDER — SACUBITRIL AND VALSARTAN 49; 51 MG/1; MG/1
TABLET, FILM COATED ORAL
Qty: 180 TABLET | Refills: 0 | Status: SHIPPED | OUTPATIENT
Start: 2020-03-02 | End: 2020-05-27 | Stop reason: SDUPTHER

## 2020-03-06 ENCOUNTER — APPOINTMENT (OUTPATIENT)
Dept: CARDIAC REHAB | Facility: HOSPITAL | Age: 75
End: 2020-03-06

## 2020-04-23 ENCOUNTER — ANESTHESIA EVENT (OUTPATIENT)
Dept: PERIOP | Facility: HOSPITAL | Age: 75
End: 2020-04-23

## 2020-04-23 RX ORDER — SODIUM CHLORIDE 0.9 % (FLUSH) 0.9 %
10 SYRINGE (ML) INJECTION EVERY 12 HOURS SCHEDULED
Status: CANCELLED | OUTPATIENT
Start: 2020-04-23

## 2020-04-23 RX ORDER — FAMOTIDINE 10 MG/ML
20 INJECTION, SOLUTION INTRAVENOUS ONCE
Status: CANCELLED | OUTPATIENT
Start: 2020-04-23 | End: 2020-04-23

## 2020-04-23 RX ORDER — SODIUM CHLORIDE 0.9 % (FLUSH) 0.9 %
10 SYRINGE (ML) INJECTION AS NEEDED
Status: CANCELLED | OUTPATIENT
Start: 2020-04-23

## 2020-04-24 ENCOUNTER — ANESTHESIA (OUTPATIENT)
Dept: PERIOP | Facility: HOSPITAL | Age: 75
End: 2020-04-24

## 2020-04-24 ENCOUNTER — HOSPITAL ENCOUNTER (OUTPATIENT)
Facility: HOSPITAL | Age: 75
Setting detail: HOSPITAL OUTPATIENT SURGERY
Discharge: HOME | End: 2020-04-24
Attending: UROLOGY | Admitting: UROLOGY

## 2020-04-24 VITALS
HEART RATE: 52 BPM | TEMPERATURE: 98 F | SYSTOLIC BLOOD PRESSURE: 137 MMHG | BODY MASS INDEX: 23.7 KG/M2 | OXYGEN SATURATION: 93 % | RESPIRATION RATE: 16 BRPM | DIASTOLIC BLOOD PRESSURE: 80 MMHG | HEIGHT: 72 IN | WEIGHT: 175 LBS

## 2020-04-24 DIAGNOSIS — D49.4 BLADDER TUMOR: ICD-10-CM

## 2020-04-24 LAB
ANION GAP SERPL CALCULATED.3IONS-SCNC: 10 MMOL/L (ref 5–15)
BUN BLD-MCNC: 23 MG/DL (ref 8–23)
BUN/CREAT SERPL: 23 (ref 7–25)
CALCIUM SPEC-SCNC: 9.3 MG/DL (ref 8.6–10.5)
CHLORIDE SERPL-SCNC: 103 MMOL/L (ref 98–107)
CO2 SERPL-SCNC: 29 MMOL/L (ref 22–29)
CREAT BLD-MCNC: 1 MG/DL (ref 0.76–1.27)
GFR SERPL CREATININE-BSD FRML MDRD: 73 ML/MIN/1.73
GLUCOSE BLD-MCNC: 106 MG/DL (ref 65–99)
POTASSIUM BLD-SCNC: 4 MMOL/L (ref 3.5–5.2)
SODIUM BLD-SCNC: 142 MMOL/L (ref 136–145)

## 2020-04-24 PROCEDURE — 25010000002 ONDANSETRON PER 1 MG: Performed by: NURSE ANESTHETIST, CERTIFIED REGISTERED

## 2020-04-24 PROCEDURE — 25010000002 DEXAMETHASONE PER 1 MG: Performed by: NURSE ANESTHETIST, CERTIFIED REGISTERED

## 2020-04-24 PROCEDURE — 25010000002 PROPOFOL 10 MG/ML EMULSION: Performed by: NURSE ANESTHETIST, CERTIFIED REGISTERED

## 2020-04-24 PROCEDURE — 25010000002 FENTANYL CITRATE (PF) 100 MCG/2ML SOLUTION: Performed by: NURSE ANESTHETIST, CERTIFIED REGISTERED

## 2020-04-24 PROCEDURE — 80048 BASIC METABOLIC PNL TOTAL CA: CPT | Performed by: ANESTHESIOLOGY

## 2020-04-24 PROCEDURE — 88307 TISSUE EXAM BY PATHOLOGIST: CPT | Performed by: UROLOGY

## 2020-04-24 RX ORDER — SODIUM CHLORIDE, SODIUM LACTATE, POTASSIUM CHLORIDE, CALCIUM CHLORIDE 600; 310; 30; 20 MG/100ML; MG/100ML; MG/100ML; MG/100ML
INJECTION, SOLUTION INTRAVENOUS CONTINUOUS PRN
Status: DISCONTINUED | OUTPATIENT
Start: 2020-04-24 | End: 2020-04-24 | Stop reason: SURG

## 2020-04-24 RX ORDER — DEXAMETHASONE SODIUM PHOSPHATE 10 MG/ML
INJECTION INTRAMUSCULAR; INTRAVENOUS AS NEEDED
Status: DISCONTINUED | OUTPATIENT
Start: 2020-04-24 | End: 2020-04-24 | Stop reason: SURG

## 2020-04-24 RX ORDER — LIDOCAINE HYDROCHLORIDE 10 MG/ML
0.5 INJECTION, SOLUTION EPIDURAL; INFILTRATION; INTRACAUDAL; PERINEURAL ONCE AS NEEDED
Status: COMPLETED | OUTPATIENT
Start: 2020-04-24 | End: 2020-04-24

## 2020-04-24 RX ORDER — ACETAMINOPHEN 500 MG
1000 TABLET ORAL ONCE
Status: COMPLETED | OUTPATIENT
Start: 2020-04-24 | End: 2020-04-24

## 2020-04-24 RX ORDER — GLYCINE 1.5 G/100ML
SOLUTION IRRIGATION AS NEEDED
Status: DISCONTINUED | OUTPATIENT
Start: 2020-04-24 | End: 2020-04-24 | Stop reason: HOSPADM

## 2020-04-24 RX ORDER — FENTANYL CITRATE 50 UG/ML
INJECTION, SOLUTION INTRAMUSCULAR; INTRAVENOUS AS NEEDED
Status: DISCONTINUED | OUTPATIENT
Start: 2020-04-24 | End: 2020-04-24 | Stop reason: SURG

## 2020-04-24 RX ORDER — SODIUM CHLORIDE, SODIUM LACTATE, POTASSIUM CHLORIDE, CALCIUM CHLORIDE 600; 310; 30; 20 MG/100ML; MG/100ML; MG/100ML; MG/100ML
9 INJECTION, SOLUTION INTRAVENOUS CONTINUOUS
Status: DISCONTINUED | OUTPATIENT
Start: 2020-04-24 | End: 2020-04-24 | Stop reason: HOSPADM

## 2020-04-24 RX ORDER — ONDANSETRON 2 MG/ML
INJECTION INTRAMUSCULAR; INTRAVENOUS AS NEEDED
Status: DISCONTINUED | OUTPATIENT
Start: 2020-04-24 | End: 2020-04-24 | Stop reason: SURG

## 2020-04-24 RX ORDER — MAGNESIUM HYDROXIDE 1200 MG/15ML
LIQUID ORAL AS NEEDED
Status: DISCONTINUED | OUTPATIENT
Start: 2020-04-24 | End: 2020-04-24 | Stop reason: HOSPADM

## 2020-04-24 RX ORDER — PROPOFOL 10 MG/ML
VIAL (ML) INTRAVENOUS AS NEEDED
Status: DISCONTINUED | OUTPATIENT
Start: 2020-04-24 | End: 2020-04-24 | Stop reason: SURG

## 2020-04-24 RX ORDER — MELOXICAM 15 MG/1
15 TABLET ORAL ONCE
Status: COMPLETED | OUTPATIENT
Start: 2020-04-24 | End: 2020-04-24

## 2020-04-24 RX ORDER — FENTANYL CITRATE 50 UG/ML
50 INJECTION, SOLUTION INTRAMUSCULAR; INTRAVENOUS
Status: DISCONTINUED | OUTPATIENT
Start: 2020-04-24 | End: 2020-04-24 | Stop reason: HOSPADM

## 2020-04-24 RX ORDER — HYDROMORPHONE HYDROCHLORIDE 1 MG/ML
0.5 INJECTION, SOLUTION INTRAMUSCULAR; INTRAVENOUS; SUBCUTANEOUS
Status: DISCONTINUED | OUTPATIENT
Start: 2020-04-24 | End: 2020-04-24 | Stop reason: HOSPADM

## 2020-04-24 RX ORDER — FAMOTIDINE 20 MG/1
20 TABLET, FILM COATED ORAL ONCE
Status: COMPLETED | OUTPATIENT
Start: 2020-04-24 | End: 2020-04-24

## 2020-04-24 RX ORDER — CLINDAMYCIN PHOSPHATE 600 MG/50ML
600 INJECTION INTRAVENOUS ONCE
Status: COMPLETED | OUTPATIENT
Start: 2020-04-24 | End: 2020-04-24

## 2020-04-24 RX ORDER — LIDOCAINE HYDROCHLORIDE 20 MG/ML
INJECTION, SOLUTION INFILTRATION; PERINEURAL AS NEEDED
Status: DISCONTINUED | OUTPATIENT
Start: 2020-04-24 | End: 2020-04-24 | Stop reason: SURG

## 2020-04-24 RX ORDER — GABAPENTIN 300 MG/1
600 CAPSULE ORAL ONCE
Status: COMPLETED | OUTPATIENT
Start: 2020-04-24 | End: 2020-04-24

## 2020-04-24 RX ADMIN — FENTANYL CITRATE 25 MCG: 50 INJECTION, SOLUTION INTRAMUSCULAR; INTRAVENOUS at 12:11

## 2020-04-24 RX ADMIN — MELOXICAM 15 MG: 15 TABLET ORAL at 10:32

## 2020-04-24 RX ADMIN — FAMOTIDINE 20 MG: 20 TABLET ORAL at 10:32

## 2020-04-24 RX ADMIN — SODIUM CHLORIDE, POTASSIUM CHLORIDE, SODIUM LACTATE AND CALCIUM CHLORIDE 9 ML/HR: 600; 310; 30; 20 INJECTION, SOLUTION INTRAVENOUS at 10:00

## 2020-04-24 RX ADMIN — DEXAMETHASONE SODIUM PHOSPHATE 8 MG: 10 INJECTION INTRAMUSCULAR; INTRAVENOUS at 12:15

## 2020-04-24 RX ADMIN — LIDOCAINE HYDROCHLORIDE 50 MG: 20 INJECTION, SOLUTION INFILTRATION; PERINEURAL at 12:11

## 2020-04-24 RX ADMIN — FENTANYL CITRATE 50 MCG: 50 INJECTION, SOLUTION INTRAMUSCULAR; INTRAVENOUS at 12:41

## 2020-04-24 RX ADMIN — ACETAMINOPHEN 1000 MG: 500 TABLET ORAL at 10:32

## 2020-04-24 RX ADMIN — AZTREONAM 2 G: 2 INJECTION, POWDER, FOR SOLUTION INTRAMUSCULAR; INTRAVENOUS at 12:25

## 2020-04-24 RX ADMIN — SODIUM CHLORIDE, POTASSIUM CHLORIDE, SODIUM LACTATE AND CALCIUM CHLORIDE: 600; 310; 30; 20 INJECTION, SOLUTION INTRAVENOUS at 12:05

## 2020-04-24 RX ADMIN — CLINDAMYCIN PHOSPHATE 600 MG: 12 INJECTION, SOLUTION INTRAVENOUS at 12:11

## 2020-04-24 RX ADMIN — FENTANYL CITRATE 25 MCG: 50 INJECTION, SOLUTION INTRAMUSCULAR; INTRAVENOUS at 12:27

## 2020-04-24 RX ADMIN — GABAPENTIN 600 MG: 300 CAPSULE ORAL at 10:37

## 2020-04-24 RX ADMIN — PROPOFOL 150 MG: 10 INJECTION, EMULSION INTRAVENOUS at 12:11

## 2020-04-24 RX ADMIN — LIDOCAINE HYDROCHLORIDE 0.2 ML: 10 INJECTION, SOLUTION EPIDURAL; INFILTRATION; INTRACAUDAL; PERINEURAL at 10:00

## 2020-04-24 RX ADMIN — ONDANSETRON 4 MG: 2 INJECTION INTRAMUSCULAR; INTRAVENOUS at 12:41

## 2020-04-24 NOTE — ANESTHESIA POSTPROCEDURE EVALUATION
Patient: Jair Flores    Procedure Summary     Date:  04/24/20 Room / Location:   [facility]    Anesthesia Start:  1205 Anesthesia Stop:      Procedures:       CYSTOSCOPY BLADDER BIOPSY (N/A Bladder)      POSSIBLE CYSTOSCOPY TRANSURETHRAL RESECTION OF BLADDER TUMOR (N/A Bladder) Diagnosis:      Surgeon:  Bobby Salmeron MD Provider:  Sylvain Escobedo MD    Anesthesia Type:  general ASA Status:  3          Anesthesia Type: general    Vitals  No vitals data found for the desired time range.    SPO2 97%  HR 63  RR 10   T 98.2   /89      Post Anesthesia Care and Evaluation    Patient location during evaluation: PACU  Patient participation: waiting for patient participation  Level of consciousness: lethargic  Pain score: 0  Pain management: adequate  Airway patency: patent  Anesthetic complications: No anesthetic complications  PONV Status: none  Cardiovascular status: hemodynamically stable and acceptable  Respiratory status: nonlabored ventilation, acceptable, nasal cannula and oral airway  Hydration status: acceptable

## 2020-04-24 NOTE — INTERVAL H&P NOTE
Pre-Op H&P (See Recent Office Note Attached for Full H&P)      Review of Systems:  General ROS:  no fever, chills, rashes.  No change since last office visit.  No recent sick exposure  Cardiovascular ROS: no chest pain or dyspnea on exertion.  History of AVR and Afib 2019.  No recent afib episodes.  On Eliquis with last dose this AM.  Recs per Dr. Salmeron with proceeding with surgery  Respiratory ROS: no cough, shortness of breath, or wheezing    Meds:    No current facility-administered medications on file prior to encounter.      Current Outpatient Medications on File Prior to Encounter   Medication Sig Dispense Refill   • allopurinol (ZYLOPRIM) 100 MG tablet TAKE 1 TABLET BY MOUTH EVERY DAY (Patient taking differently: Take 100 mg by mouth Daily.) 30 tablet 11   • amLODIPine (NORVASC) 5 MG tablet Take 1 tablet by mouth Daily. 30 tablet 11   • apixaban (ELIQUIS) 5 MG tablet tablet Take 1 tablet by mouth Every 12 (Twelve) Hours. 180 tablet 0   • Ascorbic Acid (VITAMIN C) 500 MG chewable tablet Chew 800 mg Daily.     • atorvastatin (LIPITOR) 80 MG tablet Take 1 tablet by mouth Every Night. 90 tablet 3   • ENTRESTO 49-51 MG tablet TAKE ONE TABLET BY MOUTH TWO TIMES A DAY (Patient taking differently: Take 1 tablet by mouth 2 (Two) Times a Day.) 180 tablet 0   • metoprolol succinate XL (TOPROL-XL) 100 MG 24 hr tablet Take 1 tablet by mouth Daily. 90 tablet 0   • Misc. Devices (NASAL SPRAY BOTTLE) misc 1 spray As Needed.     • Multiple Vitamins-Minerals (HM MULTIVITAMIN ADULT GUMMY) chewable tablet Chew 1 tablet Daily.     • aspirin EC 81 MG EC tablet Take 1 tablet by mouth Daily. (Patient taking differently: Take 81 mg by mouth Every Morning.) 100 tablet 4   • diphenhydrAMINE (BENADRYL) 25 mg capsule Take 25 mg by mouth As Needed for Itching.     • famotidine (PEPCID) 20 MG tablet Take 1 tablet by mouth 2 (Two) Times a Day. 180 tablet 3   • guaiFENesin (MUCINEX) 600 MG 12 hr tablet Take 1,200 mg by mouth As Needed for  "Cough.         Vital Signs:  /84 (BP Location: Right arm, Patient Position: Sitting)   Pulse 64   Temp 98 °F (36.7 °C) (Temporal)   Resp 18   Ht 182.9 cm (72\")   Wt 79.4 kg (175 lb)   SpO2 98%   BMI 23.73 kg/m²     Physical Exam:    CV:  S1S2 regular rate and rhythm, no murmur               Resp:  Clear to auscultation; respirations regular, even and unlabored    Results Review:     Lab Results   Component Value Date    WBC 6.28 11/28/2019    HGB 14.6 11/28/2019    HCT 47.8 11/28/2019    MCV 87.5 11/28/2019     11/28/2019    NEUTROABS 3.67 11/28/2019    GLUCOSE 105 (H) 12/12/2019    BUN 22 12/12/2019    CREATININE 0.97 12/12/2019    EGFRIFNONA 76 12/12/2019     12/12/2019    K 4.5 12/12/2019     12/12/2019    CO2 26.6 12/12/2019    MG 2.1 11/28/2019    PHOS 5.4 (H) 11/26/2019    CALCIUM 9.5 12/12/2019    ALBUMIN 3.00 (L) 11/26/2019    AST 33 11/26/2019    ALT 48 (H) 11/26/2019    BILITOT 0.7 11/26/2019        I reviewed the patient's new clinical results.    Lucy Clements, APRN  4/24/2020   10:50    "

## 2020-04-24 NOTE — ANESTHESIA PREPROCEDURE EVALUATION
Anesthesia Evaluation     Patient summary reviewed and Nursing notes reviewed   history of anesthetic complications: difficult airway               Airway   Mallampati: III  TM distance: >3 FB  Neck ROM: full  Possible difficult intubation and Anterior  Dental - normal exam     Pulmonary - normal exam   (+) sleep apnea on CPAP,   Cardiovascular - normal exam    (+) hypertension, dysrhythmias (eliquis tx; last dose 3 days ago) Paroxysmal Atrial Fib, hyperlipidemia,     ROS comment: S/p AVR 9/19    Echo 1/20: normal EF, AV functions normal      Neuro/Psych  (+) CVA (s/p carotid stent 6/19; no residual),     GI/Hepatic/Renal/Endo - negative ROS     Musculoskeletal     Abdominal  - normal exam    Bowel sounds: normal.   Substance History - negative use     OB/GYN negative ob/gyn ROS         Other   arthritis,    history of cancer (bladder)                    Anesthesia Plan    ASA 3     general     intravenous induction     Anesthetic plan, all risks, benefits, and alternatives have been provided, discussed and informed consent has been obtained with: patient.    Plan discussed with CRNA.

## 2020-04-24 NOTE — OP NOTE
PREOPERATIVE DIAGNOSES:   1.  Bladder tumor.   2.  History of bladder cancer.     POSTOPERATIVE DIAGNOSES:  1.  Bladder tumor.   2.  History of bladder cancer.   3.  Multiple bladder tumors.     PROCEDURES PERFORMED:   1.  Cystoscopy.   2.  Transurethral resection of multiple bladder tumors and large bladder tumor.     SURGEON: Bobby Salmeron MD     ANESTHESIA: General.     COMPLICATIONS: None.     INDICATIONS: This patient does have a prior history of transitional cell carcinoma of the bladder. He did not undergo surveillance cystoscopy over the past 12 months due to multiple comorbid conditions requiring attention. Recent surveillance cystoscopy revealed a large amount of mucous and debris at the dome of the bladder with a possible finding of a bladder dome tumor, yet visualization was poor and this diagnosis could not be made with certainty. He presents now for elective cystoscopy under anesthesia with biopsy and/or resection of any bladder tumor.     OPERATIVE FINDINGS: Large necrotic appearing tumor at the dome of the bladder. This tumor is exophytic without papillary features. It is estimated at approximately 7 cm in diameter. Bimanual exam confirmed a soft sizeable mass that was mobile. There were scattered smaller papillary bladder tumors, most prominently in the right lateral wall and the posterior wall. The bladder dome tumor was not resected in its entirety. There was ample resection to procure specimens at both the posterior wall of the bladder and right lateral wall of the bladder in hopes of enhancing a greater ability to achieve a pathologic diagnosis. It is presumed that the tissue obtained from the bladder dome tumor may not yield determinate results in terms of cancer due to the necrotic nature.     PROCEDURE: The patient was brought to the operating room where general anesthesia was induced. Preoperative IV antibiotics were administered. Preoperative antiembolism stockings were placed. He was  placed in lithotomy position and extremities and all pressure points were padded and protected appropriately with great care to the protect the knee joints with prior arthroplasty of both knees. Initially a 23-[place]ican pan-endoscope was passed per urethra in the bladder and a systematic exam of the bladder was carried out finding a large dome bladder tumor. The scope was removed and a 24-[place] resectoscope sheath was introduced. The bladder dome tumor was partially resected obtaining adequate tissue, and then separately a posterior wall coalescence of papillary tumor was resected and a similar tumor on the right lateral wall was resected into the mid thickness of the bladder wall.  There was no bleeding noted at any site of resection. Fulguration was carried out at resection edges. There was a large amount of scattered very small papillary lesions involving the right lateral wall, which were untreated. The bladder was drained and re-inspected. All tissue was procured from the bladder. Re-inspection revealed no bleeding whatsoever. The scope was removed and a 20-[place] catheter was placed to straight drainage. The patient was awakened and transferred to the postop recovery room in stable condition. He tolerated the procedure very well and there were no complications.

## 2020-04-27 LAB
CYTO UR: NORMAL
LAB AP CASE REPORT: NORMAL
LAB AP CLINICAL INFORMATION: NORMAL
LAB AP DIAGNOSIS COMMENT: NORMAL
PATH REPORT.FINAL DX SPEC: NORMAL
PATH REPORT.GROSS SPEC: NORMAL

## 2020-05-05 PROCEDURE — U0004 COV-19 TEST NON-CDC HGH THRU: HCPCS | Performed by: INTERNAL MEDICINE

## 2020-05-05 PROCEDURE — U0002 COVID-19 LAB TEST NON-CDC: HCPCS | Performed by: INTERNAL MEDICINE

## 2020-05-07 ENCOUNTER — OFFICE VISIT (OUTPATIENT)
Dept: CARDIOLOGY | Facility: CLINIC | Age: 75
End: 2020-05-07

## 2020-05-07 VITALS
BODY MASS INDEX: 24.87 KG/M2 | TEMPERATURE: 97 F | DIASTOLIC BLOOD PRESSURE: 96 MMHG | HEIGHT: 72 IN | SYSTOLIC BLOOD PRESSURE: 146 MMHG | WEIGHT: 183.6 LBS | HEART RATE: 58 BPM

## 2020-05-07 DIAGNOSIS — I10 BENIGN ESSENTIAL HYPERTENSION: ICD-10-CM

## 2020-05-07 DIAGNOSIS — I48.91 ATRIAL FIBRILLATION WITH RVR (HCC): ICD-10-CM

## 2020-05-07 DIAGNOSIS — I50.21 ACUTE SYSTOLIC CONGESTIVE HEART FAILURE (HCC): ICD-10-CM

## 2020-05-07 DIAGNOSIS — I65.23 CAROTID STENOSIS, BILATERAL: ICD-10-CM

## 2020-05-07 DIAGNOSIS — E78.5 HYPERLIPIDEMIA LDL GOAL <70: Primary | ICD-10-CM

## 2020-05-07 DIAGNOSIS — Z95.2 S/P AVR (AORTIC VALVE REPLACEMENT): ICD-10-CM

## 2020-05-07 PROCEDURE — 99214 OFFICE O/P EST MOD 30 MIN: CPT | Performed by: INTERNAL MEDICINE

## 2020-05-07 PROCEDURE — 93000 ELECTROCARDIOGRAM COMPLETE: CPT | Performed by: INTERNAL MEDICINE

## 2020-05-07 NOTE — PROGRESS NOTES
OFFICE FOLLOW UP     Date of Encounter:2020     Name: Jair Flores  : 1945  Address: [facility] [place] [address]    PCP: Osmar Clemons MD  [address] [facility] [address]  [place] [id number]    Jair Flores is a 74 y.o. male.      Chief Complaint: Follow up of VHD, Afib, carotid stenosis, HTN, needs surgical clearance    Problem List:   1. Aortic insufficiency  A.  Severe AI by TTE 2019, EF 61-65%  B.  BRENDON 19: EF 55%, prolapse of right coronary leaflet with mod-severe AI  C.  AVR with 27 mm Medtronic bovine pericardial AV, 2019 ([place])  2. Atrial fibrillation with RVR and CHF 2019  A. BRENDON 2019: EF 30%, mild-mod MR, moderate TR, LA mod-severely dilated, bioprosthetic aortic valve grossly normal   B. Sotalol not tolerated due to prolonged QT  C. Lifevest placed at discharge.  D. Echo, 2020: EF 63%  3. Carotid artery stenosis   A. Recurrent left hemispheric ischemic CVA 2019  B. Carotid duplex 19: LAURYN 50-69%, LICA <50%  C. Echo 19: EF 61-65%, LA is mod dilated, Severe AI, negative bubble study   D. Negative TCD, no Afib on monitor   E. Readmission 6/10 with recurrent right foot and arm paresthesias   i. MRI brain 6/10/19: multiple punctate foci along left hemisphere, suggestive of embolic infarcts  F. Carotid MR ([facility] read): large LICA ulcerated plaque with very large hemorrhagic component and modest sized lipid rich core.  G. LICA stent placement 2019, AMARA Mata MD  4. Coronary artery disease, nonobstructive by cath, 2019.  5. Hypertension   6. Hyperlipidemia  7. BEATRIZ on CPAP   8. History of bladder cancer, s/p resection, followed by Dr. Fox FONTAINE 2020 - necrotic material with papillary urothelial carcinoma   B. Planned second opinion @ [facility], May 2020  9. Surgical history:              A. Left knee partial replacement              B. Bladder tumor resection    Allergies:  Allergies   Allergen Reactions   •  "Penicillins Anaphylaxis   • Rocephin [Ceftriaxone] Anaphylaxis   • Bactrim [Sulfamethoxazole-Trimethoprim] Diarrhea     Abdominal pain, diarrhea, nausea    • Ciprofloxacin Other (See Comments)     Tendonitis   • Macrobid [Nitrofurantoin Macrocrystal] Other (See Comments)     Fever   • Sotalol Other (See Comments)     Prolonged QT   • Sulfa Antibiotics Diarrhea       Current Medications:  •  allopurinol (ZYLOPRIM) 100 MG tablet, TAKE 1 TABLET BY MOUTH EVERY DAY  •  amLODIPine (NORVASC) 5 MG tablet, Take 1 tablet by mouth Daily.  •  apixaban (ELIQUIS) 5 MG tablet tablet, Take 1 tablet by mouth Every 12 (Twelve) Hours  •  aspirin EC 81 MG EC tablet, Take 1 tablet by mouth Daily.   •  atorvastatin (LIPITOR) 80 MG tablet, Take 1 tablet by mouth Every Night  •  ENTRESTO 49-51 MG tablet, TAKE ONE TABLET BY MOUTH TWO TIMES A DAY  •  famotidine (PEPCID) 20 MG tablet, Take 1 tablet by mouth 2 (Two) Times a Day  •  metoprolol succinate XL (TOPROL-XL) 100 MG 24 hr tablet, Take 1 tablet by mouth Daily   •  Misc. Devices (NASAL SPRAY BOTTLE) misc, 1 spray As Needed.  •  Ascorbic Acid (VITAMIN C) 500 MG chewable tablet, Chew 800 mg Daily  •  diphenhydrAMINE (BENADRYL) 25 mg capsule, Take 25 mg by mouth As Needed for Itching.  •  guaiFENesin (MUCINEX) 600 MG 12 hr tablet, Take 1,200 mg by mouth As Needed for Cough  •  Multiple Vitamins-Minerals (HM MULTIVITAMIN ADULT GUMMY) chewable tablet, Chew 1 tablet Daily     History of Present Illness:       Jair Flores returns for appointment today at his request for \"cardiac clearance\". He is scheduled to undergo urologic procedure for recurrent bladder cancer with Dr. Bobby Salmeron. He is planning a second opinion with [facility] on Monday. He continues medications listed above including Eliquis and aspirin. He is walking two miles a day without distress. He had attended cardiac rehab prior to [facility] shutdown. He denies symptoms of angina, heart failure, or " "TIA/stroke    The following portions of the patient's history were reviewed and updated as appropriate: allergies, current medications and problem list.    ROS: Pertinent positives as listed in the HPI.  All other systems reviewed and negative.    Objective:    Vitals:    05/07/20 1446 05/07/20 1447   BP: 155/94 146/96   BP Location: Right arm Right arm   Patient Position: Sitting Standing   Pulse: 54 58   Temp: 97 °F (36.1 °C)    Weight: 83.3 kg (183 lb 9.6 oz)    Height: 182.9 cm (72\")        Physical Exam:  GENERAL: Alert, cooperative, in no acute distress.   HEENT: Normocephalic, no adenopathy, no jugular venous distention  HEART: EKG reviewed.  LUNGS: Normal effort.  NEUROLOGIC: No focal abnormalities involving strength or sensation are noted.   EXTREMITIES: No clubbing, cyanosis, or edema noted.      Diagnostic Data:    Lab Results   Component Value Date    GLUCOSE 106 (H) 04/24/2020    CALCIUM 9.3 04/24/2020     04/24/2020    K 4.0 04/24/2020    CO2 29.0 04/24/2020     04/24/2020    BUN 23 04/24/2020    CREATININE 1.00 04/24/2020    EGFRIFNONA 73 04/24/2020    BCR 23.0 04/24/2020    ANIONGAP 10.0 04/24/2020      Lab Results   Component Value Date    WBC 6.28 11/28/2019    HGB 14.6 11/28/2019    HCT 47.8 11/28/2019    MCV 87.5 11/28/2019     11/28/2019      Lab Results   Component Value Date    CHOL 99 07/10/2019    TRIG 84 07/10/2019    HDL 36 (L) 07/10/2019    LDL 46 07/10/2019      Lab Results   Component Value Date    HGBA1C 5.20 06/06/2019        ECG 12 Lead  Date/Time: 5/7/2020 3:13 PM  Performed by: Francisco Bo MD  Authorized by: Francisco Bo MD   Comparison: compared with previous ECG from 11/28/2019  Comparison to previous ECG: QT normal now, bradycardia noted now.  Rhythm: sinus bradycardia  Conduction: 1st degree AV block    Clinical impression: abnormal EKG        Assessment and Plan:   1.  VHD:  No angina or heart failure noted. Echocardiogram reviewed from November; " bioprosthetic AV grossly normal, mild-moderate MR noted.   2.  PAF:  No known recurrence, continue Eliquis and metoprolol. EKG essentially normal today, copy given to patient for surgical clearance.   3.  Carotid stenosis:  No neurovascular symptoms. Continue aspirin and statin.   4.  CHF: (? Tachycardia-mediated): EF 63% in January 2020. Continue beta blocker and Entresto.   5.  HLD: Continue high dose atorvastatin.   6.  Bladder cancer: cardiac clearance given if procedure/surgery needed, may hold Eliquis 48 hours prior. Would continue aspirin 81 mg daily if ok with urologist. He would require SBE prophylaxis prior to OR. Multiple antibiotic allergies noted.     I will see Jair Flores back in 6 months or sooner on an as needed basis.    EMR Dragon/Transcription Disclaimer:  Much of this encounter note is an electronic transcription/translation of spoken language to printed text.  The electronic translation of spoken language may permit erroneous, or at times, nonsensical words or phrases to be inadvertently transcribed.  Although I have reviewed the note for such errors, some may still exist.

## 2020-05-22 DIAGNOSIS — I48.91 ATRIAL FIBRILLATION WITH RVR (HCC): ICD-10-CM

## 2020-05-27 DIAGNOSIS — I48.91 ATRIAL FIBRILLATION WITH RVR (HCC): ICD-10-CM

## 2020-05-27 DIAGNOSIS — I50.21 ACUTE SYSTOLIC CONGESTIVE HEART FAILURE (HCC): ICD-10-CM

## 2020-05-27 RX ORDER — METOPROLOL SUCCINATE 100 MG/1
100 TABLET, EXTENDED RELEASE ORAL DAILY
Qty: 90 TABLET | Refills: 3 | Status: SHIPPED | OUTPATIENT
Start: 2020-05-27 | End: 2021-01-01 | Stop reason: HOSPADM

## 2020-06-24 ENCOUNTER — TELEPHONE (OUTPATIENT)
Dept: INTERNAL MEDICINE | Facility: CLINIC | Age: 75
End: 2020-06-24

## 2020-06-24 NOTE — TELEPHONE ENCOUNTER
I spoke to patient's spouse.  I advised her, after discussed with Dr. Baez, that she should contact Dr. Bobby Salmeron, his local urologist, to have catheter removed.  She will do this.

## 2020-06-24 NOTE — TELEPHONE ENCOUNTER
Pts wife called and stated pt had a referral to urology and had a procedure done and now needs his cathter removed tomorrow, 06/25/20, but can not go back to the doctor that put it in because he is in [place]. Pt wants to know where he can go to have this removed and requesting it to be [facility]. Please advise [phone]

## 2020-06-25 ENCOUNTER — TELEPHONE (OUTPATIENT)
Dept: INTERNAL MEDICINE | Facility: CLINIC | Age: 75
End: 2020-06-25

## 2020-06-25 NOTE — TELEPHONE ENCOUNTER
Attempted to call wife at number.  Had to leave message on her voicemail, that again needs to check with urology.

## 2020-06-25 NOTE — TELEPHONE ENCOUNTER
Message noted I believe the they are going to need to check with the urologist with regards to questions about the catheter

## 2020-06-25 NOTE — TELEPHONE ENCOUNTER
PATIENTS WIFE WAS TOLD BY [facility] TO CALL BACK TODAY IF THEY WERE HAVING ANY PUSH BACK ON GETTING HIS CATHETER OUT, THEY ARE HAVING PUSH BACK FROM UROLOGIST AND ARE CHECKING IN WITH YOU ALL    PATIENT CALL BACK PH: [phone] (MARY- WIFE)

## 2020-11-17 NOTE — PROGRESS NOTES
OFFICE FOLLOW UP     Date of Encounter:2020     Name: Jair Flores  : 1945  Address: [facility] [place] [address]    PCP: Osmar Clemons MD  [address] [facility] [address]  [place] [id number]    Jair Flores is a 75 y.o. male.      Chief Complaint: Follow up of PAF, VHD, Carotid stenosis.    Problem List:   1. Aortic insufficiency  A.  Severe AI by TTE 2019, EF 61-65%  B.  BRENDON 19: EF 55%, prolapse of right coronary leaflet with mod-severe AI  C.  AVR with 27 mm Medtronic bovine pericardial AV, 2019 ([place])  2. Atrial fibrillation with RVR and CHF 2019  A. BRENDON 2019: EF 30%, mild-mod MR, moderate TR, LA mod-severely dilated, bioprosthetic aortic valve grossly normal   B. Sotalol not tolerated due to prolonged QT  C. Lifevest placed at discharge.  D. Echo, 2020: EF 63%  3. Carotid artery stenosis   A. Recurrent left hemispheric ischemic CVA 2019  B. Carotid duplex 19: LAURYN 50-69%, LICA <50%  C. Echo 19: EF 61-65%, LA is mod dilated, Severe AI, negative bubble study   D. Negative TCD, no Afib on monitor   E. Readmission 6/10 with recurrent right foot and arm paresthesias   i. MRI brain 6/10/19: multiple punctate foci along left hemisphere, suggestive of embolic infarcts  F. Carotid MR ([facility] read): large LICA ulcerated plaque with very large hemorrhagic component and modest sized lipid rich core.  G. LICA stent placement 2019, AMARA Mata MD  4. Coronary artery disease, nonobstructive by cath, 2019.  5. Hypertension   6. Hyperlipidemia  7. BEATRIZ on CPAP   8. History of bladder cancer, s/p resection, followed by Dr. Fox FONTAINE 2020 - necrotic material with papillary urothelial carcinoma              B. Planned second opinion @ [facility], May 2020   C. Planned nephrectomy, 20  9. Surgical history:              A. Left knee partial replacement              B. Bladder tumor resection    Allergies:  Allergies  "  Allergen Reactions   • Penicillins Anaphylaxis   • Rocephin [Ceftriaxone] Anaphylaxis   • Bactrim [Sulfamethoxazole-Trimethoprim] Diarrhea     Abdominal pain, diarrhea, nausea    • Ciprofloxacin Other (See Comments)     Tendonitis   • Macrobid [Nitrofurantoin Macrocrystal] Other (See Comments)     Fever   • Sotalol Other (See Comments)     Prolonged QT   • Sulfa Antibiotics Diarrhea       Current Medications:  •  allopurinol (ZYLOPRIM) 100 MG tablet, TAKE 1 TABLET BY MOUTH EVERY DAY (Patient taking differently: Take 100 mg by mouth Daily.)  •  amLODIPine (NORVASC) 5 MG tablet, Take 1 tablet by mouth Daily  •  apixaban (ELIQUIS) 5 MG tablet tablet, Take 1 tablet by mouth Every 12 (Twelve) Hours.   •  Ascorbic Acid (VITAMIN C) 500 MG chewable tablet, Chew 800 mg Daily  •  aspirin EC 81 MG EC tablet, Take 1 tablet by mouth Daily. (Patient taking differently: Take 81 mg by mouth Every Morning.)  •  atorvastatin (LIPITOR) 80 MG tablet, TAKE 1 TABLET BY MOUTH EVERY NIGHT.  •  metoprolol succinate XL (TOPROL-XL) 100 MG 24 hr tablet, Take 1 tablet by mouth Daily.  •  Misc. Devices (NASAL SPRAY BOTTLE) misc, 1 spray As Needed  •  Multiple Vitamins-Minerals (HM MULTIVITAMIN ADULT GUMMY) chewable tablet, Chew 1 tablet Daily.  •  sacubitril-valsartan (Entresto) 49-51 MG tablet, Take 1 tablet by mouth 2 (Two) Times a Day.    History of Present Illness:   Jair Flores returns for follow up today. He denies angina, symptoms of heart failure, and neurovascular symptoms. He has been evaluated at [facility] and is scheduled for nephrectomy at the end of the week because of a \"suspicion\" of cancer.  He had issues this past summer post procedurally when he had issues waking up from anesthesia. Full neurology and cardiac work up was negative. Echocardiogram was consistent with results at [facility] completed last January. He is compliant with medications listed above. He and his wife are following COVID precautions.  " "    The following portions of the patient's history were reviewed and updated as appropriate: allergies, current medications and problem list.    ROS: Pertinent positives as listed in the HPI.  All other systems reviewed and negative.    Objective:    Vitals:    11/17/20 1508 11/17/20 1512   BP: 129/79 134/79   BP Location: Left arm Left arm   Patient Position: Sitting Standing   Pulse: 54 57   SpO2: 94%    Weight: 86.6 kg (191 lb)    Height: 177.8 cm (70\")        Physical Exam:  GENERAL: Alert, cooperative, in no acute distress.   HEENT: Normocephalic, no adenopathy, no jugular venous distention  HEART: No discrete PMI is noted. Regular rhythm, normal rate, and no murmurs, gallops, or rubs.   LUNGS: Clear to auscultation bilaterally. No wheezing, rales or ronchi.  ABDOMEN: Soft, bowel sounds present, non-tender   NEUROLOGIC: No focal abnormalities involving strength or sensation are noted.   EXTREMITIES: No clubbing, cyanosis, or edema noted.      Diagnostic Data:  No new labs available to review.       ECG 12 Lead    Date/Time: 11/17/2020 3:21 PM  Performed by: Francisco Bo MD  Authorized by: Francisco Bo MD   Comparison: compared with previous ECG from 5/7/2020  Similar to previous ECG  Rhythm: sinus bradycardia  Conduction: 1st degree AV block    Clinical impression: abnormal EKG          Assessment and Plan:   1.  VHD:  Stable.   2.  Carotid stenosis symptomatic, s/p TYRON:  No neurovascular symptoms. Continue aspirin and statin.   3.  PAF: Continue Eliquis. He will hold as directed below.   4.  Planned nephrectomy: There are no absolute or relative contraindications to planned nephrectomy. We have recommended he hold Eliquis 48 hours prior to O.R. but continue aspirin during perioperative period. A copy of his EKG will be sent with him.   5.  HTN: Well controlled. Continue current medications.     I will see Jair Flores back in 6 months or sooner on an as needed basis.    Scribed for Francisco CRISOSTOMO. " MD Anupam by Nicki Mckeon RN. 11/17/2020 15:44 EST.     EMR Dragon/Transcription Disclaimer:  Much of this encounter note is an electronic transcription/translation of spoken language to printed text.  The electronic translation of spoken language may permit erroneous, or at times, nonsensical words or phrases to be inadvertently transcribed.  Although I have reviewed the note for such errors, some may still exist.

## 2020-12-23 NOTE — TELEPHONE ENCOUNTER
Pt needs urological procedure planned at [facility] on 1/14/2021. I spoke with him and his wife earlier this month to clarify procedure information and pt reports he has not take Eliquis since prior to his procedure.   Per [facility] pt should restart Eliquis and take up until 1/10/2021, hold after evening dose.   Could not reach pt on cell or home or on wife's cell. LM with above instructions and letter sent to [facility].  Pt was also advised to check with PCP regarding COVID vaccine when available.

## 2020-12-23 NOTE — TELEPHONE ENCOUNTER
PATIENTS SPOUSE, MARY CALLED AND PATIENT WAS AT [place] FOR SURGERY  BACK IN November AND HAD HIS CATHETER  REMOVED ON [id number]; HE HAS HAD A HISTORY OF BLADDER SPASMS, BUT IS STILL HAVING SPASMS, AND BURNING WHEN HE URINATES; SHE IS REQUESTING FOR PATIENT TO HAVE A U/A THIS AFTERNOON FOR POSSIBLE UTI;  AND SHE IS ALSO REQUESTING A DIP AND CULTURE     MARY: [phone]

## 2020-12-28 PROBLEM — C66.1 MALIGNANT NEOPLASM OF RIGHT URETER (HCC): Status: ACTIVE | Noted: 2020-01-01

## 2020-12-28 NOTE — PROGRESS NOTES
DATE OF CONSULTATION: 12/28/2020    REFERRING PHYSICIAN: Osmar Clemons MD    Dear Osmar Fallon MD  Thank you for asking for my medical advice on this patient. 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  male   who was in his usual state of health until approximately 5 years ago.  Patient present with hematuria.  He had cystoscopy done by Dr. Braxton that revealed localized nonmuscle invasive cancer.  He has been treated with mitomycin-C and surgical resections one of the mitomycin-C injections was complicated with intravesical leak secondary to bladder wall rupture.  The patient decided to switch his urology care to [place].  He had follow-up cystoscopy other as well as retrograde pyelogram that revealed mid ureter lesion biopsy-proven urothelial carcinoma.  The patient had right radical nephrectomy with ureterectomy done November 30, 2020 final pathology revealed 3 separate tumors low-grade the renal pelvis high-grade invasive urothelial carcinoma of the mid ureter and low-grade papillary carcinoma in the bladder.  The posterior margin was positive.  The patient has met with local oncologist at [place] who recommended adjuvant chemotherapy.  The patient was referred to me for further recommendations.    SUBJECTIVE: When I saw the patient today he is here with his wife.  He is complaining of mild fatigue.  He has been through a lot over the last 2 years.  Status post aortic valve replacement at [facility].  Never smoked in his life.     Review of Systems   Constitutional: Positive for fatigue. Negative for activity change, appetite change, chills, fever and unexpected weight change.   HENT: Negative for hearing loss, mouth sores, nosebleeds, sore throat and trouble swallowing.    Eyes: Negative for visual disturbance.   Respiratory:  Negative for cough, chest tightness, shortness of breath and wheezing.    Cardiovascular: Negative for chest pain, palpitations and leg swelling.   Gastrointestinal: Negative for abdominal distention, abdominal pain, blood in stool, constipation, diarrhea, nausea, rectal pain and vomiting.   Endocrine: Negative for cold intolerance and heat intolerance.   Genitourinary: Negative for difficulty urinating, dysuria, frequency and urgency.   Musculoskeletal: Negative for arthralgias, back pain, gait problem, joint swelling and myalgias.   Skin: Negative for rash.   Neurological: Negative for dizziness, tremors, syncope, weakness, light-headedness, numbness and headaches.   Hematological: Negative for adenopathy. Does not bruise/bleed easily.   Psychiatric/Behavioral: Negative for confusion, sleep disturbance and suicidal ideas. The patient is not nervous/anxious.        Past Medical History:   Diagnosis Date   • Acute gout of foot 4/29/2019   • Atrial fibrillation (CMS/HCC)     after valve replaced   • Cancer (CMS/HCC)     bladder; cysto surveillance 07/23/2017; free of disease   • Cataract     right eye   • Degenerative arthritis     left; knee replacement; successful   • Elevated cholesterol    • Flash pulmonary edema (CMS/HCC) 4/29/2019    Episode of flash pulmonary edema thought to be related to volume overload at the time of left knee surgery in 2005 performed in [place] he has had no recurrence his EKG does show inferior Q waves which are unchanged.   • Hard to intubate     has been told he is difficult to intubate   • Hematuria 4/29/2019   • Hemorrhoids 09/12/2011    hemorrhoid ablation   • Hypertension    • BEATRIZ on CPAP    • Skin cancer     head   • Stroke (CMS/HCC) 05/2019    no residual   • Wears glasses        Social History     Socioeconomic History   • Marital status:      Spouse name: Not on file   • Number of children: Not on file   • Years of education: Not on file   • Highest education level: Not  on file   Tobacco Use   • Smoking status: Never Smoker   • Smokeless tobacco: Never Used   Substance and Sexual Activity   • Alcohol use: Not Currently   • Drug use: Defer   • Sexual activity: Defer       Family History   Problem Relation Age of Onset   • Hypertension Mother    • Hypertension Father    • Brain cancer Sister        Past Surgical History:   Procedure Laterality Date   • AORTIC VALVE REPAIR/REPLACEMENT  09/2019   • CARDIAC CATHETERIZATION N/A 7/10/2019    Procedure: Right and Left Heart Cath;  Surgeon: Francisco Bo MD;  Location:  [facility]AS[facility];  Service: Cardiology   • COLONOSCOPY      routinely every 10 years   • CYSTOSCOPY      x3   • CYSTOSCOPY  07/23/2017    bladder cancer; cysto surveillance; free of disease   • CYSTOSCOPY BLADDER BIOPSY N/A 4/24/2020    Procedure: CYSTOSCOPY BLADDER BIOPSY;  Surgeon: Bobby Salmeron MD;  Location:  [facility] OR;  Service: Urology;  Laterality: N/A;   • EXCISIONAL HEMORRHOIDECTOMY      hemorrhoid ablation; hemorrhoid onset 09/12/2011   • EYE SURGERY      as a child after cutting eye with barbwire fence   • INTERVENTIONAL RADIOLOGY PROCEDURE Bilateral 6/24/2019    Procedure: Carotid Cerebral Angiogram with possible stent placement;  Surgeon: Abad Mata MD;  Location:  [facility] CATH INVASIVE LOCATION;  Service: Interventional Radiology   • KNEE ARTHROPLASTY, PARTIAL REPLACEMENT Left    • HI TCAT IV STENT CRV CRTD ART EMBOLIC PROTECJ N/A 6/24/2019    Procedure: Carotid Stent;  Surgeon: Abad Mata MD;  Location:  [facility];  Service: Interventional Radiology   • SKIN CANCER EXCISION      head   • TONSILLECTOMY     • TRANSURETHRAL RESECTION OF BLADDER TUMOR N/A 3/29/2019    Procedure: TRANSURETHRAL RESECTION OF BLADDER TUMOR WITH MITOMYCIN;  Surgeon: Jair Braxton MD;  Location:  [facility] OR;  Service: Urology   • TRANSURETHRAL RESECTION OF BLADDER TUMOR N/A 4/24/2020    Procedure: TRANSURETHRAL RESECTION OF BLADDER TUMOR;   Surgeon: Bobby Salmeron MD;  Location: [facility];  Service: Urology;  Laterality: N/A;       Allergies   Allergen Reactions   • Penicillins Anaphylaxis   • Rocephin [Ceftriaxone] Anaphylaxis   • Bactrim [Sulfamethoxazole-Trimethoprim] Diarrhea     Abdominal pain, diarrhea, nausea    • Ciprofloxacin Other (See Comments)     Tendonitis   • Macrobid [Nitrofurantoin Macrocrystal] Other (See Comments)     Fever   • Sotalol Other (See Comments)     Prolonged QT   • Sulfa Antibiotics Diarrhea          Current Outpatient Medications:   •  clindamycin (Cleocin) 150 MG capsule, Every 6 (Six) Hours., Disp: , Rfl:   •  allopurinol (ZYLOPRIM) 100 MG tablet, Take 1 tablet by mouth Daily., Disp: 90 tablet, Rfl: 3  •  amLODIPine (NORVASC) 5 MG tablet, Take 1 tablet by mouth Daily., Disp: 30 tablet, Rfl: 11  •  apixaban (ELIQUIS) 5 MG tablet tablet, Take 1 tablet by mouth Every 12 (Twelve) Hours. Indications: Atrial Fibrillation, Disp: 180 tablet, Rfl: 3  •  Ascorbic Acid (VITAMIN C) 500 MG chewable tablet, Chew 800 mg Daily., Disp: , Rfl:   •  aspirin EC 81 MG EC tablet, Take 1 tablet by mouth Daily. (Patient taking differently: Take 81 mg by mouth Every Morning.), Disp: 100 tablet, Rfl: 4  •  atorvastatin (LIPITOR) 80 MG tablet, TAKE 1 TABLET BY MOUTH EVERY NIGHT., Disp: 90 tablet, Rfl: 3  •  enalapril (Vasotec) 20 MG tablet, , Disp: , Rfl:   •  Fluzone High-Dose Quadrivalent 0.7 ML suspension prefilled syringe, , Disp: , Rfl:   •  guaiFENesin (MUCINEX) 600 MG 12 hr tablet, Take 1,200 mg by mouth., Disp: , Rfl:   •  methocarbamol (ROBAXIN) 500 MG tablet, , Disp: , Rfl:   •  metoprolol succinate XL (TOPROL-XL) 100 MG 24 hr tablet, Take 1 tablet by mouth Daily., Disp: 90 tablet, Rfl: 3  •  Misc. Devices (NASAL SPRAY BOTTLE) misc, 1 spray As Needed., Disp: , Rfl:   •  Multiple Vitamins-Minerals (HM MULTIVITAMIN ADULT GUMMY) chewable tablet, Chew 1 tablet Daily., Disp: , Rfl:   •  nitrofurantoin, macrocrystal-monohydrate, (MACROBID)  "100 MG capsule, , Disp: , Rfl:   •  oxyCODONE (ROXICODONE) 10 MG tablet, , Disp: , Rfl:   •  sacubitril-valsartan (Entresto) 49-51 MG tablet, Take 1 tablet by mouth 2 (Two) Times a Day., Disp: 180 tablet, Rfl: 3    PHYSICAL EXAMINATION:   /64   Pulse 69   Temp 96.8 °F (36 °C) (Temporal)   Resp 16   Ht 177.8 cm (70\")   Wt 78.9 kg (174 lb)   SpO2 93%   BMI 24.97 kg/m²   Pain Score    12/28/20 1410   PainSc: 0-No pain       ECOG Performance Status: 1 - Symptomatic but completely ambulatory  General Appearance:  alert, cooperative, no apparent distress and appears stated age   Neurologic/Psychiatric: A&O x 3, gait steady, appropriate affect, strength 5/5 in all muscle groups   HEENT:  Normocephalic, without obvious abnormality, mucous membranes moist   Neck: Supple, symmetrical, trachea midline, no adenopathy;  No thyromegaly, masses, or tenderness   Lungs:   Clear to auscultation bilaterally; respirations regular, even, and unlabored bilaterally   Heart:  Regular rate and rhythm, no murmurs appreciated   Abdomen:   Soft, non-tender, non-distended and no organomegaly   Lymph nodes: No cervical, supraclavicular, inguinal or axillary adenopathy noted   Extremities: Normal, atraumatic; no clubbing, cyanosis, or edema    Skin: No rashes, ulcers, or suspicious lesions noted       No visits with results within 2 Week(s) from this visit.   Latest known visit with results is:   Office Visit on 05/05/2020   Component Date Value Ref Range Status   • Reference Lab Report 05/05/2020    Final    See scanned report     • COVID19 05/05/2020 Not Detected  Not Detected - Ref. Range Final        No results found.      DIAGNOSTIC DATA:   1. Radiology:    EXAMINATION: XR CHEST 1 VW-      INDICATION: Respiratory distress; I50.9-Heart failure, unspecified;  I48.91-Unspecified atrial fibrillation; I10-Essential (primary)  hypertension; J06.9-Acute upper respiratory infection, unspecified.     COMPARISON: 11/25/2019.     FINDINGS: " Interval extubation with improved lung volumes and aeration  status post extubation particularly within the mid and lower lung  fields, however, persistent trace left pleural effusion and bibasilar  atelectasis. Persistent cardiomegaly.         IMPRESSION:  Interval extubation with improved aeration of the lung  volumes status post extubation including significant improvement in  bibasilar and midlung atelectasis with a residual left trace effusion.     D:  11/26/2019  E:  11/26/2019  2.  Extensive medical records from [facility] including medical oncology notes, urology notes, blood work results and pathology reports including biopsy and surgical pathology are reviewed by me and document of the patient's chart.    ASSESSMENT: The patient is a very pleasant 75 y.o.  male  with high-grade urothelial carcinoma    PROBLEM LIST:   1.  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  Status post right nephrectomy with ureterectomy done at [facility] November 30, 2020  B.  Pathology showed positive posterior margin  2.  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  Status post right nephrectomy done November 30, 2020 with a clear surgical margins  3.  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 Status post multiple resections with mitomycin-C injection in the past  B.  Status post resection for local recurrence November 30, 2020  4.  Atrial fibrillation  5.  Valvular heart disease    PLAN:   1. I had a long discussion today with the patient and his wife about his new diagnosis of ureter cancer. I did go over the final pathology report in detail with both of them. I reviewed the patient's documents including refereing provider's notes, lab results, imaging, and path report.   2.  The patient would benefit from adjuvant chemotherapy given his disease stage and grade.  3.  The patient will be treated with  adjuvant cisplatin plus gemcitabine day 1 and day 8 every 3 weeks for 4 cycles.  4.  We might have to substitute carboplatin instead of cisplatin if this currently was not more than 50.  5.  I will set the patient up to meet with my nurse practitioner for treatment education.  6.  I will order whole-body PET scan for staging purposes.  7.  I will send tissue for [employer] sequencing given the fact the patient has 3 malignancies and he is never been a smoker.  8.  The patient might benefit from adjuvant radiation if he indeed has positive surgical margin.  The patient is convinced that he had clear margins according to discussion with his urologist.  I did page his urologist and waiting his call back to discuss further.  9.  The patient will continue taking Eliquis for atrial fibrillation.  He will be at high risk of bleeding while on chemotherapy.  10.  I will monitor the patient blood work including blood counts kidney function liver function and electrolytes.  11.  I will start treatment joints.  Patient scheduled for intravesical treatment at [facility] in 10 days and he would like to get that out of the way before starting chemotherapy.  12. We reviewed the potential side effects of this regimen including fatigue, vomiting and nausea, hair loss, nephropathy, neuropathy, hearing loss, myelosuppression, and risk of infusion reaction.    Rosemarie Montoya MD  12/28/2020

## 2020-12-28 NOTE — TELEPHONE ENCOUNTER
Patient is being treated by [facility] with macrobid and pyridium. Patients symptoms have started to resolve. They will use the order to check UA about a 1-2 weeks after completing antibiotic.

## 2021-01-01 ENCOUNTER — HOSPITAL ENCOUNTER (OUTPATIENT)
Dept: PET IMAGING | Facility: HOSPITAL | Age: 76
Discharge: HOME | End: 2021-01-08

## 2021-01-01 ENCOUNTER — OFFICE VISIT (OUTPATIENT)
Dept: ONCOLOGY | Facility: CLINIC | Age: 76
End: 2021-01-01

## 2021-01-01 ENCOUNTER — TELEPHONE (OUTPATIENT)
Dept: INTERNAL MEDICINE | Facility: CLINIC | Age: 76
End: 2021-01-01

## 2021-01-01 ENCOUNTER — TELEPHONE (OUTPATIENT)
Dept: ONCOLOGY | Facility: CLINIC | Age: 76
End: 2021-01-01

## 2021-01-01 ENCOUNTER — EDUCATION (OUTPATIENT)
Dept: ONCOLOGY | Facility: HOSPITAL | Age: 76
End: 2021-01-01

## 2021-01-01 ENCOUNTER — TELEPHONE (OUTPATIENT)
Dept: CARDIOLOGY | Facility: CLINIC | Age: 76
End: 2021-01-01

## 2021-01-01 ENCOUNTER — DOCUMENTATION (OUTPATIENT)
Dept: NUTRITION | Facility: HOSPITAL | Age: 76
End: 2021-01-01

## 2021-01-01 ENCOUNTER — APPOINTMENT (OUTPATIENT)
Dept: GENERAL RADIOLOGY | Facility: HOSPITAL | Age: 76
End: 2021-01-01

## 2021-01-01 ENCOUNTER — TELEPHONE (OUTPATIENT)
Dept: RADIATION ONCOLOGY | Facility: HOSPITAL | Age: 76
End: 2021-01-01

## 2021-01-01 ENCOUNTER — APPOINTMENT (OUTPATIENT)
Dept: CT IMAGING | Facility: HOSPITAL | Age: 76
End: 2021-01-01

## 2021-01-01 ENCOUNTER — APPOINTMENT (OUTPATIENT)
Dept: ULTRASOUND IMAGING | Facility: HOSPITAL | Age: 76
End: 2021-01-01

## 2021-01-01 ENCOUNTER — LAB (OUTPATIENT)
Dept: LAB | Facility: HOSPITAL | Age: 76
End: 2021-01-01

## 2021-01-01 ENCOUNTER — HOSPITAL ENCOUNTER (INPATIENT)
Facility: HOSPITAL | Age: 76
LOS: 2 days | Discharge: HOME | End: 2021-04-05
Attending: EMERGENCY MEDICINE | Admitting: HOSPITALIST

## 2021-01-01 ENCOUNTER — HOSPITAL ENCOUNTER (OUTPATIENT)
Dept: CARDIOLOGY | Facility: HOSPITAL | Age: 76
Discharge: HOME | End: 2021-06-23
Admitting: INTERNAL MEDICINE

## 2021-01-01 ENCOUNTER — READMISSION MANAGEMENT (OUTPATIENT)
Dept: CALL CENTER | Facility: HOSPITAL | Age: 76
End: 2021-01-01

## 2021-01-01 ENCOUNTER — OFFICE VISIT (OUTPATIENT)
Dept: INTERNAL MEDICINE | Facility: CLINIC | Age: 76
End: 2021-01-01

## 2021-01-01 ENCOUNTER — APPOINTMENT (OUTPATIENT)
Dept: CARDIOLOGY | Facility: HOSPITAL | Age: 76
End: 2021-01-01

## 2021-01-01 ENCOUNTER — HOSPITAL ENCOUNTER (INPATIENT)
Facility: HOSPITAL | Age: 76
LOS: 14 days | Discharge: NURSING, REHAB OR LONG-TERM CARE | End: 2021-03-01
Attending: EMERGENCY MEDICINE | Admitting: INTERNAL MEDICINE

## 2021-01-01 ENCOUNTER — HOSPITAL ENCOUNTER (OUTPATIENT)
Dept: RADIATION ONCOLOGY | Facility: HOSPITAL | Age: 76
Setting detail: RADIATION/ONCOLOGY SERIES
Discharge: HOME | End: 2021-07-06

## 2021-01-01 ENCOUNTER — TRANSITIONAL CARE MANAGEMENT TELEPHONE ENCOUNTER (OUTPATIENT)
Dept: CALL CENTER | Facility: HOSPITAL | Age: 76
End: 2021-01-01

## 2021-01-01 ENCOUNTER — HOSPITAL ENCOUNTER (OUTPATIENT)
Dept: ONCOLOGY | Facility: HOSPITAL | Age: 76
Setting detail: INFUSION SERIES
Discharge: HOME | End: 2021-01-26

## 2021-01-01 ENCOUNTER — OFFICE VISIT (OUTPATIENT)
Dept: RADIATION ONCOLOGY | Facility: HOSPITAL | Age: 76
End: 2021-01-01

## 2021-01-01 ENCOUNTER — HOSPITAL ENCOUNTER (OUTPATIENT)
Dept: RADIATION ONCOLOGY | Facility: HOSPITAL | Age: 76
Discharge: HOME | End: 2021-07-06

## 2021-01-01 ENCOUNTER — HOSPITAL ENCOUNTER (OUTPATIENT)
Dept: ONCOLOGY | Facility: HOSPITAL | Age: 76
Setting detail: INFUSION SERIES
Discharge: HOME | End: 2021-03-15

## 2021-01-01 ENCOUNTER — APPOINTMENT (OUTPATIENT)
Dept: ONCOLOGY | Facility: HOSPITAL | Age: 76
End: 2021-01-01

## 2021-01-01 ENCOUNTER — HOSPITAL ENCOUNTER (OUTPATIENT)
Dept: ONCOLOGY | Facility: HOSPITAL | Age: 76
Setting detail: INFUSION SERIES
Discharge: HOME | End: 2021-06-25

## 2021-01-01 ENCOUNTER — HOSPITAL ENCOUNTER (OUTPATIENT)
Dept: RADIATION ONCOLOGY | Facility: HOSPITAL | Age: 76
Discharge: HOME | End: 2021-07-13

## 2021-01-01 ENCOUNTER — HOSPITAL ENCOUNTER (OUTPATIENT)
Dept: ONCOLOGY | Facility: HOSPITAL | Age: 76
Discharge: HOME | End: 2021-06-25

## 2021-01-01 ENCOUNTER — HOSPITAL ENCOUNTER (OUTPATIENT)
Dept: RADIATION ONCOLOGY | Facility: HOSPITAL | Age: 76
Discharge: HOME | End: 2021-07-12

## 2021-01-01 ENCOUNTER — OFFICE VISIT (OUTPATIENT)
Dept: CARDIOLOGY | Facility: CLINIC | Age: 76
End: 2021-01-01

## 2021-01-01 ENCOUNTER — HOSPITAL ENCOUNTER (OUTPATIENT)
Dept: ONCOLOGY | Facility: HOSPITAL | Age: 76
Setting detail: INFUSION SERIES
Discharge: HOME | End: 2021-01-19

## 2021-01-01 ENCOUNTER — HOSPITAL ENCOUNTER (OUTPATIENT)
Dept: ONCOLOGY | Facility: HOSPITAL | Age: 76
Setting detail: INFUSION SERIES
Discharge: HOME | End: 2021-02-09

## 2021-01-01 ENCOUNTER — HOSPITAL ENCOUNTER (INPATIENT)
Facility: HOSPITAL | Age: 76
LOS: 16 days | End: 2021-07-30
Attending: EMERGENCY MEDICINE | Admitting: FAMILY MEDICINE

## 2021-01-01 VITALS
BODY MASS INDEX: 23.62 KG/M2 | HEIGHT: 70 IN | TEMPERATURE: 96.9 F | DIASTOLIC BLOOD PRESSURE: 81 MMHG | SYSTOLIC BLOOD PRESSURE: 160 MMHG | OXYGEN SATURATION: 98 % | WEIGHT: 165 LBS | RESPIRATION RATE: 18 BRPM | HEART RATE: 70 BPM

## 2021-01-01 VITALS
HEART RATE: 61 BPM | OXYGEN SATURATION: 97 % | RESPIRATION RATE: 18 BRPM | TEMPERATURE: 98 F | HEIGHT: 70 IN | WEIGHT: 182 LBS | DIASTOLIC BLOOD PRESSURE: 83 MMHG | SYSTOLIC BLOOD PRESSURE: 156 MMHG | BODY MASS INDEX: 26.05 KG/M2

## 2021-01-01 VITALS
RESPIRATION RATE: 16 BRPM | RESPIRATION RATE: 18 BRPM | DIASTOLIC BLOOD PRESSURE: 55 MMHG | OXYGEN SATURATION: 94 % | HEART RATE: 84 BPM | TEMPERATURE: 98.4 F | WEIGHT: 169 LBS | HEIGHT: 70 IN | SYSTOLIC BLOOD PRESSURE: 123 MMHG | HEART RATE: 77 BPM | WEIGHT: 175 LBS | DIASTOLIC BLOOD PRESSURE: 77 MMHG | HEIGHT: 70 IN | BODY MASS INDEX: 25.05 KG/M2 | BODY MASS INDEX: 24.2 KG/M2 | TEMPERATURE: 97.1 F | OXYGEN SATURATION: 98 % | SYSTOLIC BLOOD PRESSURE: 98 MMHG

## 2021-01-01 VITALS
TEMPERATURE: 97.7 F | SYSTOLIC BLOOD PRESSURE: 138 MMHG | HEART RATE: 87 BPM | HEIGHT: 70 IN | WEIGHT: 175 LBS | DIASTOLIC BLOOD PRESSURE: 83 MMHG | BODY MASS INDEX: 25.05 KG/M2 | OXYGEN SATURATION: 95 % | RESPIRATION RATE: 18 BRPM

## 2021-01-01 VITALS
DIASTOLIC BLOOD PRESSURE: 93 MMHG | SYSTOLIC BLOOD PRESSURE: 159 MMHG | WEIGHT: 182 LBS | HEART RATE: 66 BPM | OXYGEN SATURATION: 98 % | HEIGHT: 70 IN | BODY MASS INDEX: 26.05 KG/M2

## 2021-01-01 VITALS
SYSTOLIC BLOOD PRESSURE: 123 MMHG | HEIGHT: 70 IN | DIASTOLIC BLOOD PRESSURE: 77 MMHG | RESPIRATION RATE: 16 BRPM | HEART RATE: 77 BPM | BODY MASS INDEX: 25.05 KG/M2 | WEIGHT: 175 LBS | TEMPERATURE: 97.1 F

## 2021-01-01 VITALS
HEIGHT: 70 IN | RESPIRATION RATE: 16 BRPM | BODY MASS INDEX: 22.9 KG/M2 | TEMPERATURE: 98.6 F | WEIGHT: 160 LBS | HEART RATE: 92 BPM | OXYGEN SATURATION: 94 % | DIASTOLIC BLOOD PRESSURE: 76 MMHG | SYSTOLIC BLOOD PRESSURE: 108 MMHG

## 2021-01-01 VITALS
DIASTOLIC BLOOD PRESSURE: 52 MMHG | WEIGHT: 179.5 LBS | RESPIRATION RATE: 28 BRPM | TEMPERATURE: 99.2 F | SYSTOLIC BLOOD PRESSURE: 81 MMHG | BODY MASS INDEX: 25.7 KG/M2 | HEART RATE: 110 BPM | HEIGHT: 70 IN | OXYGEN SATURATION: 81 %

## 2021-01-01 VITALS
BODY MASS INDEX: 25.34 KG/M2 | DIASTOLIC BLOOD PRESSURE: 75 MMHG | TEMPERATURE: 97.5 F | HEART RATE: 81 BPM | RESPIRATION RATE: 16 BRPM | WEIGHT: 177 LBS | HEIGHT: 70 IN | SYSTOLIC BLOOD PRESSURE: 124 MMHG

## 2021-01-01 VITALS
DIASTOLIC BLOOD PRESSURE: 69 MMHG | WEIGHT: 176 LBS | TEMPERATURE: 97.5 F | HEART RATE: 75 BPM | BODY MASS INDEX: 25.2 KG/M2 | OXYGEN SATURATION: 97 % | HEIGHT: 70 IN | RESPIRATION RATE: 18 BRPM | SYSTOLIC BLOOD PRESSURE: 126 MMHG

## 2021-01-01 VITALS
TEMPERATURE: 98 F | HEART RATE: 80 BPM | OXYGEN SATURATION: 91 % | BODY MASS INDEX: 24.79 KG/M2 | RESPIRATION RATE: 18 BRPM | DIASTOLIC BLOOD PRESSURE: 80 MMHG | SYSTOLIC BLOOD PRESSURE: 134 MMHG | WEIGHT: 172.8 LBS

## 2021-01-01 VITALS
HEIGHT: 70 IN | DIASTOLIC BLOOD PRESSURE: 71 MMHG | SYSTOLIC BLOOD PRESSURE: 122 MMHG | BODY MASS INDEX: 24.91 KG/M2 | WEIGHT: 174 LBS | HEART RATE: 63 BPM | OXYGEN SATURATION: 98 % | TEMPERATURE: 96.8 F | RESPIRATION RATE: 16 BRPM

## 2021-01-01 VITALS
SYSTOLIC BLOOD PRESSURE: 108 MMHG | HEART RATE: 80 BPM | WEIGHT: 163 LBS | DIASTOLIC BLOOD PRESSURE: 80 MMHG | BODY MASS INDEX: 23.34 KG/M2 | TEMPERATURE: 97.1 F | HEIGHT: 70 IN

## 2021-01-01 VITALS
BODY MASS INDEX: 23.42 KG/M2 | OXYGEN SATURATION: 95 % | DIASTOLIC BLOOD PRESSURE: 72 MMHG | WEIGHT: 163.6 LBS | TEMPERATURE: 97.5 F | SYSTOLIC BLOOD PRESSURE: 115 MMHG | RESPIRATION RATE: 18 BRPM | HEIGHT: 70 IN | HEART RATE: 72 BPM

## 2021-01-01 VITALS — WEIGHT: 175 LBS | HEIGHT: 70 IN | BODY MASS INDEX: 25.05 KG/M2

## 2021-01-01 DIAGNOSIS — I50.31 ACUTE DIASTOLIC CHF (CONGESTIVE HEART FAILURE) (HCC): ICD-10-CM

## 2021-01-01 DIAGNOSIS — C66.1 MALIGNANT NEOPLASM OF RIGHT URETER (HCC): ICD-10-CM

## 2021-01-01 DIAGNOSIS — E78.5 HYPERLIPIDEMIA LDL GOAL <70: ICD-10-CM

## 2021-01-01 DIAGNOSIS — I10 BENIGN ESSENTIAL HYPERTENSION: ICD-10-CM

## 2021-01-01 DIAGNOSIS — Z01.812 PRE-OPERATIVE LABORATORY EXAMINATION: Primary | ICD-10-CM

## 2021-01-01 DIAGNOSIS — C67.9 MALIGNANT NEOPLASM OF URINARY BLADDER, UNSPECIFIED SITE (HCC): Primary | ICD-10-CM

## 2021-01-01 DIAGNOSIS — C67.9 MALIGNANT NEOPLASM OF URINARY BLADDER, UNSPECIFIED SITE (HCC): ICD-10-CM

## 2021-01-01 DIAGNOSIS — Z87.440 HISTORY OF UTI: ICD-10-CM

## 2021-01-01 DIAGNOSIS — C67.4 MALIGNANT NEOPLASM OF POSTERIOR WALL OF URINARY BLADDER (HCC): Primary | ICD-10-CM

## 2021-01-01 DIAGNOSIS — I48.91 ATRIAL FIBRILLATION WITH RVR (HCC): ICD-10-CM

## 2021-01-01 DIAGNOSIS — G89.3 CANCER RELATED PAIN: Primary | ICD-10-CM

## 2021-01-01 DIAGNOSIS — Z92.21 S/P CHEMOTHERAPY, TIME SINCE LESS THAN 4 WEEKS: ICD-10-CM

## 2021-01-01 DIAGNOSIS — G89.3 CANCER ASSOCIATED PAIN: Primary | ICD-10-CM

## 2021-01-01 DIAGNOSIS — Z01.812 PRE-OPERATIVE LABORATORY EXAMINATION: ICD-10-CM

## 2021-01-01 DIAGNOSIS — Z95.2 S/P AVR (AORTIC VALVE REPLACEMENT): Primary | ICD-10-CM

## 2021-01-01 DIAGNOSIS — I48.0 PAF (PAROXYSMAL ATRIAL FIBRILLATION) (HCC): ICD-10-CM

## 2021-01-01 DIAGNOSIS — M89.8X9 BONE DISEASE, METABOLIC: ICD-10-CM

## 2021-01-01 DIAGNOSIS — C64.9 RENAL CELL CARCINOMA, UNSPECIFIED LATERALITY (HCC): ICD-10-CM

## 2021-01-01 DIAGNOSIS — D64.9 CHRONIC ANEMIA: ICD-10-CM

## 2021-01-01 DIAGNOSIS — Z95.2 S/P AVR (AORTIC VALVE REPLACEMENT): ICD-10-CM

## 2021-01-01 DIAGNOSIS — C67.4 MALIGNANT NEOPLASM OF POSTERIOR WALL OF URINARY BLADDER (HCC): ICD-10-CM

## 2021-01-01 DIAGNOSIS — E87.1 HYPONATREMIA: ICD-10-CM

## 2021-01-01 DIAGNOSIS — J96.01 ACUTE RESPIRATORY FAILURE WITH HYPOXIA (HCC): ICD-10-CM

## 2021-01-01 DIAGNOSIS — I50.42 CHRONIC COMBINED SYSTOLIC AND DIASTOLIC CONGESTIVE HEART FAILURE (HCC): ICD-10-CM

## 2021-01-01 DIAGNOSIS — C66.1 MALIGNANT NEOPLASM OF RIGHT URETER (HCC): Primary | ICD-10-CM

## 2021-01-01 DIAGNOSIS — I10 BENIGN ESSENTIAL HYPERTENSION: Primary | ICD-10-CM

## 2021-01-01 DIAGNOSIS — N12 PYELONEPHRITIS: Primary | ICD-10-CM

## 2021-01-01 DIAGNOSIS — N18.9 CHRONIC KIDNEY DISEASE, UNSPECIFIED CKD STAGE: ICD-10-CM

## 2021-01-01 DIAGNOSIS — R11.0 NAUSEA: ICD-10-CM

## 2021-01-01 DIAGNOSIS — Z85.51 HISTORY OF BLADDER CANCER: ICD-10-CM

## 2021-01-01 DIAGNOSIS — Z85.51 HISTORY OF BLADDER CANCER: Primary | ICD-10-CM

## 2021-01-01 DIAGNOSIS — A41.9 SEPSIS WITH ACUTE ORGAN DYSFUNCTION WITHOUT SEPTIC SHOCK, DUE TO UNSPECIFIED ORGANISM, UNSPECIFIED TYPE (HCC): ICD-10-CM

## 2021-01-01 DIAGNOSIS — R30.0 DYSURIA: ICD-10-CM

## 2021-01-01 DIAGNOSIS — I50.21 ACUTE SYSTOLIC CONGESTIVE HEART FAILURE (HCC): ICD-10-CM

## 2021-01-01 DIAGNOSIS — R53.83 FATIGUE, UNSPECIFIED TYPE: ICD-10-CM

## 2021-01-01 DIAGNOSIS — R09.02 HYPOXIA: ICD-10-CM

## 2021-01-01 DIAGNOSIS — C67.5 CANCER, BLADDER, NECK (HCC): ICD-10-CM

## 2021-01-01 DIAGNOSIS — Z86.19 HISTORY OF SEPSIS: ICD-10-CM

## 2021-01-01 DIAGNOSIS — R41.82 ALTERED MENTAL STATUS, UNSPECIFIED ALTERED MENTAL STATUS TYPE: Primary | ICD-10-CM

## 2021-01-01 DIAGNOSIS — R65.20 SEPSIS WITH ACUTE ORGAN DYSFUNCTION WITHOUT SEPTIC SHOCK, DUE TO UNSPECIFIED ORGANISM, UNSPECIFIED TYPE (HCC): ICD-10-CM

## 2021-01-01 DIAGNOSIS — N39.0 URINARY TRACT INFECTION WITHOUT HEMATURIA, SITE UNSPECIFIED: ICD-10-CM

## 2021-01-01 DIAGNOSIS — N18.9 CHRONIC RENAL IMPAIRMENT, UNSPECIFIED CKD STAGE: ICD-10-CM

## 2021-01-01 DIAGNOSIS — R07.9 CHEST PAIN, UNSPECIFIED TYPE: Primary | ICD-10-CM

## 2021-01-01 LAB
ABO GROUP BLD: NORMAL
ABO GROUP BLD: NORMAL
ALBUMIN SERPL-MCNC: 2.2 G/DL (ref 3.5–5.2)
ALBUMIN SERPL-MCNC: 2.3 G/DL (ref 3.5–5.2)
ALBUMIN SERPL-MCNC: 2.3 G/DL (ref 3.5–5.2)
ALBUMIN SERPL-MCNC: 2.4 G/DL (ref 3.5–5.2)
ALBUMIN SERPL-MCNC: 2.4 G/DL (ref 3.5–5.2)
ALBUMIN SERPL-MCNC: 2.5 G/DL (ref 3.5–5.2)
ALBUMIN SERPL-MCNC: 2.6 G/DL (ref 3.5–5.2)
ALBUMIN SERPL-MCNC: 2.7 G/DL (ref 3.5–5.2)
ALBUMIN SERPL-MCNC: 2.8 G/DL (ref 3.5–5.2)
ALBUMIN SERPL-MCNC: 2.8 G/DL (ref 3.5–5.2)
ALBUMIN SERPL-MCNC: 3.1 G/DL (ref 3.5–5.2)
ALBUMIN SERPL-MCNC: 3.2 G/DL (ref 3.5–5.2)
ALBUMIN SERPL-MCNC: 3.5 G/DL (ref 3.5–5.2)
ALBUMIN SERPL-MCNC: 3.5 G/DL (ref 3.5–5.2)
ALBUMIN SERPL-MCNC: 3.8 G/DL (ref 3.5–5.2)
ALBUMIN SERPL-MCNC: 3.8 G/DL (ref 3.5–5.2)
ALBUMIN SERPL-MCNC: 4.2 G/DL (ref 3.5–5.2)
ALBUMIN/GLOB SERPL: 0.7 G/DL
ALBUMIN/GLOB SERPL: 0.7 G/DL
ALBUMIN/GLOB SERPL: 0.8 G/DL
ALBUMIN/GLOB SERPL: 0.9 G/DL
ALBUMIN/GLOB SERPL: 1 G/DL
ALBUMIN/GLOB SERPL: 1 G/DL
ALBUMIN/GLOB SERPL: 1.1 G/DL
ALBUMIN/GLOB SERPL: 1.1 G/DL
ALBUMIN/GLOB SERPL: 1.2 G/DL
ALBUMIN/GLOB SERPL: 1.3 G/DL
ALBUMIN/GLOB SERPL: 1.3 G/DL
ALP SERPL-CCNC: 103 U/L (ref 39–117)
ALP SERPL-CCNC: 108 U/L (ref 39–117)
ALP SERPL-CCNC: 109 U/L (ref 39–117)
ALP SERPL-CCNC: 143 U/L (ref 39–117)
ALP SERPL-CCNC: 229 U/L (ref 39–117)
ALP SERPL-CCNC: 247 U/L (ref 39–117)
ALP SERPL-CCNC: 255 U/L (ref 39–117)
ALP SERPL-CCNC: 257 U/L (ref 39–117)
ALP SERPL-CCNC: 283 U/L (ref 39–117)
ALP SERPL-CCNC: 67 U/L (ref 39–117)
ALP SERPL-CCNC: 74 U/L (ref 39–117)
ALP SERPL-CCNC: 75 U/L (ref 39–117)
ALP SERPL-CCNC: 75 U/L (ref 39–117)
ALP SERPL-CCNC: 84 U/L (ref 39–117)
ALP SERPL-CCNC: 89 U/L (ref 39–117)
ALP SERPL-CCNC: 90 U/L (ref 39–117)
ALT SERPL W P-5'-P-CCNC: 12 U/L (ref 1–41)
ALT SERPL W P-5'-P-CCNC: 12 U/L (ref 1–41)
ALT SERPL W P-5'-P-CCNC: 18 U/L (ref 1–41)
ALT SERPL W P-5'-P-CCNC: 20 U/L (ref 1–41)
ALT SERPL W P-5'-P-CCNC: 20 U/L (ref 1–41)
ALT SERPL W P-5'-P-CCNC: 24 U/L (ref 1–41)
ALT SERPL W P-5'-P-CCNC: 24 U/L (ref 1–41)
ALT SERPL W P-5'-P-CCNC: 29 U/L (ref 1–41)
ALT SERPL W P-5'-P-CCNC: 31 U/L (ref 1–41)
ALT SERPL W P-5'-P-CCNC: 45 U/L (ref 1–41)
ALT SERPL W P-5'-P-CCNC: 73 U/L (ref 1–41)
ALT SERPL W P-5'-P-CCNC: 8 U/L (ref 1–41)
ALT SERPL W P-5'-P-CCNC: 86 U/L (ref 1–41)
ALT SERPL W P-5'-P-CCNC: 89 U/L (ref 1–41)
ALT SERPL W P-5'-P-CCNC: 9 U/L (ref 1–41)
ALT SERPL W P-5'-P-CCNC: 97 U/L (ref 1–41)
ANION GAP SERPL CALCULATED.3IONS-SCNC: 10 MMOL/L (ref 5–15)
ANION GAP SERPL CALCULATED.3IONS-SCNC: 11 MMOL/L (ref 5–15)
ANION GAP SERPL CALCULATED.3IONS-SCNC: 12 MMOL/L (ref 5–15)
ANION GAP SERPL CALCULATED.3IONS-SCNC: 13 MMOL/L (ref 5–15)
ANION GAP SERPL CALCULATED.3IONS-SCNC: 14 MMOL/L (ref 5–15)
ANION GAP SERPL CALCULATED.3IONS-SCNC: 15 MMOL/L (ref 5–15)
ANION GAP SERPL CALCULATED.3IONS-SCNC: 16 MMOL/L (ref 5–15)
ANION GAP SERPL CALCULATED.3IONS-SCNC: 7 MMOL/L (ref 5–15)
ANION GAP SERPL CALCULATED.3IONS-SCNC: 7 MMOL/L (ref 5–15)
ANION GAP SERPL CALCULATED.3IONS-SCNC: 8 MMOL/L (ref 5–15)
ANION GAP SERPL CALCULATED.3IONS-SCNC: 9 MMOL/L (ref 5–15)
ANISOCYTOSIS BLD QL: ABNORMAL
ARTERIAL PATENCY WRIST A: ABNORMAL
AST SERPL-CCNC: 103 U/L (ref 1–40)
AST SERPL-CCNC: 125 U/L (ref 1–40)
AST SERPL-CCNC: 14 U/L (ref 1–40)
AST SERPL-CCNC: 16 U/L (ref 1–40)
AST SERPL-CCNC: 175 U/L (ref 1–40)
AST SERPL-CCNC: 19 U/L (ref 1–40)
AST SERPL-CCNC: 20 U/L (ref 1–40)
AST SERPL-CCNC: 25 U/L (ref 1–40)
AST SERPL-CCNC: 25 U/L (ref 1–40)
AST SERPL-CCNC: 27 U/L (ref 1–40)
AST SERPL-CCNC: 27 U/L (ref 1–40)
AST SERPL-CCNC: 28 U/L (ref 1–40)
AST SERPL-CCNC: 30 U/L (ref 1–40)
AST SERPL-CCNC: 33 U/L (ref 1–40)
AST SERPL-CCNC: 51 U/L (ref 1–40)
AST SERPL-CCNC: 84 U/L (ref 1–40)
ATMOSPHERIC PRESS: ABNORMAL MM[HG]
BACTERIA SPEC AEROBE CULT: ABNORMAL
BACTERIA SPEC AEROBE CULT: NO GROWTH
BACTERIA SPEC AEROBE CULT: NORMAL
BACTERIA UR QL AUTO: ABNORMAL /HPF
BASE EXCESS BLDA CALC-SCNC: 2.3 MMOL/L (ref 0–2)
BASOPHILS # BLD AUTO: 0 10*3/MM3 (ref 0–0.2)
BASOPHILS # BLD AUTO: 0 10*3/MM3 (ref 0–0.2)
BASOPHILS # BLD AUTO: 0.01 10*3/MM3 (ref 0–0.2)
BASOPHILS # BLD AUTO: 0.02 10*3/MM3 (ref 0–0.2)
BASOPHILS # BLD AUTO: 0.03 10*3/MM3 (ref 0–0.2)
BASOPHILS # BLD AUTO: 0.03 10*3/MM3 (ref 0–0.2)
BASOPHILS # BLD MANUAL: 0 10*3/MM3 (ref 0–0.2)
BASOPHILS NFR BLD AUTO: 0 % (ref 0–1.5)
BASOPHILS NFR BLD AUTO: 0.1 % (ref 0–1.5)
BASOPHILS NFR BLD AUTO: 0.2 % (ref 0–1.5)
BASOPHILS NFR BLD AUTO: 0.2 % (ref 0–1.5)
BASOPHILS NFR BLD AUTO: 0.3 % (ref 0–1.5)
BDY SITE: ABNORMAL
BH BB BLOOD EXPIRATION DATE: NORMAL
BH BB BLOOD EXPIRATION DATE: NORMAL
BH BB BLOOD TYPE BARCODE: 5100
BH BB BLOOD TYPE BARCODE: 5100
BH BB DISPENSE STATUS: NORMAL
BH BB DISPENSE STATUS: NORMAL
BH BB PRODUCT CODE: NORMAL
BH BB PRODUCT CODE: NORMAL
BH BB UNIT NUMBER: NORMAL
BH BB UNIT NUMBER: NORMAL
BH CV ECHO MEAS - AO MAX PG (FULL): 13.2 MMHG
BH CV ECHO MEAS - AO MAX PG (FULL): 14.9 MMHG
BH CV ECHO MEAS - AO MAX PG: 18 MMHG
BH CV ECHO MEAS - AO MAX PG: 20.7 MMHG
BH CV ECHO MEAS - AO MEAN PG (FULL): 7.4 MMHG
BH CV ECHO MEAS - AO MEAN PG (FULL): 9.1 MMHG
BH CV ECHO MEAS - AO MEAN PG: 10.5 MMHG
BH CV ECHO MEAS - AO MEAN PG: 12.4 MMHG
BH CV ECHO MEAS - AO ROOT AREA (BSA CORRECTED): 1.8
BH CV ECHO MEAS - AO ROOT AREA (BSA CORRECTED): 2.1
BH CV ECHO MEAS - AO ROOT AREA: 10 CM^2
BH CV ECHO MEAS - AO ROOT AREA: 13.4 CM^2
BH CV ECHO MEAS - AO ROOT DIAM: 3.6 CM
BH CV ECHO MEAS - AO ROOT DIAM: 4.1 CM
BH CV ECHO MEAS - AO V2 MAX: 209.9 CM/SEC
BH CV ECHO MEAS - AO V2 MAX: 227.3 CM/SEC
BH CV ECHO MEAS - AO V2 MEAN: 153.9 CM/SEC
BH CV ECHO MEAS - AO V2 MEAN: 168.2 CM/SEC
BH CV ECHO MEAS - AO V2 VTI: 43.2 CM
BH CV ECHO MEAS - AO V2 VTI: 43.7 CM
BH CV ECHO MEAS - ASC AORTA: 3.8 CM
BH CV ECHO MEAS - ASC AORTA: 4.1 CM
BH CV ECHO MEAS - AVA(I,A): 1.7 CM^2
BH CV ECHO MEAS - AVA(I,A): 1.9 CM^2
BH CV ECHO MEAS - AVA(I,D): 1.7 CM^2
BH CV ECHO MEAS - AVA(I,D): 1.9 CM^2
BH CV ECHO MEAS - AVA(V,A): 1.7 CM^2
BH CV ECHO MEAS - AVA(V,A): 1.8 CM^2
BH CV ECHO MEAS - AVA(V,D): 1.7 CM^2
BH CV ECHO MEAS - AVA(V,D): 1.8 CM^2
BH CV ECHO MEAS - BSA(HAYCOCK): 2 M^2
BH CV ECHO MEAS - BSA(HAYCOCK): 2 M^2
BH CV ECHO MEAS - BSA: 1.9 M^2
BH CV ECHO MEAS - BSA: 2 M^2
BH CV ECHO MEAS - BZI_BMI: 24.4 KILOGRAMS/M^2
BH CV ECHO MEAS - BZI_BMI: 25.1 KILOGRAMS/M^2
BH CV ECHO MEAS - BZI_METRIC_HEIGHT: 177.8 CM
BH CV ECHO MEAS - BZI_METRIC_HEIGHT: 177.8 CM
BH CV ECHO MEAS - BZI_METRIC_WEIGHT: 77.1 KG
BH CV ECHO MEAS - BZI_METRIC_WEIGHT: 79.4 KG
BH CV ECHO MEAS - EDV(CUBED): 55.4 ML
BH CV ECHO MEAS - EDV(CUBED): 77.6 ML
BH CV ECHO MEAS - EDV(MOD-SP2): 115 ML
BH CV ECHO MEAS - EDV(MOD-SP2): 88.3 ML
BH CV ECHO MEAS - EDV(MOD-SP4): 113 ML
BH CV ECHO MEAS - EDV(MOD-SP4): 92.6 ML
BH CV ECHO MEAS - EDV(TEICH): 62.4 ML
BH CV ECHO MEAS - EDV(TEICH): 81.5 ML
BH CV ECHO MEAS - EF(CUBED): 54.3 %
BH CV ECHO MEAS - EF(CUBED): 66.9 %
BH CV ECHO MEAS - EF(MOD-BP): 58 %
BH CV ECHO MEAS - EF(MOD-BP): 58.9 %
BH CV ECHO MEAS - EF(MOD-SP2): 60 %
BH CV ECHO MEAS - EF(MOD-SP2): 62.6 %
BH CV ECHO MEAS - EF(MOD-SP4): 55.5 %
BH CV ECHO MEAS - EF(MOD-SP4): 58.4 %
BH CV ECHO MEAS - EF(TEICH): 46.8 %
BH CV ECHO MEAS - EF(TEICH): 58.7 %
BH CV ECHO MEAS - ESV(CUBED): 25.3 ML
BH CV ECHO MEAS - ESV(CUBED): 25.7 ML
BH CV ECHO MEAS - ESV(MOD-SP2): 33 ML
BH CV ECHO MEAS - ESV(MOD-SP2): 46 ML
BH CV ECHO MEAS - ESV(MOD-SP4): 41.2 ML
BH CV ECHO MEAS - ESV(MOD-SP4): 47 ML
BH CV ECHO MEAS - ESV(TEICH): 33.2 ML
BH CV ECHO MEAS - ESV(TEICH): 33.6 ML
BH CV ECHO MEAS - FS: 23 %
BH CV ECHO MEAS - FS: 30.8 %
BH CV ECHO MEAS - IVS/LVPW: 1
BH CV ECHO MEAS - IVS/LVPW: 1.2
BH CV ECHO MEAS - IVSD: 0.93 CM
BH CV ECHO MEAS - IVSD: 1.4 CM
BH CV ECHO MEAS - LA DIMENSION: 4 CM
BH CV ECHO MEAS - LA/AO: 0.97
BH CV ECHO MEAS - LAD MAJOR: 5.9 CM
BH CV ECHO MEAS - LAD MAJOR: 6.3 CM
BH CV ECHO MEAS - LAT PEAK E' VEL: 7.3 CM/SEC
BH CV ECHO MEAS - LAT PEAK E' VEL: 8.9 CM/SEC
BH CV ECHO MEAS - LATERAL E/E' RATIO: 10.7
BH CV ECHO MEAS - LV DIASTOLIC VOL/BSA (35-75): 47 ML/M^2
BH CV ECHO MEAS - LV DIASTOLIC VOL/BSA (35-75): 58 ML/M^2
BH CV ECHO MEAS - LV IVRT: 0.09 SEC
BH CV ECHO MEAS - LV MASS(C)D: 123.5 GRAMS
BH CV ECHO MEAS - LV MASS(C)D: 176.9 GRAMS
BH CV ECHO MEAS - LV MASS(C)DI: 63.4 GRAMS/M^2
BH CV ECHO MEAS - LV MASS(C)DI: 89.7 GRAMS/M^2
BH CV ECHO MEAS - LV MAX PG: 4.8 MMHG
BH CV ECHO MEAS - LV MAX PG: 5.7 MMHG
BH CV ECHO MEAS - LV MEAN PG: 3.1 MMHG
BH CV ECHO MEAS - LV MEAN PG: 3.3 MMHG
BH CV ECHO MEAS - LV SYSTOLIC VOL/BSA (12-30): 20.9 ML/M^2
BH CV ECHO MEAS - LV SYSTOLIC VOL/BSA (12-30): 24.1 ML/M^2
BH CV ECHO MEAS - LV V1 MAX: 109.3 CM/SEC
BH CV ECHO MEAS - LV V1 MAX: 119.4 CM/SEC
BH CV ECHO MEAS - LV V1 MEAN: 83.7 CM/SEC
BH CV ECHO MEAS - LV V1 MEAN: 83.9 CM/SEC
BH CV ECHO MEAS - LV V1 VTI: 23.1 CM
BH CV ECHO MEAS - LV V1 VTI: 24.2 CM
BH CV ECHO MEAS - LVIDD: 3.8 CM
BH CV ECHO MEAS - LVIDD: 4.3 CM
BH CV ECHO MEAS - LVIDS: 2.9 CM
BH CV ECHO MEAS - LVIDS: 3 CM
BH CV ECHO MEAS - LVLD AP2: 8.5 CM
BH CV ECHO MEAS - LVLD AP2: 8.9 CM
BH CV ECHO MEAS - LVLD AP4: 8.2 CM
BH CV ECHO MEAS - LVLD AP4: 9.1 CM
BH CV ECHO MEAS - LVLS AP2: 6.9 CM
BH CV ECHO MEAS - LVLS AP2: 7.7 CM
BH CV ECHO MEAS - LVLS AP4: 7.1 CM
BH CV ECHO MEAS - LVLS AP4: 7.1 CM
BH CV ECHO MEAS - LVOT AREA (M): 3.1 CM^2
BH CV ECHO MEAS - LVOT AREA (M): 3.5 CM^2
BH CV ECHO MEAS - LVOT AREA: 3.3 CM^2
BH CV ECHO MEAS - LVOT AREA: 3.4 CM^2
BH CV ECHO MEAS - LVOT DIAM: 2 CM
BH CV ECHO MEAS - LVOT DIAM: 2.1 CM
BH CV ECHO MEAS - LVPWD: 0.89 CM
BH CV ECHO MEAS - LVPWD: 1.2 CM
BH CV ECHO MEAS - MED PEAK E' VEL: 3.6 CM/SEC
BH CV ECHO MEAS - MED PEAK E' VEL: 6.1 CM/SEC
BH CV ECHO MEAS - MEDIAL E/E' RATIO: 21.7
BH CV ECHO MEAS - MR MAX PG: 58 MMHG
BH CV ECHO MEAS - MR MAX VEL: 380.3 CM/SEC
BH CV ECHO MEAS - MV A MAX VEL: 92.3 CM/SEC
BH CV ECHO MEAS - MV DEC TIME: 0.32 SEC
BH CV ECHO MEAS - MV E MAX VEL: 80 CM/SEC
BH CV ECHO MEAS - MV E/A: 0.87
BH CV ECHO MEAS - MV P1/2T: 53 MSEC
BH CV ECHO MEAS - MVA(P1/2T): 4.2 CM2
BH CV ECHO MEAS - PA ACC SLOPE: 711.7 CM/SEC^2
BH CV ECHO MEAS - PA ACC TIME: 0.09 SEC
BH CV ECHO MEAS - PA MAX PG: 4.6 MMHG
BH CV ECHO MEAS - PA PR(ACCEL): 37.8 MMHG
BH CV ECHO MEAS - PA V2 MAX: 107.2 CM/SEC
BH CV ECHO MEAS - PI END-D VEL: 127.6 CM/SEC
BH CV ECHO MEAS - PI END-D VEL: 129 CM/SEC
BH CV ECHO MEAS - RAP SYSTOLE: 3 MMHG
BH CV ECHO MEAS - RVDD: 4.1 CM
BH CV ECHO MEAS - RVSP: 35 MMHG
BH CV ECHO MEAS - SI(AO): 222.7 ML/M^2
BH CV ECHO MEAS - SI(AO): 297.5 ML/M^2
BH CV ECHO MEAS - SI(CUBED): 15.3 ML/M^2
BH CV ECHO MEAS - SI(CUBED): 26.6 ML/M^2
BH CV ECHO MEAS - SI(LVOT): 38.5 ML/M^2
BH CV ECHO MEAS - SI(LVOT): 42.4 ML/M^2
BH CV ECHO MEAS - SI(MOD-SP2): 28 ML/M^2
BH CV ECHO MEAS - SI(MOD-SP2): 35.4 ML/M^2
BH CV ECHO MEAS - SI(MOD-SP4): 26.1 ML/M^2
BH CV ECHO MEAS - SI(MOD-SP4): 33.9 ML/M^2
BH CV ECHO MEAS - SI(TEICH): 14.8 ML/M^2
BH CV ECHO MEAS - SI(TEICH): 24.6 ML/M^2
BH CV ECHO MEAS - SV(AO): 433.8 ML
BH CV ECHO MEAS - SV(AO): 586.6 ML
BH CV ECHO MEAS - SV(CUBED): 30.1 ML
BH CV ECHO MEAS - SV(CUBED): 51.9 ML
BH CV ECHO MEAS - SV(LVOT): 75.9 ML
BH CV ECHO MEAS - SV(LVOT): 82.5 ML
BH CV ECHO MEAS - SV(MOD-SP2): 55.3 ML
BH CV ECHO MEAS - SV(MOD-SP2): 69 ML
BH CV ECHO MEAS - SV(MOD-SP4): 51.4 ML
BH CV ECHO MEAS - SV(MOD-SP4): 66 ML
BH CV ECHO MEAS - SV(TEICH): 29.2 ML
BH CV ECHO MEAS - SV(TEICH): 47.8 ML
BH CV ECHO MEAS - TAPSE (>1.6): 1.3 CM
BH CV ECHO MEAS - TAPSE (>1.6): 1.9 CM
BH CV ECHO MEAS - TR MAX PG: 31 MMHG
BH CV ECHO MEAS - TR MAX PG: 33 MMHG
BH CV ECHO MEAS - TR MAX VEL: 279.6 CM/SEC
BH CV ECHO MEAS - TR MAX VEL: 287 CM/SEC
BH CV ECHO MEASUREMENTS AVERAGE E/E' RATIO: 14.68
BH CV VAS BP LEFT ARM: NORMAL MMHG
BH CV VAS BP RIGHT ARM: NORMAL MMHG
BH CV XLRA - RV BASE: 4.2 CM
BH CV XLRA - RV BASE: 5.6 CM
BH CV XLRA - RV LENGTH: 7.2 CM
BH CV XLRA - RV LENGTH: 8 CM
BH CV XLRA - RV MID: 4.4 CM
BH CV XLRA - RV MID: 5 CM
BH CV XLRA - TDI S': 10.4 CM/SEC
BH CV XLRA - TDI S': 11.6 CM/SEC
BILIRUB SERPL-MCNC: 0.2 MG/DL (ref 0–1.2)
BILIRUB SERPL-MCNC: 0.3 MG/DL (ref 0–1.2)
BILIRUB SERPL-MCNC: 0.4 MG/DL (ref 0–1.2)
BILIRUB SERPL-MCNC: 0.4 MG/DL (ref 0–1.2)
BILIRUB SERPL-MCNC: 0.5 MG/DL (ref 0–1.2)
BILIRUB SERPL-MCNC: 0.5 MG/DL (ref 0–1.2)
BILIRUB SERPL-MCNC: 0.6 MG/DL (ref 0–1.2)
BILIRUB UR QL STRIP: NEGATIVE
BLD GP AB SCN SERPL QL: NEGATIVE
BODY TEMPERATURE: 37 C
BUN SERPL-MCNC: 102 MG/DL (ref 8–23)
BUN SERPL-MCNC: 103 MG/DL (ref 8–23)
BUN SERPL-MCNC: 14 MG/DL (ref 8–23)
BUN SERPL-MCNC: 16 MG/DL (ref 8–23)
BUN SERPL-MCNC: 18 MG/DL (ref 8–23)
BUN SERPL-MCNC: 19 MG/DL (ref 8–23)
BUN SERPL-MCNC: 21 MG/DL (ref 8–23)
BUN SERPL-MCNC: 27 MG/DL (ref 8–23)
BUN SERPL-MCNC: 28 MG/DL (ref 8–23)
BUN SERPL-MCNC: 33 MG/DL (ref 8–23)
BUN SERPL-MCNC: 34 MG/DL (ref 8–23)
BUN SERPL-MCNC: 37 MG/DL (ref 8–23)
BUN SERPL-MCNC: 43 MG/DL (ref 8–23)
BUN SERPL-MCNC: 46 MG/DL (ref 8–23)
BUN SERPL-MCNC: 46 MG/DL (ref 8–23)
BUN SERPL-MCNC: 48 MG/DL (ref 8–23)
BUN SERPL-MCNC: 50 MG/DL (ref 8–23)
BUN SERPL-MCNC: 51 MG/DL (ref 8–23)
BUN SERPL-MCNC: 52 MG/DL (ref 8–23)
BUN SERPL-MCNC: 54 MG/DL (ref 8–23)
BUN SERPL-MCNC: 55 MG/DL (ref 8–23)
BUN SERPL-MCNC: 55 MG/DL (ref 8–23)
BUN SERPL-MCNC: 56 MG/DL (ref 8–23)
BUN SERPL-MCNC: 63 MG/DL (ref 8–23)
BUN SERPL-MCNC: 64 MG/DL (ref 8–23)
BUN SERPL-MCNC: 66 MG/DL (ref 8–23)
BUN SERPL-MCNC: 67 MG/DL (ref 8–23)
BUN SERPL-MCNC: 68 MG/DL (ref 8–23)
BUN SERPL-MCNC: 80 MG/DL (ref 8–23)
BUN SERPL-MCNC: 86 MG/DL (ref 8–23)
BUN SERPL-MCNC: 94 MG/DL (ref 8–23)
BUN/CREAT SERPL: 10.4 (ref 7–25)
BUN/CREAT SERPL: 11 (ref 7–25)
BUN/CREAT SERPL: 11.5 (ref 7–25)
BUN/CREAT SERPL: 12.1 (ref 7–25)
BUN/CREAT SERPL: 14.1 (ref 7–25)
BUN/CREAT SERPL: 15.1 (ref 7–25)
BUN/CREAT SERPL: 15.9 (ref 7–25)
BUN/CREAT SERPL: 18.1 (ref 7–25)
BUN/CREAT SERPL: 19.1 (ref 7–25)
BUN/CREAT SERPL: 19.4 (ref 7–25)
BUN/CREAT SERPL: 21.4 (ref 7–25)
BUN/CREAT SERPL: 22.5 (ref 7–25)
BUN/CREAT SERPL: 23.4 (ref 7–25)
BUN/CREAT SERPL: 24.6 (ref 7–25)
BUN/CREAT SERPL: 28.1 (ref 7–25)
BUN/CREAT SERPL: 28.1 (ref 7–25)
BUN/CREAT SERPL: 30.9 (ref 7–25)
BUN/CREAT SERPL: 34 (ref 7–25)
BUN/CREAT SERPL: 34.4 (ref 7–25)
BUN/CREAT SERPL: 34.6 (ref 7–25)
BUN/CREAT SERPL: 35.3 (ref 7–25)
BUN/CREAT SERPL: 36 (ref 7–25)
BUN/CREAT SERPL: 40.3 (ref 7–25)
BUN/CREAT SERPL: 41.1 (ref 7–25)
BUN/CREAT SERPL: 41.7 (ref 7–25)
BUN/CREAT SERPL: 41.9 (ref 7–25)
BUN/CREAT SERPL: 42.6 (ref 7–25)
BUN/CREAT SERPL: 43.2 (ref 7–25)
BUN/CREAT SERPL: 43.9 (ref 7–25)
BUN/CREAT SERPL: 44 (ref 7–25)
BUN/CREAT SERPL: 45.1 (ref 7–25)
BUN/CREAT SERPL: 46.7 (ref 7–25)
BUN/CREAT SERPL: 48.7 (ref 7–25)
BUN/CREAT SERPL: 48.9 (ref 7–25)
BUN/CREAT SERPL: 9.5 (ref 7–25)
BURR CELLS BLD QL SMEAR: ABNORMAL
BURR CELLS BLD QL SMEAR: ABNORMAL
C DIFF TOX GENS STL QL NAA+PROBE: NOT DETECTED
CA-I SERPL ISE-MCNC: 1.12 MMOL/L (ref 1.12–1.32)
CA-I SERPL ISE-MCNC: 1.21 MMOL/L (ref 1.12–1.32)
CALCIUM SPEC-SCNC: 10 MG/DL (ref 8.6–10.5)
CALCIUM SPEC-SCNC: 10.1 MG/DL (ref 8.6–10.5)
CALCIUM SPEC-SCNC: 10.4 MG/DL (ref 8.6–10.5)
CALCIUM SPEC-SCNC: 7.7 MG/DL (ref 8.6–10.5)
CALCIUM SPEC-SCNC: 7.7 MG/DL (ref 8.6–10.5)
CALCIUM SPEC-SCNC: 8 MG/DL (ref 8.6–10.5)
CALCIUM SPEC-SCNC: 8.1 MG/DL (ref 8.6–10.5)
CALCIUM SPEC-SCNC: 8.2 MG/DL (ref 8.6–10.5)
CALCIUM SPEC-SCNC: 8.3 MG/DL (ref 8.6–10.5)
CALCIUM SPEC-SCNC: 8.4 MG/DL (ref 8.6–10.5)
CALCIUM SPEC-SCNC: 8.5 MG/DL (ref 8.6–10.5)
CALCIUM SPEC-SCNC: 8.6 MG/DL (ref 8.6–10.5)
CALCIUM SPEC-SCNC: 8.6 MG/DL (ref 8.6–10.5)
CALCIUM SPEC-SCNC: 8.7 MG/DL (ref 8.6–10.5)
CALCIUM SPEC-SCNC: 8.7 MG/DL (ref 8.6–10.5)
CALCIUM SPEC-SCNC: 9 MG/DL (ref 8.6–10.5)
CALCIUM SPEC-SCNC: 9.1 MG/DL (ref 8.6–10.5)
CALCIUM SPEC-SCNC: 9.2 MG/DL (ref 8.6–10.5)
CALCIUM SPEC-SCNC: 9.5 MG/DL (ref 8.6–10.5)
CALCIUM SPEC-SCNC: 9.7 MG/DL (ref 8.6–10.5)
CHLORIDE SERPL-SCNC: 100 MMOL/L (ref 98–107)
CHLORIDE SERPL-SCNC: 100 MMOL/L (ref 98–107)
CHLORIDE SERPL-SCNC: 101 MMOL/L (ref 98–107)
CHLORIDE SERPL-SCNC: 102 MMOL/L (ref 98–107)
CHLORIDE SERPL-SCNC: 103 MMOL/L (ref 98–107)
CHLORIDE SERPL-SCNC: 104 MMOL/L (ref 98–107)
CHLORIDE SERPL-SCNC: 105 MMOL/L (ref 98–107)
CHLORIDE SERPL-SCNC: 106 MMOL/L (ref 98–107)
CHLORIDE SERPL-SCNC: 107 MMOL/L (ref 98–107)
CHLORIDE SERPL-SCNC: 90 MMOL/L (ref 98–107)
CHLORIDE SERPL-SCNC: 91 MMOL/L (ref 98–107)
CHLORIDE SERPL-SCNC: 92 MMOL/L (ref 98–107)
CHLORIDE SERPL-SCNC: 94 MMOL/L (ref 98–107)
CHLORIDE SERPL-SCNC: 96 MMOL/L (ref 98–107)
CHLORIDE SERPL-SCNC: 97 MMOL/L (ref 98–107)
CHLORIDE SERPL-SCNC: 98 MMOL/L (ref 98–107)
CHLORIDE SERPL-SCNC: 99 MMOL/L (ref 98–107)
CK SERPL-CCNC: 116 U/L (ref 20–200)
CLARITY UR: ABNORMAL
CO2 BLDA-SCNC: 26.6 MMOL/L (ref 22–33)
CO2 SERPL-SCNC: 18 MMOL/L (ref 22–29)
CO2 SERPL-SCNC: 19 MMOL/L (ref 22–29)
CO2 SERPL-SCNC: 20 MMOL/L (ref 22–29)
CO2 SERPL-SCNC: 22 MMOL/L (ref 22–29)
CO2 SERPL-SCNC: 23 MMOL/L (ref 22–29)
CO2 SERPL-SCNC: 24 MMOL/L (ref 22–29)
CO2 SERPL-SCNC: 25 MMOL/L (ref 22–29)
CO2 SERPL-SCNC: 26 MMOL/L (ref 22–29)
CO2 SERPL-SCNC: 27 MMOL/L (ref 22–29)
CO2 SERPL-SCNC: 27 MMOL/L (ref 22–29)
CO2 SERPL-SCNC: 28 MMOL/L (ref 22–29)
CO2 SERPL-SCNC: 29 MMOL/L (ref 22–29)
CO2 SERPL-SCNC: 30 MMOL/L (ref 22–29)
COHGB MFR BLD: 0.9 % (ref 0–2)
COLOR UR: ABNORMAL
COLOR UR: YELLOW
CREAT BLDA-MCNC: 1.8 MG/DL
CREAT BLDA-MCNC: 1.8 MG/DL (ref 0.6–1.3)
CREAT SERPL-MCNC: 1.07 MG/DL (ref 0.76–1.27)
CREAT SERPL-MCNC: 1.08 MG/DL (ref 0.76–1.27)
CREAT SERPL-MCNC: 1.13 MG/DL (ref 0.76–1.27)
CREAT SERPL-MCNC: 1.15 MG/DL (ref 0.76–1.27)
CREAT SERPL-MCNC: 1.18 MG/DL (ref 0.76–1.27)
CREAT SERPL-MCNC: 1.19 MG/DL (ref 0.76–1.27)
CREAT SERPL-MCNC: 1.23 MG/DL (ref 0.76–1.27)
CREAT SERPL-MCNC: 1.24 MG/DL (ref 0.76–1.27)
CREAT SERPL-MCNC: 1.25 MG/DL (ref 0.76–1.27)
CREAT SERPL-MCNC: 1.31 MG/DL (ref 0.76–1.27)
CREAT SERPL-MCNC: 1.39 MG/DL (ref 0.76–1.27)
CREAT SERPL-MCNC: 1.47 MG/DL (ref 0.76–1.27)
CREAT SERPL-MCNC: 1.49 MG/DL (ref 0.76–1.27)
CREAT SERPL-MCNC: 1.51 MG/DL (ref 0.76–1.27)
CREAT SERPL-MCNC: 1.51 MG/DL (ref 0.76–1.27)
CREAT SERPL-MCNC: 1.55 MG/DL (ref 0.76–1.27)
CREAT SERPL-MCNC: 1.58 MG/DL (ref 0.76–1.27)
CREAT SERPL-MCNC: 1.6 MG/DL (ref 0.76–1.27)
CREAT SERPL-MCNC: 1.62 MG/DL (ref 0.76–1.27)
CREAT SERPL-MCNC: 1.62 MG/DL (ref 0.76–1.27)
CREAT SERPL-MCNC: 1.7 MG/DL (ref 0.76–1.27)
CREAT SERPL-MCNC: 1.7 MG/DL (ref 0.76–1.27)
CREAT SERPL-MCNC: 1.73 MG/DL (ref 0.76–1.27)
CREAT SERPL-MCNC: 1.73 MG/DL (ref 0.76–1.27)
CREAT SERPL-MCNC: 1.74 MG/DL (ref 0.76–1.27)
CREAT SERPL-MCNC: 1.85 MG/DL (ref 0.76–1.27)
CREAT SERPL-MCNC: 1.87 MG/DL (ref 0.76–1.27)
CREAT SERPL-MCNC: 1.89 MG/DL (ref 0.76–1.27)
CREAT SERPL-MCNC: 2.01 MG/DL (ref 0.76–1.27)
CREAT SERPL-MCNC: 2.5 MG/DL (ref 0.76–1.27)
CREAT SERPL-MCNC: 3.63 MG/DL (ref 0.76–1.27)
CREAT SERPL-MCNC: 3.7 MG/DL (ref 0.76–1.27)
CREAT SERPL-MCNC: 4.19 MG/DL (ref 0.76–1.27)
CREAT SERPL-MCNC: 4.92 MG/DL (ref 0.76–1.27)
CREAT SERPL-MCNC: 5.68 MG/DL (ref 0.76–1.27)
CREAT UR-MCNC: <4.2 MG/DL
CROSSMATCH INTERPRETATION: NORMAL
CROSSMATCH INTERPRETATION: NORMAL
CRP SERPL-MCNC: 14.24 MG/DL (ref 0–0.5)
CYTOLOGIST CVX/VAG CYTO: NORMAL
D-LACTATE SERPL-SCNC: 1 MMOL/L (ref 0.5–2)
D-LACTATE SERPL-SCNC: 1 MMOL/L (ref 0.5–2)
D-LACTATE SERPL-SCNC: 1.6 MMOL/L (ref 0.5–2)
D-LACTATE SERPL-SCNC: 1.6 MMOL/L (ref 0.5–2)
DEPRECATED RDW RBC AUTO: 47.7 FL (ref 37–54)
DEPRECATED RDW RBC AUTO: 48 FL (ref 37–54)
DEPRECATED RDW RBC AUTO: 48.5 FL (ref 37–54)
DEPRECATED RDW RBC AUTO: 48.5 FL (ref 37–54)
DEPRECATED RDW RBC AUTO: 48.6 FL (ref 37–54)
DEPRECATED RDW RBC AUTO: 48.8 FL (ref 37–54)
DEPRECATED RDW RBC AUTO: 49 FL (ref 37–54)
DEPRECATED RDW RBC AUTO: 49 FL (ref 37–54)
DEPRECATED RDW RBC AUTO: 50.3 FL (ref 37–54)
DEPRECATED RDW RBC AUTO: 50.4 FL (ref 37–54)
DEPRECATED RDW RBC AUTO: 51 FL (ref 37–54)
DEPRECATED RDW RBC AUTO: 51.4 FL (ref 37–54)
DEPRECATED RDW RBC AUTO: 51.6 FL (ref 37–54)
DEPRECATED RDW RBC AUTO: 51.8 FL (ref 37–54)
DEPRECATED RDW RBC AUTO: 52 FL (ref 37–54)
DEPRECATED RDW RBC AUTO: 52.9 FL (ref 37–54)
DEPRECATED RDW RBC AUTO: 53.3 FL (ref 37–54)
DEPRECATED RDW RBC AUTO: 53.8 FL (ref 37–54)
DEPRECATED RDW RBC AUTO: 54.3 FL (ref 37–54)
DEPRECATED RDW RBC AUTO: 55.2 FL (ref 37–54)
DEPRECATED RDW RBC AUTO: 62 FL (ref 37–54)
DEPRECATED RDW RBC AUTO: 63.2 FL (ref 37–54)
DEPRECATED RDW RBC AUTO: 65.4 FL (ref 37–54)
EOSINOPHIL # BLD AUTO: 0 10*3/MM3 (ref 0–0.4)
EOSINOPHIL # BLD AUTO: 0.01 10*3/MM3 (ref 0–0.4)
EOSINOPHIL # BLD AUTO: 0.02 10*3/MM3 (ref 0–0.4)
EOSINOPHIL # BLD AUTO: 0.06 10*3/MM3 (ref 0–0.4)
EOSINOPHIL # BLD MANUAL: 0 10*3/MM3 (ref 0–0.4)
EOSINOPHIL # BLD MANUAL: 0.1 10*3/MM3 (ref 0–0.4)
EOSINOPHIL NFR BLD AUTO: 0 % (ref 0.3–6.2)
EOSINOPHIL NFR BLD AUTO: 0.1 % (ref 0.3–6.2)
EOSINOPHIL NFR BLD AUTO: 0.1 % (ref 0.3–6.2)
EOSINOPHIL NFR BLD AUTO: 0.2 % (ref 0.3–6.2)
EOSINOPHIL NFR BLD AUTO: 0.2 % (ref 0.3–6.2)
EOSINOPHIL NFR BLD AUTO: 0.5 % (ref 0.3–6.2)
EOSINOPHIL NFR BLD MANUAL: 0 % (ref 0.3–6.2)
EOSINOPHIL NFR BLD MANUAL: 1 % (ref 0.3–6.2)
EOSINOPHIL SPEC QL MICRO: 0 % EOS/100 CELLS (ref 0–0)
EPAP: 0
ERYTHROCYTE [DISTWIDTH] IN BLOOD BY AUTOMATED COUNT: 15.2 % (ref 12.3–15.4)
ERYTHROCYTE [DISTWIDTH] IN BLOOD BY AUTOMATED COUNT: 15.9 % (ref 12.3–15.4)
ERYTHROCYTE [DISTWIDTH] IN BLOOD BY AUTOMATED COUNT: 16 % (ref 12.3–15.4)
ERYTHROCYTE [DISTWIDTH] IN BLOOD BY AUTOMATED COUNT: 16.2 % (ref 12.3–15.4)
ERYTHROCYTE [DISTWIDTH] IN BLOOD BY AUTOMATED COUNT: 16.5 % (ref 12.3–15.4)
ERYTHROCYTE [DISTWIDTH] IN BLOOD BY AUTOMATED COUNT: 16.6 % (ref 12.3–15.4)
ERYTHROCYTE [DISTWIDTH] IN BLOOD BY AUTOMATED COUNT: 16.8 % (ref 12.3–15.4)
ERYTHROCYTE [DISTWIDTH] IN BLOOD BY AUTOMATED COUNT: 17.2 % (ref 12.3–15.4)
ERYTHROCYTE [DISTWIDTH] IN BLOOD BY AUTOMATED COUNT: 17.3 % (ref 12.3–15.4)
ERYTHROCYTE [DISTWIDTH] IN BLOOD BY AUTOMATED COUNT: 17.3 % (ref 12.3–15.4)
ERYTHROCYTE [DISTWIDTH] IN BLOOD BY AUTOMATED COUNT: 17.5 % (ref 12.3–15.4)
ERYTHROCYTE [DISTWIDTH] IN BLOOD BY AUTOMATED COUNT: 17.8 % (ref 12.3–15.4)
ERYTHROCYTE [DISTWIDTH] IN BLOOD BY AUTOMATED COUNT: 17.9 % (ref 12.3–15.4)
ERYTHROCYTE [DISTWIDTH] IN BLOOD BY AUTOMATED COUNT: 17.9 % (ref 12.3–15.4)
ERYTHROCYTE [DISTWIDTH] IN BLOOD BY AUTOMATED COUNT: 18 % (ref 12.3–15.4)
ERYTHROCYTE [DISTWIDTH] IN BLOOD BY AUTOMATED COUNT: 18.5 % (ref 12.3–15.4)
ERYTHROCYTE [DISTWIDTH] IN BLOOD BY AUTOMATED COUNT: 18.6 % (ref 12.3–15.4)
ERYTHROCYTE [DISTWIDTH] IN BLOOD BY AUTOMATED COUNT: 18.7 % (ref 12.3–15.4)
ERYTHROCYTE [DISTWIDTH] IN BLOOD BY AUTOMATED COUNT: 18.7 % (ref 12.3–15.4)
ERYTHROCYTE [DISTWIDTH] IN BLOOD BY AUTOMATED COUNT: 19.1 % (ref 12.3–15.4)
ERYTHROCYTE [DISTWIDTH] IN BLOOD BY AUTOMATED COUNT: 19.2 % (ref 12.3–15.4)
ERYTHROCYTE [DISTWIDTH] IN BLOOD BY AUTOMATED COUNT: 19.3 % (ref 12.3–15.4)
ERYTHROCYTE [DISTWIDTH] IN BLOOD BY AUTOMATED COUNT: 19.6 % (ref 12.3–15.4)
ERYTHROCYTE [DISTWIDTH] IN BLOOD BY AUTOMATED COUNT: 21 % (ref 12.3–15.4)
ERYTHROCYTE [SEDIMENTATION RATE] IN BLOOD: 73 MM/HR (ref 0–20)
ERYTHROCYTE [SEDIMENTATION RATE] IN BLOOD: 98 MM/HR (ref 0–20)
FERRITIN SERPL-MCNC: 1564 NG/ML (ref 30–400)
FLUAV RNA RESP QL NAA+PROBE: NOT DETECTED
FLUAV SUBTYP SPEC NAA+PROBE: NOT DETECTED
FLUBV RNA ISLT QL NAA+PROBE: NOT DETECTED
FLUBV RNA RESP QL NAA+PROBE: NOT DETECTED
GFR SERPL CREATININE-BSD FRML MDRD: 10 ML/MIN/1.73
GFR SERPL CREATININE-BSD FRML MDRD: 12 ML/MIN/1.73
GFR SERPL CREATININE-BSD FRML MDRD: 14 ML/MIN/1.73
GFR SERPL CREATININE-BSD FRML MDRD: 16 ML/MIN/1.73
GFR SERPL CREATININE-BSD FRML MDRD: 16 ML/MIN/1.73
GFR SERPL CREATININE-BSD FRML MDRD: 25 ML/MIN/1.73
GFR SERPL CREATININE-BSD FRML MDRD: 33 ML/MIN/1.73
GFR SERPL CREATININE-BSD FRML MDRD: 35 ML/MIN/1.73
GFR SERPL CREATININE-BSD FRML MDRD: 35 ML/MIN/1.73
GFR SERPL CREATININE-BSD FRML MDRD: 36 ML/MIN/1.73
GFR SERPL CREATININE-BSD FRML MDRD: 39 ML/MIN/1.73
GFR SERPL CREATININE-BSD FRML MDRD: 40 ML/MIN/1.73
GFR SERPL CREATININE-BSD FRML MDRD: 42 ML/MIN/1.73
GFR SERPL CREATININE-BSD FRML MDRD: 43 ML/MIN/1.73
GFR SERPL CREATININE-BSD FRML MDRD: 44 ML/MIN/1.73
GFR SERPL CREATININE-BSD FRML MDRD: 45 ML/MIN/1.73
GFR SERPL CREATININE-BSD FRML MDRD: 45 ML/MIN/1.73
GFR SERPL CREATININE-BSD FRML MDRD: 46 ML/MIN/1.73
GFR SERPL CREATININE-BSD FRML MDRD: 47 ML/MIN/1.73
GFR SERPL CREATININE-BSD FRML MDRD: 50 ML/MIN/1.73
GFR SERPL CREATININE-BSD FRML MDRD: 53 ML/MIN/1.73
GFR SERPL CREATININE-BSD FRML MDRD: 56 ML/MIN/1.73
GFR SERPL CREATININE-BSD FRML MDRD: 57 ML/MIN/1.73
GFR SERPL CREATININE-BSD FRML MDRD: 57 ML/MIN/1.73
GFR SERPL CREATININE-BSD FRML MDRD: 60 ML/MIN/1.73
GFR SERPL CREATININE-BSD FRML MDRD: 60 ML/MIN/1.73
GFR SERPL CREATININE-BSD FRML MDRD: 62 ML/MIN/1.73
GFR SERPL CREATININE-BSD FRML MDRD: 63 ML/MIN/1.73
GFR SERPL CREATININE-BSD FRML MDRD: 67 ML/MIN/1.73
GFR SERPL CREATININE-BSD FRML MDRD: 67 ML/MIN/1.73
GFR SERPL CREATININE-BSD FRML MDRD: ABNORMAL ML/MIN/{1.73_M2}
GLOBULIN UR ELPH-MCNC: 2.5 GM/DL
GLOBULIN UR ELPH-MCNC: 2.7 GM/DL
GLOBULIN UR ELPH-MCNC: 2.8 GM/DL
GLOBULIN UR ELPH-MCNC: 2.9 GM/DL
GLOBULIN UR ELPH-MCNC: 3 GM/DL
GLOBULIN UR ELPH-MCNC: 3 GM/DL
GLOBULIN UR ELPH-MCNC: 3.1 GM/DL
GLOBULIN UR ELPH-MCNC: 3.4 GM/DL
GLOBULIN UR ELPH-MCNC: 3.5 GM/DL
GLOBULIN UR ELPH-MCNC: 3.6 GM/DL
GLOBULIN UR ELPH-MCNC: 4.2 GM/DL
GLOBULIN UR ELPH-MCNC: 4.5 GM/DL
GLOBULIN UR ELPH-MCNC: 4.7 GM/DL
GLUCOSE BLDC GLUCOMTR-MCNC: 138 MG/DL (ref 70–130)
GLUCOSE BLDC GLUCOMTR-MCNC: 94 MG/DL (ref 70–130)
GLUCOSE SERPL-MCNC: 103 MG/DL (ref 65–99)
GLUCOSE SERPL-MCNC: 105 MG/DL (ref 65–99)
GLUCOSE SERPL-MCNC: 108 MG/DL (ref 65–99)
GLUCOSE SERPL-MCNC: 112 MG/DL (ref 65–99)
GLUCOSE SERPL-MCNC: 117 MG/DL (ref 65–99)
GLUCOSE SERPL-MCNC: 118 MG/DL (ref 65–99)
GLUCOSE SERPL-MCNC: 120 MG/DL (ref 65–99)
GLUCOSE SERPL-MCNC: 122 MG/DL (ref 65–99)
GLUCOSE SERPL-MCNC: 124 MG/DL (ref 65–99)
GLUCOSE SERPL-MCNC: 124 MG/DL (ref 65–99)
GLUCOSE SERPL-MCNC: 125 MG/DL (ref 65–99)
GLUCOSE SERPL-MCNC: 125 MG/DL (ref 65–99)
GLUCOSE SERPL-MCNC: 127 MG/DL (ref 65–99)
GLUCOSE SERPL-MCNC: 136 MG/DL (ref 65–99)
GLUCOSE SERPL-MCNC: 137 MG/DL (ref 65–99)
GLUCOSE SERPL-MCNC: 138 MG/DL (ref 65–99)
GLUCOSE SERPL-MCNC: 139 MG/DL (ref 65–99)
GLUCOSE SERPL-MCNC: 143 MG/DL (ref 65–99)
GLUCOSE SERPL-MCNC: 148 MG/DL (ref 65–99)
GLUCOSE SERPL-MCNC: 149 MG/DL (ref 65–99)
GLUCOSE SERPL-MCNC: 154 MG/DL (ref 65–99)
GLUCOSE SERPL-MCNC: 157 MG/DL (ref 65–99)
GLUCOSE SERPL-MCNC: 159 MG/DL (ref 65–99)
GLUCOSE SERPL-MCNC: 160 MG/DL (ref 65–99)
GLUCOSE SERPL-MCNC: 162 MG/DL (ref 65–99)
GLUCOSE SERPL-MCNC: 162 MG/DL (ref 65–99)
GLUCOSE SERPL-MCNC: 167 MG/DL (ref 65–99)
GLUCOSE SERPL-MCNC: 170 MG/DL (ref 65–99)
GLUCOSE SERPL-MCNC: 172 MG/DL (ref 65–99)
GLUCOSE SERPL-MCNC: 172 MG/DL (ref 65–99)
GLUCOSE SERPL-MCNC: 182 MG/DL (ref 65–99)
GLUCOSE SERPL-MCNC: 235 MG/DL (ref 65–99)
GLUCOSE SERPL-MCNC: 90 MG/DL (ref 65–99)
GLUCOSE SERPL-MCNC: 98 MG/DL (ref 65–99)
GLUCOSE SERPL-MCNC: 99 MG/DL (ref 65–99)
GLUCOSE UR STRIP-MCNC: NEGATIVE MG/DL
HAPTOGLOB SERPL-MCNC: 439 MG/DL (ref 30–200)
HCO3 BLDA-SCNC: 25.5 MMOL/L (ref 20–26)
HCT VFR BLD AUTO: 19.2 % (ref 37.5–51)
HCT VFR BLD AUTO: 23.3 % (ref 37.5–51)
HCT VFR BLD AUTO: 23.7 % (ref 37.5–51)
HCT VFR BLD AUTO: 23.9 % (ref 37.5–51)
HCT VFR BLD AUTO: 24.7 % (ref 37.5–51)
HCT VFR BLD AUTO: 25 % (ref 37.5–51)
HCT VFR BLD AUTO: 26.3 % (ref 37.5–51)
HCT VFR BLD AUTO: 26.4 % (ref 37.5–51)
HCT VFR BLD AUTO: 27 % (ref 37.5–51)
HCT VFR BLD AUTO: 28 % (ref 37.5–51)
HCT VFR BLD AUTO: 28.7 % (ref 37.5–51)
HCT VFR BLD AUTO: 28.9 % (ref 37.5–51)
HCT VFR BLD AUTO: 29 % (ref 37.5–51)
HCT VFR BLD AUTO: 29.3 % (ref 37.5–51)
HCT VFR BLD AUTO: 29.8 % (ref 37.5–51)
HCT VFR BLD AUTO: 30.2 % (ref 37.5–51)
HCT VFR BLD AUTO: 30.2 % (ref 37.5–51)
HCT VFR BLD AUTO: 30.3 % (ref 37.5–51)
HCT VFR BLD AUTO: 30.3 % (ref 37.5–51)
HCT VFR BLD AUTO: 31.2 % (ref 37.5–51)
HCT VFR BLD AUTO: 31.2 % (ref 37.5–51)
HCT VFR BLD AUTO: 31.4 % (ref 37.5–51)
HCT VFR BLD AUTO: 31.8 % (ref 37.5–51)
HCT VFR BLD AUTO: 33.5 % (ref 37.5–51)
HCT VFR BLD AUTO: 33.6 % (ref 37.5–51)
HCT VFR BLD AUTO: 33.7 % (ref 37.5–51)
HCT VFR BLD AUTO: 34.2 % (ref 37.5–51)
HCT VFR BLD AUTO: 34.2 % (ref 37.5–51)
HCT VFR BLD AUTO: 35.4 % (ref 37.5–51)
HCT VFR BLD AUTO: 35.8 % (ref 37.5–51)
HCT VFR BLD AUTO: 38 % (ref 37.5–51)
HCT VFR BLD CALC: 33 %
HGB BLD-MCNC: 10 G/DL (ref 13–17.7)
HGB BLD-MCNC: 10.3 G/DL (ref 13–17.7)
HGB BLD-MCNC: 10.5 G/DL (ref 13–17.7)
HGB BLD-MCNC: 10.8 G/DL (ref 13–17.7)
HGB BLD-MCNC: 11 G/DL (ref 13–17.7)
HGB BLD-MCNC: 11.2 G/DL (ref 13–17.7)
HGB BLD-MCNC: 11.2 G/DL (ref 13–17.7)
HGB BLD-MCNC: 11.4 G/DL (ref 13–17.7)
HGB BLD-MCNC: 11.4 G/DL (ref 13–17.7)
HGB BLD-MCNC: 6.1 G/DL (ref 13–17.7)
HGB BLD-MCNC: 7.6 G/DL (ref 13–17.7)
HGB BLD-MCNC: 7.7 G/DL (ref 13–17.7)
HGB BLD-MCNC: 7.9 G/DL (ref 13–17.7)
HGB BLD-MCNC: 8.1 G/DL (ref 13–17.7)
HGB BLD-MCNC: 8.1 G/DL (ref 13–17.7)
HGB BLD-MCNC: 8.2 G/DL (ref 13–17.7)
HGB BLD-MCNC: 8.5 G/DL (ref 13–17.7)
HGB BLD-MCNC: 8.7 G/DL (ref 13–17.7)
HGB BLD-MCNC: 8.9 G/DL (ref 13–17.7)
HGB BLD-MCNC: 9 G/DL (ref 13–17.7)
HGB BLD-MCNC: 9.2 G/DL (ref 13–17.7)
HGB BLD-MCNC: 9.2 G/DL (ref 13–17.7)
HGB BLD-MCNC: 9.3 G/DL (ref 13–17.7)
HGB BLD-MCNC: 9.5 G/DL (ref 13–17.7)
HGB BLD-MCNC: 9.7 G/DL (ref 13–17.7)
HGB BLD-MCNC: 9.8 G/DL (ref 13–17.7)
HGB BLD-MCNC: 9.9 G/DL (ref 13–17.7)
HGB BLDA-MCNC: 10.8 G/DL (ref 13.5–17.5)
HGB UR QL STRIP.AUTO: ABNORMAL
HOLD SPECIMEN: NORMAL
HYALINE CASTS UR QL AUTO: ABNORMAL /LPF
HYPOCHROMIA BLD QL: ABNORMAL
HYPOCHROMIA BLD QL: ABNORMAL
IMM GRANULOCYTES # BLD AUTO: 0.02 10*3/MM3 (ref 0–0.05)
IMM GRANULOCYTES # BLD AUTO: 0.02 10*3/MM3 (ref 0–0.05)
IMM GRANULOCYTES # BLD AUTO: 0.03 10*3/MM3 (ref 0–0.05)
IMM GRANULOCYTES # BLD AUTO: 0.05 10*3/MM3 (ref 0–0.05)
IMM GRANULOCYTES # BLD AUTO: 0.2 10*3/MM3 (ref 0–0.05)
IMM GRANULOCYTES # BLD AUTO: 0.22 10*3/MM3 (ref 0–0.05)
IMM GRANULOCYTES # BLD AUTO: 0.55 10*3/MM3 (ref 0–0.05)
IMM GRANULOCYTES # BLD AUTO: 0.61 10*3/MM3 (ref 0–0.05)
IMM GRANULOCYTES NFR BLD AUTO: 0.2 % (ref 0–0.5)
IMM GRANULOCYTES NFR BLD AUTO: 0.3 % (ref 0–0.5)
IMM GRANULOCYTES NFR BLD AUTO: 0.6 % (ref 0–0.5)
IMM GRANULOCYTES NFR BLD AUTO: 1 % (ref 0–0.5)
IMM GRANULOCYTES NFR BLD AUTO: 1.7 % (ref 0–0.5)
IMM GRANULOCYTES NFR BLD AUTO: 3.1 % (ref 0–0.5)
IMM GRANULOCYTES NFR BLD AUTO: 3.8 % (ref 0–0.5)
IMM GRANULOCYTES NFR BLD AUTO: 5.9 % (ref 0–0.5)
INHALED O2 CONCENTRATION: 36 %
INR PPP: 1.77 (ref 0.85–1.16)
IPAP: 0
IRON 24H UR-MRATE: 9 MCG/DL (ref 59–158)
IRON SATN MFR SERPL: 8 % (ref 20–50)
KETONES UR QL STRIP: NEGATIVE
LDH SERPL-CCNC: 2006 U/L (ref 135–225)
LDH SERPL-CCNC: 418 U/L (ref 135–225)
LEFT ATRIUM VOLUME INDEX: 32.3 ML/M^2
LEFT ATRIUM VOLUME INDEX: 36.7 ML/M^2
LEFT ATRIUM VOLUME: 63 ML
LEFT ATRIUM VOLUME: 72.3 ML
LEUKOCYTE ESTERASE UR QL STRIP.AUTO: ABNORMAL
LIPASE SERPL-CCNC: 21 U/L (ref 13–60)
LV EF 2D ECHO EST: 58 %
LYMPHOCYTES # BLD AUTO: 0.35 10*3/MM3 (ref 0.7–3.1)
LYMPHOCYTES # BLD AUTO: 0.46 10*3/MM3 (ref 0.7–3.1)
LYMPHOCYTES # BLD AUTO: 0.58 10*3/MM3 (ref 0.7–3.1)
LYMPHOCYTES # BLD AUTO: 0.65 10*3/MM3 (ref 0.7–3.1)
LYMPHOCYTES # BLD AUTO: 0.89 10*3/MM3 (ref 0.7–3.1)
LYMPHOCYTES # BLD AUTO: 1 10*3/MM3 (ref 0.7–3.1)
LYMPHOCYTES # BLD AUTO: 1 10*3/MM3 (ref 0.7–3.1)
LYMPHOCYTES # BLD AUTO: 1.53 10*3/MM3 (ref 0.7–3.1)
LYMPHOCYTES # BLD AUTO: 1.6 10*3/MM3 (ref 0.7–3.1)
LYMPHOCYTES # BLD AUTO: 2.08 10*3/MM3 (ref 0.7–3.1)
LYMPHOCYTES # BLD AUTO: 2.11 10*3/MM3 (ref 0.7–3.1)
LYMPHOCYTES # BLD AUTO: 2.5 10*3/MM3 (ref 0.7–3.1)
LYMPHOCYTES # BLD AUTO: 3.5 10*3/MM3 (ref 0.7–3.1)
LYMPHOCYTES # BLD AUTO: 4.4 10*3/MM3 (ref 0.7–3.1)
LYMPHOCYTES # BLD MANUAL: 0.31 10*3/MM3 (ref 0.7–3.1)
LYMPHOCYTES # BLD MANUAL: 0.32 10*3/MM3 (ref 0.7–3.1)
LYMPHOCYTES # BLD MANUAL: 0.32 10*3/MM3 (ref 0.7–3.1)
LYMPHOCYTES # BLD MANUAL: 0.51 10*3/MM3 (ref 0.7–3.1)
LYMPHOCYTES NFR BLD AUTO: 13.4 % (ref 19.6–45.3)
LYMPHOCYTES NFR BLD AUTO: 16.2 % (ref 19.6–45.3)
LYMPHOCYTES NFR BLD AUTO: 22.4 % (ref 19.6–45.3)
LYMPHOCYTES NFR BLD AUTO: 22.9 % (ref 19.6–45.3)
LYMPHOCYTES NFR BLD AUTO: 27 % (ref 19.6–45.3)
LYMPHOCYTES NFR BLD AUTO: 27.4 % (ref 19.6–45.3)
LYMPHOCYTES NFR BLD AUTO: 32.8 % (ref 19.6–45.3)
LYMPHOCYTES NFR BLD AUTO: 49.1 % (ref 19.6–45.3)
LYMPHOCYTES NFR BLD AUTO: 5.6 % (ref 19.6–45.3)
LYMPHOCYTES NFR BLD AUTO: 51.6 % (ref 19.6–45.3)
LYMPHOCYTES NFR BLD AUTO: 6.1 % (ref 19.6–45.3)
LYMPHOCYTES NFR BLD AUTO: 6.8 % (ref 19.6–45.3)
LYMPHOCYTES NFR BLD AUTO: 9.2 % (ref 19.6–45.3)
LYMPHOCYTES NFR BLD AUTO: 9.2 % (ref 19.6–45.3)
LYMPHOCYTES NFR BLD MANUAL: 0 % (ref 5–12)
LYMPHOCYTES NFR BLD MANUAL: 1 % (ref 5–12)
LYMPHOCYTES NFR BLD MANUAL: 1 % (ref 5–12)
LYMPHOCYTES NFR BLD MANUAL: 2 % (ref 5–12)
LYMPHOCYTES NFR BLD MANUAL: 3 % (ref 19.6–45.3)
LYMPHOCYTES NFR BLD MANUAL: 4 % (ref 19.6–45.3)
LYMPHOCYTES NFR BLD MANUAL: 4 % (ref 19.6–45.3)
LYMPHOCYTES NFR BLD MANUAL: 5 % (ref 19.6–45.3)
MAGNESIUM SERPL-MCNC: 1.6 MG/DL (ref 1.6–2.4)
MAGNESIUM SERPL-MCNC: 1.7 MG/DL (ref 1.6–2.4)
MAGNESIUM SERPL-MCNC: 1.8 MG/DL (ref 1.6–2.4)
MAGNESIUM SERPL-MCNC: 1.8 MG/DL (ref 1.6–2.4)
MAGNESIUM SERPL-MCNC: 1.9 MG/DL (ref 1.6–2.4)
MAGNESIUM SERPL-MCNC: 2 MG/DL (ref 1.6–2.4)
MAGNESIUM SERPL-MCNC: 2 MG/DL (ref 1.6–2.4)
MAGNESIUM SERPL-MCNC: 2.1 MG/DL (ref 1.6–2.4)
MCH RBC QN AUTO: 25.4 PG (ref 26.6–33)
MCH RBC QN AUTO: 25.7 PG (ref 26.6–33)
MCH RBC QN AUTO: 25.7 PG (ref 26.6–33)
MCH RBC QN AUTO: 25.8 PG (ref 26.6–33)
MCH RBC QN AUTO: 26.1 PG (ref 26.6–33)
MCH RBC QN AUTO: 26.1 PG (ref 26.6–33)
MCH RBC QN AUTO: 26.2 PG (ref 26.6–33)
MCH RBC QN AUTO: 26.2 PG (ref 26.6–33)
MCH RBC QN AUTO: 26.3 PG (ref 26.6–33)
MCH RBC QN AUTO: 26.3 PG (ref 26.6–33)
MCH RBC QN AUTO: 26.4 PG (ref 26.6–33)
MCH RBC QN AUTO: 26.4 PG (ref 26.6–33)
MCH RBC QN AUTO: 26.5 PG (ref 26.6–33)
MCH RBC QN AUTO: 26.5 PG (ref 26.6–33)
MCH RBC QN AUTO: 26.6 PG (ref 26.6–33)
MCH RBC QN AUTO: 26.6 PG (ref 26.6–33)
MCH RBC QN AUTO: 26.8 PG (ref 26.6–33)
MCH RBC QN AUTO: 26.9 PG (ref 26.6–33)
MCH RBC QN AUTO: 27 PG (ref 26.6–33)
MCH RBC QN AUTO: 27.3 PG (ref 26.6–33)
MCH RBC QN AUTO: 27.5 PG (ref 26.6–33)
MCH RBC QN AUTO: 27.5 PG (ref 26.6–33)
MCH RBC QN AUTO: 27.6 PG (ref 26.6–33)
MCH RBC QN AUTO: 28.4 PG (ref 26.6–33)
MCH RBC QN AUTO: 29.1 PG (ref 26.6–33)
MCHC RBC AUTO-ENTMCNC: 29.6 G/DL (ref 31.5–35.7)
MCHC RBC AUTO-ENTMCNC: 30.2 G/DL (ref 31.5–35.7)
MCHC RBC AUTO-ENTMCNC: 30.6 G/DL (ref 31.5–35.7)
MCHC RBC AUTO-ENTMCNC: 31.1 G/DL (ref 31.5–35.7)
MCHC RBC AUTO-ENTMCNC: 31.1 G/DL (ref 31.5–35.7)
MCHC RBC AUTO-ENTMCNC: 31.2 G/DL (ref 31.5–35.7)
MCHC RBC AUTO-ENTMCNC: 31.3 G/DL (ref 31.5–35.7)
MCHC RBC AUTO-ENTMCNC: 31.4 G/DL (ref 31.5–35.7)
MCHC RBC AUTO-ENTMCNC: 31.5 G/DL (ref 31.5–35.7)
MCHC RBC AUTO-ENTMCNC: 31.7 G/DL (ref 31.5–35.7)
MCHC RBC AUTO-ENTMCNC: 31.8 G/DL (ref 31.5–35.7)
MCHC RBC AUTO-ENTMCNC: 32 G/DL (ref 31.5–35.7)
MCHC RBC AUTO-ENTMCNC: 32.1 G/DL (ref 31.5–35.7)
MCHC RBC AUTO-ENTMCNC: 32.1 G/DL (ref 31.5–35.7)
MCHC RBC AUTO-ENTMCNC: 32.2 G/DL (ref 31.5–35.7)
MCHC RBC AUTO-ENTMCNC: 32.2 G/DL (ref 31.5–35.7)
MCHC RBC AUTO-ENTMCNC: 32.4 G/DL (ref 31.5–35.7)
MCHC RBC AUTO-ENTMCNC: 32.4 G/DL (ref 31.5–35.7)
MCHC RBC AUTO-ENTMCNC: 32.5 G/DL (ref 31.5–35.7)
MCHC RBC AUTO-ENTMCNC: 32.5 G/DL (ref 31.5–35.7)
MCHC RBC AUTO-ENTMCNC: 32.6 G/DL (ref 31.5–35.7)
MCHC RBC AUTO-ENTMCNC: 32.6 G/DL (ref 31.5–35.7)
MCHC RBC AUTO-ENTMCNC: 32.7 G/DL (ref 31.5–35.7)
MCHC RBC AUTO-ENTMCNC: 33.3 G/DL (ref 31.5–35.7)
MCHC RBC AUTO-ENTMCNC: 33.9 G/DL (ref 31.5–35.7)
MCV RBC AUTO: 79.7 FL (ref 79–97)
MCV RBC AUTO: 80.7 FL (ref 79–97)
MCV RBC AUTO: 81 FL (ref 79–97)
MCV RBC AUTO: 81.2 FL (ref 79–97)
MCV RBC AUTO: 81.4 FL (ref 79–97)
MCV RBC AUTO: 82 FL (ref 79–97)
MCV RBC AUTO: 82.1 FL (ref 79–97)
MCV RBC AUTO: 82.2 FL (ref 79–97)
MCV RBC AUTO: 82.2 FL (ref 79–97)
MCV RBC AUTO: 82.3 FL (ref 79–97)
MCV RBC AUTO: 82.4 FL (ref 79–97)
MCV RBC AUTO: 82.6 FL (ref 79–97)
MCV RBC AUTO: 83.1 FL (ref 79–97)
MCV RBC AUTO: 83.1 FL (ref 79–97)
MCV RBC AUTO: 83.8 FL (ref 79–97)
MCV RBC AUTO: 83.9 FL (ref 79–97)
MCV RBC AUTO: 84.1 FL (ref 79–97)
MCV RBC AUTO: 84.3 FL (ref 79–97)
MCV RBC AUTO: 84.3 FL (ref 79–97)
MCV RBC AUTO: 84.9 FL (ref 79–97)
MCV RBC AUTO: 85 FL (ref 79–97)
MCV RBC AUTO: 86.5 FL (ref 79–97)
MCV RBC AUTO: 87.2 FL (ref 79–97)
MCV RBC AUTO: 88.1 FL (ref 79–97)
MCV RBC AUTO: 90.1 FL (ref 79–97)
MCV RBC AUTO: 90.5 FL (ref 79–97)
MCV RBC AUTO: 91.2 FL (ref 79–97)
METAMYELOCYTES NFR BLD MANUAL: 1 % (ref 0–0)
METHGB BLD QL: 0.5 % (ref 0–1.5)
MODALITY: ABNORMAL
MONOCYTES # BLD AUTO: 0 10*3/MM3 (ref 0.1–0.9)
MONOCYTES # BLD AUTO: 0.08 10*3/MM3 (ref 0.1–0.9)
MONOCYTES # BLD AUTO: 0.1 10*3/MM3 (ref 0.1–0.9)
MONOCYTES # BLD AUTO: 0.14 10*3/MM3 (ref 0.1–0.9)
MONOCYTES # BLD AUTO: 0.16 10*3/MM3 (ref 0.1–0.9)
MONOCYTES # BLD AUTO: 0.2 10*3/MM3 (ref 0.1–0.9)
MONOCYTES # BLD AUTO: 0.29 10*3/MM3 (ref 0.1–0.9)
MONOCYTES # BLD AUTO: 0.3 10*3/MM3 (ref 0.1–0.9)
MONOCYTES # BLD AUTO: 0.44 10*3/MM3 (ref 0.1–0.9)
MONOCYTES # BLD AUTO: 0.6 10*3/MM3 (ref 0.1–0.9)
MONOCYTES # BLD AUTO: 0.68 10*3/MM3 (ref 0.1–0.9)
MONOCYTES # BLD AUTO: 0.7 10*3/MM3 (ref 0.1–0.9)
MONOCYTES # BLD AUTO: 0.7 10*3/MM3 (ref 0.1–0.9)
MONOCYTES # BLD AUTO: 0.82 10*3/MM3 (ref 0.1–0.9)
MONOCYTES # BLD AUTO: 0.82 10*3/MM3 (ref 0.1–0.9)
MONOCYTES # BLD AUTO: 0.88 10*3/MM3 (ref 0.1–0.9)
MONOCYTES NFR BLD AUTO: 11.1 % (ref 5–12)
MONOCYTES NFR BLD AUTO: 11.3 % (ref 5–12)
MONOCYTES NFR BLD AUTO: 2.6 % (ref 5–12)
MONOCYTES NFR BLD AUTO: 2.7 % (ref 5–12)
MONOCYTES NFR BLD AUTO: 4.3 % (ref 5–12)
MONOCYTES NFR BLD AUTO: 4.7 % (ref 5–12)
MONOCYTES NFR BLD AUTO: 5.8 % (ref 5–12)
MONOCYTES NFR BLD AUTO: 7.2 % (ref 5–12)
MONOCYTES NFR BLD AUTO: 7.6 % (ref 5–12)
MONOCYTES NFR BLD AUTO: 7.8 % (ref 5–12)
MONOCYTES NFR BLD AUTO: 8.4 % (ref 5–12)
MONOCYTES NFR BLD AUTO: 8.5 % (ref 5–12)
MONOCYTES NFR BLD AUTO: 8.5 % (ref 5–12)
MONOCYTES NFR BLD AUTO: 8.8 % (ref 5–12)
NEUTROPHILS # BLD AUTO: 10.29 10*3/MM3 (ref 1.7–7)
NEUTROPHILS # BLD AUTO: 7.31 10*3/MM3 (ref 1.7–7)
NEUTROPHILS # BLD AUTO: 7.46 10*3/MM3 (ref 1.7–7)
NEUTROPHILS # BLD AUTO: 9.31 10*3/MM3 (ref 1.7–7)
NEUTROPHILS NFR BLD AUTO: 1.7 10*3/MM3 (ref 1.7–7)
NEUTROPHILS NFR BLD AUTO: 12.33 10*3/MM3 (ref 1.7–7)
NEUTROPHILS NFR BLD AUTO: 2.7 10*3/MM3 (ref 1.7–7)
NEUTROPHILS NFR BLD AUTO: 3.8 10*3/MM3 (ref 1.7–7)
NEUTROPHILS NFR BLD AUTO: 39.9 % (ref 42.7–76)
NEUTROPHILS NFR BLD AUTO: 4.18 10*3/MM3 (ref 1.7–7)
NEUTROPHILS NFR BLD AUTO: 4.63 10*3/MM3 (ref 1.7–7)
NEUTROPHILS NFR BLD AUTO: 4.7 10*3/MM3 (ref 1.7–7)
NEUTROPHILS NFR BLD AUTO: 4.78 10*3/MM3 (ref 1.7–7)
NEUTROPHILS NFR BLD AUTO: 43.1 % (ref 42.7–76)
NEUTROPHILS NFR BLD AUTO: 5 10*3/MM3 (ref 1.7–7)
NEUTROPHILS NFR BLD AUTO: 5.58 10*3/MM3 (ref 1.7–7)
NEUTROPHILS NFR BLD AUTO: 56.1 % (ref 42.7–76)
NEUTROPHILS NFR BLD AUTO: 6 10*3/MM3 (ref 1.7–7)
NEUTROPHILS NFR BLD AUTO: 6.33 10*3/MM3 (ref 1.7–7)
NEUTROPHILS NFR BLD AUTO: 61.2 % (ref 42.7–76)
NEUTROPHILS NFR BLD AUTO: 65 % (ref 42.7–76)
NEUTROPHILS NFR BLD AUTO: 68.1 % (ref 42.7–76)
NEUTROPHILS NFR BLD AUTO: 68.7 % (ref 42.7–76)
NEUTROPHILS NFR BLD AUTO: 77.1 % (ref 42.7–76)
NEUTROPHILS NFR BLD AUTO: 79.1 % (ref 42.7–76)
NEUTROPHILS NFR BLD AUTO: 8.67 10*3/MM3 (ref 1.7–7)
NEUTROPHILS NFR BLD AUTO: 8.83 10*3/MM3 (ref 1.7–7)
NEUTROPHILS NFR BLD AUTO: 81.2 % (ref 42.7–76)
NEUTROPHILS NFR BLD AUTO: 83.8 % (ref 42.7–76)
NEUTROPHILS NFR BLD AUTO: 84 % (ref 42.7–76)
NEUTROPHILS NFR BLD AUTO: 85.1 % (ref 42.7–76)
NEUTROPHILS NFR BLD AUTO: 89.9 % (ref 42.7–76)
NEUTROPHILS NFR BLD MANUAL: 80 % (ref 42.7–76)
NEUTROPHILS NFR BLD MANUAL: 82 % (ref 42.7–76)
NEUTROPHILS NFR BLD MANUAL: 88 % (ref 42.7–76)
NEUTROPHILS NFR BLD MANUAL: 94 % (ref 42.7–76)
NEUTS BAND NFR BLD MANUAL: 12 % (ref 0–5)
NEUTS BAND NFR BLD MANUAL: 13 % (ref 0–5)
NEUTS BAND NFR BLD MANUAL: 9 % (ref 0–5)
NITRITE UR QL STRIP: NEGATIVE
NITRITE UR QL STRIP: POSITIVE
NOTE: ABNORMAL
NRBC BLD AUTO-RTO: 0 /100 WBC (ref 0–0.2)
NRBC BLD AUTO-RTO: 0.3 /100 WBC (ref 0–0.2)
NRBC BLD AUTO-RTO: 0.6 /100 WBC (ref 0–0.2)
NRBC SPEC MANUAL: 0 /100 WBC (ref 0–0.2)
NRBC SPEC MANUAL: 0 /100 WBC (ref 0–0.2)
NRBC SPEC MANUAL: 3 /100 WBC (ref 0–0.2)
NT-PROBNP SERPL-MCNC: 1714 PG/ML (ref 0–900)
NT-PROBNP SERPL-MCNC: 2068 PG/ML (ref 0–1800)
NT-PROBNP SERPL-MCNC: 2282 PG/ML (ref 0–1800)
NT-PROBNP SERPL-MCNC: 3735 PG/ML (ref 0–1800)
OXYHGB MFR BLDV: 86.7 % (ref 94–99)
PATH INTERP BLD-IMP: NORMAL
PAW @ PEAK INSP FLOW SETTING VENT: 0 CMH2O
PCO2 BLDA: 33.7 MM HG (ref 35–45)
PCO2 TEMP ADJ BLD: 33.7 MM HG (ref 35–48)
PF4 HEPARIN CMPLX IGG SERPL IA: 0.18 OD (ref 0–0.4)
PH BLDA: 7.49 PH UNITS (ref 7.35–7.45)
PH UR STRIP.AUTO: 6 [PH] (ref 5–8)
PH UR STRIP.AUTO: 6 [PH] (ref 5–8)
PH UR STRIP.AUTO: 6.5 [PH] (ref 5–8)
PH UR STRIP.AUTO: 6.5 [PH] (ref 5–8)
PH UR STRIP.AUTO: 7 [PH] (ref 5–8)
PH UR STRIP.AUTO: 8 [PH] (ref 5–8)
PH, TEMP CORRECTED: 7.49 PH UNITS
PHOSPHATE SERPL-MCNC: 2.3 MG/DL (ref 2.5–4.5)
PHOSPHATE SERPL-MCNC: 2.6 MG/DL (ref 2.5–4.5)
PHOSPHATE SERPL-MCNC: 2.6 MG/DL (ref 2.5–4.5)
PHOSPHATE SERPL-MCNC: 2.8 MG/DL (ref 2.5–4.5)
PHOSPHATE SERPL-MCNC: 2.8 MG/DL (ref 2.5–4.5)
PHOSPHATE SERPL-MCNC: 2.9 MG/DL (ref 2.5–4.5)
PHOSPHATE SERPL-MCNC: 3.3 MG/DL (ref 2.5–4.5)
PHOSPHATE SERPL-MCNC: 3.4 MG/DL (ref 2.5–4.5)
PHOSPHATE SERPL-MCNC: 3.5 MG/DL (ref 2.5–4.5)
PHOSPHATE SERPL-MCNC: 3.9 MG/DL (ref 2.5–4.5)
PHOSPHATE SERPL-MCNC: 5.4 MG/DL (ref 2.5–4.5)
PHOSPHATE SERPL-MCNC: 6.8 MG/DL (ref 2.5–4.5)
PHOSPHATE SERPL-MCNC: 7.6 MG/DL (ref 2.5–4.5)
PLAT MORPH BLD: NORMAL
PLATELET # BLD AUTO: 106 10*3/MM3 (ref 140–450)
PLATELET # BLD AUTO: 110 10*3/MM3 (ref 140–450)
PLATELET # BLD AUTO: 111 10*3/MM3 (ref 140–450)
PLATELET # BLD AUTO: 122 10*3/MM3 (ref 140–450)
PLATELET # BLD AUTO: 132 10*3/MM3 (ref 140–450)
PLATELET # BLD AUTO: 133 10*3/MM3 (ref 140–450)
PLATELET # BLD AUTO: 141 10*3/MM3 (ref 140–450)
PLATELET # BLD AUTO: 153 10*3/MM3 (ref 140–450)
PLATELET # BLD AUTO: 161 10*3/MM3 (ref 140–450)
PLATELET # BLD AUTO: 174 10*3/MM3 (ref 140–450)
PLATELET # BLD AUTO: 218 10*3/MM3 (ref 140–450)
PLATELET # BLD AUTO: 230 10*3/MM3 (ref 140–450)
PLATELET # BLD AUTO: 234 10*3/MM3 (ref 140–450)
PLATELET # BLD AUTO: 240 10*3/MM3 (ref 140–450)
PLATELET # BLD AUTO: 259 10*3/MM3 (ref 140–450)
PLATELET # BLD AUTO: 289 10*3/MM3 (ref 140–450)
PLATELET # BLD AUTO: 297 10*3/MM3 (ref 140–450)
PLATELET # BLD AUTO: 299 10*3/MM3 (ref 140–450)
PLATELET # BLD AUTO: 312 10*3/MM3 (ref 140–450)
PLATELET # BLD AUTO: 327 10*3/MM3 (ref 140–450)
PLATELET # BLD AUTO: 353 10*3/MM3 (ref 140–450)
PLATELET # BLD AUTO: 49 10*3/MM3 (ref 140–450)
PLATELET # BLD AUTO: 503 10*3/MM3 (ref 140–450)
PLATELET # BLD AUTO: 52 10*3/MM3 (ref 140–450)
PLATELET # BLD AUTO: 59 10*3/MM3 (ref 140–450)
PLATELET # BLD AUTO: 60 10*3/MM3 (ref 140–450)
PLATELET # BLD AUTO: 79 10*3/MM3 (ref 140–450)
PLATELET # BLD AUTO: 84 10*3/MM3 (ref 140–450)
PLATELET # BLD AUTO: 96 10*3/MM3 (ref 140–450)
PMV BLD AUTO: 10 FL (ref 6–12)
PMV BLD AUTO: 10.2 FL (ref 6–12)
PMV BLD AUTO: 10.3 FL (ref 6–12)
PMV BLD AUTO: 10.4 FL (ref 6–12)
PMV BLD AUTO: 10.5 FL (ref 6–12)
PMV BLD AUTO: 10.7 FL (ref 6–12)
PMV BLD AUTO: 10.9 FL (ref 6–12)
PMV BLD AUTO: 11.3 FL (ref 6–12)
PMV BLD AUTO: 11.6 FL (ref 6–12)
PMV BLD AUTO: 6.5 FL (ref 6–12)
PMV BLD AUTO: 6.8 FL (ref 6–12)
PMV BLD AUTO: 6.9 FL (ref 6–12)
PMV BLD AUTO: 7.1 FL (ref 6–12)
PMV BLD AUTO: 7.2 FL (ref 6–12)
PMV BLD AUTO: 7.4 FL (ref 6–12)
PMV BLD AUTO: 8.7 FL (ref 6–12)
PMV BLD AUTO: 8.9 FL (ref 6–12)
PMV BLD AUTO: 9 FL (ref 6–12)
PMV BLD AUTO: 9.4 FL (ref 6–12)
PMV BLD AUTO: 9.8 FL (ref 6–12)
PMV BLD AUTO: 9.9 FL (ref 6–12)
PO2 BLDA: 52.7 MM HG (ref 83–108)
PO2 TEMP ADJ BLD: 52.7 MM HG (ref 83–108)
POTASSIUM SERPL-SCNC: 3.3 MMOL/L (ref 3.5–5.2)
POTASSIUM SERPL-SCNC: 3.3 MMOL/L (ref 3.5–5.2)
POTASSIUM SERPL-SCNC: 3.4 MMOL/L (ref 3.5–5.2)
POTASSIUM SERPL-SCNC: 3.5 MMOL/L (ref 3.5–5.2)
POTASSIUM SERPL-SCNC: 3.6 MMOL/L (ref 3.5–5.2)
POTASSIUM SERPL-SCNC: 3.6 MMOL/L (ref 3.5–5.2)
POTASSIUM SERPL-SCNC: 3.7 MMOL/L (ref 3.5–5.2)
POTASSIUM SERPL-SCNC: 3.8 MMOL/L (ref 3.5–5.2)
POTASSIUM SERPL-SCNC: 3.9 MMOL/L (ref 3.5–5.2)
POTASSIUM SERPL-SCNC: 4 MMOL/L (ref 3.5–5.2)
POTASSIUM SERPL-SCNC: 4 MMOL/L (ref 3.5–5.2)
POTASSIUM SERPL-SCNC: 4.1 MMOL/L (ref 3.5–5.2)
POTASSIUM SERPL-SCNC: 4.3 MMOL/L (ref 3.5–5.2)
POTASSIUM SERPL-SCNC: 4.4 MMOL/L (ref 3.5–5.2)
POTASSIUM SERPL-SCNC: 4.5 MMOL/L (ref 3.5–5.2)
POTASSIUM SERPL-SCNC: 4.7 MMOL/L (ref 3.5–5.2)
POTASSIUM SERPL-SCNC: 4.7 MMOL/L (ref 3.5–5.2)
PROCALCITONIN SERPL-MCNC: 0.2 NG/ML (ref 0–0.25)
PROCALCITONIN SERPL-MCNC: 0.36 NG/ML (ref 0–0.25)
PROCALCITONIN SERPL-MCNC: 0.74 NG/ML (ref 0–0.25)
PROCALCITONIN SERPL-MCNC: 1.16 NG/ML (ref 0–0.25)
PROT SERPL-MCNC: 5 G/DL (ref 6–8.5)
PROT SERPL-MCNC: 5 G/DL (ref 6–8.5)
PROT SERPL-MCNC: 5.1 G/DL (ref 6–8.5)
PROT SERPL-MCNC: 5.2 G/DL (ref 6–8.5)
PROT SERPL-MCNC: 5.4 G/DL (ref 6–8.5)
PROT SERPL-MCNC: 5.5 G/DL (ref 6–8.5)
PROT SERPL-MCNC: 5.7 G/DL (ref 6–8.5)
PROT SERPL-MCNC: 6.1 G/DL (ref 6–8.5)
PROT SERPL-MCNC: 6.3 G/DL (ref 6–8.5)
PROT SERPL-MCNC: 6.8 G/DL (ref 6–8.5)
PROT SERPL-MCNC: 7 G/DL (ref 6–8.5)
PROT SERPL-MCNC: 7 G/DL (ref 6–8.5)
PROT SERPL-MCNC: 7.4 G/DL (ref 6–8.5)
PROT SERPL-MCNC: 7.6 G/DL (ref 6–8.5)
PROT SERPL-MCNC: 7.8 G/DL (ref 6–8.5)
PROT SERPL-MCNC: 8 G/DL (ref 6–8.5)
PROT UR QL STRIP: ABNORMAL
PROT UR-MCNC: 18.4 MG/DL
PROTHROMBIN TIME: 20.3 SECONDS (ref 11.5–14)
QT INTERVAL: 348 MS
QT INTERVAL: 356 MS
QT INTERVAL: 378 MS
QT INTERVAL: 388 MS
QTC INTERVAL: 410 MS
QTC INTERVAL: 423 MS
QTC INTERVAL: 430 MS
QTC INTERVAL: 464 MS
RBC # BLD AUTO: 2.31 10*6/MM3 (ref 4.14–5.8)
RBC # BLD AUTO: 2.87 10*6/MM3 (ref 4.14–5.8)
RBC # BLD AUTO: 2.89 10*6/MM3 (ref 4.14–5.8)
RBC # BLD AUTO: 3 10*6/MM3 (ref 4.14–5.8)
RBC # BLD AUTO: 3.01 10*6/MM3 (ref 4.14–5.8)
RBC # BLD AUTO: 3.04 10*6/MM3 (ref 4.14–5.8)
RBC # BLD AUTO: 3.22 10*6/MM3 (ref 4.14–5.8)
RBC # BLD AUTO: 3.34 10*6/MM3 (ref 4.14–5.8)
RBC # BLD AUTO: 3.39 10*6/MM3 (ref 4.14–5.8)
RBC # BLD AUTO: 3.42 10*6/MM3 (ref 4.14–5.8)
RBC # BLD AUTO: 3.44 10*6/MM3 (ref 4.14–5.8)
RBC # BLD AUTO: 3.46 10*6/MM3 (ref 4.14–5.8)
RBC # BLD AUTO: 3.49 10*6/MM3 (ref 4.14–5.8)
RBC # BLD AUTO: 3.51 10*6/MM3 (ref 4.14–5.8)
RBC # BLD AUTO: 3.56 10*6/MM3 (ref 4.14–5.8)
RBC # BLD AUTO: 3.59 10*6/MM3 (ref 4.14–5.8)
RBC # BLD AUTO: 3.73 10*6/MM3 (ref 4.14–5.8)
RBC # BLD AUTO: 3.74 10*6/MM3 (ref 4.14–5.8)
RBC # BLD AUTO: 3.74 10*6/MM3 (ref 4.14–5.8)
RBC # BLD AUTO: 3.78 10*6/MM3 (ref 4.14–5.8)
RBC # BLD AUTO: 3.8 10*6/MM3 (ref 4.14–5.8)
RBC # BLD AUTO: 3.81 10*6/MM3 (ref 4.14–5.8)
RBC # BLD AUTO: 3.92 10*6/MM3 (ref 4.14–5.8)
RBC # BLD AUTO: 4.03 10*6/MM3 (ref 4.14–5.8)
RBC # BLD AUTO: 4.06 10*6/MM3 (ref 4.14–5.8)
RBC # BLD AUTO: 4.09 10*6/MM3 (ref 4.14–5.8)
RBC # BLD AUTO: 4.2 10*6/MM3 (ref 4.14–5.8)
RBC # BLD AUTO: 4.21 10*6/MM3 (ref 4.14–5.8)
RBC # BLD AUTO: 4.35 10*6/MM3 (ref 4.14–5.8)
RBC # UR: ABNORMAL /HPF
RBC MORPH BLD: NORMAL
REF LAB TEST METHOD: ABNORMAL
RH BLD: POSITIVE
RH BLD: POSITIVE
SARS-COV-2 RDRP RESP QL NAA+PROBE: NORMAL
SARS-COV-2 RNA PNL SPEC NAA+PROBE: NOT DETECTED
SARS-COV-2 RNA RESP QL NAA+PROBE: NOT DETECTED
SARS-COV-2 RNA RESP QL NAA+PROBE: NOT DETECTED
SMALL PLATELETS BLD QL SMEAR: ABNORMAL
SODIUM SERPL-SCNC: 127 MMOL/L (ref 136–145)
SODIUM SERPL-SCNC: 128 MMOL/L (ref 136–145)
SODIUM SERPL-SCNC: 129 MMOL/L (ref 136–145)
SODIUM SERPL-SCNC: 130 MMOL/L (ref 136–145)
SODIUM SERPL-SCNC: 132 MMOL/L (ref 136–145)
SODIUM SERPL-SCNC: 133 MMOL/L (ref 136–145)
SODIUM SERPL-SCNC: 134 MMOL/L (ref 136–145)
SODIUM SERPL-SCNC: 135 MMOL/L (ref 136–145)
SODIUM SERPL-SCNC: 136 MMOL/L (ref 136–145)
SODIUM SERPL-SCNC: 137 MMOL/L (ref 136–145)
SODIUM SERPL-SCNC: 138 MMOL/L (ref 136–145)
SODIUM SERPL-SCNC: 138 MMOL/L (ref 136–145)
SODIUM SERPL-SCNC: 139 MMOL/L (ref 136–145)
SODIUM SERPL-SCNC: 140 MMOL/L (ref 136–145)
SODIUM SERPL-SCNC: 142 MMOL/L (ref 136–145)
SODIUM SERPL-SCNC: 143 MMOL/L (ref 136–145)
SODIUM SERPL-SCNC: 144 MMOL/L (ref 136–145)
SODIUM SERPL-SCNC: 144 MMOL/L (ref 136–145)
SODIUM UR-SCNC: 160 MMOL/L
SP GR UR STRIP: 1.01 (ref 1–1.03)
SP GR UR STRIP: 1.02 (ref 1–1.03)
SP GR UR STRIP: 1.02 (ref 1–1.03)
SQUAMOUS #/AREA URNS HPF: ABNORMAL /HPF
T&S EXPIRATION DATE: NORMAL
T4 FREE SERPL-MCNC: 1.28 NG/DL (ref 0.93–1.7)
TIBC SERPL-MCNC: 119 MCG/DL (ref 298–536)
TOTAL RATE: 0 BREATHS/MINUTE
TRANSFERRIN SERPL-MCNC: 80 MG/DL (ref 200–360)
TROPONIN T SERPL-MCNC: <0.01 NG/ML (ref 0–0.03)
TSH SERPL DL<=0.05 MIU/L-ACNC: 3.13 UIU/ML (ref 0.27–4.2)
UNIT  ABO: NORMAL
UNIT  ABO: NORMAL
UNIT  RH: NORMAL
UNIT  RH: NORMAL
URATE SERPL-MCNC: 6 MG/DL (ref 3.4–7)
UROBILINOGEN UR QL STRIP: ABNORMAL
WBC # BLD AUTO: 10.12 10*3/MM3 (ref 3.4–10.8)
WBC # BLD AUTO: 10.32 10*3/MM3 (ref 3.4–10.8)
WBC # BLD AUTO: 10.61 10*3/MM3 (ref 3.4–10.8)
WBC # BLD AUTO: 10.79 10*3/MM3 (ref 3.4–10.8)
WBC # BLD AUTO: 11.42 10*3/MM3 (ref 3.4–10.8)
WBC # BLD AUTO: 11.45 10*3/MM3 (ref 3.4–10.8)
WBC # BLD AUTO: 11.7 10*3/MM3 (ref 3.4–10.8)
WBC # BLD AUTO: 12.06 10*3/MM3 (ref 3.4–10.8)
WBC # BLD AUTO: 12.74 10*3/MM3 (ref 3.4–10.8)
WBC # BLD AUTO: 12.76 10*3/MM3 (ref 3.4–10.8)
WBC # BLD AUTO: 14.49 10*3/MM3 (ref 3.4–10.8)
WBC # BLD AUTO: 2.99 10*3/MM3 (ref 3.4–10.8)
WBC # BLD AUTO: 3.1 10*3/MM3 (ref 3.4–10.8)
WBC # BLD AUTO: 3.57 10*3/MM3 (ref 3.4–10.8)
WBC # BLD AUTO: 3.81 10*3/MM3 (ref 3.4–10.8)
WBC # BLD AUTO: 4.99 10*3/MM3 (ref 3.4–10.8)
WBC # BLD AUTO: 5.15 10*3/MM3 (ref 3.4–10.8)
WBC # BLD AUTO: 6.2 10*3/MM3 (ref 3.4–10.8)
WBC # BLD AUTO: 6.8 10*3/MM3 (ref 3.4–10.8)
WBC # BLD AUTO: 6.8 10*3/MM3 (ref 3.4–10.8)
WBC # BLD AUTO: 7.06 10*3/MM3 (ref 3.4–10.8)
WBC # BLD AUTO: 7.4 10*3/MM3 (ref 3.4–10.8)
WBC # BLD AUTO: 7.69 10*3/MM3 (ref 3.4–10.8)
WBC # BLD AUTO: 7.81 10*3/MM3 (ref 3.4–10.8)
WBC # BLD AUTO: 7.94 10*3/MM3 (ref 3.4–10.8)
WBC # BLD AUTO: 8.61 10*3/MM3 (ref 3.4–10.8)
WBC # BLD AUTO: 8.9 10*3/MM3 (ref 3.4–10.8)
WBC # BLD AUTO: 9.3 10*3/MM3 (ref 3.4–10.8)
WBC # BLD AUTO: 9.3 10*3/MM3 (ref 3.4–10.8)
WBC MORPH BLD: NORMAL
WBC UR QL AUTO: ABNORMAL /HPF
WHOLE BLOOD HOLD SPECIMEN: NORMAL
YEAST URNS QL MICRO: ABNORMAL /HPF

## 2021-01-01 PROCEDURE — 99232 SBSQ HOSP IP/OBS MODERATE 35: CPT | Performed by: INTERNAL MEDICINE

## 2021-01-01 PROCEDURE — 25010000002 METHYLPREDNISOLONE PER 125 MG: Performed by: INTERNAL MEDICINE

## 2021-01-01 PROCEDURE — 25010000002 FUROSEMIDE PER 20 MG: Performed by: INTERNAL MEDICINE

## 2021-01-01 PROCEDURE — 25010000002 HEPARIN (PORCINE) PER 1000 UNITS: Performed by: INTERNAL MEDICINE

## 2021-01-01 PROCEDURE — 94799 UNLISTED PULMONARY SVC/PX: CPT

## 2021-01-01 PROCEDURE — 77307 TELETHX ISODOSE PLAN CPLX: CPT | Performed by: RADIOLOGY

## 2021-01-01 PROCEDURE — 80069 RENAL FUNCTION PANEL: CPT | Performed by: INTERNAL MEDICINE

## 2021-01-01 PROCEDURE — 97530 THERAPEUTIC ACTIVITIES: CPT

## 2021-01-01 PROCEDURE — 80053 COMPREHEN METABOLIC PANEL: CPT | Performed by: INTERNAL MEDICINE

## 2021-01-01 PROCEDURE — 99223 1ST HOSP IP/OBS HIGH 75: CPT | Performed by: INTERNAL MEDICINE

## 2021-01-01 PROCEDURE — 85027 COMPLETE CBC AUTOMATED: CPT | Performed by: INTERNAL MEDICINE

## 2021-01-01 PROCEDURE — 85025 COMPLETE CBC W/AUTO DIFF WBC: CPT

## 2021-01-01 PROCEDURE — 96366 THER/PROPH/DIAG IV INF ADDON: CPT

## 2021-01-01 PROCEDURE — 84550 ASSAY OF BLOOD/URIC ACID: CPT | Performed by: INTERNAL MEDICINE

## 2021-01-01 PROCEDURE — 86140 C-REACTIVE PROTEIN: CPT | Performed by: INTERNAL MEDICINE

## 2021-01-01 PROCEDURE — 84145 PROCALCITONIN (PCT): CPT | Performed by: PHYSICIAN ASSISTANT

## 2021-01-01 PROCEDURE — 83050 HGB METHEMOGLOBIN QUAN: CPT

## 2021-01-01 PROCEDURE — 25010000002 HYDROMORPHONE PER 4 MG: Performed by: FAMILY MEDICINE

## 2021-01-01 PROCEDURE — 63710000001 PREDNISONE PER 1 MG: Performed by: INTERNAL MEDICINE

## 2021-01-01 PROCEDURE — 25010000002 MORPHINE PER 10 MG: Performed by: INTERNAL MEDICINE

## 2021-01-01 PROCEDURE — 99215 OFFICE O/P EST HI 40 MIN: CPT | Performed by: INTERNAL MEDICINE

## 2021-01-01 PROCEDURE — 99233 SBSQ HOSP IP/OBS HIGH 50: CPT | Performed by: INTERNAL MEDICINE

## 2021-01-01 PROCEDURE — 85007 BL SMEAR W/DIFF WBC COUNT: CPT | Performed by: INTERNAL MEDICINE

## 2021-01-01 PROCEDURE — A9552 F18 FDG: HCPCS | Performed by: INTERNAL MEDICINE

## 2021-01-01 PROCEDURE — 25010000002 POTASSIUM CHLORIDE PER 2 MEQ OF POTASSIUM: Performed by: NURSE PRACTITIONER

## 2021-01-01 PROCEDURE — 83615 LACTATE (LD) (LDH) ENZYME: CPT | Performed by: INTERNAL MEDICINE

## 2021-01-01 PROCEDURE — 78815 PET IMAGE W/CT SKULL-THIGH: CPT

## 2021-01-01 PROCEDURE — 84443 ASSAY THYROID STIM HORMONE: CPT

## 2021-01-01 PROCEDURE — 87186 SC STD MICRODIL/AGAR DIL: CPT | Performed by: PHYSICIAN ASSISTANT

## 2021-01-01 PROCEDURE — 99291 CRITICAL CARE FIRST HOUR: CPT | Performed by: INTERNAL MEDICINE

## 2021-01-01 PROCEDURE — 99233 SBSQ HOSP IP/OBS HIGH 50: CPT | Performed by: PHYSICIAN ASSISTANT

## 2021-01-01 PROCEDURE — 99232 SBSQ HOSP IP/OBS MODERATE 35: CPT | Performed by: NURSE PRACTITIONER

## 2021-01-01 PROCEDURE — 25010000002 DIPHENHYDRAMINE PER 50 MG: Performed by: FAMILY MEDICINE

## 2021-01-01 PROCEDURE — 80069 RENAL FUNCTION PANEL: CPT | Performed by: FAMILY MEDICINE

## 2021-01-01 PROCEDURE — 97167 OT EVAL HIGH COMPLEX 60 MIN: CPT

## 2021-01-01 PROCEDURE — 99215 OFFICE O/P EST HI 40 MIN: CPT | Performed by: NURSE PRACTITIONER

## 2021-01-01 PROCEDURE — 87636 SARSCOV2 & INF A&B AMP PRB: CPT | Performed by: PHYSICIAN ASSISTANT

## 2021-01-01 PROCEDURE — 86900 BLOOD TYPING SEROLOGIC ABO: CPT

## 2021-01-01 PROCEDURE — 82330 ASSAY OF CALCIUM: CPT | Performed by: INTERNAL MEDICINE

## 2021-01-01 PROCEDURE — 25010000002 DESMOPRESSIN PER 1 MCG: Performed by: INTERNAL MEDICINE

## 2021-01-01 PROCEDURE — 82962 GLUCOSE BLOOD TEST: CPT

## 2021-01-01 PROCEDURE — 85014 HEMATOCRIT: CPT | Performed by: FAMILY MEDICINE

## 2021-01-01 PROCEDURE — 25010000002 ONDANSETRON PER 1 MG: Performed by: INTERNAL MEDICINE

## 2021-01-01 PROCEDURE — 71250 CT THORAX DX C-: CPT

## 2021-01-01 PROCEDURE — 77290 THER RAD SIMULAJ FIELD CPLX: CPT | Performed by: RADIOLOGY

## 2021-01-01 PROCEDURE — 25010000002 ERTAPENEM PER 500 MG: Performed by: INTERNAL MEDICINE

## 2021-01-01 PROCEDURE — 97162 PT EVAL MOD COMPLEX 30 MIN: CPT

## 2021-01-01 PROCEDURE — 87040 BLOOD CULTURE FOR BACTERIA: CPT | Performed by: PHYSICIAN ASSISTANT

## 2021-01-01 PROCEDURE — 99233 SBSQ HOSP IP/OBS HIGH 50: CPT | Performed by: FAMILY MEDICINE

## 2021-01-01 PROCEDURE — 83735 ASSAY OF MAGNESIUM: CPT | Performed by: INTERNAL MEDICINE

## 2021-01-01 PROCEDURE — 87147 CULTURE TYPE IMMUNOLOGIC: CPT | Performed by: PHYSICIAN ASSISTANT

## 2021-01-01 PROCEDURE — 85025 COMPLETE CBC W/AUTO DIFF WBC: CPT | Performed by: INTERNAL MEDICINE

## 2021-01-01 PROCEDURE — 85060 BLOOD SMEAR INTERPRETATION: CPT | Performed by: INTERNAL MEDICINE

## 2021-01-01 PROCEDURE — 74176 CT ABD & PELVIS W/O CONTRAST: CPT

## 2021-01-01 PROCEDURE — 80048 BASIC METABOLIC PNL TOTAL CA: CPT | Performed by: INTERNAL MEDICINE

## 2021-01-01 PROCEDURE — C1751 CATH, INF, PER/CENT/MIDLINE: HCPCS

## 2021-01-01 PROCEDURE — 84484 ASSAY OF TROPONIN QUANT: CPT | Performed by: PHYSICIAN ASSISTANT

## 2021-01-01 PROCEDURE — 25010000002 LORAZEPAM PER 2 MG: Performed by: FAMILY MEDICINE

## 2021-01-01 PROCEDURE — 99222 1ST HOSP IP/OBS MODERATE 55: CPT | Performed by: INTERNAL MEDICINE

## 2021-01-01 PROCEDURE — 99214 OFFICE O/P EST MOD 30 MIN: CPT | Performed by: INTERNAL MEDICINE

## 2021-01-01 PROCEDURE — 87077 CULTURE AEROBIC IDENTIFY: CPT | Performed by: RADIOLOGY

## 2021-01-01 PROCEDURE — 82728 ASSAY OF FERRITIN: CPT | Performed by: INTERNAL MEDICINE

## 2021-01-01 PROCEDURE — 80053 COMPREHEN METABOLIC PANEL: CPT

## 2021-01-01 PROCEDURE — 83605 ASSAY OF LACTIC ACID: CPT | Performed by: INTERNAL MEDICINE

## 2021-01-01 PROCEDURE — 87635 SARS-COV-2 COVID-19 AMP PRB: CPT | Performed by: PHYSICIAN ASSISTANT

## 2021-01-01 PROCEDURE — 85027 COMPLETE CBC AUTOMATED: CPT | Performed by: HOSPITALIST

## 2021-01-01 PROCEDURE — 80048 BASIC METABOLIC PNL TOTAL CA: CPT | Performed by: HOSPITALIST

## 2021-01-01 PROCEDURE — 25010000002 MAGNESIUM SULFATE PER 500 MG OF MAGNESIUM: Performed by: NURSE PRACTITIONER

## 2021-01-01 PROCEDURE — 25010000002 FOSAPREPITANT PER 1 MG: Performed by: NURSE PRACTITIONER

## 2021-01-01 PROCEDURE — C1769 GUIDE WIRE: HCPCS | Performed by: INTERNAL MEDICINE

## 2021-01-01 PROCEDURE — 81001 URINALYSIS AUTO W/SCOPE: CPT | Performed by: RADIOLOGY

## 2021-01-01 PROCEDURE — 93568 NJX CAR CTH NSLC P-ART ANGRP: CPT | Performed by: INTERNAL MEDICINE

## 2021-01-01 PROCEDURE — 96415 CHEMO IV INFUSION ADDL HR: CPT

## 2021-01-01 PROCEDURE — 84300 ASSAY OF URINE SODIUM: CPT | Performed by: INTERNAL MEDICINE

## 2021-01-01 PROCEDURE — 99239 HOSP IP/OBS DSCHRG MGMT >30: CPT | Performed by: HOSPITALIST

## 2021-01-01 PROCEDURE — 86850 RBC ANTIBODY SCREEN: CPT | Performed by: NURSE PRACTITIONER

## 2021-01-01 PROCEDURE — 25010000002 CARBOPLATIN PER 50 MG: Performed by: NURSE PRACTITIONER

## 2021-01-01 PROCEDURE — 25010000002 PALONOSETRON 0.25 MG/5ML SOLUTION PREFILLED SYRINGE: Performed by: NURSE PRACTITIONER

## 2021-01-01 PROCEDURE — 99223 1ST HOSP IP/OBS HIGH 75: CPT | Performed by: FAMILY MEDICINE

## 2021-01-01 PROCEDURE — 25010000002 PEMBROLIZUMAB 100 MG/4ML SOLUTION 4 ML VIAL: Performed by: INTERNAL MEDICINE

## 2021-01-01 PROCEDURE — 84100 ASSAY OF PHOSPHORUS: CPT

## 2021-01-01 PROCEDURE — 83690 ASSAY OF LIPASE: CPT | Performed by: EMERGENCY MEDICINE

## 2021-01-01 PROCEDURE — 96375 TX/PRO/DX INJ NEW DRUG ADDON: CPT

## 2021-01-01 PROCEDURE — 87181 SC STD AGAR DILUTION PER AGT: CPT | Performed by: PHYSICIAN ASSISTANT

## 2021-01-01 PROCEDURE — 80048 BASIC METABOLIC PNL TOTAL CA: CPT | Performed by: FAMILY MEDICINE

## 2021-01-01 PROCEDURE — 84145 PROCALCITONIN (PCT): CPT | Performed by: INTERNAL MEDICINE

## 2021-01-01 PROCEDURE — 84145 PROCALCITONIN (PCT): CPT | Performed by: EMERGENCY MEDICINE

## 2021-01-01 PROCEDURE — 94660 CPAP INITIATION&MGMT: CPT

## 2021-01-01 PROCEDURE — 84100 ASSAY OF PHOSPHORUS: CPT | Performed by: INTERNAL MEDICINE

## 2021-01-01 PROCEDURE — 25010000002 MORPHINE PER 10 MG: Performed by: EMERGENCY MEDICINE

## 2021-01-01 PROCEDURE — 71045 X-RAY EXAM CHEST 1 VIEW: CPT

## 2021-01-01 PROCEDURE — 25010000002 ERTAPENEM PER 500 MG

## 2021-01-01 PROCEDURE — 02HV33Z INSERTION OF INFUSION DEVICE INTO SUPERIOR VENA CAVA, PERCUTANEOUS APPROACH: ICD-10-PCS | Performed by: FAMILY MEDICINE

## 2021-01-01 PROCEDURE — 85652 RBC SED RATE AUTOMATED: CPT | Performed by: INTERNAL MEDICINE

## 2021-01-01 PROCEDURE — 96417 CHEMO IV INFUS EACH ADDL SEQ: CPT

## 2021-01-01 PROCEDURE — 96367 TX/PROPH/DG ADDL SEQ IV INF: CPT

## 2021-01-01 PROCEDURE — 85007 BL SMEAR W/DIFF WBC COUNT: CPT

## 2021-01-01 PROCEDURE — 77295 3-D RADIOTHERAPY PLAN: CPT | Performed by: RADIOLOGY

## 2021-01-01 PROCEDURE — 96413 CHEMO IV INFUSION 1 HR: CPT

## 2021-01-01 PROCEDURE — 87086 URINE CULTURE/COLONY COUNT: CPT | Performed by: PHYSICIAN ASSISTANT

## 2021-01-01 PROCEDURE — 36430 TRANSFUSION BLD/BLD COMPNT: CPT

## 2021-01-01 PROCEDURE — 86901 BLOOD TYPING SEROLOGIC RH(D): CPT

## 2021-01-01 PROCEDURE — 80053 COMPREHEN METABOLIC PANEL: CPT | Performed by: NURSE PRACTITIONER

## 2021-01-01 PROCEDURE — 85025 COMPLETE CBC W/AUTO DIFF WBC: CPT | Performed by: NURSE PRACTITIONER

## 2021-01-01 PROCEDURE — 93005 ELECTROCARDIOGRAM TRACING: CPT | Performed by: INTERNAL MEDICINE

## 2021-01-01 PROCEDURE — 77334 RADIATION TREATMENT AID(S): CPT | Performed by: RADIOLOGY

## 2021-01-01 PROCEDURE — 80053 COMPREHEN METABOLIC PANEL: CPT | Performed by: EMERGENCY MEDICINE

## 2021-01-01 PROCEDURE — 83880 ASSAY OF NATRIURETIC PEPTIDE: CPT | Performed by: INTERNAL MEDICINE

## 2021-01-01 PROCEDURE — 99232 SBSQ HOSP IP/OBS MODERATE 35: CPT | Performed by: FAMILY MEDICINE

## 2021-01-01 PROCEDURE — 25010000002 ERTAPENEM PER 500 MG: Performed by: NURSE PRACTITIONER

## 2021-01-01 PROCEDURE — 97161 PT EVAL LOW COMPLEX 20 MIN: CPT

## 2021-01-01 PROCEDURE — 93306 TTE W/DOPPLER COMPLETE: CPT

## 2021-01-01 PROCEDURE — 82550 ASSAY OF CK (CPK): CPT | Performed by: INTERNAL MEDICINE

## 2021-01-01 PROCEDURE — 99239 HOSP IP/OBS DSCHRG MGMT >30: CPT | Performed by: NURSE PRACTITIONER

## 2021-01-01 PROCEDURE — 86022 PLATELET ANTIBODIES: CPT | Performed by: FAMILY MEDICINE

## 2021-01-01 PROCEDURE — 81001 URINALYSIS AUTO W/SCOPE: CPT | Performed by: PHYSICIAN ASSISTANT

## 2021-01-01 PROCEDURE — 99214 OFFICE O/P EST MOD 30 MIN: CPT | Performed by: NURSE PRACTITIONER

## 2021-01-01 PROCEDURE — 93005 ELECTROCARDIOGRAM TRACING: CPT

## 2021-01-01 PROCEDURE — 25010000002 CISPLATIN PER 50 MG: Performed by: NURSE PRACTITIONER

## 2021-01-01 PROCEDURE — U0003 INFECTIOUS AGENT DETECTION BY NUCLEIC ACID (DNA OR RNA); SEVERE ACUTE RESPIRATORY SYNDROME CORONAVIRUS 2 (SARS-COV-2) (CORONAVIRUS DISEASE [COVID-19]), AMPLIFIED PROBE TECHNIQUE, MAKING USE OF HIGH THROUGHPUT TECHNOLOGIES AS DESCRIBED BY CMS-2020-01-R: HCPCS | Performed by: NURSE PRACTITIONER

## 2021-01-01 PROCEDURE — 87040 BLOOD CULTURE FOR BACTERIA: CPT | Performed by: EMERGENCY MEDICINE

## 2021-01-01 PROCEDURE — 99231 SBSQ HOSP IP/OBS SF/LOW 25: CPT | Performed by: FAMILY MEDICINE

## 2021-01-01 PROCEDURE — 25010000002 DEXAMETHASONE SODIUM PHOSPHATE 100 MG/10ML SOLUTION: Performed by: NURSE PRACTITIONER

## 2021-01-01 PROCEDURE — 25010000002 FUROSEMIDE PER 20 MG: Performed by: PHYSICIAN ASSISTANT

## 2021-01-01 PROCEDURE — 97535 SELF CARE MNGMENT TRAINING: CPT

## 2021-01-01 PROCEDURE — 25010000002 GEMCITABINE 1 GM/26.3ML SOLUTION 26.3 ML VIAL: Performed by: NURSE PRACTITIONER

## 2021-01-01 PROCEDURE — 84484 ASSAY OF TROPONIN QUANT: CPT

## 2021-01-01 PROCEDURE — C1751 CATH, INF, PER/CENT/MIDLINE: HCPCS | Performed by: INTERNAL MEDICINE

## 2021-01-01 PROCEDURE — U0005 INFEC AGEN DETEC AMPLI PROBE: HCPCS | Performed by: NURSE PRACTITIONER

## 2021-01-01 PROCEDURE — 76705 ECHO EXAM OF ABDOMEN: CPT

## 2021-01-01 PROCEDURE — 84484 ASSAY OF TROPONIN QUANT: CPT | Performed by: EMERGENCY MEDICINE

## 2021-01-01 PROCEDURE — 36415 COLL VENOUS BLD VENIPUNCTURE: CPT

## 2021-01-01 PROCEDURE — 84466 ASSAY OF TRANSFERRIN: CPT | Performed by: INTERNAL MEDICINE

## 2021-01-01 PROCEDURE — C1894 INTRO/SHEATH, NON-LASER: HCPCS | Performed by: INTERNAL MEDICINE

## 2021-01-01 PROCEDURE — 93356 MYOCRD STRAIN IMG SPCKL TRCK: CPT

## 2021-01-01 PROCEDURE — 81001 URINALYSIS AUTO W/SCOPE: CPT

## 2021-01-01 PROCEDURE — 74018 RADEX ABDOMEN 1 VIEW: CPT

## 2021-01-01 PROCEDURE — 83605 ASSAY OF LACTIC ACID: CPT | Performed by: EMERGENCY MEDICINE

## 2021-01-01 PROCEDURE — 94640 AIRWAY INHALATION TREATMENT: CPT

## 2021-01-01 PROCEDURE — 0 FLUDEOXYGLUCOSE F18 SOLUTION: Performed by: INTERNAL MEDICINE

## 2021-01-01 PROCEDURE — C1760 CLOSURE DEV, VASC: HCPCS | Performed by: INTERNAL MEDICINE

## 2021-01-01 PROCEDURE — 4A023N6 MEASUREMENT OF CARDIAC SAMPLING AND PRESSURE, RIGHT HEART, PERCUTANEOUS APPROACH: ICD-10-PCS | Performed by: INTERNAL MEDICINE

## 2021-01-01 PROCEDURE — 93005 ELECTROCARDIOGRAM TRACING: CPT | Performed by: EMERGENCY MEDICINE

## 2021-01-01 PROCEDURE — 84439 ASSAY OF FREE THYROXINE: CPT

## 2021-01-01 PROCEDURE — 85018 HEMOGLOBIN: CPT | Performed by: FAMILY MEDICINE

## 2021-01-01 PROCEDURE — P9016 RBC LEUKOCYTES REDUCED: HCPCS

## 2021-01-01 PROCEDURE — 96360 HYDRATION IV INFUSION INIT: CPT

## 2021-01-01 PROCEDURE — 25010000002 ERTAPENEM PER 500 MG: Performed by: PHYSICIAN ASSISTANT

## 2021-01-01 PROCEDURE — 0 IOPAMIDOL PER 1 ML: Performed by: INTERNAL MEDICINE

## 2021-01-01 PROCEDURE — 97110 THERAPEUTIC EXERCISES: CPT

## 2021-01-01 PROCEDURE — 83735 ASSAY OF MAGNESIUM: CPT

## 2021-01-01 PROCEDURE — 84156 ASSAY OF PROTEIN URINE: CPT | Performed by: INTERNAL MEDICINE

## 2021-01-01 PROCEDURE — 36600 WITHDRAWAL OF ARTERIAL BLOOD: CPT

## 2021-01-01 PROCEDURE — 86901 BLOOD TYPING SEROLOGIC RH(D): CPT | Performed by: NURSE PRACTITIONER

## 2021-01-01 PROCEDURE — 85018 HEMOGLOBIN: CPT | Performed by: INTERNAL MEDICINE

## 2021-01-01 PROCEDURE — 93451 RIGHT HEART CATH: CPT | Performed by: INTERNAL MEDICINE

## 2021-01-01 PROCEDURE — 77280 THER RAD SIMULAJ FIELD SMPL: CPT | Performed by: RADIOLOGY

## 2021-01-01 PROCEDURE — 99284 EMERGENCY DEPT VISIT MOD MDM: CPT

## 2021-01-01 PROCEDURE — 83540 ASSAY OF IRON: CPT | Performed by: INTERNAL MEDICINE

## 2021-01-01 PROCEDURE — 82565 ASSAY OF CREATININE: CPT

## 2021-01-01 PROCEDURE — 82375 ASSAY CARBOXYHB QUANT: CPT

## 2021-01-01 PROCEDURE — 87086 URINE CULTURE/COLONY COUNT: CPT

## 2021-01-01 PROCEDURE — 74177 CT ABD & PELVIS W/CONTRAST: CPT

## 2021-01-01 PROCEDURE — 99231 SBSQ HOSP IP/OBS SF/LOW 25: CPT | Performed by: NURSE PRACTITIONER

## 2021-01-01 PROCEDURE — C1894 INTRO/SHEATH, NON-LASER: HCPCS

## 2021-01-01 PROCEDURE — 93306 TTE W/DOPPLER COMPLETE: CPT | Performed by: INTERNAL MEDICINE

## 2021-01-01 PROCEDURE — 93005 ELECTROCARDIOGRAM TRACING: CPT | Performed by: PHYSICIAN ASSISTANT

## 2021-01-01 PROCEDURE — 25010000002 MAGNESIUM SULFATE 2 GM/50ML SOLUTION: Performed by: PHYSICIAN ASSISTANT

## 2021-01-01 PROCEDURE — 86900 BLOOD TYPING SEROLOGIC ABO: CPT | Performed by: NURSE PRACTITIONER

## 2021-01-01 PROCEDURE — 25010000002 HYDROMORPHONE PER 4 MG: Performed by: EMERGENCY MEDICINE

## 2021-01-01 PROCEDURE — 25010000002 ONDANSETRON PER 1 MG: Performed by: PHYSICIAN ASSISTANT

## 2021-01-01 PROCEDURE — 87086 URINE CULTURE/COLONY COUNT: CPT | Performed by: RADIOLOGY

## 2021-01-01 PROCEDURE — 77332 RADIATION TREATMENT AID(S): CPT | Performed by: RADIOLOGY

## 2021-01-01 PROCEDURE — 83880 ASSAY OF NATRIURETIC PEPTIDE: CPT | Performed by: EMERGENCY MEDICINE

## 2021-01-01 PROCEDURE — 85610 PROTHROMBIN TIME: CPT | Performed by: INTERNAL MEDICINE

## 2021-01-01 PROCEDURE — 87040 BLOOD CULTURE FOR BACTERIA: CPT | Performed by: INTERNAL MEDICINE

## 2021-01-01 PROCEDURE — 77412 RADIATION TX DELIVERY LVL 3: CPT | Performed by: RADIOLOGY

## 2021-01-01 PROCEDURE — 85025 COMPLETE CBC W/AUTO DIFF WBC: CPT | Performed by: FAMILY MEDICINE

## 2021-01-01 PROCEDURE — 25010000002 VANCOMYCIN 10 G RECONSTITUTED SOLUTION: Performed by: PHYSICIAN ASSISTANT

## 2021-01-01 PROCEDURE — 82805 BLOOD GASES W/O2 SATURATION: CPT

## 2021-01-01 PROCEDURE — 87186 SC STD MICRODIL/AGAR DIL: CPT | Performed by: RADIOLOGY

## 2021-01-01 PROCEDURE — 77336 RADIATION PHYSICS CONSULT: CPT | Performed by: RADIOLOGY

## 2021-01-01 PROCEDURE — 87636 SARSCOV2 & INF A&B AMP PRB: CPT | Performed by: EMERGENCY MEDICINE

## 2021-01-01 PROCEDURE — 76775 US EXAM ABDO BACK WALL LIM: CPT

## 2021-01-01 PROCEDURE — 99233 SBSQ HOSP IP/OBS HIGH 50: CPT | Performed by: HOSPITALIST

## 2021-01-01 PROCEDURE — 5A09357 ASSISTANCE WITH RESPIRATORY VENTILATION, LESS THAN 24 CONSECUTIVE HOURS, CONTINUOUS POSITIVE AIRWAY PRESSURE: ICD-10-PCS | Performed by: INTERNAL MEDICINE

## 2021-01-01 PROCEDURE — 25010000002 FUROSEMIDE PER 20 MG: Performed by: FAMILY MEDICINE

## 2021-01-01 PROCEDURE — 96361 HYDRATE IV INFUSION ADD-ON: CPT

## 2021-01-01 PROCEDURE — 86923 COMPATIBILITY TEST ELECTRIC: CPT

## 2021-01-01 PROCEDURE — 93356 MYOCRD STRAIN IMG SPCKL TRCK: CPT | Performed by: INTERNAL MEDICINE

## 2021-01-01 PROCEDURE — 97116 GAIT TRAINING THERAPY: CPT

## 2021-01-01 PROCEDURE — 83605 ASSAY OF LACTIC ACID: CPT | Performed by: PHYSICIAN ASSISTANT

## 2021-01-01 PROCEDURE — 77300 RADIATION THERAPY DOSE PLAN: CPT | Performed by: RADIOLOGY

## 2021-01-01 PROCEDURE — 87205 SMEAR GRAM STAIN: CPT | Performed by: INTERNAL MEDICINE

## 2021-01-01 PROCEDURE — 99221 1ST HOSP IP/OBS SF/LOW 40: CPT | Performed by: INTERNAL MEDICINE

## 2021-01-01 PROCEDURE — 87493 C DIFF AMPLIFIED PROBE: CPT | Performed by: FAMILY MEDICINE

## 2021-01-01 PROCEDURE — 85014 HEMATOCRIT: CPT | Performed by: INTERNAL MEDICINE

## 2021-01-01 PROCEDURE — 83735 ASSAY OF MAGNESIUM: CPT | Performed by: NURSE PRACTITIONER

## 2021-01-01 PROCEDURE — 82570 ASSAY OF URINE CREATININE: CPT | Performed by: INTERNAL MEDICINE

## 2021-01-01 PROCEDURE — 99285 EMERGENCY DEPT VISIT HI MDM: CPT

## 2021-01-01 PROCEDURE — 83010 ASSAY OF HAPTOGLOBIN QUANT: CPT | Performed by: INTERNAL MEDICINE

## 2021-01-01 PROCEDURE — 94760 N-INVAS EAR/PLS OXIMETRY 1: CPT

## 2021-01-01 PROCEDURE — 25010000002 ERTAPENEM 1 GM/100ML SOLUTION

## 2021-01-01 PROCEDURE — 85007 BL SMEAR W/DIFF WBC COUNT: CPT | Performed by: FAMILY MEDICINE

## 2021-01-01 PROCEDURE — 99233 SBSQ HOSP IP/OBS HIGH 50: CPT | Performed by: NURSE PRACTITIONER

## 2021-01-01 PROCEDURE — 83880 ASSAY OF NATRIURETIC PEPTIDE: CPT | Performed by: PHYSICIAN ASSISTANT

## 2021-01-01 PROCEDURE — 71275 CT ANGIOGRAPHY CHEST: CPT

## 2021-01-01 PROCEDURE — 85025 COMPLETE CBC W/AUTO DIFF WBC: CPT | Performed by: EMERGENCY MEDICINE

## 2021-01-01 PROCEDURE — 77387 GUIDANCE FOR RADJ TX DLVR: CPT | Performed by: RADIOLOGY

## 2021-01-01 RX ORDER — SODIUM CHLORIDE 0.9 % (FLUSH) 0.9 %
10 SYRINGE (ML) INJECTION AS NEEDED
Status: DISCONTINUED | OUTPATIENT
Start: 2021-01-01 | End: 2021-01-01 | Stop reason: HOSPADM

## 2021-01-01 RX ORDER — POTASSIUM CHLORIDE 750 MG/1
20 CAPSULE, EXTENDED RELEASE ORAL ONCE
Status: COMPLETED | OUTPATIENT
Start: 2021-01-01 | End: 2021-01-01

## 2021-01-01 RX ORDER — ACETAMINOPHEN 650 MG/1
650 SUPPOSITORY RECTAL EVERY 4 HOURS PRN
Status: DISCONTINUED | OUTPATIENT
Start: 2021-01-01 | End: 2021-01-01 | Stop reason: HOSPADM

## 2021-01-01 RX ORDER — AMOXICILLIN 250 MG
1 CAPSULE ORAL DAILY
Qty: 30 TABLET | Refills: 1 | Status: SHIPPED | OUTPATIENT
Start: 2021-01-01

## 2021-01-01 RX ORDER — HYDROMORPHONE HYDROCHLORIDE 1 MG/ML
0.25 INJECTION, SOLUTION INTRAMUSCULAR; INTRAVENOUS; SUBCUTANEOUS
Status: DISCONTINUED | OUTPATIENT
Start: 2021-01-01 | End: 2021-01-01 | Stop reason: HOSPADM

## 2021-01-01 RX ORDER — CHOLECALCIFEROL (VITAMIN D3) 125 MCG
5 CAPSULE ORAL NIGHTLY PRN
Status: DISCONTINUED | OUTPATIENT
Start: 2021-01-01 | End: 2021-01-01 | Stop reason: HOSPADM

## 2021-01-01 RX ORDER — ACETAMINOPHEN 325 MG/1
650 TABLET ORAL EVERY 4 HOURS PRN
Start: 2021-01-01

## 2021-01-01 RX ORDER — PREDNISONE 20 MG/1
20 TABLET ORAL
Status: COMPLETED | OUTPATIENT
Start: 2021-01-01 | End: 2021-01-01

## 2021-01-01 RX ORDER — AMLODIPINE BESYLATE 10 MG/1
10 TABLET ORAL DAILY
COMMUNITY

## 2021-01-01 RX ORDER — ONDANSETRON 2 MG/ML
4 INJECTION INTRAMUSCULAR; INTRAVENOUS EVERY 6 HOURS PRN
Status: DISCONTINUED | OUTPATIENT
Start: 2021-01-01 | End: 2021-01-01 | Stop reason: HOSPADM

## 2021-01-01 RX ORDER — SODIUM CHLORIDE 9 MG/ML
1000 INJECTION, SOLUTION INTRAVENOUS ONCE
Status: COMPLETED | OUTPATIENT
Start: 2021-01-01 | End: 2021-01-01

## 2021-01-01 RX ORDER — TAMSULOSIN HYDROCHLORIDE 0.4 MG/1
0.4 CAPSULE ORAL DAILY
Qty: 30 CAPSULE
Start: 2021-01-01 | End: 2021-01-01

## 2021-01-01 RX ORDER — MAGNESIUM SULFATE 1 G/100ML
1 INJECTION INTRAVENOUS AS NEEDED
Status: DISCONTINUED | OUTPATIENT
Start: 2021-01-01 | End: 2021-01-01 | Stop reason: HOSPADM

## 2021-01-01 RX ORDER — FUROSEMIDE 10 MG/ML
40 INJECTION INTRAMUSCULAR; INTRAVENOUS ONCE
Status: COMPLETED | OUTPATIENT
Start: 2021-01-01 | End: 2021-01-01

## 2021-01-01 RX ORDER — POLYETHYLENE GLYCOL 3350 17 G/17G
17 POWDER, FOR SOLUTION ORAL DAILY PRN
Status: DISCONTINUED | OUTPATIENT
Start: 2021-01-01 | End: 2021-01-01 | Stop reason: HOSPADM

## 2021-01-01 RX ORDER — POTASSIUM CHLORIDE 750 MG/1
40 CAPSULE, EXTENDED RELEASE ORAL ONCE
Status: COMPLETED | OUTPATIENT
Start: 2021-01-01 | End: 2021-01-01

## 2021-01-01 RX ORDER — ONDANSETRON 4 MG/1
8 TABLET, FILM COATED ORAL EVERY 8 HOURS PRN
Status: DISCONTINUED | OUTPATIENT
Start: 2021-01-01 | End: 2021-01-01 | Stop reason: HOSPADM

## 2021-01-01 RX ORDER — MAGNESIUM SULFATE HEPTAHYDRATE 40 MG/ML
2 INJECTION, SOLUTION INTRAVENOUS AS NEEDED
Status: DISCONTINUED | OUTPATIENT
Start: 2021-01-01 | End: 2021-01-01 | Stop reason: HOSPADM

## 2021-01-01 RX ORDER — LOPERAMIDE HYDROCHLORIDE 2 MG/1
2 CAPSULE ORAL 4 TIMES DAILY PRN
Start: 2021-01-01 | End: 2021-01-01

## 2021-01-01 RX ORDER — OXYCODONE HYDROCHLORIDE 15 MG/1
15 TABLET ORAL EVERY 4 HOURS PRN
Qty: 60 TABLET | Refills: 0 | Status: SHIPPED | OUTPATIENT
Start: 2021-01-01

## 2021-01-01 RX ORDER — ONDANSETRON 2 MG/ML
4 INJECTION INTRAMUSCULAR; INTRAVENOUS ONCE
Status: COMPLETED | OUTPATIENT
Start: 2021-01-01 | End: 2021-01-01

## 2021-01-01 RX ORDER — OXYBUTYNIN CHLORIDE 5 MG/1
5 TABLET ORAL
Status: ON HOLD | COMMUNITY
Start: 2021-01-01 | End: 2021-01-01

## 2021-01-01 RX ORDER — SODIUM CHLORIDE 9 MG/ML
100 INJECTION, SOLUTION INTRAVENOUS CONTINUOUS
Status: DISCONTINUED | OUTPATIENT
Start: 2021-01-01 | End: 2021-01-01

## 2021-01-01 RX ORDER — LACTULOSE 10 G/15ML
20 SOLUTION ORAL 3 TIMES DAILY PRN
Status: DISCONTINUED | OUTPATIENT
Start: 2021-01-01 | End: 2021-01-01 | Stop reason: HOSPADM

## 2021-01-01 RX ORDER — ALLOPURINOL 100 MG/1
100 TABLET ORAL DAILY
Status: DISCONTINUED | OUTPATIENT
Start: 2021-01-01 | End: 2021-01-01 | Stop reason: HOSPADM

## 2021-01-01 RX ORDER — MORPHINE SULFATE 2 MG/ML
1 INJECTION, SOLUTION INTRAMUSCULAR; INTRAVENOUS ONCE
Status: COMPLETED | OUTPATIENT
Start: 2021-01-01 | End: 2021-01-01

## 2021-01-01 RX ORDER — SODIUM CHLORIDE 9 MG/ML
250 INJECTION, SOLUTION INTRAVENOUS ONCE
Status: CANCELLED | OUTPATIENT
Start: 2021-01-01

## 2021-01-01 RX ORDER — SODIUM CHLORIDE 9 MG/ML
9 INJECTION, SOLUTION INTRAVENOUS CONTINUOUS
Status: DISCONTINUED | OUTPATIENT
Start: 2021-01-01 | End: 2021-01-01 | Stop reason: HOSPADM

## 2021-01-01 RX ORDER — HYDROCODONE BITARTRATE AND ACETAMINOPHEN 5; 325 MG/1; MG/1
1 TABLET ORAL EVERY 4 HOURS PRN
Status: DISPENSED | OUTPATIENT
Start: 2021-01-01 | End: 2021-01-01

## 2021-01-01 RX ORDER — LIDOCAINE 50 MG/G
1 PATCH TOPICAL
Status: DISCONTINUED | OUTPATIENT
Start: 2021-01-01 | End: 2021-01-01

## 2021-01-01 RX ORDER — SODIUM CHLORIDE 9 MG/ML
10 INJECTION INTRAVENOUS AS NEEDED
Status: DISCONTINUED | OUTPATIENT
Start: 2021-01-01 | End: 2021-01-01 | Stop reason: HOSPADM

## 2021-01-01 RX ORDER — AMOXICILLIN 250 MG
2 CAPSULE ORAL 2 TIMES DAILY
Status: DISCONTINUED | OUTPATIENT
Start: 2021-01-01 | End: 2021-01-01

## 2021-01-01 RX ORDER — PREDNISONE 20 MG/1
60 TABLET ORAL
Status: COMPLETED | OUTPATIENT
Start: 2021-01-01 | End: 2021-01-01

## 2021-01-01 RX ORDER — DIPHENOXYLATE HYDROCHLORIDE AND ATROPINE SULFATE 2.5; .025 MG/1; MG/1
1 TABLET ORAL DAILY
Status: DISCONTINUED | OUTPATIENT
Start: 2021-01-01 | End: 2021-01-01 | Stop reason: HOSPADM

## 2021-01-01 RX ORDER — OXYCODONE HYDROCHLORIDE 15 MG/1
7.5 TABLET ORAL EVERY 4 HOURS PRN
Status: DISCONTINUED | OUTPATIENT
Start: 2021-01-01 | End: 2021-01-01

## 2021-01-01 RX ORDER — OXYBUTYNIN CHLORIDE 5 MG/1
5 TABLET ORAL 3 TIMES DAILY
COMMUNITY
Start: 2021-01-01 | End: 2021-01-01

## 2021-01-01 RX ORDER — HYDROMORPHONE HYDROCHLORIDE 2 MG/1
1 TABLET ORAL
Status: DISCONTINUED | OUTPATIENT
Start: 2021-01-01 | End: 2021-01-01

## 2021-01-01 RX ORDER — CHOLECALCIFEROL (VITAMIN D3) 125 MCG
2.5 CAPSULE ORAL NIGHTLY
Status: DISCONTINUED | OUTPATIENT
Start: 2021-01-01 | End: 2021-01-01 | Stop reason: HOSPADM

## 2021-01-01 RX ORDER — SODIUM CHLORIDE 0.9 % (FLUSH) 0.9 %
10 SYRINGE (ML) INJECTION EVERY 12 HOURS SCHEDULED
Status: DISCONTINUED | OUTPATIENT
Start: 2021-01-01 | End: 2021-01-01 | Stop reason: HOSPADM

## 2021-01-01 RX ORDER — GLYCOPYRROLATE 0.2 MG/ML
0.1 INJECTION INTRAMUSCULAR; INTRAVENOUS EVERY 4 HOURS PRN
Status: DISCONTINUED | OUTPATIENT
Start: 2021-01-01 | End: 2021-01-01 | Stop reason: HOSPADM

## 2021-01-01 RX ORDER — CALCIUM CARBONATE 200(500)MG
2 TABLET,CHEWABLE ORAL 3 TIMES DAILY PRN
Status: DISCONTINUED | OUTPATIENT
Start: 2021-01-01 | End: 2021-01-01 | Stop reason: HOSPADM

## 2021-01-01 RX ORDER — FLUCONAZOLE 200 MG/1
200 TABLET ORAL EVERY 24 HOURS
Status: DISCONTINUED | OUTPATIENT
Start: 2021-01-01 | End: 2021-01-01 | Stop reason: HOSPADM

## 2021-01-01 RX ORDER — ATORVASTATIN CALCIUM 40 MG/1
80 TABLET, FILM COATED ORAL NIGHTLY
Status: DISCONTINUED | OUTPATIENT
Start: 2021-01-01 | End: 2021-01-01 | Stop reason: HOSPADM

## 2021-01-01 RX ORDER — ONDANSETRON 4 MG/1
4 TABLET, FILM COATED ORAL EVERY 6 HOURS PRN
Status: DISCONTINUED | OUTPATIENT
Start: 2021-01-01 | End: 2021-01-01 | Stop reason: HOSPADM

## 2021-01-01 RX ORDER — METOPROLOL SUCCINATE 50 MG/1
50 TABLET, EXTENDED RELEASE ORAL DAILY
Status: DISCONTINUED | OUTPATIENT
Start: 2021-01-01 | End: 2021-01-01

## 2021-01-01 RX ORDER — GUAIFENESIN 600 MG/1
1200 TABLET, EXTENDED RELEASE ORAL EVERY 12 HOURS SCHEDULED
Status: DISCONTINUED | OUTPATIENT
Start: 2021-01-01 | End: 2021-01-01 | Stop reason: HOSPADM

## 2021-01-01 RX ORDER — SODIUM CHLORIDE 0.9 % (FLUSH) 0.9 %
10 SYRINGE (ML) INJECTION AS NEEDED
Status: DISCONTINUED | OUTPATIENT
Start: 2021-01-01 | End: 2021-01-01 | Stop reason: SDUPTHER

## 2021-01-01 RX ORDER — ACETAMINOPHEN 325 MG/1
650 TABLET ORAL EVERY 4 HOURS PRN
Status: DISCONTINUED | OUTPATIENT
Start: 2021-01-01 | End: 2021-01-01 | Stop reason: HOSPADM

## 2021-01-01 RX ORDER — SODIUM CHLORIDE 9 MG/ML
250 INJECTION, SOLUTION INTRAVENOUS ONCE
Status: COMPLETED | OUTPATIENT
Start: 2021-01-01 | End: 2021-01-01

## 2021-01-01 RX ORDER — SODIUM CHLORIDE 0.9 % (FLUSH) 0.9 %
10 SYRINGE (ML) INJECTION EVERY 12 HOURS SCHEDULED
Status: DISCONTINUED | OUTPATIENT
Start: 2021-01-01 | End: 2021-01-01 | Stop reason: SDUPTHER

## 2021-01-01 RX ORDER — GABAPENTIN 100 MG/1
100 CAPSULE ORAL NIGHTLY
Status: DISCONTINUED | OUTPATIENT
Start: 2021-01-01 | End: 2021-01-01 | Stop reason: HOSPADM

## 2021-01-01 RX ORDER — ASPIRIN 81 MG/1
81 TABLET ORAL DAILY
Status: DISCONTINUED | OUTPATIENT
Start: 2021-01-01 | End: 2021-01-01 | Stop reason: HOSPADM

## 2021-01-01 RX ORDER — AMLODIPINE BESYLATE 5 MG/1
5 TABLET ORAL DAILY
Qty: 30 TABLET | Refills: 2 | Status: SHIPPED | OUTPATIENT
Start: 2021-01-01 | End: 2021-01-01

## 2021-01-01 RX ORDER — AMLODIPINE BESYLATE 10 MG/1
10 TABLET ORAL DAILY
Status: DISCONTINUED | OUTPATIENT
Start: 2021-01-01 | End: 2021-01-01

## 2021-01-01 RX ORDER — IPRATROPIUM BROMIDE AND ALBUTEROL SULFATE 2.5; .5 MG/3ML; MG/3ML
3 SOLUTION RESPIRATORY (INHALATION) EVERY 4 HOURS PRN
Status: DISCONTINUED | OUTPATIENT
Start: 2021-01-01 | End: 2021-01-01 | Stop reason: HOSPADM

## 2021-01-01 RX ORDER — AMLODIPINE BESYLATE 10 MG/1
10 TABLET ORAL DAILY
Qty: 30 TABLET | Refills: 11 | Status: SHIPPED | OUTPATIENT
Start: 2021-01-01 | End: 2021-01-01

## 2021-01-01 RX ORDER — SODIUM CHLORIDE 9 MG/ML
1 INJECTION, SOLUTION INTRAVENOUS CONTINUOUS
Status: ACTIVE | OUTPATIENT
Start: 2021-01-01 | End: 2021-01-01

## 2021-01-01 RX ORDER — SILDENAFIL CITRATE 20 MG/1
20 TABLET ORAL EVERY 8 HOURS SCHEDULED
Status: DISCONTINUED | OUTPATIENT
Start: 2021-01-01 | End: 2021-01-01 | Stop reason: HOSPADM

## 2021-01-01 RX ORDER — ACETAMINOPHEN 160 MG/5ML
650 SOLUTION ORAL EVERY 4 HOURS PRN
Status: DISCONTINUED | OUTPATIENT
Start: 2021-01-01 | End: 2021-01-01 | Stop reason: HOSPADM

## 2021-01-01 RX ORDER — AMOXICILLIN 250 MG
1 CAPSULE ORAL DAILY
Status: DISCONTINUED | OUTPATIENT
Start: 2021-01-01 | End: 2021-01-01 | Stop reason: ALTCHOICE

## 2021-01-01 RX ORDER — AMOXICILLIN 250 MG
1 CAPSULE ORAL 2 TIMES DAILY
Status: DISCONTINUED | OUTPATIENT
Start: 2021-01-01 | End: 2021-01-01

## 2021-01-01 RX ORDER — DEXAMETHASONE 4 MG/1
TABLET ORAL
Qty: 24 TABLET | Refills: 0 | Status: SHIPPED | OUTPATIENT
Start: 2021-01-01 | End: 2021-01-01

## 2021-01-01 RX ORDER — AMOXICILLIN 250 MG
2 CAPSULE ORAL 2 TIMES DAILY
Status: DISCONTINUED | OUTPATIENT
Start: 2021-01-01 | End: 2021-01-01 | Stop reason: HOSPADM

## 2021-01-01 RX ORDER — DEXAMETHASONE 4 MG/1
TABLET ORAL
Qty: 12 TABLET | Refills: 3 | Status: SHIPPED | OUTPATIENT
Start: 2021-01-01 | End: 2021-01-01

## 2021-01-01 RX ORDER — PALONOSETRON 0.05 MG/ML
0.25 INJECTION, SOLUTION INTRAVENOUS ONCE
Status: COMPLETED | OUTPATIENT
Start: 2021-01-01 | End: 2021-01-01

## 2021-01-01 RX ORDER — HYDROMORPHONE HYDROCHLORIDE 1 MG/ML
0.25 INJECTION, SOLUTION INTRAMUSCULAR; INTRAVENOUS; SUBCUTANEOUS ONCE
Status: COMPLETED | OUTPATIENT
Start: 2021-01-01 | End: 2021-01-01

## 2021-01-01 RX ORDER — METOPROLOL SUCCINATE 100 MG/1
100 TABLET, EXTENDED RELEASE ORAL DAILY
Status: DISCONTINUED | OUTPATIENT
Start: 2021-01-01 | End: 2021-01-01

## 2021-01-01 RX ORDER — AMLODIPINE BESYLATE 5 MG/1
5 TABLET ORAL
Status: DISCONTINUED | OUTPATIENT
Start: 2021-01-01 | End: 2021-01-01 | Stop reason: HOSPADM

## 2021-01-01 RX ORDER — POTASSIUM CHLORIDE 7.45 MG/ML
10 INJECTION INTRAVENOUS
Status: DISCONTINUED | OUTPATIENT
Start: 2021-01-01 | End: 2021-01-01 | Stop reason: HOSPADM

## 2021-01-01 RX ORDER — LIDOCAINE HYDROCHLORIDE 20 MG/ML
JELLY TOPICAL AS NEEDED
Status: DISCONTINUED | OUTPATIENT
Start: 2021-01-01 | End: 2021-01-01 | Stop reason: HOSPADM

## 2021-01-01 RX ORDER — ONDANSETRON HYDROCHLORIDE 8 MG/1
8 TABLET, FILM COATED ORAL 3 TIMES DAILY PRN
Qty: 30 TABLET | Refills: 5 | Status: SHIPPED | OUTPATIENT
Start: 2021-01-01

## 2021-01-01 RX ORDER — PHENAZOPYRIDINE HYDROCHLORIDE 100 MG/1
100 TABLET, FILM COATED ORAL ONCE
Status: COMPLETED | OUTPATIENT
Start: 2021-01-01 | End: 2021-01-01

## 2021-01-01 RX ORDER — PALONOSETRON 0.05 MG/ML
0.25 INJECTION, SOLUTION INTRAVENOUS ONCE
Status: CANCELLED | OUTPATIENT
Start: 2021-01-01

## 2021-01-01 RX ORDER — ASPIRIN 81 MG/1
81 TABLET ORAL EVERY MORNING
Status: DISCONTINUED | OUTPATIENT
Start: 2021-01-01 | End: 2021-01-01

## 2021-01-01 RX ORDER — PHENAZOPYRIDINE HYDROCHLORIDE 100 MG/1
100 TABLET, FILM COATED ORAL 3 TIMES DAILY
COMMUNITY
Start: 2021-01-01 | End: 2021-01-01

## 2021-01-01 RX ORDER — DIPHENOXYLATE HYDROCHLORIDE AND ATROPINE SULFATE 2.5; .025 MG/1; MG/1
1 TABLET ORAL DAILY
Status: DISCONTINUED | OUTPATIENT
Start: 2021-01-01 | End: 2021-01-01

## 2021-01-01 RX ORDER — BISACODYL 10 MG
10 SUPPOSITORY, RECTAL RECTAL DAILY PRN
Status: DISCONTINUED | OUTPATIENT
Start: 2021-01-01 | End: 2021-01-01 | Stop reason: HOSPADM

## 2021-01-01 RX ORDER — IBUPROFEN 400 MG/1
400 TABLET ORAL EVERY 6 HOURS PRN
COMMUNITY
Start: 2021-01-01 | End: 2021-01-01

## 2021-01-01 RX ORDER — COVID-19 MOLECULAR TEST ASSAY
KIT MISCELLANEOUS SEE ADMIN INSTRUCTIONS
Status: ON HOLD | COMMUNITY
Start: 2021-01-01 | End: 2021-01-01

## 2021-01-01 RX ORDER — HEPARIN SODIUM 5000 [USP'U]/ML
5000 INJECTION, SOLUTION INTRAVENOUS; SUBCUTANEOUS EVERY 12 HOURS SCHEDULED
Status: DISCONTINUED | OUTPATIENT
Start: 2021-01-01 | End: 2021-01-01

## 2021-01-01 RX ORDER — DIPHENHYDRAMINE HYDROCHLORIDE 50 MG/ML
50 INJECTION INTRAMUSCULAR; INTRAVENOUS AS NEEDED
Status: CANCELLED | OUTPATIENT
Start: 2021-01-01

## 2021-01-01 RX ORDER — GABAPENTIN 100 MG/1
100 CAPSULE ORAL 3 TIMES DAILY
Qty: 30 CAPSULE | Refills: 0 | Status: SHIPPED | OUTPATIENT
Start: 2021-01-01

## 2021-01-01 RX ORDER — SODIUM CHLORIDE 9 MG/ML
100 INJECTION, SOLUTION INTRAVENOUS CONTINUOUS
Status: ACTIVE | OUTPATIENT
Start: 2021-01-01 | End: 2021-01-01

## 2021-01-01 RX ORDER — FAMOTIDINE 10 MG/ML
20 INJECTION, SOLUTION INTRAVENOUS AS NEEDED
Status: CANCELLED | OUTPATIENT
Start: 2021-01-01

## 2021-01-01 RX ORDER — IPRATROPIUM BROMIDE AND ALBUTEROL SULFATE 2.5; .5 MG/3ML; MG/3ML
3 SOLUTION RESPIRATORY (INHALATION)
Status: DISCONTINUED | OUTPATIENT
Start: 2021-01-01 | End: 2021-01-01

## 2021-01-01 RX ORDER — ACETAMINOPHEN 325 MG/1
650 TABLET ORAL EVERY 4 HOURS PRN
Status: DISCONTINUED | OUTPATIENT
Start: 2021-01-01 | End: 2021-01-01

## 2021-01-01 RX ORDER — POTASSIUM CHLORIDE 1.5 G/1.77G
40 POWDER, FOR SOLUTION ORAL AS NEEDED
Status: DISCONTINUED | OUTPATIENT
Start: 2021-01-01 | End: 2021-01-01 | Stop reason: HOSPADM

## 2021-01-01 RX ORDER — LOPERAMIDE HYDROCHLORIDE 2 MG/1
2 CAPSULE ORAL 4 TIMES DAILY PRN
Status: DISCONTINUED | OUTPATIENT
Start: 2021-01-01 | End: 2021-01-01 | Stop reason: HOSPADM

## 2021-01-01 RX ORDER — BACLOFEN 10 MG/1
10 TABLET ORAL ONCE
Status: COMPLETED | OUTPATIENT
Start: 2021-01-01 | End: 2021-01-01

## 2021-01-01 RX ORDER — LORAZEPAM 2 MG/ML
0.5 INJECTION INTRAMUSCULAR EVERY 4 HOURS PRN
Status: DISCONTINUED | OUTPATIENT
Start: 2021-01-01 | End: 2021-01-01 | Stop reason: HOSPADM

## 2021-01-01 RX ORDER — ASCORBIC ACID 500 MG
1000 TABLET ORAL DAILY
Status: DISCONTINUED | OUTPATIENT
Start: 2021-01-01 | End: 2021-01-01

## 2021-01-01 RX ORDER — CHOLECALCIFEROL (VITAMIN D3) 125 MCG
2.5 CAPSULE ORAL NIGHTLY
Start: 2021-01-01 | End: 2021-01-01

## 2021-01-01 RX ORDER — MINERAL OIL 100 G/100G
1 OIL RECTAL ONCE
Status: COMPLETED | OUTPATIENT
Start: 2021-01-01 | End: 2021-01-01

## 2021-01-01 RX ORDER — TAMSULOSIN HYDROCHLORIDE 0.4 MG/1
1 CAPSULE ORAL DAILY
COMMUNITY
End: 2021-01-01

## 2021-01-01 RX ORDER — ONDANSETRON HYDROCHLORIDE 8 MG/1
8 TABLET, FILM COATED ORAL EVERY 8 HOURS PRN
Qty: 30 TABLET | Refills: 5 | Status: SHIPPED | OUTPATIENT
Start: 2021-01-01 | End: 2021-01-01

## 2021-01-01 RX ORDER — ONDANSETRON 4 MG/1
4 TABLET, FILM COATED ORAL EVERY 6 HOURS PRN
Status: DISCONTINUED | OUTPATIENT
Start: 2021-01-01 | End: 2021-01-01

## 2021-01-01 RX ORDER — AMLODIPINE BESYLATE 5 MG/1
5 TABLET ORAL DAILY
Status: DISCONTINUED | OUTPATIENT
Start: 2021-01-01 | End: 2021-01-01 | Stop reason: HOSPADM

## 2021-01-01 RX ORDER — PANTOPRAZOLE SODIUM 40 MG/1
40 TABLET, DELAYED RELEASE ORAL
Status: DISCONTINUED | OUTPATIENT
Start: 2021-01-01 | End: 2021-01-01 | Stop reason: HOSPADM

## 2021-01-01 RX ORDER — AMLODIPINE BESYLATE 5 MG/1
5 TABLET ORAL DAILY
Status: DISCONTINUED | OUTPATIENT
Start: 2021-01-01 | End: 2021-01-01

## 2021-01-01 RX ORDER — BACLOFEN 10 MG/1
10 TABLET ORAL ONCE
Status: CANCELLED
Start: 2021-01-01

## 2021-01-01 RX ORDER — FUROSEMIDE 10 MG/ML
20 INJECTION INTRAMUSCULAR; INTRAVENOUS
Status: COMPLETED | OUTPATIENT
Start: 2021-01-01 | End: 2021-01-01

## 2021-01-01 RX ORDER — ONDANSETRON HYDROCHLORIDE 8 MG/1
TABLET, FILM COATED ORAL EVERY 8 HOURS PRN
COMMUNITY
End: 2021-01-01

## 2021-01-01 RX ORDER — TAMSULOSIN HYDROCHLORIDE 0.4 MG/1
0.4 CAPSULE ORAL DAILY
Status: DISCONTINUED | OUTPATIENT
Start: 2021-01-01 | End: 2021-01-01 | Stop reason: HOSPADM

## 2021-01-01 RX ORDER — HYDROXYZINE HYDROCHLORIDE 10 MG/1
10 TABLET, FILM COATED ORAL 3 TIMES DAILY PRN
Status: DISCONTINUED | OUTPATIENT
Start: 2021-01-01 | End: 2021-01-01 | Stop reason: HOSPADM

## 2021-01-01 RX ORDER — IPRATROPIUM BROMIDE AND ALBUTEROL SULFATE 2.5; .5 MG/3ML; MG/3ML
3 SOLUTION RESPIRATORY (INHALATION)
Status: DISCONTINUED | OUTPATIENT
Start: 2021-01-01 | End: 2021-01-01 | Stop reason: SDUPTHER

## 2021-01-01 RX ORDER — ALPRAZOLAM 0.5 MG/1
0.5 TABLET ORAL 2 TIMES DAILY PRN
Qty: 30 TABLET | Refills: 0 | Status: SHIPPED | OUTPATIENT
Start: 2021-01-01 | End: 2021-01-01

## 2021-01-01 RX ORDER — ONDANSETRON HYDROCHLORIDE 8 MG/1
8 TABLET, FILM COATED ORAL 3 TIMES DAILY PRN
Qty: 30 TABLET | Refills: 5 | Status: SHIPPED | OUTPATIENT
Start: 2021-01-01 | End: 2021-01-01

## 2021-01-01 RX ORDER — ACETAMINOPHEN 325 MG/1
650 TABLET ORAL EVERY 6 HOURS PRN
Status: DISCONTINUED | OUTPATIENT
Start: 2021-01-01 | End: 2021-01-01 | Stop reason: SDUPTHER

## 2021-01-01 RX ORDER — LIDOCAINE HYDROCHLORIDE 10 MG/ML
INJECTION, SOLUTION EPIDURAL; INFILTRATION; INTRACAUDAL; PERINEURAL AS NEEDED
Status: DISCONTINUED | OUTPATIENT
Start: 2021-01-01 | End: 2021-01-01 | Stop reason: HOSPADM

## 2021-01-01 RX ORDER — FUROSEMIDE 10 MG/ML
40 INJECTION INTRAMUSCULAR; INTRAVENOUS DAILY
Status: DISCONTINUED | OUTPATIENT
Start: 2021-01-01 | End: 2021-01-01

## 2021-01-01 RX ORDER — FLUCONAZOLE 200 MG/1
200 TABLET ORAL EVERY 24 HOURS
Qty: 5 TABLET | Refills: 0 | Status: SHIPPED | OUTPATIENT
Start: 2021-01-01 | End: 2021-01-01

## 2021-01-01 RX ORDER — ONDANSETRON 4 MG/1
8 TABLET, FILM COATED ORAL 3 TIMES DAILY PRN
Status: DISCONTINUED | OUTPATIENT
Start: 2021-01-01 | End: 2021-01-01 | Stop reason: ALTCHOICE

## 2021-01-01 RX ORDER — SACUBITRIL AND VALSARTAN 49; 51 MG/1; MG/1
1 TABLET, FILM COATED ORAL 2 TIMES DAILY
Qty: 180 TABLET | Refills: 3 | Status: SHIPPED | OUTPATIENT
Start: 2021-01-01 | End: 2021-01-01 | Stop reason: HOSPADM

## 2021-01-01 RX ORDER — METHYLPREDNISOLONE SODIUM SUCCINATE 125 MG/2ML
60 INJECTION, POWDER, LYOPHILIZED, FOR SOLUTION INTRAMUSCULAR; INTRAVENOUS EVERY 12 HOURS
Status: COMPLETED | OUTPATIENT
Start: 2021-01-01 | End: 2021-01-01

## 2021-01-01 RX ORDER — PREDNISONE 20 MG/1
60 TABLET ORAL
Status: DISCONTINUED | OUTPATIENT
Start: 2021-01-01 | End: 2021-01-01

## 2021-01-01 RX ORDER — LIDOCAINE 50 MG/G
1 PATCH TOPICAL EVERY 24 HOURS
Status: DISCONTINUED | OUTPATIENT
Start: 2021-01-01 | End: 2021-01-01

## 2021-01-01 RX ORDER — POTASSIUM CHLORIDE 750 MG/1
40 CAPSULE, EXTENDED RELEASE ORAL AS NEEDED
Status: DISCONTINUED | OUTPATIENT
Start: 2021-01-01 | End: 2021-01-01 | Stop reason: HOSPADM

## 2021-01-01 RX ORDER — HYDROCODONE BITARTRATE AND ACETAMINOPHEN 5; 325 MG/1; MG/1
1 TABLET ORAL EVERY 6 HOURS PRN
Qty: 45 TABLET | Refills: 0 | Status: SHIPPED | OUTPATIENT
Start: 2021-01-01

## 2021-01-01 RX ORDER — METHYLPREDNISOLONE SODIUM SUCCINATE 125 MG/2ML
60 INJECTION, POWDER, LYOPHILIZED, FOR SOLUTION INTRAMUSCULAR; INTRAVENOUS EVERY 8 HOURS
Status: DISCONTINUED | OUTPATIENT
Start: 2021-01-01 | End: 2021-01-01

## 2021-01-01 RX ORDER — PREDNISONE 20 MG/1
40 TABLET ORAL
Status: COMPLETED | OUTPATIENT
Start: 2021-01-01 | End: 2021-01-01

## 2021-01-01 RX ORDER — MORPHINE SULFATE 2 MG/ML
0.5 INJECTION, SOLUTION INTRAMUSCULAR; INTRAVENOUS ONCE AS NEEDED
Status: COMPLETED | OUTPATIENT
Start: 2021-01-01 | End: 2021-01-01

## 2021-01-01 RX ORDER — OXYCODONE HYDROCHLORIDE 5 MG/1
5 TABLET ORAL ONCE AS NEEDED
Status: COMPLETED | OUTPATIENT
Start: 2021-01-01 | End: 2021-01-01

## 2021-01-01 RX ORDER — DIPHENHYDRAMINE HYDROCHLORIDE 50 MG/ML
6 INJECTION INTRAMUSCULAR; INTRAVENOUS NIGHTLY
Status: DISCONTINUED | OUTPATIENT
Start: 2021-01-01 | End: 2021-01-01 | Stop reason: HOSPADM

## 2021-01-01 RX ORDER — BISACODYL 5 MG/1
5 TABLET, DELAYED RELEASE ORAL DAILY PRN
Status: DISCONTINUED | OUTPATIENT
Start: 2021-01-01 | End: 2021-01-01 | Stop reason: HOSPADM

## 2021-01-01 RX ORDER — SODIUM CHLORIDE 9 MG/ML
250 INJECTION, SOLUTION INTRAVENOUS ONCE
Status: DISCONTINUED | OUTPATIENT
Start: 2021-01-01 | End: 2021-01-01 | Stop reason: SDUPTHER

## 2021-01-01 RX ORDER — LORAZEPAM 0.5 MG/1
0.5 TABLET ORAL EVERY 4 HOURS PRN
Status: DISCONTINUED | OUTPATIENT
Start: 2021-01-01 | End: 2021-01-01

## 2021-01-01 RX ORDER — LEVOFLOXACIN 250 MG/1
250 TABLET ORAL DAILY
COMMUNITY
Start: 2021-01-01 | End: 2021-01-01

## 2021-01-01 RX ORDER — MAGNESIUM SULFATE HEPTAHYDRATE 40 MG/ML
4 INJECTION, SOLUTION INTRAVENOUS AS NEEDED
Status: DISCONTINUED | OUTPATIENT
Start: 2021-01-01 | End: 2021-01-01 | Stop reason: HOSPADM

## 2021-01-01 RX ORDER — NALOXONE HCL 0.4 MG/ML
0.1 VIAL (ML) INJECTION
Status: DISCONTINUED | OUTPATIENT
Start: 2021-01-01 | End: 2021-01-01 | Stop reason: HOSPADM

## 2021-01-01 RX ORDER — BACLOFEN 10 MG/1
10 TABLET ORAL 3 TIMES DAILY
Qty: 90 TABLET | Refills: 2 | Status: SHIPPED | OUTPATIENT
Start: 2021-01-01 | End: 2021-01-01

## 2021-01-01 RX ORDER — GABAPENTIN 100 MG/1
100 CAPSULE ORAL 3 TIMES DAILY
Status: DISCONTINUED | OUTPATIENT
Start: 2021-01-01 | End: 2021-01-01

## 2021-01-01 RX ADMIN — ASPIRIN 81 MG: 81 TABLET, COATED ORAL at 08:19

## 2021-01-01 RX ADMIN — IPRATROPIUM BROMIDE AND ALBUTEROL SULFATE 3 ML: 2.5; .5 SOLUTION RESPIRATORY (INHALATION) at 20:02

## 2021-01-01 RX ADMIN — IPRATROPIUM BROMIDE AND ALBUTEROL SULFATE 3 ML: 2.5; .5 SOLUTION RESPIRATORY (INHALATION) at 12:33

## 2021-01-01 RX ADMIN — MORPHINE SULFATE 1 MG: 2 INJECTION, SOLUTION INTRAMUSCULAR; INTRAVENOUS at 06:26

## 2021-01-01 RX ADMIN — ACETAMINOPHEN 650 MG: 325 TABLET ORAL at 10:27

## 2021-01-01 RX ADMIN — GUAIFENESIN 1200 MG: 600 TABLET, EXTENDED RELEASE ORAL at 08:39

## 2021-01-01 RX ADMIN — SILDENAFIL CITRATE 20 MG: 20 TABLET ORAL at 06:44

## 2021-01-01 RX ADMIN — SODIUM CHLORIDE 500 ML: 9 INJECTION, SOLUTION INTRAVENOUS at 17:18

## 2021-01-01 RX ADMIN — MULTIVITAMIN TABLET 1 TABLET: TABLET at 09:28

## 2021-01-01 RX ADMIN — SILDENAFIL CITRATE 20 MG: 20 TABLET ORAL at 16:48

## 2021-01-01 RX ADMIN — SILDENAFIL CITRATE 20 MG: 20 TABLET ORAL at 04:47

## 2021-01-01 RX ADMIN — METHYLPREDNISOLONE SODIUM SUCCINATE 60 MG: 125 INJECTION, POWDER, FOR SOLUTION INTRAMUSCULAR; INTRAVENOUS at 18:32

## 2021-01-01 RX ADMIN — SODIUM CHLORIDE, PRESERVATIVE FREE 10 ML: 5 INJECTION INTRAVENOUS at 21:43

## 2021-01-01 RX ADMIN — POTASSIUM CHLORIDE 20 MEQ: 10 CAPSULE, COATED, EXTENDED RELEASE ORAL at 13:37

## 2021-01-01 RX ADMIN — HYDROMORPHONE HYDROCHLORIDE 0.25 MG: 1 INJECTION, SOLUTION INTRAMUSCULAR; INTRAVENOUS; SUBCUTANEOUS at 01:52

## 2021-01-01 RX ADMIN — GABAPENTIN 100 MG: 100 CAPSULE ORAL at 09:36

## 2021-01-01 RX ADMIN — IPRATROPIUM BROMIDE AND ALBUTEROL SULFATE 3 ML: 2.5; .5 SOLUTION RESPIRATORY (INHALATION) at 11:32

## 2021-01-01 RX ADMIN — IPRATROPIUM BROMIDE AND ALBUTEROL SULFATE 3 ML: 2.5; .5 SOLUTION RESPIRATORY (INHALATION) at 07:37

## 2021-01-01 RX ADMIN — IPRATROPIUM BROMIDE AND ALBUTEROL SULFATE 3 ML: 2.5; .5 SOLUTION RESPIRATORY (INHALATION) at 07:19

## 2021-01-01 RX ADMIN — SODIUM CHLORIDE, PRESERVATIVE FREE 10 ML: 5 INJECTION INTRAVENOUS at 21:49

## 2021-01-01 RX ADMIN — SODIUM CHLORIDE 500 ML: 9 INJECTION, SOLUTION INTRAVENOUS at 14:52

## 2021-01-01 RX ADMIN — ATORVASTATIN CALCIUM 80 MG: 40 TABLET, FILM COATED ORAL at 20:44

## 2021-01-01 RX ADMIN — DICLOFENAC 4 G: 10 GEL TOPICAL at 18:22

## 2021-01-01 RX ADMIN — ASPIRIN 81 MG: 81 TABLET, COATED ORAL at 06:02

## 2021-01-01 RX ADMIN — PANTOPRAZOLE SODIUM 40 MG: 40 TABLET, DELAYED RELEASE ORAL at 04:43

## 2021-01-01 RX ADMIN — GABAPENTIN 100 MG: 100 CAPSULE ORAL at 16:37

## 2021-01-01 RX ADMIN — IPRATROPIUM BROMIDE AND ALBUTEROL SULFATE 3 ML: 2.5; .5 SOLUTION RESPIRATORY (INHALATION) at 12:59

## 2021-01-01 RX ADMIN — HYDROCODONE BITARTRATE AND ACETAMINOPHEN 1 TABLET: 5; 325 TABLET ORAL at 13:37

## 2021-01-01 RX ADMIN — SODIUM CHLORIDE, PRESERVATIVE FREE 10 ML: 5 INJECTION INTRAVENOUS at 09:31

## 2021-01-01 RX ADMIN — SODIUM CHLORIDE, PRESERVATIVE FREE 10 ML: 5 INJECTION INTRAVENOUS at 22:32

## 2021-01-01 RX ADMIN — ERTAPENEM 1 G: 1 INJECTION, POWDER, LYOPHILIZED, FOR SOLUTION INTRAMUSCULAR; INTRAVENOUS at 18:06

## 2021-01-01 RX ADMIN — DOCUSATE SODIUM 50MG AND SENNOSIDES 8.6MG 1 TABLET: 8.6; 5 TABLET, FILM COATED ORAL at 20:31

## 2021-01-01 RX ADMIN — HYDROMORPHONE HYDROCHLORIDE 0.25 MG: 1 INJECTION, SOLUTION INTRAMUSCULAR; INTRAVENOUS; SUBCUTANEOUS at 08:59

## 2021-01-01 RX ADMIN — HYDROXYZINE HYDROCHLORIDE 10 MG: 10 TABLET, FILM COATED ORAL at 22:31

## 2021-01-01 RX ADMIN — ACETAMINOPHEN 650 MG: 325 TABLET ORAL at 21:22

## 2021-01-01 RX ADMIN — DOCUSATE SODIUM 50MG AND SENNOSIDES 8.6MG 2 TABLET: 8.6; 5 TABLET, FILM COATED ORAL at 21:43

## 2021-01-01 RX ADMIN — GABAPENTIN 100 MG: 100 CAPSULE ORAL at 20:35

## 2021-01-01 RX ADMIN — ALLOPURINOL 100 MG: 100 TABLET ORAL at 08:42

## 2021-01-01 RX ADMIN — METOPROLOL TARTRATE 25 MG: 25 TABLET, FILM COATED ORAL at 10:28

## 2021-01-01 RX ADMIN — ATORVASTATIN CALCIUM 80 MG: 40 TABLET, FILM COATED ORAL at 21:12

## 2021-01-01 RX ADMIN — SODIUM CHLORIDE, PRESERVATIVE FREE 10 ML: 5 INJECTION INTRAVENOUS at 20:44

## 2021-01-01 RX ADMIN — ASPIRIN 81 MG: 81 TABLET, COATED ORAL at 10:02

## 2021-01-01 RX ADMIN — METOPROLOL TARTRATE 25 MG: 25 TABLET, FILM COATED ORAL at 20:35

## 2021-01-01 RX ADMIN — POTASSIUM CHLORIDE 1000 ML: 2 INJECTION, SOLUTION, CONCENTRATE INTRAVENOUS at 14:38

## 2021-01-01 RX ADMIN — Medication 5 MG: at 00:04

## 2021-01-01 RX ADMIN — SODIUM CHLORIDE, PRESERVATIVE FREE 10 ML: 5 INJECTION INTRAVENOUS at 21:25

## 2021-01-01 RX ADMIN — POTASSIUM PHOSPHATE, MONOBASIC AND POTASSIUM PHOSPHATE, DIBASIC 15 MMOL: 224; 236 INJECTION, SOLUTION, CONCENTRATE INTRAVENOUS at 01:25

## 2021-01-01 RX ADMIN — SODIUM CHLORIDE, PRESERVATIVE FREE 10 ML: 5 INJECTION INTRAVENOUS at 09:23

## 2021-01-01 RX ADMIN — HYDROXYZINE HYDROCHLORIDE 10 MG: 10 TABLET, FILM COATED ORAL at 20:34

## 2021-01-01 RX ADMIN — SILDENAFIL CITRATE 20 MG: 20 TABLET ORAL at 09:58

## 2021-01-01 RX ADMIN — MORPHINE SULFATE 1 MG: 2 INJECTION, SOLUTION INTRAMUSCULAR; INTRAVENOUS at 10:58

## 2021-01-01 RX ADMIN — AMLODIPINE BESYLATE 10 MG: 10 TABLET ORAL at 09:32

## 2021-01-01 RX ADMIN — METOPROLOL SUCCINATE 100 MG: 100 TABLET, EXTENDED RELEASE ORAL at 08:39

## 2021-01-01 RX ADMIN — ONDANSETRON 4 MG: 2 INJECTION INTRAMUSCULAR; INTRAVENOUS at 08:59

## 2021-01-01 RX ADMIN — GABAPENTIN 100 MG: 100 CAPSULE ORAL at 20:51

## 2021-01-01 RX ADMIN — IPRATROPIUM BROMIDE AND ALBUTEROL SULFATE 3 ML: 2.5; .5 SOLUTION RESPIRATORY (INHALATION) at 20:10

## 2021-01-01 RX ADMIN — METOPROLOL TARTRATE 25 MG: 25 TABLET, FILM COATED ORAL at 09:21

## 2021-01-01 RX ADMIN — TAMSULOSIN HYDROCHLORIDE 0.4 MG: 0.4 CAPSULE ORAL at 08:41

## 2021-01-01 RX ADMIN — METHYLPREDNISOLONE SODIUM SUCCINATE 60 MG: 125 INJECTION, POWDER, FOR SOLUTION INTRAMUSCULAR; INTRAVENOUS at 01:00

## 2021-01-01 RX ADMIN — ALLOPURINOL 100 MG: 100 TABLET ORAL at 08:39

## 2021-01-01 RX ADMIN — THERA TABS 1 TABLET: TAB at 09:03

## 2021-01-01 RX ADMIN — HEPARIN SODIUM 5000 UNITS: 5000 INJECTION INTRAVENOUS; SUBCUTANEOUS at 21:21

## 2021-01-01 RX ADMIN — ASPIRIN 81 MG: 81 TABLET, COATED ORAL at 14:27

## 2021-01-01 RX ADMIN — HEPARIN SODIUM 5000 UNITS: 5000 INJECTION INTRAVENOUS; SUBCUTANEOUS at 21:43

## 2021-01-01 RX ADMIN — PANTOPRAZOLE SODIUM 40 MG: 40 TABLET, DELAYED RELEASE ORAL at 05:12

## 2021-01-01 RX ADMIN — AMLODIPINE BESYLATE 5 MG: 5 TABLET ORAL at 09:21

## 2021-01-01 RX ADMIN — DIPHENHYDRAMINE HYDROCHLORIDE 6 MG: 50 INJECTION INTRAMUSCULAR; INTRAVENOUS at 20:12

## 2021-01-01 RX ADMIN — ALLOPURINOL 100 MG: 100 TABLET ORAL at 08:23

## 2021-01-01 RX ADMIN — DOCUSATE SODIUM 50MG AND SENNOSIDES 8.6MG 1 TABLET: 8.6; 5 TABLET, FILM COATED ORAL at 08:16

## 2021-01-01 RX ADMIN — ALLOPURINOL 100 MG: 100 TABLET ORAL at 09:06

## 2021-01-01 RX ADMIN — GLYCOPYRROLATE 0.1 MG: 0.2 INJECTION INTRAMUSCULAR; INTRAVENOUS at 08:59

## 2021-01-01 RX ADMIN — HYDROMORPHONE HYDROCHLORIDE 0.25 MG: 1 INJECTION, SOLUTION INTRAMUSCULAR; INTRAVENOUS; SUBCUTANEOUS at 00:28

## 2021-01-01 RX ADMIN — SODIUM CHLORIDE, PRESERVATIVE FREE 10 ML: 5 INJECTION INTRAVENOUS at 10:03

## 2021-01-01 RX ADMIN — Medication 5 MG: at 22:03

## 2021-01-01 RX ADMIN — ATORVASTATIN CALCIUM 80 MG: 40 TABLET, FILM COATED ORAL at 20:14

## 2021-01-01 RX ADMIN — ACETAMINOPHEN 650 MG: 325 TABLET ORAL at 09:21

## 2021-01-01 RX ADMIN — OXYCODONE HYDROCHLORIDE 7.5 MG: 15 TABLET ORAL at 18:15

## 2021-01-01 RX ADMIN — AMLODIPINE BESYLATE 10 MG: 10 TABLET ORAL at 08:24

## 2021-01-01 RX ADMIN — IPRATROPIUM BROMIDE AND ALBUTEROL SULFATE 3 ML: 2.5; .5 SOLUTION RESPIRATORY (INHALATION) at 17:04

## 2021-01-01 RX ADMIN — SODIUM CHLORIDE 1000 ML/HR: 9 INJECTION, SOLUTION INTRAVENOUS at 09:39

## 2021-01-01 RX ADMIN — FUROSEMIDE 40 MG: 10 INJECTION, SOLUTION INTRAVENOUS at 12:22

## 2021-01-01 RX ADMIN — IOPAMIDOL 90 ML: 755 INJECTION, SOLUTION INTRAVENOUS at 16:09

## 2021-01-01 RX ADMIN — DEXAMETHASONE SODIUM PHOSPHATE 12 MG: 10 INJECTION, SOLUTION INTRAMUSCULAR; INTRAVENOUS at 10:50

## 2021-01-01 RX ADMIN — DICLOFENAC 4 G: 10 GEL TOPICAL at 21:04

## 2021-01-01 RX ADMIN — ATORVASTATIN CALCIUM 80 MG: 40 TABLET, FILM COATED ORAL at 20:06

## 2021-01-01 RX ADMIN — METHYLPREDNISOLONE SODIUM SUCCINATE 60 MG: 125 INJECTION, POWDER, FOR SOLUTION INTRAMUSCULAR; INTRAVENOUS at 09:04

## 2021-01-01 RX ADMIN — DOCUSATE SODIUM 50MG AND SENNOSIDES 8.6MG 2 TABLET: 8.6; 5 TABLET, FILM COATED ORAL at 20:35

## 2021-01-01 RX ADMIN — THERA TABS 1 TABLET: TAB at 09:09

## 2021-01-01 RX ADMIN — SODIUM CHLORIDE 150 MG: 9 INJECTION, SOLUTION INTRAVENOUS at 12:08

## 2021-01-01 RX ADMIN — TAMSULOSIN HYDROCHLORIDE 0.4 MG: 0.4 CAPSULE ORAL at 09:20

## 2021-01-01 RX ADMIN — SODIUM CHLORIDE, PRESERVATIVE FREE 10 ML: 5 INJECTION INTRAVENOUS at 08:17

## 2021-01-01 RX ADMIN — METOPROLOL TARTRATE 25 MG: 25 TABLET, FILM COATED ORAL at 08:42

## 2021-01-01 RX ADMIN — Medication 2.5 MG: at 20:12

## 2021-01-01 RX ADMIN — FUROSEMIDE 20 MG: 10 INJECTION INTRAMUSCULAR; INTRAVENOUS at 08:54

## 2021-01-01 RX ADMIN — ATORVASTATIN CALCIUM 80 MG: 40 TABLET, FILM COATED ORAL at 20:12

## 2021-01-01 RX ADMIN — PREDNISONE 60 MG: 20 TABLET ORAL at 08:17

## 2021-01-01 RX ADMIN — ASPIRIN 81 MG: 81 TABLET, COATED ORAL at 10:27

## 2021-01-01 RX ADMIN — SILDENAFIL CITRATE 20 MG: 20 TABLET ORAL at 13:27

## 2021-01-01 RX ADMIN — ACETAMINOPHEN 650 MG: 325 TABLET ORAL at 20:14

## 2021-01-01 RX ADMIN — THERA TABS 1 TABLET: TAB at 08:49

## 2021-01-01 RX ADMIN — HYDROCODONE BITARTRATE AND ACETAMINOPHEN 1 TABLET: 5; 325 TABLET ORAL at 21:58

## 2021-01-01 RX ADMIN — ALLOPURINOL 100 MG: 100 TABLET ORAL at 10:03

## 2021-01-01 RX ADMIN — ASPIRIN 81 MG: 81 TABLET, COATED ORAL at 08:32

## 2021-01-01 RX ADMIN — METHYLPREDNISOLONE SODIUM SUCCINATE 60 MG: 125 INJECTION, POWDER, FOR SOLUTION INTRAMUSCULAR; INTRAVENOUS at 21:22

## 2021-01-01 RX ADMIN — PREDNISONE 20 MG: 20 TABLET ORAL at 09:50

## 2021-01-01 RX ADMIN — BISACODYL 10 MG: 10 SUPPOSITORY RECTAL at 13:09

## 2021-01-01 RX ADMIN — IPRATROPIUM BROMIDE AND ALBUTEROL SULFATE 3 ML: 2.5; .5 SOLUTION RESPIRATORY (INHALATION) at 16:09

## 2021-01-01 RX ADMIN — SODIUM CHLORIDE 250 ML: 9 INJECTION, SOLUTION INTRAVENOUS at 12:05

## 2021-01-01 RX ADMIN — DEXAMETHASONE SODIUM PHOSPHATE 12 MG: 10 INJECTION, SOLUTION INTRAMUSCULAR; INTRAVENOUS at 12:29

## 2021-01-01 RX ADMIN — MORPHINE SULFATE 0.5 MG: 2 INJECTION, SOLUTION INTRAMUSCULAR; INTRAVENOUS at 05:10

## 2021-01-01 RX ADMIN — METOPROLOL TARTRATE 25 MG: 25 TABLET, FILM COATED ORAL at 21:12

## 2021-01-01 RX ADMIN — ASPIRIN 81 MG: 81 TABLET, COATED ORAL at 09:28

## 2021-01-01 RX ADMIN — NYSTATIN 500000 UNITS: 100000 SUSPENSION ORAL at 09:20

## 2021-01-01 RX ADMIN — ERTAPENEM 1 G: 1 INJECTION, POWDER, LYOPHILIZED, FOR SOLUTION INTRAMUSCULAR; INTRAVENOUS at 17:21

## 2021-01-01 RX ADMIN — APIXABAN 2.5 MG: 2.5 TABLET, FILM COATED ORAL at 08:32

## 2021-01-01 RX ADMIN — METOPROLOL TARTRATE 25 MG: 25 TABLET, FILM COATED ORAL at 09:28

## 2021-01-01 RX ADMIN — ERTAPENEM SODIUM 500 MG: 1 INJECTION, POWDER, LYOPHILIZED, FOR SOLUTION INTRAMUSCULAR; INTRAVENOUS at 17:59

## 2021-01-01 RX ADMIN — HEPARIN SODIUM 5000 UNITS: 5000 INJECTION INTRAVENOUS; SUBCUTANEOUS at 22:04

## 2021-01-01 RX ADMIN — ONDANSETRON 4 MG: 2 INJECTION INTRAMUSCULAR; INTRAVENOUS at 14:50

## 2021-01-01 RX ADMIN — METOPROLOL SUCCINATE 50 MG: 50 TABLET, EXTENDED RELEASE ORAL at 12:05

## 2021-01-01 RX ADMIN — AMLODIPINE BESYLATE 5 MG: 5 TABLET ORAL at 08:32

## 2021-01-01 RX ADMIN — AMLODIPINE BESYLATE 10 MG: 10 TABLET ORAL at 09:04

## 2021-01-01 RX ADMIN — DOCUSATE SODIUM 50MG AND SENNOSIDES 8.6MG 2 TABLET: 8.6; 5 TABLET, FILM COATED ORAL at 20:13

## 2021-01-01 RX ADMIN — METOPROLOL TARTRATE 12.5 MG: 25 TABLET, FILM COATED ORAL at 20:05

## 2021-01-01 RX ADMIN — ASPIRIN 81 MG: 81 TABLET, COATED ORAL at 06:44

## 2021-01-01 RX ADMIN — METHYLPREDNISOLONE SODIUM SUCCINATE 60 MG: 125 INJECTION, POWDER, FOR SOLUTION INTRAMUSCULAR; INTRAVENOUS at 02:33

## 2021-01-01 RX ADMIN — TAMSULOSIN HYDROCHLORIDE 0.4 MG: 0.4 CAPSULE ORAL at 08:36

## 2021-01-01 RX ADMIN — OXYCODONE HYDROCHLORIDE AND ACETAMINOPHEN 1000 MG: 500 TABLET ORAL at 09:10

## 2021-01-01 RX ADMIN — METOPROLOL SUCCINATE 50 MG: 50 TABLET, EXTENDED RELEASE ORAL at 08:52

## 2021-01-01 RX ADMIN — HEPARIN SODIUM 5000 UNITS: 5000 INJECTION INTRAVENOUS; SUBCUTANEOUS at 08:50

## 2021-01-01 RX ADMIN — ASPIRIN 81 MG: 81 TABLET, COATED ORAL at 09:36

## 2021-01-01 RX ADMIN — TAMSULOSIN HYDROCHLORIDE 0.4 MG: 0.4 CAPSULE ORAL at 09:33

## 2021-01-01 RX ADMIN — SODIUM CHLORIDE, PRESERVATIVE FREE 10 ML: 5 INJECTION INTRAVENOUS at 08:42

## 2021-01-01 RX ADMIN — DICLOFENAC 4 G: 10 GEL TOPICAL at 09:31

## 2021-01-01 RX ADMIN — METOPROLOL TARTRATE 25 MG: 25 TABLET, FILM COATED ORAL at 20:34

## 2021-01-01 RX ADMIN — ERTAPENEM SODIUM 500 MG: 1 INJECTION, POWDER, LYOPHILIZED, FOR SOLUTION INTRAMUSCULAR; INTRAVENOUS at 20:24

## 2021-01-01 RX ADMIN — SODIUM CHLORIDE, PRESERVATIVE FREE 10 ML: 5 INJECTION INTRAVENOUS at 08:40

## 2021-01-01 RX ADMIN — ANTACID TABLETS 2 TABLET: 500 TABLET, CHEWABLE ORAL at 04:42

## 2021-01-01 RX ADMIN — SODIUM CHLORIDE, PRESERVATIVE FREE 10 ML: 5 INJECTION INTRAVENOUS at 20:51

## 2021-01-01 RX ADMIN — IPRATROPIUM BROMIDE AND ALBUTEROL SULFATE 3 ML: 2.5; .5 SOLUTION RESPIRATORY (INHALATION) at 08:13

## 2021-01-01 RX ADMIN — GUAIFENESIN 1200 MG: 600 TABLET, EXTENDED RELEASE ORAL at 09:28

## 2021-01-01 RX ADMIN — FUROSEMIDE 40 MG: 10 INJECTION, SOLUTION INTRAVENOUS at 18:27

## 2021-01-01 RX ADMIN — GABAPENTIN 100 MG: 100 CAPSULE ORAL at 21:20

## 2021-01-01 RX ADMIN — FLUCONAZOLE 200 MG: 200 TABLET ORAL at 17:08

## 2021-01-01 RX ADMIN — SODIUM CHLORIDE, PRESERVATIVE FREE 10 ML: 5 INJECTION INTRAVENOUS at 20:34

## 2021-01-01 RX ADMIN — AMLODIPINE BESYLATE 5 MG: 5 TABLET ORAL at 08:39

## 2021-01-01 RX ADMIN — ACETAMINOPHEN 650 MG: 325 TABLET ORAL at 19:42

## 2021-01-01 RX ADMIN — DICLOFENAC 4 G: 10 GEL TOPICAL at 08:24

## 2021-01-01 RX ADMIN — ALLOPURINOL 100 MG: 100 TABLET ORAL at 08:20

## 2021-01-01 RX ADMIN — HYDROXYZINE HYDROCHLORIDE 10 MG: 10 TABLET, FILM COATED ORAL at 21:20

## 2021-01-01 RX ADMIN — DOCUSATE SODIUM 50MG AND SENNOSIDES 8.6MG 2 TABLET: 8.6; 5 TABLET, FILM COATED ORAL at 08:51

## 2021-01-01 RX ADMIN — SODIUM CHLORIDE, PRESERVATIVE FREE 10 ML: 5 INJECTION INTRAVENOUS at 09:51

## 2021-01-01 RX ADMIN — DOCUSATE SODIUM 50MG AND SENNOSIDES 8.6MG 1 TABLET: 8.6; 5 TABLET, FILM COATED ORAL at 10:02

## 2021-01-01 RX ADMIN — DICLOFENAC 4 G: 10 GEL TOPICAL at 21:49

## 2021-01-01 RX ADMIN — HEPARIN SODIUM 5000 UNITS: 5000 INJECTION INTRAVENOUS; SUBCUTANEOUS at 09:02

## 2021-01-01 RX ADMIN — METOPROLOL TARTRATE 25 MG: 25 TABLET, FILM COATED ORAL at 09:32

## 2021-01-01 RX ADMIN — AMLODIPINE BESYLATE 5 MG: 5 TABLET ORAL at 09:33

## 2021-01-01 RX ADMIN — METOPROLOL TARTRATE 25 MG: 25 TABLET, FILM COATED ORAL at 08:36

## 2021-01-01 RX ADMIN — ALLOPURINOL 100 MG: 100 TABLET ORAL at 09:33

## 2021-01-01 RX ADMIN — OXYCODONE HYDROCHLORIDE 5 MG: 5 TABLET ORAL at 23:48

## 2021-01-01 RX ADMIN — FUROSEMIDE 40 MG: 10 INJECTION INTRAMUSCULAR; INTRAVENOUS at 18:16

## 2021-01-01 RX ADMIN — IPRATROPIUM BROMIDE AND ALBUTEROL SULFATE 3 ML: 2.5; .5 SOLUTION RESPIRATORY (INHALATION) at 15:32

## 2021-01-01 RX ADMIN — THERA TABS 1 TABLET: TAB at 09:39

## 2021-01-01 RX ADMIN — OXYCODONE HYDROCHLORIDE 7.5 MG: 15 TABLET ORAL at 17:59

## 2021-01-01 RX ADMIN — GEMCITABINE 2000 MG: 38 INJECTION, SOLUTION INTRAVENOUS at 11:36

## 2021-01-01 RX ADMIN — POLYETHYLENE GLYCOL 3350 17 G: 17 POWDER, FOR SOLUTION ORAL at 16:53

## 2021-01-01 RX ADMIN — METOPROLOL TARTRATE 25 MG: 25 TABLET, FILM COATED ORAL at 21:21

## 2021-01-01 RX ADMIN — ALLOPURINOL 100 MG: 100 TABLET ORAL at 08:36

## 2021-01-01 RX ADMIN — DOCUSATE SODIUM 50MG AND SENNOSIDES 8.6MG 2 TABLET: 8.6; 5 TABLET, FILM COATED ORAL at 19:43

## 2021-01-01 RX ADMIN — SODIUM CHLORIDE 100 ML/HR: 9 INJECTION, SOLUTION INTRAVENOUS at 21:25

## 2021-01-01 RX ADMIN — ASPIRIN 81 MG: 81 TABLET, COATED ORAL at 04:42

## 2021-01-01 RX ADMIN — AMLODIPINE BESYLATE 10 MG: 10 TABLET ORAL at 09:09

## 2021-01-01 RX ADMIN — ATORVASTATIN CALCIUM 80 MG: 40 TABLET, FILM COATED ORAL at 21:25

## 2021-01-01 RX ADMIN — PANTOPRAZOLE SODIUM 40 MG: 40 TABLET, DELAYED RELEASE ORAL at 05:41

## 2021-01-01 RX ADMIN — HYDROXYZINE HYDROCHLORIDE 10 MG: 10 TABLET, FILM COATED ORAL at 04:55

## 2021-01-01 RX ADMIN — ASPIRIN 81 MG: 81 TABLET, COATED ORAL at 08:16

## 2021-01-01 RX ADMIN — GABAPENTIN 100 MG: 100 CAPSULE ORAL at 21:49

## 2021-01-01 RX ADMIN — DOCUSATE SODIUM 50MG AND SENNOSIDES 8.6MG 1 TABLET: 8.6; 5 TABLET, FILM COATED ORAL at 09:06

## 2021-01-01 RX ADMIN — AMLODIPINE BESYLATE 10 MG: 10 TABLET ORAL at 08:49

## 2021-01-01 RX ADMIN — HEPARIN SODIUM 5000 UNITS: 5000 INJECTION INTRAVENOUS; SUBCUTANEOUS at 20:13

## 2021-01-01 RX ADMIN — ASPIRIN 81 MG: 81 TABLET, COATED ORAL at 08:39

## 2021-01-01 RX ADMIN — ASPIRIN 81 MG: 81 TABLET, COATED ORAL at 08:49

## 2021-01-01 RX ADMIN — IPRATROPIUM BROMIDE AND ALBUTEROL SULFATE 3 ML: 2.5; .5 SOLUTION RESPIRATORY (INHALATION) at 15:15

## 2021-01-01 RX ADMIN — IPRATROPIUM BROMIDE AND ALBUTEROL SULFATE 3 ML: 2.5; .5 SOLUTION RESPIRATORY (INHALATION) at 07:55

## 2021-01-01 RX ADMIN — HYDROXYZINE HYDROCHLORIDE 10 MG: 10 TABLET, FILM COATED ORAL at 22:03

## 2021-01-01 RX ADMIN — HEPARIN SODIUM 5000 UNITS: 5000 INJECTION INTRAVENOUS; SUBCUTANEOUS at 08:20

## 2021-01-01 RX ADMIN — AMLODIPINE BESYLATE 10 MG: 10 TABLET ORAL at 08:17

## 2021-01-01 RX ADMIN — METOPROLOL TARTRATE 25 MG: 25 TABLET, FILM COATED ORAL at 08:19

## 2021-01-01 RX ADMIN — METHYLPREDNISOLONE SODIUM SUCCINATE 60 MG: 125 INJECTION, POWDER, FOR SOLUTION INTRAMUSCULAR; INTRAVENOUS at 17:27

## 2021-01-01 RX ADMIN — ATORVASTATIN CALCIUM 80 MG: 40 TABLET, FILM COATED ORAL at 20:13

## 2021-01-01 RX ADMIN — DEXAMETHASONE SODIUM PHOSPHATE 12 MG: 10 INJECTION, SOLUTION INTRAMUSCULAR; INTRAVENOUS at 12:08

## 2021-01-01 RX ADMIN — METOPROLOL TARTRATE 25 MG: 25 TABLET, FILM COATED ORAL at 21:43

## 2021-01-01 RX ADMIN — DICLOFENAC 4 G: 10 GEL TOPICAL at 09:51

## 2021-01-01 RX ADMIN — ATORVASTATIN CALCIUM 80 MG: 40 TABLET, FILM COATED ORAL at 20:31

## 2021-01-01 RX ADMIN — SILDENAFIL CITRATE 20 MG: 20 TABLET ORAL at 21:19

## 2021-01-01 RX ADMIN — SODIUM CHLORIDE, PRESERVATIVE FREE 10 ML: 5 INJECTION INTRAVENOUS at 09:02

## 2021-01-01 RX ADMIN — ALLOPURINOL 100 MG: 100 TABLET ORAL at 10:01

## 2021-01-01 RX ADMIN — HEPARIN SODIUM 5000 UNITS: 5000 INJECTION INTRAVENOUS; SUBCUTANEOUS at 20:51

## 2021-01-01 RX ADMIN — DOCUSATE SODIUM 50MG AND SENNOSIDES 8.6MG 1 TABLET: 8.6; 5 TABLET, FILM COATED ORAL at 08:53

## 2021-01-01 RX ADMIN — TAMSULOSIN HYDROCHLORIDE 0.4 MG: 0.4 CAPSULE ORAL at 10:27

## 2021-01-01 RX ADMIN — DICLOFENAC 4 G: 10 GEL TOPICAL at 13:23

## 2021-01-01 RX ADMIN — METOPROLOL SUCCINATE 100 MG: 100 TABLET, EXTENDED RELEASE ORAL at 14:27

## 2021-01-01 RX ADMIN — POTASSIUM CHLORIDE 40 MEQ: 1.5 POWDER, FOR SOLUTION ORAL at 01:50

## 2021-01-01 RX ADMIN — ACETAMINOPHEN 650 MG: 325 TABLET ORAL at 09:09

## 2021-01-01 RX ADMIN — METOPROLOL TARTRATE 25 MG: 25 TABLET, FILM COATED ORAL at 09:39

## 2021-01-01 RX ADMIN — IPRATROPIUM BROMIDE AND ALBUTEROL SULFATE 3 ML: 2.5; .5 SOLUTION RESPIRATORY (INHALATION) at 20:22

## 2021-01-01 RX ADMIN — IPRATROPIUM BROMIDE AND ALBUTEROL SULFATE 3 ML: 2.5; .5 SOLUTION RESPIRATORY (INHALATION) at 11:33

## 2021-01-01 RX ADMIN — ASPIRIN 81 MG: 81 TABLET, COATED ORAL at 08:24

## 2021-01-01 RX ADMIN — METOPROLOL TARTRATE 25 MG: 25 TABLET, FILM COATED ORAL at 21:49

## 2021-01-01 RX ADMIN — IPRATROPIUM BROMIDE AND ALBUTEROL SULFATE 3 ML: 2.5; .5 SOLUTION RESPIRATORY (INHALATION) at 10:36

## 2021-01-01 RX ADMIN — METOPROLOL TARTRATE 25 MG: 25 TABLET, FILM COATED ORAL at 09:50

## 2021-01-01 RX ADMIN — ALLOPURINOL 100 MG: 100 TABLET ORAL at 09:21

## 2021-01-01 RX ADMIN — SODIUM CHLORIDE, PRESERVATIVE FREE 10 ML: 5 INJECTION INTRAVENOUS at 20:36

## 2021-01-01 RX ADMIN — SILDENAFIL CITRATE 20 MG: 20 TABLET ORAL at 15:54

## 2021-01-01 RX ADMIN — HEPARIN SODIUM 5000 UNITS: 5000 INJECTION INTRAVENOUS; SUBCUTANEOUS at 21:03

## 2021-01-01 RX ADMIN — METOPROLOL TARTRATE 25 MG: 25 TABLET, FILM COATED ORAL at 20:44

## 2021-01-01 RX ADMIN — OXYCODONE HYDROCHLORIDE AND ACETAMINOPHEN 1000 MG: 500 TABLET ORAL at 08:18

## 2021-01-01 RX ADMIN — PANTOPRAZOLE SODIUM 40 MG: 40 TABLET, DELAYED RELEASE ORAL at 05:49

## 2021-01-01 RX ADMIN — IPRATROPIUM BROMIDE AND ALBUTEROL SULFATE 3 ML: 2.5; .5 SOLUTION RESPIRATORY (INHALATION) at 19:22

## 2021-01-01 RX ADMIN — HYDROMORPHONE HYDROCHLORIDE 0.25 MG: 1 INJECTION, SOLUTION INTRAMUSCULAR; INTRAVENOUS; SUBCUTANEOUS at 04:56

## 2021-01-01 RX ADMIN — ALLOPURINOL 100 MG: 100 TABLET ORAL at 10:28

## 2021-01-01 RX ADMIN — AMLODIPINE BESYLATE 5 MG: 5 TABLET ORAL at 10:28

## 2021-01-01 RX ADMIN — GABAPENTIN 100 MG: 100 CAPSULE ORAL at 21:10

## 2021-01-01 RX ADMIN — CISPLATIN 68 MG: 1 INJECTION INTRAVENOUS at 13:32

## 2021-01-01 RX ADMIN — FUROSEMIDE 40 MG: 10 INJECTION, SOLUTION INTRAVENOUS at 19:07

## 2021-01-01 RX ADMIN — AMLODIPINE BESYLATE 5 MG: 5 TABLET ORAL at 08:17

## 2021-01-01 RX ADMIN — IPRATROPIUM BROMIDE AND ALBUTEROL SULFATE 3 ML: 2.5; .5 SOLUTION RESPIRATORY (INHALATION) at 12:01

## 2021-01-01 RX ADMIN — SODIUM CHLORIDE, PRESERVATIVE FREE 10 ML: 5 INJECTION INTRAVENOUS at 21:19

## 2021-01-01 RX ADMIN — GABAPENTIN 100 MG: 100 CAPSULE ORAL at 21:25

## 2021-01-01 RX ADMIN — ERTAPENEM SODIUM 1 G: 1 INJECTION, POWDER, LYOPHILIZED, FOR SOLUTION INTRAMUSCULAR; INTRAVENOUS at 02:00

## 2021-01-01 RX ADMIN — HYDROMORPHONE HYDROCHLORIDE 0.25 MG: 1 INJECTION, SOLUTION INTRAMUSCULAR; INTRAVENOUS; SUBCUTANEOUS at 22:31

## 2021-01-01 RX ADMIN — BACLOFEN 10 MG: 10 TABLET ORAL at 10:46

## 2021-01-01 RX ADMIN — HYDROMORPHONE HYDROCHLORIDE 0.25 MG: 1 INJECTION, SOLUTION INTRAMUSCULAR; INTRAVENOUS; SUBCUTANEOUS at 15:52

## 2021-01-01 RX ADMIN — ALLOPURINOL 100 MG: 100 TABLET ORAL at 09:39

## 2021-01-01 RX ADMIN — OXYCODONE HYDROCHLORIDE AND ACETAMINOPHEN 1000 MG: 500 TABLET ORAL at 09:28

## 2021-01-01 RX ADMIN — OXYCODONE HYDROCHLORIDE 7.5 MG: 15 TABLET ORAL at 21:51

## 2021-01-01 RX ADMIN — SILDENAFIL CITRATE 20 MG: 20 TABLET ORAL at 04:55

## 2021-01-01 RX ADMIN — SODIUM CHLORIDE 200 MG: 9 INJECTION, SOLUTION INTRAVENOUS at 15:23

## 2021-01-01 RX ADMIN — DOCUSATE SODIUM 50MG AND SENNOSIDES 8.6MG 2 TABLET: 8.6; 5 TABLET, FILM COATED ORAL at 09:28

## 2021-01-01 RX ADMIN — SILDENAFIL CITRATE 20 MG: 20 TABLET ORAL at 21:13

## 2021-01-01 RX ADMIN — ALLOPURINOL 100 MG: 100 TABLET ORAL at 08:32

## 2021-01-01 RX ADMIN — ERTAPENEM 1 G: 1 INJECTION, POWDER, LYOPHILIZED, FOR SOLUTION INTRAMUSCULAR; INTRAVENOUS at 18:16

## 2021-01-01 RX ADMIN — GEMCITABINE 2000 MG: 38 INJECTION, SOLUTION INTRAVENOUS at 12:56

## 2021-01-01 RX ADMIN — ASPIRIN 81 MG: 81 TABLET, COATED ORAL at 06:38

## 2021-01-01 RX ADMIN — ACETAMINOPHEN 650 MG: 325 TABLET ORAL at 12:10

## 2021-01-01 RX ADMIN — ASPIRIN 81 MG: 81 TABLET, COATED ORAL at 09:04

## 2021-01-01 RX ADMIN — HEPARIN SODIUM 5000 UNITS: 5000 INJECTION INTRAVENOUS; SUBCUTANEOUS at 20:34

## 2021-01-01 RX ADMIN — METOPROLOL TARTRATE 25 MG: 25 TABLET, FILM COATED ORAL at 08:24

## 2021-01-01 RX ADMIN — IPRATROPIUM BROMIDE AND ALBUTEROL SULFATE 3 ML: 2.5; .5 SOLUTION RESPIRATORY (INHALATION) at 19:44

## 2021-01-01 RX ADMIN — HYDROXYZINE HYDROCHLORIDE 10 MG: 10 TABLET, FILM COATED ORAL at 21:37

## 2021-01-01 RX ADMIN — SODIUM CHLORIDE, PRESERVATIVE FREE 10 ML: 5 INJECTION INTRAVENOUS at 21:15

## 2021-01-01 RX ADMIN — METHYLPREDNISOLONE SODIUM SUCCINATE 60 MG: 125 INJECTION, POWDER, FOR SOLUTION INTRAMUSCULAR; INTRAVENOUS at 09:32

## 2021-01-01 RX ADMIN — SODIUM CHLORIDE, PRESERVATIVE FREE 10 ML: 5 INJECTION INTRAVENOUS at 20:13

## 2021-01-01 RX ADMIN — HEPARIN SODIUM 5000 UNITS: 5000 INJECTION INTRAVENOUS; SUBCUTANEOUS at 08:42

## 2021-01-01 RX ADMIN — HYDROXYZINE HYDROCHLORIDE 10 MG: 10 TABLET, FILM COATED ORAL at 20:04

## 2021-01-01 RX ADMIN — ERTAPENEM 1 G: 1 INJECTION, POWDER, LYOPHILIZED, FOR SOLUTION INTRAMUSCULAR; INTRAVENOUS at 17:28

## 2021-01-01 RX ADMIN — HEPARIN SODIUM 5000 UNITS: 5000 INJECTION INTRAVENOUS; SUBCUTANEOUS at 20:36

## 2021-01-01 RX ADMIN — SODIUM CHLORIDE 250 ML: 9 INJECTION, SOLUTION INTRAVENOUS at 15:23

## 2021-01-01 RX ADMIN — IPRATROPIUM BROMIDE AND ALBUTEROL SULFATE 3 ML: 2.5; .5 SOLUTION RESPIRATORY (INHALATION) at 16:34

## 2021-01-01 RX ADMIN — PANTOPRAZOLE SODIUM 40 MG: 40 TABLET, DELAYED RELEASE ORAL at 06:42

## 2021-01-01 RX ADMIN — SODIUM CHLORIDE, PRESERVATIVE FREE 10 ML: 5 INJECTION INTRAVENOUS at 21:22

## 2021-01-01 RX ADMIN — SODIUM CHLORIDE 100 ML/HR: 9 INJECTION, SOLUTION INTRAVENOUS at 14:35

## 2021-01-01 RX ADMIN — IPRATROPIUM BROMIDE AND ALBUTEROL SULFATE 3 ML: 2.5; .5 SOLUTION RESPIRATORY (INHALATION) at 07:34

## 2021-01-01 RX ADMIN — DICLOFENAC 4 G: 10 GEL TOPICAL at 08:43

## 2021-01-01 RX ADMIN — GABAPENTIN 100 MG: 100 CAPSULE ORAL at 21:42

## 2021-01-01 RX ADMIN — SODIUM CHLORIDE, PRESERVATIVE FREE 10 ML: 5 INJECTION INTRAVENOUS at 08:33

## 2021-01-01 RX ADMIN — ERTAPENEM SODIUM 1 G: 1 INJECTION, POWDER, LYOPHILIZED, FOR SOLUTION INTRAMUSCULAR; INTRAVENOUS at 14:35

## 2021-01-01 RX ADMIN — PANTOPRAZOLE SODIUM 40 MG: 40 TABLET, DELAYED RELEASE ORAL at 06:02

## 2021-01-01 RX ADMIN — HEPARIN SODIUM 5000 UNITS: 5000 INJECTION INTRAVENOUS; SUBCUTANEOUS at 09:10

## 2021-01-01 RX ADMIN — IPRATROPIUM BROMIDE AND ALBUTEROL SULFATE 3 ML: 2.5; .5 SOLUTION RESPIRATORY (INHALATION) at 19:48

## 2021-01-01 RX ADMIN — SODIUM CHLORIDE, PRESERVATIVE FREE 10 ML: 5 INJECTION INTRAVENOUS at 08:18

## 2021-01-01 RX ADMIN — GABAPENTIN 100 MG: 100 CAPSULE ORAL at 22:04

## 2021-01-01 RX ADMIN — SODIUM CHLORIDE, PRESERVATIVE FREE 10 ML: 5 INJECTION INTRAVENOUS at 20:14

## 2021-01-01 RX ADMIN — ERTAPENEM 1 G: 1 INJECTION, POWDER, LYOPHILIZED, FOR SOLUTION INTRAMUSCULAR; INTRAVENOUS at 17:03

## 2021-01-01 RX ADMIN — GABAPENTIN 100 MG: 100 CAPSULE ORAL at 21:03

## 2021-01-01 RX ADMIN — DICLOFENAC 4 G: 10 GEL TOPICAL at 09:33

## 2021-01-01 RX ADMIN — OXYCODONE HYDROCHLORIDE AND ACETAMINOPHEN 1000 MG: 500 TABLET ORAL at 18:33

## 2021-01-01 RX ADMIN — FLUDEOXYGLUCOSE F18 1 DOSE: 300 INJECTION INTRAVENOUS at 14:56

## 2021-01-01 RX ADMIN — SODIUM CHLORIDE, PRESERVATIVE FREE 10 ML: 5 INJECTION INTRAVENOUS at 08:53

## 2021-01-01 RX ADMIN — OXYCODONE HYDROCHLORIDE 7.5 MG: 15 TABLET ORAL at 00:27

## 2021-01-01 RX ADMIN — ASPIRIN 81 MG: 81 TABLET, COATED ORAL at 08:41

## 2021-01-01 RX ADMIN — SODIUM CHLORIDE 2178 ML: 9 INJECTION, SOLUTION INTRAVENOUS at 11:31

## 2021-01-01 RX ADMIN — ASPIRIN 81 MG: 81 TABLET, COATED ORAL at 09:33

## 2021-01-01 RX ADMIN — POTASSIUM CHLORIDE 40 MEQ: 750 CAPSULE, EXTENDED RELEASE ORAL at 15:45

## 2021-01-01 RX ADMIN — GEMCITABINE HYDROCHLORIDE 2000 MG: 1 INJECTION, SOLUTION INTRAVENOUS at 12:52

## 2021-01-01 RX ADMIN — SODIUM CHLORIDE, PRESERVATIVE FREE 10 ML: 5 INJECTION INTRAVENOUS at 21:52

## 2021-01-01 RX ADMIN — SODIUM CHLORIDE 150 MG: 9 INJECTION, SOLUTION INTRAVENOUS at 10:52

## 2021-01-01 RX ADMIN — PANTOPRAZOLE SODIUM 40 MG: 40 TABLET, DELAYED RELEASE ORAL at 06:44

## 2021-01-01 RX ADMIN — SODIUM CHLORIDE, PRESERVATIVE FREE 10 ML: 5 INJECTION INTRAVENOUS at 19:44

## 2021-01-01 RX ADMIN — HYDROXYZINE HYDROCHLORIDE 10 MG: 10 TABLET, FILM COATED ORAL at 16:03

## 2021-01-01 RX ADMIN — THERA TABS 1 TABLET: TAB at 08:42

## 2021-01-01 RX ADMIN — DIPHENHYDRAMINE HYDROCHLORIDE 6 MG: 50 INJECTION INTRAMUSCULAR; INTRAVENOUS at 20:35

## 2021-01-01 RX ADMIN — ERTAPENEM SODIUM 500 MG: 1 INJECTION, POWDER, LYOPHILIZED, FOR SOLUTION INTRAMUSCULAR; INTRAVENOUS at 21:22

## 2021-01-01 RX ADMIN — METOPROLOL TARTRATE 25 MG: 25 TABLET, FILM COATED ORAL at 21:42

## 2021-01-01 RX ADMIN — ATORVASTATIN CALCIUM 80 MG: 40 TABLET, FILM COATED ORAL at 19:42

## 2021-01-01 RX ADMIN — SODIUM CHLORIDE 250 ML: 9 INJECTION, SOLUTION INTRAVENOUS at 10:52

## 2021-01-01 RX ADMIN — PANTOPRAZOLE SODIUM 40 MG: 40 TABLET, DELAYED RELEASE ORAL at 06:37

## 2021-01-01 RX ADMIN — ATORVASTATIN CALCIUM 80 MG: 40 TABLET, FILM COATED ORAL at 20:51

## 2021-01-01 RX ADMIN — METHYLPREDNISOLONE SODIUM SUCCINATE 60 MG: 125 INJECTION, POWDER, FOR SOLUTION INTRAMUSCULAR; INTRAVENOUS at 17:21

## 2021-01-01 RX ADMIN — DOCUSATE SODIUM 50MG AND SENNOSIDES 8.6MG 1 TABLET: 8.6; 5 TABLET, FILM COATED ORAL at 20:51

## 2021-01-01 RX ADMIN — ALLOPURINOL 100 MG: 100 TABLET ORAL at 08:54

## 2021-01-01 RX ADMIN — METOPROLOL TARTRATE 25 MG: 25 TABLET, FILM COATED ORAL at 08:40

## 2021-01-01 RX ADMIN — OXYCODONE HYDROCHLORIDE AND ACETAMINOPHEN 1000 MG: 500 TABLET ORAL at 09:39

## 2021-01-01 RX ADMIN — ALLOPURINOL 100 MG: 100 TABLET ORAL at 08:17

## 2021-01-01 RX ADMIN — PALONOSETRON 0.25 MG: 0.25 INJECTION, SOLUTION INTRAVENOUS at 12:27

## 2021-01-01 RX ADMIN — HYDROMORPHONE HYDROCHLORIDE 1 MG: 2 TABLET ORAL at 02:14

## 2021-01-01 RX ADMIN — THERA TABS 1 TABLET: TAB at 08:19

## 2021-01-01 RX ADMIN — FUROSEMIDE 40 MG: 10 INJECTION INTRAMUSCULAR; INTRAVENOUS at 12:27

## 2021-01-01 RX ADMIN — Medication 5 MG: at 00:16

## 2021-01-01 RX ADMIN — GABAPENTIN 100 MG: 100 CAPSULE ORAL at 22:31

## 2021-01-01 RX ADMIN — METOPROLOL TARTRATE 25 MG: 25 TABLET, FILM COATED ORAL at 20:12

## 2021-01-01 RX ADMIN — Medication 5 MG: at 19:43

## 2021-01-01 RX ADMIN — ERTAPENEM 1 G: 1 INJECTION, POWDER, LYOPHILIZED, FOR SOLUTION INTRAMUSCULAR; INTRAVENOUS at 17:15

## 2021-01-01 RX ADMIN — METOPROLOL TARTRATE 25 MG: 25 TABLET, FILM COATED ORAL at 08:41

## 2021-01-01 RX ADMIN — ALLOPURINOL 100 MG: 100 TABLET ORAL at 08:49

## 2021-01-01 RX ADMIN — PREDNISONE 60 MG: 20 TABLET ORAL at 08:19

## 2021-01-01 RX ADMIN — ONDANSETRON 4 MG: 2 INJECTION INTRAMUSCULAR; INTRAVENOUS at 10:58

## 2021-01-01 RX ADMIN — ALLOPURINOL 100 MG: 100 TABLET ORAL at 14:27

## 2021-01-01 RX ADMIN — METOPROLOL TARTRATE 25 MG: 25 TABLET, FILM COATED ORAL at 20:14

## 2021-01-01 RX ADMIN — Medication 2.5 MG: at 21:59

## 2021-01-01 RX ADMIN — METOPROLOL TARTRATE 25 MG: 25 TABLET, FILM COATED ORAL at 21:03

## 2021-01-01 RX ADMIN — GABAPENTIN 100 MG: 100 CAPSULE ORAL at 20:13

## 2021-01-01 RX ADMIN — ALLOPURINOL 100 MG: 100 TABLET ORAL at 08:19

## 2021-01-01 RX ADMIN — ALLOPURINOL 100 MG: 100 TABLET ORAL at 09:32

## 2021-01-01 RX ADMIN — METOPROLOL TARTRATE 25 MG: 25 TABLET, FILM COATED ORAL at 09:10

## 2021-01-01 RX ADMIN — DICLOFENAC 4 G: 10 GEL TOPICAL at 09:05

## 2021-01-01 RX ADMIN — ATORVASTATIN CALCIUM 80 MG: 40 TABLET, FILM COATED ORAL at 20:54

## 2021-01-01 RX ADMIN — SILDENAFIL CITRATE 20 MG: 20 TABLET ORAL at 13:26

## 2021-01-01 RX ADMIN — HYDROXYZINE HYDROCHLORIDE 10 MG: 10 TABLET, FILM COATED ORAL at 21:42

## 2021-01-01 RX ADMIN — AMLODIPINE BESYLATE 5 MG: 5 TABLET ORAL at 13:38

## 2021-01-01 RX ADMIN — ATORVASTATIN CALCIUM 80 MG: 40 TABLET, FILM COATED ORAL at 22:03

## 2021-01-01 RX ADMIN — DOCUSATE SODIUM 50MG AND SENNOSIDES 8.6MG 1 TABLET: 8.6; 5 TABLET, FILM COATED ORAL at 21:21

## 2021-01-01 RX ADMIN — SODIUM CHLORIDE 150 MG: 9 INJECTION, SOLUTION INTRAVENOUS at 12:28

## 2021-01-01 RX ADMIN — HYDROCODONE BITARTRATE AND ACETAMINOPHEN 1 TABLET: 5; 325 TABLET ORAL at 11:35

## 2021-01-01 RX ADMIN — PANTOPRAZOLE SODIUM 40 MG: 40 TABLET, DELAYED RELEASE ORAL at 06:26

## 2021-01-01 RX ADMIN — OXYCODONE HYDROCHLORIDE AND ACETAMINOPHEN 1000 MG: 500 TABLET ORAL at 09:32

## 2021-01-01 RX ADMIN — VANCOMYCIN HYDROCHLORIDE 1500 MG: 100 INJECTION, POWDER, LYOPHILIZED, FOR SOLUTION INTRAVENOUS at 08:49

## 2021-01-01 RX ADMIN — HYDROXYZINE HYDROCHLORIDE 10 MG: 10 TABLET, FILM COATED ORAL at 10:20

## 2021-01-01 RX ADMIN — DICLOFENAC 4 G: 10 GEL TOPICAL at 20:34

## 2021-01-01 RX ADMIN — METOPROLOL TARTRATE 25 MG: 25 TABLET, FILM COATED ORAL at 08:16

## 2021-01-01 RX ADMIN — AMLODIPINE BESYLATE 10 MG: 10 TABLET ORAL at 08:19

## 2021-01-01 RX ADMIN — ERTAPENEM SODIUM 500 MG: 1 INJECTION, POWDER, LYOPHILIZED, FOR SOLUTION INTRAMUSCULAR; INTRAVENOUS at 20:50

## 2021-01-01 RX ADMIN — SODIUM CHLORIDE, PRESERVATIVE FREE 10 ML: 5 INJECTION INTRAVENOUS at 08:25

## 2021-01-01 RX ADMIN — Medication 5 MG: at 20:14

## 2021-01-01 RX ADMIN — ALLOPURINOL 100 MG: 100 TABLET ORAL at 08:16

## 2021-01-01 RX ADMIN — GABAPENTIN 100 MG: 100 CAPSULE ORAL at 21:13

## 2021-01-01 RX ADMIN — DOCUSATE SODIUM 50MG AND SENNOSIDES 8.6MG 2 TABLET: 8.6; 5 TABLET, FILM COATED ORAL at 09:36

## 2021-01-01 RX ADMIN — METOPROLOL TARTRATE 25 MG: 25 TABLET, FILM COATED ORAL at 22:04

## 2021-01-01 RX ADMIN — METOPROLOL TARTRATE 25 MG: 25 TABLET, FILM COATED ORAL at 22:03

## 2021-01-01 RX ADMIN — PREDNISONE 40 MG: 20 TABLET ORAL at 08:42

## 2021-01-01 RX ADMIN — METHYLPREDNISOLONE SODIUM SUCCINATE 60 MG: 125 INJECTION, POWDER, FOR SOLUTION INTRAMUSCULAR; INTRAVENOUS at 01:19

## 2021-01-01 RX ADMIN — OXYCODONE HYDROCHLORIDE 7.5 MG: 15 TABLET ORAL at 14:11

## 2021-01-01 RX ADMIN — LIDOCAINE 1 PATCH: 50 PATCH CUTANEOUS at 00:02

## 2021-01-01 RX ADMIN — SODIUM CHLORIDE, PRESERVATIVE FREE 10 ML: 5 INJECTION INTRAVENOUS at 09:41

## 2021-01-01 RX ADMIN — OXYCODONE HYDROCHLORIDE AND ACETAMINOPHEN 1000 MG: 500 TABLET ORAL at 08:24

## 2021-01-01 RX ADMIN — OXYCODONE HYDROCHLORIDE AND ACETAMINOPHEN 1000 MG: 500 TABLET ORAL at 08:19

## 2021-01-01 RX ADMIN — DIPHENHYDRAMINE HYDROCHLORIDE 6 MG: 50 INJECTION INTRAMUSCULAR; INTRAVENOUS at 20:14

## 2021-01-01 RX ADMIN — ALLOPURINOL 100 MG: 100 TABLET ORAL at 09:28

## 2021-01-01 RX ADMIN — NYSTATIN 500000 UNITS: 100000 SUSPENSION ORAL at 17:48

## 2021-01-01 RX ADMIN — HEPARIN SODIUM 5000 UNITS: 5000 INJECTION INTRAVENOUS; SUBCUTANEOUS at 21:10

## 2021-01-01 RX ADMIN — AMLODIPINE BESYLATE 10 MG: 10 TABLET ORAL at 09:28

## 2021-01-01 RX ADMIN — METOPROLOL SUCCINATE 100 MG: 100 TABLET, EXTENDED RELEASE ORAL at 08:32

## 2021-01-01 RX ADMIN — LOPERAMIDE HYDROCHLORIDE 2 MG: 2 CAPSULE ORAL at 11:58

## 2021-01-01 RX ADMIN — HYDROMORPHONE HYDROCHLORIDE 0.25 MG: 1 INJECTION, SOLUTION INTRAMUSCULAR; INTRAVENOUS; SUBCUTANEOUS at 16:03

## 2021-01-01 RX ADMIN — ERTAPENEM SODIUM 1 G: 1 INJECTION, POWDER, LYOPHILIZED, FOR SOLUTION INTRAMUSCULAR; INTRAVENOUS at 06:26

## 2021-01-01 RX ADMIN — NYSTATIN 500000 UNITS: 100000 SUSPENSION ORAL at 17:28

## 2021-01-01 RX ADMIN — IPRATROPIUM BROMIDE AND ALBUTEROL SULFATE 3 ML: 2.5; .5 SOLUTION RESPIRATORY (INHALATION) at 06:18

## 2021-01-01 RX ADMIN — GABAPENTIN 100 MG: 100 CAPSULE ORAL at 20:04

## 2021-01-01 RX ADMIN — POTASSIUM CHLORIDE 1000 ML: 2 INJECTION, SOLUTION, CONCENTRATE INTRAVENOUS at 14:36

## 2021-01-01 RX ADMIN — ERTAPENEM 1 G: 1 INJECTION, POWDER, LYOPHILIZED, FOR SOLUTION INTRAMUSCULAR; INTRAVENOUS at 18:28

## 2021-01-01 RX ADMIN — ACETAMINOPHEN 650 MG: 325 TABLET ORAL at 16:10

## 2021-01-01 RX ADMIN — TAMSULOSIN HYDROCHLORIDE 0.4 MG: 0.4 CAPSULE ORAL at 19:43

## 2021-01-01 RX ADMIN — CISPLATIN 68 MG: 1 INJECTION INTRAVENOUS at 13:30

## 2021-01-01 RX ADMIN — POTASSIUM CHLORIDE 1000 ML: 2 INJECTION, SOLUTION, CONCENTRATE INTRAVENOUS at 10:30

## 2021-01-01 RX ADMIN — ATORVASTATIN CALCIUM 80 MG: 40 TABLET, FILM COATED ORAL at 21:21

## 2021-01-01 RX ADMIN — OXYCODONE HYDROCHLORIDE AND ACETAMINOPHEN 1000 MG: 500 TABLET ORAL at 09:04

## 2021-01-01 RX ADMIN — DICLOFENAC 4 G: 10 GEL TOPICAL at 23:49

## 2021-01-01 RX ADMIN — SODIUM CHLORIDE, PRESERVATIVE FREE 10 ML: 5 INJECTION INTRAVENOUS at 09:05

## 2021-01-01 RX ADMIN — METOPROLOL SUCCINATE 50 MG: 50 TABLET, EXTENDED RELEASE ORAL at 10:02

## 2021-01-01 RX ADMIN — POTASSIUM CHLORIDE 40 MEQ: 10 CAPSULE, COATED, EXTENDED RELEASE ORAL at 20:33

## 2021-01-01 RX ADMIN — THERA TABS 1 TABLET: TAB at 09:58

## 2021-01-01 RX ADMIN — Medication 2.5 MG: at 20:44

## 2021-01-01 RX ADMIN — SODIUM CHLORIDE, PRESERVATIVE FREE 10 ML: 5 INJECTION INTRAVENOUS at 15:00

## 2021-01-01 RX ADMIN — SILDENAFIL CITRATE 20 MG: 20 TABLET ORAL at 21:55

## 2021-01-01 RX ADMIN — SILDENAFIL CITRATE 20 MG: 20 TABLET ORAL at 22:31

## 2021-01-01 RX ADMIN — ERTAPENEM SODIUM 1 G: 1 INJECTION, POWDER, LYOPHILIZED, FOR SOLUTION INTRAMUSCULAR; INTRAVENOUS at 08:40

## 2021-01-01 RX ADMIN — ERTAPENEM SODIUM 500 MG: 1 INJECTION, POWDER, LYOPHILIZED, FOR SOLUTION INTRAMUSCULAR; INTRAVENOUS at 20:54

## 2021-01-01 RX ADMIN — HEPARIN SODIUM 5000 UNITS: 5000 INJECTION INTRAVENOUS; SUBCUTANEOUS at 09:32

## 2021-01-01 RX ADMIN — ATORVASTATIN CALCIUM 80 MG: 40 TABLET, FILM COATED ORAL at 21:58

## 2021-01-01 RX ADMIN — DICLOFENAC 4 G: 10 GEL TOPICAL at 14:17

## 2021-01-01 RX ADMIN — ERTAPENEM SODIUM 1 G: 1 INJECTION, POWDER, LYOPHILIZED, FOR SOLUTION INTRAMUSCULAR; INTRAVENOUS at 09:29

## 2021-01-01 RX ADMIN — AZTREONAM 2 G: 2 INJECTION, POWDER, LYOPHILIZED, FOR SOLUTION INTRAMUSCULAR; INTRAVENOUS at 07:57

## 2021-01-01 RX ADMIN — OXYCODONE HYDROCHLORIDE 7.5 MG: 15 TABLET ORAL at 02:33

## 2021-01-01 RX ADMIN — LORAZEPAM 0.5 MG: 2 INJECTION INTRAMUSCULAR; INTRAVENOUS at 05:05

## 2021-01-01 RX ADMIN — THERA TABS 1 TABLET: TAB at 08:18

## 2021-01-01 RX ADMIN — GUAIFENESIN 1200 MG: 600 TABLET, EXTENDED RELEASE ORAL at 20:44

## 2021-01-01 RX ADMIN — OXYCODONE HYDROCHLORIDE AND ACETAMINOPHEN 1000 MG: 500 TABLET ORAL at 08:42

## 2021-01-01 RX ADMIN — PANTOPRAZOLE SODIUM 40 MG: 40 TABLET, DELAYED RELEASE ORAL at 05:43

## 2021-01-01 RX ADMIN — HYDROXYZINE HYDROCHLORIDE 10 MG: 10 TABLET, FILM COATED ORAL at 20:51

## 2021-01-01 RX ADMIN — DICLOFENAC 4 G: 10 GEL TOPICAL at 08:53

## 2021-01-01 RX ADMIN — SODIUM CHLORIDE, PRESERVATIVE FREE 10 ML: 5 INJECTION INTRAVENOUS at 09:33

## 2021-01-01 RX ADMIN — IPRATROPIUM BROMIDE AND ALBUTEROL SULFATE 3 ML: 2.5; .5 SOLUTION RESPIRATORY (INHALATION) at 16:33

## 2021-01-01 RX ADMIN — METHYLPREDNISOLONE SODIUM SUCCINATE 60 MG: 125 INJECTION, POWDER, FOR SOLUTION INTRAMUSCULAR; INTRAVENOUS at 09:36

## 2021-01-01 RX ADMIN — ACETAMINOPHEN 650 MG: 325 TABLET ORAL at 20:11

## 2021-01-01 RX ADMIN — THERA TABS 1 TABLET: TAB at 09:32

## 2021-01-01 RX ADMIN — FUROSEMIDE 20 MG: 10 INJECTION INTRAMUSCULAR; INTRAVENOUS at 20:11

## 2021-01-01 RX ADMIN — SODIUM CHLORIDE, PRESERVATIVE FREE 10 ML: 5 INJECTION INTRAVENOUS at 20:04

## 2021-01-01 RX ADMIN — DOCUSATE SODIUM 50MG AND SENNOSIDES 8.6MG 2 TABLET: 8.6; 5 TABLET, FILM COATED ORAL at 21:03

## 2021-01-01 RX ADMIN — SILDENAFIL CITRATE 20 MG: 20 TABLET ORAL at 06:38

## 2021-01-01 RX ADMIN — MULTIVITAMIN TABLET 1 TABLET: TABLET at 08:40

## 2021-01-01 RX ADMIN — AMLODIPINE BESYLATE 5 MG: 5 TABLET ORAL at 08:41

## 2021-01-01 RX ADMIN — HYDROMORPHONE HYDROCHLORIDE 0.25 MG: 1 INJECTION, SOLUTION INTRAMUSCULAR; INTRAVENOUS; SUBCUTANEOUS at 21:15

## 2021-01-01 RX ADMIN — NYSTATIN 500000 UNITS: 100000 SUSPENSION ORAL at 13:09

## 2021-01-01 RX ADMIN — METOPROLOL TARTRATE 25 MG: 25 TABLET, FILM COATED ORAL at 08:49

## 2021-01-01 RX ADMIN — ERTAPENEM 1 G: 1 INJECTION, POWDER, LYOPHILIZED, FOR SOLUTION INTRAMUSCULAR; INTRAVENOUS at 18:00

## 2021-01-01 RX ADMIN — DICLOFENAC 4 G: 10 GEL TOPICAL at 08:25

## 2021-01-01 RX ADMIN — ASPIRIN 81 MG: 81 TABLET, COATED ORAL at 08:52

## 2021-01-01 RX ADMIN — SODIUM CHLORIDE 100 ML/HR: 9 INJECTION, SOLUTION INTRAVENOUS at 14:12

## 2021-01-01 RX ADMIN — MINERAL OIL 1 ENEMA: 100 ENEMA RECTAL at 18:41

## 2021-01-01 RX ADMIN — SILDENAFIL CITRATE 20 MG: 20 TABLET ORAL at 15:04

## 2021-01-01 RX ADMIN — ERTAPENEM SODIUM 500 MG: 1 INJECTION, POWDER, LYOPHILIZED, FOR SOLUTION INTRAMUSCULAR; INTRAVENOUS at 20:31

## 2021-01-01 RX ADMIN — ASPIRIN 81 MG: 81 TABLET, COATED ORAL at 09:01

## 2021-01-01 RX ADMIN — METOPROLOL TARTRATE 25 MG: 25 TABLET, FILM COATED ORAL at 09:33

## 2021-01-01 RX ADMIN — ALLOPURINOL 100 MG: 100 TABLET ORAL at 09:10

## 2021-01-01 RX ADMIN — ACETAMINOPHEN 650 MG: 325 TABLET ORAL at 18:36

## 2021-01-01 RX ADMIN — AMLODIPINE BESYLATE 5 MG: 5 TABLET ORAL at 14:27

## 2021-01-01 RX ADMIN — HYDROMORPHONE HYDROCHLORIDE 0.25 MG: 1 INJECTION, SOLUTION INTRAMUSCULAR; INTRAVENOUS; SUBCUTANEOUS at 12:51

## 2021-01-01 RX ADMIN — PREDNISONE 40 MG: 20 TABLET ORAL at 09:03

## 2021-01-01 RX ADMIN — PREDNISONE 60 MG: 20 TABLET ORAL at 09:28

## 2021-01-01 RX ADMIN — PREDNISONE 20 MG: 20 TABLET ORAL at 09:59

## 2021-01-01 RX ADMIN — ATORVASTATIN CALCIUM 80 MG: 40 TABLET, FILM COATED ORAL at 20:34

## 2021-01-01 RX ADMIN — IPRATROPIUM BROMIDE AND ALBUTEROL SULFATE 3 ML: 2.5; .5 SOLUTION RESPIRATORY (INHALATION) at 19:46

## 2021-01-01 RX ADMIN — HEPARIN SODIUM 5000 UNITS: 5000 INJECTION INTRAVENOUS; SUBCUTANEOUS at 08:24

## 2021-01-01 RX ADMIN — ASPIRIN 81 MG: 81 TABLET, COATED ORAL at 06:26

## 2021-01-01 RX ADMIN — IPRATROPIUM BROMIDE AND ALBUTEROL SULFATE 3 ML: 2.5; .5 SOLUTION RESPIRATORY (INHALATION) at 05:34

## 2021-01-01 RX ADMIN — FUROSEMIDE 40 MG: 10 INJECTION INTRAMUSCULAR; INTRAVENOUS at 08:50

## 2021-01-01 RX ADMIN — LIDOCAINE HYDROCHLORIDE: 20 JELLY TOPICAL at 06:28

## 2021-01-01 RX ADMIN — HEPARIN SODIUM 5000 UNITS: 5000 INJECTION INTRAVENOUS; SUBCUTANEOUS at 09:37

## 2021-01-01 RX ADMIN — GABAPENTIN 100 MG: 100 CAPSULE ORAL at 21:43

## 2021-01-01 RX ADMIN — HYDROCODONE BITARTRATE AND ACETAMINOPHEN 1 TABLET: 5; 325 TABLET ORAL at 20:06

## 2021-01-01 RX ADMIN — GABAPENTIN 100 MG: 100 CAPSULE ORAL at 17:21

## 2021-01-01 RX ADMIN — DICLOFENAC 4 G: 10 GEL TOPICAL at 17:59

## 2021-01-01 RX ADMIN — ASPIRIN 81 MG: 81 TABLET, COATED ORAL at 09:09

## 2021-01-01 RX ADMIN — DICLOFENAC 4 G: 10 GEL TOPICAL at 21:25

## 2021-01-01 RX ADMIN — METHYLPREDNISOLONE SODIUM SUCCINATE 60 MG: 125 INJECTION, POWDER, FOR SOLUTION INTRAMUSCULAR; INTRAVENOUS at 09:11

## 2021-01-01 RX ADMIN — NYSTATIN 500000 UNITS: 100000 SUSPENSION ORAL at 08:41

## 2021-01-01 RX ADMIN — METHYLPREDNISOLONE SODIUM SUCCINATE 60 MG: 125 INJECTION, POWDER, FOR SOLUTION INTRAMUSCULAR; INTRAVENOUS at 02:09

## 2021-01-01 RX ADMIN — OXYCODONE HYDROCHLORIDE AND ACETAMINOPHEN 1000 MG: 500 TABLET ORAL at 08:49

## 2021-01-01 RX ADMIN — HEPARIN SODIUM 5000 UNITS: 5000 INJECTION INTRAVENOUS; SUBCUTANEOUS at 21:49

## 2021-01-01 RX ADMIN — DICLOFENAC 4 G: 10 GEL TOPICAL at 12:50

## 2021-01-01 RX ADMIN — ASPIRIN 81 MG: 81 TABLET, COATED ORAL at 09:32

## 2021-01-01 RX ADMIN — METHYLPREDNISOLONE SODIUM SUCCINATE 60 MG: 125 INJECTION, POWDER, FOR SOLUTION INTRAMUSCULAR; INTRAVENOUS at 21:49

## 2021-01-01 RX ADMIN — ALLOPURINOL 100 MG: 100 TABLET ORAL at 09:04

## 2021-01-01 RX ADMIN — PREDNISONE 20 MG: 20 TABLET ORAL at 08:42

## 2021-01-01 RX ADMIN — IPRATROPIUM BROMIDE AND ALBUTEROL SULFATE 3 ML: 2.5; .5 SOLUTION RESPIRATORY (INHALATION) at 08:46

## 2021-01-01 RX ADMIN — DESMOPRESSIN ACETATE 21.8 MCG: 4 SOLUTION INTRAVENOUS at 17:29

## 2021-01-01 RX ADMIN — POTASSIUM CHLORIDE 1000 ML: 2 INJECTION, SOLUTION, CONCENTRATE INTRAVENOUS at 09:48

## 2021-01-01 RX ADMIN — GABAPENTIN 100 MG: 100 CAPSULE ORAL at 21:21

## 2021-01-01 RX ADMIN — ANTACID TABLETS 2 TABLET: 500 TABLET, CHEWABLE ORAL at 05:08

## 2021-01-01 RX ADMIN — ASPIRIN 81 MG: 81 TABLET, COATED ORAL at 09:21

## 2021-01-01 RX ADMIN — ALLOPURINOL 100 MG: 100 TABLET ORAL at 09:01

## 2021-01-01 RX ADMIN — SODIUM CHLORIDE, PRESERVATIVE FREE 10 ML: 5 INJECTION INTRAVENOUS at 20:08

## 2021-01-01 RX ADMIN — HYDROCODONE BITARTRATE AND ACETAMINOPHEN 1 TABLET: 5; 325 TABLET ORAL at 08:16

## 2021-01-01 RX ADMIN — SODIUM CHLORIDE, PRESERVATIVE FREE 10 ML: 5 INJECTION INTRAVENOUS at 08:43

## 2021-01-01 RX ADMIN — THERA TABS 1 TABLET: TAB at 09:28

## 2021-01-01 RX ADMIN — METOPROLOL SUCCINATE 50 MG: 50 TABLET, EXTENDED RELEASE ORAL at 09:05

## 2021-01-01 RX ADMIN — ASPIRIN 81 MG: 81 TABLET, COATED ORAL at 05:49

## 2021-01-01 RX ADMIN — SODIUM CHLORIDE, PRESERVATIVE FREE 10 ML: 5 INJECTION INTRAVENOUS at 21:12

## 2021-01-01 RX ADMIN — METHYLPREDNISOLONE SODIUM SUCCINATE 60 MG: 125 INJECTION, POWDER, FOR SOLUTION INTRAMUSCULAR; INTRAVENOUS at 18:16

## 2021-01-01 RX ADMIN — ACETAMINOPHEN 650 MG: 325 TABLET ORAL at 14:56

## 2021-01-01 RX ADMIN — PALONOSETRON HYDROCHLORIDE 0.25 MG: 0.05 INJECTION, SOLUTION INTRAVENOUS at 10:47

## 2021-01-01 RX ADMIN — PANTOPRAZOLE SODIUM 40 MG: 40 TABLET, DELAYED RELEASE ORAL at 14:11

## 2021-01-01 RX ADMIN — Medication 2.5 MG: at 20:14

## 2021-01-01 RX ADMIN — ASPIRIN 81 MG: 81 TABLET, COATED ORAL at 08:40

## 2021-01-01 RX ADMIN — SILDENAFIL CITRATE 20 MG: 20 TABLET ORAL at 22:03

## 2021-01-01 RX ADMIN — ERTAPENEM SODIUM 1 G: 1 INJECTION, POWDER, LYOPHILIZED, FOR SOLUTION INTRAMUSCULAR; INTRAVENOUS at 18:24

## 2021-01-01 RX ADMIN — PHENAZOPYRIDINE HYDROCHLORIDE 100 MG: 100 TABLET ORAL at 10:53

## 2021-01-01 RX ADMIN — IPRATROPIUM BROMIDE AND ALBUTEROL SULFATE 3 ML: 2.5; .5 SOLUTION RESPIRATORY (INHALATION) at 21:05

## 2021-01-01 RX ADMIN — HEPARIN SODIUM 5000 UNITS: 5000 INJECTION INTRAVENOUS; SUBCUTANEOUS at 21:25

## 2021-01-01 RX ADMIN — METOPROLOL TARTRATE 25 MG: 25 TABLET, FILM COATED ORAL at 20:51

## 2021-01-01 RX ADMIN — ALLOPURINOL 100 MG: 100 TABLET ORAL at 08:18

## 2021-01-01 RX ADMIN — SODIUM CHLORIDE, PRESERVATIVE FREE 10 ML: 5 INJECTION INTRAVENOUS at 10:28

## 2021-01-01 RX ADMIN — HEPARIN SODIUM 5000 UNITS: 5000 INJECTION INTRAVENOUS; SUBCUTANEOUS at 08:18

## 2021-01-01 RX ADMIN — SODIUM CHLORIDE, PRESERVATIVE FREE 10 ML: 5 INJECTION INTRAVENOUS at 21:11

## 2021-01-01 RX ADMIN — HEPARIN SODIUM 5000 UNITS: 5000 INJECTION INTRAVENOUS; SUBCUTANEOUS at 08:19

## 2021-01-01 RX ADMIN — SILDENAFIL CITRATE 20 MG: 20 TABLET ORAL at 05:07

## 2021-01-01 RX ADMIN — HYDROCODONE BITARTRATE AND ACETAMINOPHEN 1 TABLET: 5; 325 TABLET ORAL at 22:47

## 2021-01-01 RX ADMIN — Medication 5 MG: at 21:22

## 2021-01-01 RX ADMIN — AMLODIPINE BESYLATE 10 MG: 10 TABLET ORAL at 09:39

## 2021-01-01 RX ADMIN — GABAPENTIN 100 MG: 100 CAPSULE ORAL at 20:40

## 2021-01-01 RX ADMIN — DOCUSATE SODIUM 50MG AND SENNOSIDES 8.6MG 2 TABLET: 8.6; 5 TABLET, FILM COATED ORAL at 20:44

## 2021-01-01 RX ADMIN — METOPROLOL TARTRATE 25 MG: 25 TABLET, FILM COATED ORAL at 21:25

## 2021-01-01 RX ADMIN — ASPIRIN 81 MG: 81 TABLET, COATED ORAL at 05:07

## 2021-01-01 RX ADMIN — TAMSULOSIN HYDROCHLORIDE 0.4 MG: 0.4 CAPSULE ORAL at 09:01

## 2021-01-01 RX ADMIN — HEPARIN SODIUM 5000 UNITS: 5000 INJECTION INTRAVENOUS; SUBCUTANEOUS at 21:42

## 2021-01-01 RX ADMIN — DOCUSATE SODIUM 50MG AND SENNOSIDES 8.6MG 1 TABLET: 8.6; 5 TABLET, FILM COATED ORAL at 21:49

## 2021-01-01 RX ADMIN — SODIUM CHLORIDE 100 ML/HR: 9 INJECTION, SOLUTION INTRAVENOUS at 06:33

## 2021-01-01 RX ADMIN — BISACODYL 10 MG: 10 SUPPOSITORY RECTAL at 09:17

## 2021-01-01 RX ADMIN — ASPIRIN 81 MG: 81 TABLET, COATED ORAL at 10:03

## 2021-01-01 RX ADMIN — SODIUM CHLORIDE, PRESERVATIVE FREE 10 ML: 5 INJECTION INTRAVENOUS at 08:20

## 2021-01-01 RX ADMIN — APIXABAN 2.5 MG: 2.5 TABLET, FILM COATED ORAL at 14:27

## 2021-01-01 RX ADMIN — THERA TABS 1 TABLET: TAB at 08:24

## 2021-01-01 RX ADMIN — SODIUM CHLORIDE, PRESERVATIVE FREE 10 ML: 5 INJECTION INTRAVENOUS at 22:03

## 2021-01-01 RX ADMIN — SODIUM CHLORIDE, PRESERVATIVE FREE 10 ML: 5 INJECTION INTRAVENOUS at 22:04

## 2021-01-01 RX ADMIN — SODIUM CHLORIDE 100 ML/HR: 9 INJECTION, SOLUTION INTRAVENOUS at 08:40

## 2021-01-01 RX ADMIN — METOPROLOL TARTRATE 25 MG: 25 TABLET, FILM COATED ORAL at 20:13

## 2021-01-01 RX ADMIN — METOPROLOL TARTRATE 25 MG: 25 TABLET, FILM COATED ORAL at 09:03

## 2021-01-01 RX ADMIN — HYDROCODONE BITARTRATE AND ACETAMINOPHEN 1 TABLET: 5; 325 TABLET ORAL at 05:47

## 2021-01-01 RX ADMIN — GABAPENTIN 100 MG: 100 CAPSULE ORAL at 18:16

## 2021-01-01 RX ADMIN — ALLOPURINOL 100 MG: 100 TABLET ORAL at 09:58

## 2021-01-01 RX ADMIN — FUROSEMIDE 20 MG: 10 INJECTION INTRAMUSCULAR; INTRAVENOUS at 18:01

## 2021-01-01 RX ADMIN — PANTOPRAZOLE SODIUM 40 MG: 40 TABLET, DELAYED RELEASE ORAL at 05:07

## 2021-01-01 RX ADMIN — FLUCONAZOLE 200 MG: 200 TABLET ORAL at 15:45

## 2021-01-01 RX ADMIN — SODIUM CHLORIDE, PRESERVATIVE FREE 10 ML: 5 INJECTION INTRAVENOUS at 09:11

## 2021-01-01 RX ADMIN — POTASSIUM CHLORIDE 40 MEQ: 1.5 POWDER, FOR SOLUTION ORAL at 09:44

## 2021-01-01 RX ADMIN — HYDROCODONE BITARTRATE AND ACETAMINOPHEN 1 TABLET: 5; 325 TABLET ORAL at 18:45

## 2021-01-01 RX ADMIN — SILDENAFIL CITRATE 20 MG: 20 TABLET ORAL at 06:42

## 2021-01-01 RX ADMIN — ALLOPURINOL 100 MG: 100 TABLET ORAL at 09:50

## 2021-01-01 RX ADMIN — Medication 2.5 MG: at 20:34

## 2021-01-01 RX ADMIN — DOCUSATE SODIUM 50MG AND SENNOSIDES 8.6MG 2 TABLET: 8.6; 5 TABLET, FILM COATED ORAL at 08:40

## 2021-01-01 RX ADMIN — GABAPENTIN 100 MG: 100 CAPSULE ORAL at 09:04

## 2021-01-01 RX ADMIN — METOPROLOL TARTRATE 25 MG: 25 TABLET, FILM COATED ORAL at 08:18

## 2021-01-01 RX ADMIN — AMLODIPINE BESYLATE 5 MG: 5 TABLET ORAL at 08:36

## 2021-01-01 RX ADMIN — ASPIRIN 81 MG: 81 TABLET, COATED ORAL at 08:36

## 2021-01-01 RX ADMIN — PREDNISONE 40 MG: 20 TABLET ORAL at 08:19

## 2021-01-01 RX ADMIN — HEPARIN SODIUM 5000 UNITS: 5000 INJECTION INTRAVENOUS; SUBCUTANEOUS at 09:29

## 2021-01-01 RX ADMIN — METOPROLOL TARTRATE 25 MG: 25 TABLET, FILM COATED ORAL at 21:58

## 2021-01-01 RX ADMIN — METOPROLOL SUCCINATE 50 MG: 50 TABLET, EXTENDED RELEASE ORAL at 08:16

## 2021-01-01 RX ADMIN — AMLODIPINE BESYLATE 5 MG: 5 TABLET ORAL at 09:01

## 2021-01-01 RX ADMIN — METHYLPREDNISOLONE SODIUM SUCCINATE 60 MG: 125 INJECTION, POWDER, FOR SOLUTION INTRAMUSCULAR; INTRAVENOUS at 08:24

## 2021-01-01 RX ADMIN — DOCUSATE SODIUM 50MG AND SENNOSIDES 8.6MG 2 TABLET: 8.6; 5 TABLET, FILM COATED ORAL at 09:08

## 2021-01-01 RX ADMIN — SODIUM CHLORIDE, PRESERVATIVE FREE 10 ML: 5 INJECTION INTRAVENOUS at 08:54

## 2021-01-01 RX ADMIN — SODIUM CHLORIDE 1 ML/KG/HR: 9 INJECTION, SOLUTION INTRAVENOUS at 21:00

## 2021-01-01 RX ADMIN — ASPIRIN 81 MG: 81 TABLET, COATED ORAL at 09:06

## 2021-01-01 RX ADMIN — THERA TABS 1 TABLET: TAB at 17:58

## 2021-01-01 RX ADMIN — DOCUSATE SODIUM 50MG AND SENNOSIDES 8.6MG 2 TABLET: 8.6; 5 TABLET, FILM COATED ORAL at 09:31

## 2021-01-01 RX ADMIN — HYDROMORPHONE HYDROCHLORIDE 0.25 MG: 1 INJECTION, SOLUTION INTRAMUSCULAR; INTRAVENOUS; SUBCUTANEOUS at 11:27

## 2021-01-01 RX ADMIN — HEPARIN SODIUM 5000 UNITS: 5000 INJECTION INTRAVENOUS; SUBCUTANEOUS at 09:50

## 2021-01-01 RX ADMIN — ACETAMINOPHEN 650 MG: 325 TABLET ORAL at 10:16

## 2021-01-01 RX ADMIN — PANTOPRAZOLE SODIUM 40 MG: 40 TABLET, DELAYED RELEASE ORAL at 09:32

## 2021-01-01 RX ADMIN — SODIUM CHLORIDE, PRESERVATIVE FREE 10 ML: 5 INJECTION INTRAVENOUS at 20:31

## 2021-01-01 RX ADMIN — GABAPENTIN 100 MG: 100 CAPSULE ORAL at 09:10

## 2021-01-01 RX ADMIN — MAGNESIUM SULFATE HEPTAHYDRATE 2 G: 40 INJECTION, SOLUTION INTRAVENOUS at 07:42

## 2021-01-01 RX ADMIN — IPRATROPIUM BROMIDE AND ALBUTEROL SULFATE 3 ML: 2.5; .5 SOLUTION RESPIRATORY (INHALATION) at 19:13

## 2021-01-01 RX ADMIN — HYDROXYZINE HYDROCHLORIDE 10 MG: 10 TABLET, FILM COATED ORAL at 21:13

## 2021-01-01 RX ADMIN — TAMSULOSIN HYDROCHLORIDE 0.4 MG: 0.4 CAPSULE ORAL at 08:17

## 2021-01-01 RX ADMIN — SODIUM CHLORIDE, PRESERVATIVE FREE 10 ML: 5 INJECTION INTRAVENOUS at 09:29

## 2021-01-01 RX ADMIN — ALLOPURINOL 100 MG: 100 TABLET ORAL at 08:52

## 2021-01-01 RX ADMIN — OXYCODONE HYDROCHLORIDE AND ACETAMINOPHEN 1000 MG: 500 TABLET ORAL at 09:58

## 2021-01-01 RX ADMIN — HYDROCODONE BITARTRATE AND ACETAMINOPHEN 1 TABLET: 5; 325 TABLET ORAL at 00:04

## 2021-01-01 RX ADMIN — IPRATROPIUM BROMIDE AND ALBUTEROL SULFATE 3 ML: 2.5; .5 SOLUTION RESPIRATORY (INHALATION) at 15:42

## 2021-01-01 RX ADMIN — PANTOPRAZOLE SODIUM 40 MG: 40 TABLET, DELAYED RELEASE ORAL at 04:56

## 2021-01-01 RX ADMIN — CARBOPLATIN 320 MG: 10 INJECTION, SOLUTION INTRAVENOUS at 12:14

## 2021-01-01 RX ADMIN — ERTAPENEM SODIUM 500 MG: 1 INJECTION, POWDER, LYOPHILIZED, FOR SOLUTION INTRAMUSCULAR; INTRAVENOUS at 22:37

## 2021-01-01 RX ADMIN — SODIUM CHLORIDE, PRESERVATIVE FREE 10 ML: 5 INJECTION INTRAVENOUS at 18:33

## 2021-01-01 RX ADMIN — ATORVASTATIN CALCIUM 80 MG: 40 TABLET, FILM COATED ORAL at 21:49

## 2021-01-01 RX ADMIN — DICLOFENAC 4 G: 10 GEL TOPICAL at 11:40

## 2021-01-01 RX ADMIN — ERTAPENEM 1 G: 1 INJECTION, POWDER, LYOPHILIZED, FOR SOLUTION INTRAMUSCULAR; INTRAVENOUS at 18:33

## 2021-01-01 RX ADMIN — OXYCODONE HYDROCHLORIDE AND ACETAMINOPHEN 1000 MG: 500 TABLET ORAL at 09:50

## 2021-01-01 RX ADMIN — NYSTATIN 500000 UNITS: 100000 SUSPENSION ORAL at 20:14

## 2021-01-01 RX ADMIN — PALONOSETRON 0.25 MG: 0.25 INJECTION, SOLUTION INTRAVENOUS at 12:05

## 2021-01-01 RX ADMIN — METOPROLOL TARTRATE 25 MG: 25 TABLET, FILM COATED ORAL at 21:10

## 2021-01-01 RX ADMIN — THERA TABS 1 TABLET: TAB at 09:50

## 2021-01-01 RX ADMIN — DICLOFENAC 4 G: 10 GEL TOPICAL at 18:00

## 2021-01-01 RX ADMIN — ACETAMINOPHEN 650 MG: 325 TABLET ORAL at 22:03

## 2021-01-01 RX ADMIN — AMLODIPINE BESYLATE 5 MG: 5 TABLET ORAL at 09:28

## 2021-01-11 NOTE — PROGRESS NOTES
CHEMOTHERAPY PREPARATION    Jair Flores  [id number]  1945    Subjective   Chief Complaint: Treatment Preparation and Needs Assessment    History of present illness:  Jair Flores is a 75 y.o. year old male who is here today for chemotherapy preparation and needs assessment. The patient has been diagnosed with high grade urothelial carcinoma, stage III and is scheduled to begin treatment with cisplatin/gemcitabine. He has questions regarding his surgical pathology as well as his PET scan results.     Oncology History:    Oncology/Hematology History   Bladder cancer (CMS/HCC)   4/29/2019 Initial Diagnosis    Bladder cancer (CMS/HCC)     1/19/2021 -  Chemotherapy    OP BLADDER CISplatin D1,D8 / Gemcitabine D1,D8     Malignant neoplasm of right ureter (CMS/HCC)   11/30/2020 Cancer Staged    Staging form: Renal Pelvis And Ureter, AJCC 8th Edition  - Clinical stage from 11/30/2020: Stage III (cT3, cN0, cM0) - Signed by Rosemarie Montoya MD on 12/28/2020 12/28/2020 Initial Diagnosis    Malignant neoplasm of right ureter (CMS/HCC)         The current medication list and allergy list were reviewed and reconciled.     Past Medical History, Past Surgical History, Social History, Family History have been reviewed and are without significant changes except as mentioned.    Review of Systems   Constitutional: Positive for fatigue. Negative for activity change, appetite change, chills, fever and unexpected weight change.   HENT: Negative for hearing loss, mouth sores, nosebleeds, sore throat and trouble swallowing.    Eyes: Negative for visual disturbance.   Respiratory: Negative for cough, chest tightness, shortness of breath and wheezing.    Cardiovascular: Negative for chest pain, palpitations and leg swelling.   Gastrointestinal: Negative for abdominal distention, abdominal pain, blood in stool, constipation, diarrhea, nausea, rectal pain and vomiting.   Endocrine: Negative for cold intolerance and heat  "intolerance.   Genitourinary: Negative for difficulty urinating, dysuria, frequency and urgency.   Musculoskeletal: Negative for arthralgias, back pain, gait problem, joint swelling and myalgias.   Skin: Negative for rash.   Neurological: Negative for dizziness, tremors, syncope, weakness, light-headedness, numbness and headaches.   Hematological: Negative for adenopathy. Does not bruise/bleed easily.   Psychiatric/Behavioral: Negative for confusion, sleep disturbance and suicidal ideas. The patient is nervous/anxious.         Depression       Objective   Physical Exam  Vital Signs: /71 Comment: standing  Pulse 63   Temp 96.8 °F (36 °C) (Temporal)   Resp 16   Ht 177.8 cm (70\")   Wt 78.9 kg (174 lb)   SpO2 98%   BMI 24.97 kg/m²   Vitals:    01/11/21 1434   PainSc: 0-No pain         General Appearance:  alert, cooperative, no apparent distress and appears stated age   Neurologic/Psychiatric: A&O x 3, gait steady, appropriate affect   HEENT:  Normocephalic, without obvious abnormality, mucous membranes moist   Lungs:   Clear to auscultation bilaterally; respirations regular, even, and unlabored bilaterally   Heart:  Regular rate and rhythm, no murmurs appreciated   Extremities: Normal, atraumatic; no clubbing, cyanosis, or edema    Skin: No rashes, lesions, or abnormal coloration noted     ECOG Performance Status: (1) Restricted in Physically Strenuous Activity, Ambulatory & Able to Do Work of Light Nature          NEEDS ASSESSMENTS    Genetics  The patient's new diagnosis and family history have been reviewed for genetic counseling needs. A genetic referral is not recommended.     Molecular Testing  Further molecular testing on tumor is indicated. NexGen Sequencing pending.     Psychosocial  The patient has completed a PHQ-9 Depression Screening and the Distress Thermometer (DT) today.   PHQ-9 Total Score: 0. PHQ-9 results show 5-9 (Mild Depression). The patient scored their distress today as 0 on a scale " of 0-10 with 0 being no distress and 10 being extreme distress.   Problems marked by the patient as being an issue for them within the last week include emotional problems.   Results were reviewed along with psychosocial resources offered by our cancer center. Our oncology social worker will be flagged for a DT score of 4 or above, and a same day call will be made for a score of 9 or 10. A mental health referral is recommended at this time. The patient is accepting of a referral to [facility].   Copies of patient's questionnaires will be scanned into EMR for details and further reference.    Barriers to care  A barriers form was also completed by the patient today. We discussed services offered by our facility to help him have adequate access to care. The patient was given the name and card for our Oncology Social Worker, Karlene Buenrostro. Based upon barriers assessment today, the patient will not require a follow-up call from the  to further discuss needs.   A copy of the barriers form will also be scanned into EMR for details and further reference.     VAD Assessment  The patient and I discussed planned intervenous chemotherapy as well as other IV treatments that are often needed throughout the course of treatment. These may include, but are not limited to blood transfusions, antibiotics, and IV hydration. The vasculature does appear to be adequate for multiple peripheral IVs throughout their treatment course. Discussed risks and benefits of VADs. The patient would not like to pursue Port-A-Cath insertion prior to initiation of treatment. I did discuss with the patient and his wife that if he began having difficulty with IV access we could consider PICC or placement in the future if needed.     Advance Care Planning   ACP discussion was held with the patient during this visit. Patient does not have an advance directive, declines further assistance.  The patient and I discussed advanced care  "planning, \"Conversations that Matter\".   This service was offered, free of charge, for development of advance directives with a certified ACP facilitator.  The patient does not have an up-to-date advanced directive. This document is not on file with our office. The patient is not interested in an appointment with one of our facilitators to create or update their advanced directives.  The patient is an  and is working on having his advanced directive and medical power of  done through his office.        Palliative Care  The patient and I discussed palliative care services. Palliative care is not the same as Hospice care. This is specialized medical care for people living with serious illness with the goal of improving quality of life for the patient and their family. [facility] has partnered with [facility] to offer our patients outpatient palliative care early along with their treatment to assist in coordination of care, symptom management, pain management, and medical decision making.  Oncology criteria for palliative care referral is not met at this time. The patient is not interested in a palliative care consultation.     Additional Referral needs  none      CHEMOTHERAPY EDUCATION    Booklets Given: Chemotherapy and You [x]  Eating Hints [x]    Sexuality/Fertility Books []      Chemotherapy/Biotherapy Education Sheets: (list all that apply)  nausea management, acid reflux management, diarrhea management, Cancer resourse contacts information, skin and mouth care and Treatment Calendar                                                                                                                                                                 Chemotherapy Regimen:   Treatment Plans     Name Type Plan Dates Plan Provider         Active    OP BLADDER CISplatin D1,D8 / Gemcitabine D1,D8 ONCOLOGY TREATMENT  1/18/2021 - Present Rosemarie Montoya MD                    TOPICS EDUCATION PROVIDED " COMMENTS   ANEMIA:  role of RBC, cause, s/s, ways to manage, role of transfusion [x]    THROMBOCYTOPENIA:  role of platelet, cause, s/s, ways to prevent bleeding, things to avoid, when to seek help [x]    NEUTROPENIA:  role of WBC, cause, infection precautions, s/s of infection, when to call MD [x]    NUTRITION & APPETITE CHANGES:  importance of maintaining healthy diet & weight, ways to manage to improve intake, dietary consult, exercise regimen [x]    DIARRHEA:  causes, s/s of dehydration, ways to manage, dietary changes, when to call MD [x]    CONSTIPATION:  causes, ways to manage, dietary changes, when to call MD [x]    NAUSEA & VOMITING:  cause, use of antiemetics, dietary changes, when to call MD [x]    MOUTH SORES:  causes, oral care, ways to manage [x]    ALOPECIA:  cause, ways to manage, resources [x]    INFERTILITY & SEXUALITY:  causes, fertility preservation options, sexuality changes, ways to manage, importance of birth control [x]    NERVOUS SYSTEM CHANGES:  causes, s/s, neuropathies, cognitive changes, ways to manage [x]    PAIN:  causes, ways to manage [x]    SKIN & NAIL CHANGES:  cause, s/s, ways to manage [x]    ORGAN TOXICITIES:  cause, s/s, need for diagnostic tests, labs, when to notify MD [x]    SURVIVORSHIP:  distress, distress assessment, secondary malignancies, early/late effects, follow-up, social issues, social support [x]    HOME CARE:  use of spill kits, storing of PO chemo, how to manage bodily fluids [x]    MISCELLANEOUS:  drug interactions, administration, vesicant, et [x]        Assessment and Plan:    Diagnoses and all orders for this visit:    1. Malignant neoplasm of right ureter (CMS/HCC) (Primary)  -     Ambulatory Referral to Psychiatry    2. Malignant neoplasm of urinary bladder, unspecified site (CMS/HCC)  -     dexamethasone (DECADRON) 4 MG tablet; Take 2 tablets in the morning with food on Days 2, 3 and 4 and Days 9, 10 and 11.  Dispense: 12 tablet; Refill: 3  -      ondansetron (ZOFRAN) 8 MG tablet; Take 1 tablet by mouth 3 (Three) Times a Day As Needed for Nausea or Vomiting.  Dispense: 30 tablet; Refill: 5  -     CBC and Differential; Future  -     Comprehensive metabolic panel; Future  -     Magnesium; Future  -     Lab Appointment Request; Future  -     Clinic Appointment Request; Future  -     Infusion Appointment Request; Future  -     CBC and Differential; Future  -     Lab Appointment Request; Future  -     Infusion Appointment Request 3; Future  -     Ambulatory Referral to Psychiatry    Other orders  -     sodium chloride 0.9 % infusion 250 mL  -     sodium chloride 0.9 % 1,000 mL with potassium chloride 20 mEq, magnesium sulfate 1 g infusion  -     palonosetron (ALOXI) injection 0.25 mg  -     fosaprepitant (EMEND) 150 mg in sodium chloride 0.9 % 100 mL IVPB  -     dexamethasone (DECADRON) 12 mg in sodium chloride 0.9 % IVPB  -     gemcitabine (GEMZAR) 1,950 mg in sodium chloride 0.9 % 301.3 mL chemo IVPB  -     CISplatin (PLATINOL) 68 mg in sodium chloride 0.9 % 318 mL chemo IVPB  -     sodium chloride 0.9 % 1,000 mL with potassium chloride 20 mEq, magnesium sulfate 1 g infusion  -     sodium chloride 0.9 % infusion 250 mL  -     sodium chloride 0.9 % 1,000 mL with potassium chloride 20 mEq, magnesium sulfate 1 g infusion  -     palonosetron (ALOXI) injection 0.25 mg  -     fosaprepitant (EMEND) 150 mg in sodium chloride 0.9 % 100 mL IVPB  -     dexamethasone (DECADRON) 12 mg in sodium chloride 0.9 % IVPB  -     gemcitabine (GEMZAR) 1,950 mg in sodium chloride 0.9 % 301.3 mL chemo IVPB  -     CISplatin (PLATINOL) 68 mg in sodium chloride 0.9 % 318 mL chemo IVPB  -     sodium chloride 0.9 % 1,000 mL with potassium chloride 20 mEq, magnesium sulfate 1 g infusion        This was a 80 minute face-to-face visit with 75 minutes spent in  counseling and coordination of care as documented above.   The patient and I have reviewed their new cancer diagnosis and scheduled  treatment plan. Needs assessment was completed including genetics, psychosocial needs, barriers to care, VAD evaluation, advanced care planning, and palliative care services. Referrals have been ordered as appropriate based upon our evaluation and patient desires.     Chemotherapy teaching was also completed today as documented above. Adequate time was given to answer all questions to his satisfaction. Patient and family are aware of their care team members and contact information if they have questions or problems throughout the treatment course. Needs assessments and education has been completed. The patient is adequately prepared to begin treatment as scheduled.     Pain assessment was performed today as a part of patient’s care. For patients with pain related to surgery, gynecologic malignancy or cancer treatment, the plan is as noted in the assessment/plan.  For patients with pain not related to these issues, they are to seek any further needed care from a more appropriate provider, such as PCP.    Consent obtained for cisplatin/gemcitabine.     The patient was unable to have pre-treatment labs drawn today secondary to lab being closed by the end of his visit. He has follow up scheduled for Tuesday prior to treatment with Dr. Montoya. I asked that he get labs drawn before his visit so they will be ready for his scheduled infusion appointment.     We will consider port placement in the future if needed for difficulty with IV access.     I will send a message to dietician, Ghazala Gan, to have her consult with the patient during his infusion next week.     We reviewed use of home medications including Zofran every 8 hours as needed for treatment induced nausea and vomiting as well as Decadron 2 tab sin the AM on days 2, 3, 4 and 9, 10, 11 of treatment. He was instructed to take steroids with food as it can cause GI upset.     The patient is going to draw up his medical power of  and advanced directive  through is own office. He will bring up a copy of these when completed.     We will refer him to [facility] due to anxiety and depression related to cancer diagnosis.    I reviewed again with the patient that his surgical pathology did reveal a positive deep margin. Dr. Montoya spoke with the patient's surgeon who confirmed this as well. He may benefit from adjuvant radiation following chemotherapy as a result.    The patient is scheduled for TURBT this week with Dr. Salmeron.    I advised the patient he can get COVID-19 vaccine if he has the opportunity, ideally to start before beginning chemotherapy. I told him that we do not yet had access to vaccine for patients.     I reviewed the PET scan results with the patient and his wife. I reassured him there was no evidence metastatic disease. He does have a known bladder tumor, without evidence of lymphadenopathy. I reviewed the results with Dr. Montoya as well.     The patient was advised to continue all of his home medications unless instructed otherwise. We may have to adjust his medications, such as antihypertensives, while on chemotherapy as he may have fluctuations in blood pressure while on treatment.       Electronically signed by ANTION[facility] Wright on 01/13/21 at 14:32 EST.

## 2021-01-19 NOTE — PLAN OF CARE
[facility] • [address]  • [address] • [place] [id number] • [phone]      CHEMOTHERAPY EDUCATION SHEET    NAME:  Jair Flores      : 1945           DATE: 21    Booklets Given: Chemotherapy and You []  Eating Hints []    Sexuality/Fertility Books []     Chemotherapy/Biotherapy Education Sheets: (list all that apply)  Gemcitabine and Cisplatin                                                                                                                                                                Chemotherapy Regimen:  Gemcitabine and Cisplatin IV infusions on day 1 and 8 of 21 day cycle    TOPICS EDUCATION PROVIDED EDUCATION REINFORCED COMMENTS   ANEMIA:  role of RBC, cause, s/s, ways to manage, role of transfusion [] [x] Discussed role of red blood cells in our body and symptoms he may experience such as fatigue.    THROMBOCYTOPENIA:  role of platelet, cause, s/s, ways to prevent bleeding, things to avoid, when to seek help [] [x] Discussed role of platelets in our body and that he may bleed and bruise easier. Discussed to use caution and seek help if bleeding does not stop or becomes unmanageable.   NEUTROPENIA:  role of WBC, cause, infection precautions, s/s of infection, when to call MD [] [x] Discussed role of white blood cells in our body and immunocompromise status. Discussed importance of hand hygiene and avoiding crowds. Instructed to call MD if temperature >100.4.    NUTRITION & APPETITE CHANGES:  importance of maintaining healthy diet & weight, ways to manage to improve intake, dietary consult, exercise regimen [] [x] Discussed possibility of taste changes and recommended using flavoring if he water starts to taste metallic.    DIARRHEA:  causes, s/s of dehydration, ways to manage, dietary changes, when to call MD [] [x] Discussed that patient can use OTC loperamide to manage this at home. If it becomes unmanageable, instructed to call provider.   CONSTIPATION:  causes,  ways to manage, dietary changes, when to call MD [] [x] Discussed risk for patient to experience constipation. If it becomes an issue, can use OTC stool softener or miralax. Counseled on avoid suppositories or enemas without talking to MD.   NAUSEA & VOMITING:  cause, use of antiemetics, dietary changes, when to call MD [] [x] Discussed nausea and vomiting and using ondansetron up to three times daily as needed.    MOUTH SORES:  causes, oral care, ways to manage [] [x] Discussed making homemade mouthwash to prevent sores but to call MD if they develop and become bothersome.   ALOPECIA:  cause, ways to manage, resources [] [x] Discussed potential for some hair loss or thinning.   INFERTILITY & SEXUALITY:  causes, fertility preservation options, sexuality changes, ways to manage, importance of birth control [] []    NERVOUS SYSTEM CHANGES:  causes, s/s, neuropathies, cognitive changes, ways to manage [] [x] Discussed potential for peripheral neuropathy and that it's not temporary. Instructed to call if it becomes unmanageable.   PAIN:  causes, ways to manage [] [] ????   SKIN & NAIL CHANGES:  cause, s/s, ways to manage [] []    ORGAN TOXICITIES:  cause, s/s, need for diagnostic tests, labs, when to notify MD [] [x] Discussed that we will be monitoring his blood counts, liver function, kidney function, and other organs through his labs.   SURVIVORSHIP:  distress, distress assessment, secondary malignancies, early/late effects, follow-up, social issues, social support [] []    HOME CARE:  use of spill kits, storing of PO chemo, how to manage bodily fluids [] []    MISCELLANEOUS:  drug interactions, administration, vesicant, et [] [x] Attempted to do a med rec but patient did not have his medication list with him.     Referrals:    N/A    Notes:   Discussed all aforementioned material with Mr. Flores in infusion clinic. All questions and concerns were answered. He was provided with personalized calendar, written  information, and Almita Gomez's business card. Encouraged patient to call if other questions arise.

## 2021-01-19 NOTE — PROGRESS NOTES
DATE OF VISIT: 1/19/2021    REASON FOR VISIT: Followup for right ureter carcinoma     HISTORY OF PRESENT ILLNESS: The patient is a very pleasant 75 y.o. 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  The patient was started adjuvant chemotherapy cisplatin gemcitabine January 19, 2021. The patient is here today for scheduled follow-up visit with treatment cycle 1 day 1.    SUBJECTIVE: The patient has been doing fairly well. he was able to tolerate  his treatment without any serious side effects. he denied any fever or  chills, no night sweats, denied any headaches    PAST MEDICAL HISTORY/SOCIAL HISTORY/FAMILY HISTORY: Reviewed by me and unchanged from my documentation done on 01/19/21.    Review of Systems   Constitutional: Negative for activity change, appetite change, chills, fatigue, fever and unexpected weight change.   HENT: Negative for hearing loss, mouth sores, nosebleeds, sore throat and trouble swallowing.    Eyes: Negative for visual disturbance.   Respiratory: Negative for cough, chest tightness, shortness of breath and wheezing.    Cardiovascular: Negative for chest pain, palpitations and leg swelling.   Gastrointestinal: Negative for abdominal distention, abdominal pain, blood in stool, constipation, diarrhea, nausea, rectal pain and vomiting.   Endocrine: Negative for cold intolerance and heat intolerance.   Genitourinary: Negative for difficulty urinating, dysuria, frequency and urgency.   Musculoskeletal: Negative for arthralgias, back pain, gait problem, joint swelling and myalgias.   Skin: Negative for rash.   Neurological: Negative for dizziness, tremors, syncope, weakness, light-headedness, numbness and headaches.   Hematological: Negative for adenopathy. Does not bruise/bleed easily.   Psychiatric/Behavioral: Negative for confusion, sleep disturbance and suicidal ideas. The patient is not nervous/anxious.          Current Outpatient Medications:    •  allopurinol (ZYLOPRIM) 100 MG tablet, Take 1 tablet by mouth Daily., Disp: 90 tablet, Rfl: 3  •  amLODIPine (NORVASC) 5 MG tablet, Take 1 tablet by mouth Daily., Disp: 30 tablet, Rfl: 11  •  apixaban (ELIQUIS) 5 MG tablet tablet, Take 1 tablet by mouth Every 12 (Twelve) Hours. Indications: Atrial Fibrillation, Disp: 180 tablet, Rfl: 3  •  Ascorbic Acid (VITAMIN C) 500 MG chewable tablet, Chew 800 mg Daily., Disp: , Rfl:   •  aspirin EC 81 MG EC tablet, Take 1 tablet by mouth Daily. (Patient taking differently: Take 81 mg by mouth Every Morning.), Disp: 100 tablet, Rfl: 4  •  atorvastatin (LIPITOR) 80 MG tablet, TAKE 1 TABLET BY MOUTH EVERY NIGHT., Disp: 90 tablet, Rfl: 3  •  clindamycin (Cleocin) 150 MG capsule, Every 6 (Six) Hours., Disp: , Rfl:   •  dexamethasone (DECADRON) 4 MG tablet, Take 2 tablets in the morning with food on Days 2, 3 and 4 and Days 9, 10 and 11., Disp: 12 tablet, Rfl: 3  •  Fluzone High-Dose Quadrivalent 0.7 ML suspension prefilled syringe, , Disp: , Rfl:   •  guaiFENesin (MUCINEX) 600 MG 12 hr tablet, Take 1,200 mg by mouth., Disp: , Rfl:   •  methocarbamol (ROBAXIN) 500 MG tablet, , Disp: , Rfl:   •  metoprolol succinate XL (TOPROL-XL) 100 MG 24 hr tablet, Take 1 tablet by mouth Daily., Disp: 90 tablet, Rfl: 3  •  Misc. Devices (NASAL SPRAY BOTTLE) misc, 1 spray As Needed., Disp: , Rfl:   •  Multiple Vitamins-Minerals (HM MULTIVITAMIN ADULT GUMMY) chewable tablet, Chew 1 tablet Daily., Disp: , Rfl:   •  nitrofurantoin, macrocrystal-monohydrate, (MACROBID) 100 MG capsule, , Disp: , Rfl:   •  ondansetron (ZOFRAN) 8 MG tablet, Take 1 tablet by mouth 3 (Three) Times a Day As Needed for Nausea or Vomiting., Disp: 30 tablet, Rfl: 5  •  oxyCODONE (ROXICODONE) 10 MG tablet, , Disp: , Rfl:   •  sacubitril-valsartan (Entresto) 49-51 MG tablet, Take 1 tablet by mouth 2 (Two) Times a Day., Disp: 180 tablet, Rfl: 3    PHYSICAL EXAMINATION:   There were no vitals taken for this visit.   There were  no vitals filed for this visit.    ECOG Performance Status: 1 - Symptomatic but completely ambulatory  General Appearance:  alert, cooperative, no apparent distress and appears stated age   Neurologic/Psychiatric: A&O x 3, gait steady, appropriate affect, strength 5/5 in all muscle groups   HEENT:  Normocephalic, without obvious abnormality, mucous membranes moist   Neck: Supple, symmetrical, trachea midline, no adenopathy;  No thyromegaly, masses, or tenderness   Lungs:   Clear to auscultation bilaterally; respirations regular, even, and unlabored bilaterally   Heart:  Regular rate and rhythm, no murmurs appreciated   Abdomen:   Soft, non-tender, non-distended and no organomegaly   Lymph nodes: No cervical, supraclavicular, inguinal or axillary adenopathy noted   Extremities: Normal, atraumatic; no clubbing, cyanosis, or edema    Skin: No rashes, ulcers, or suspicious lesions noted     Hospital Outpatient Visit on 01/08/2021   Component Date Value Ref Range Status   • Glucose 01/08/2021 94  70 - 130 mg/dL Final        Nm Pet Skull Base To Mid Thigh    Result Date: 1/11/2021  Narrative: EXAMINATION: NM PET, SKULL BASE TO MID THIGH-01/08/2021:  INDICATION: Staging scans; C67.5-Malignant neoplasm of bladder neck; C67.9-Malignant neoplasm of bladder, unspecified, bladder cancer.  TECHNIQUE: With fasting blood glucose level of 94 mg/dL, a total of 12.44 mCi of FDG was administered via the left wrist. Following appropriate delay, PET-CT imaging was obtained from the skull vertex to the mid thighs bilaterally and fused multiplanar images were reconstructed. The CT scan is an unenhanced study and used for reference to the PET scan only and should not be considered a diagnostic CT scan. PET-CT imaging was reviewed in the axial, coronal, and sagittal planes as well as within the cine mode.  The radiation dose reduction device was turned on as low as reasonably achievable for each scan per ALARA protocol.  COMPARISON: Initial  exam for treatment with no prior studies available for comparison.  FINDINGS: There is normal variant activity identified within the oropharynx and muscles of phonation. Normal variant activity identified in the region of the vocal cords. Normal variant activity identified in the thyroid. There is no evidence of hypermetabolic activity seen within the neck to suggest evidence of metastatic disease. Within the chest there is normal variant activity seen within the heart. There is no bulky adenopathy or hypermetabolic adenopathy identified within the mediastinum or hilar regions to suggest evidence of metastatic disease. Minimal atelectatic changes seen in the lung bases bilaterally. No pulmonary mass or nodule.  The right kidney has been surgically removed. The left kidney is unremarkable. Normal variant activity seen within the GI and  tract. There is no evidence of adenopathy identified within the retroperitoneum or abdomen. There is a soft tissue mass identified anterior to the bladder demonstrating hypermetabolic activity concerning for malignancy, maximum SUV of 10.3. There is some wall thickening identified of the bladder. There is no adenopathy seen in the pelvic sidewalls. Mild enlargement of the prostate. No inguinal adenopathy identified. The bony structures are unremarkable. Upper thighs are unremarkable.      Impression: Soft tissue mass seen anterior to the bladder concerning for malignancy. There is also mild wall thickening of the bladder, however, the activity within the bladder obscures image detail of involvement of the bladder wall itself. There is no adenopathy in the pelvic sidewalls. The remainder of the PET/CT scan is unremarkable.   D:  01/08/2021 E:  01/08/2021  This report was finalized on 1/11/2021 4:57 PM by Dr. Pati More MD.        ASSESSMENT: The patient is a very pleasant 75 y.o. male  with high-grade urothelial carcinoma    PROBLEM LIST:   1.  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  Status post right nephrectomy with ureterectomy done at [facility] November 30, 2020  B.  Pathology showed positive posterior margin  C.  Molecular testing revealed PD-L1 combined positive score at 10%, TSC 1 mutation.    D. Started adjuvant chemotherapy with cisplatin Gemzar January 19, 2021  2.  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  Status post right nephrectomy done November 30, 2020 with a clear surgical margins  3.  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 Status post multiple resections with mitomycin-C injection in the past  B.  Status post resection for local recurrence November 30, 2020  4.  Atrial fibrillation  5.  Valvular heart disease    PLAN:  1.  I will proceed with chemotherapy as scheduled today cycle number 1 day 1.  2.  The patient will follow up with my nurse practitioner next week for cycle number 1 day 8 to evaluate for treatment tolerance and potential side effects.  3.  The patient will follow up with me for cycle 2-day 1.  4.  I reviewed the PET scan findings with the patient his wife it did reveal the mass in the bladder however the patient has known and resected malignancy which he had another surgery with intravesical treatment done last week at [facility].  The anterior 3 cm mass lesion and next of the bladder is most likely from his previous surgery.  I will repeat the patient scans after completion of chemotherapy.  Were planning on for total of 4 cycles.  5.  I discussed the case with the patient urologist at [facility].  He agreed that the margins were positive.  We discussed the role of adjuvant radiation he said this is somebody doing do routinely over there.  I will discuss further with our local radiation oncologist.  6.  I will monitor the patient blood work including blood counts kidney function liver function and  electrolytes.  7. We reviewed the potential side effects of this regimen including fatigue, vomiting and nausea, hair loss, nephropathy, neuropathy, hearing loss, myelosuppression, and risk of infusion reaction.  8.  I will start the patient on Zofran for chemotherapy-induced nausea.  9.  The patient be at high risk of bleeding while on chemotherapy given the fact that he is also on full anticoagulation.  10.  I explained to the patient his NexGen sequencing did not reveal any actionable mutation.  His PD-L1 is positive which might predict response to immunotherapy and subsequence lines of treatment.  Rosemarie Montoya MD  1/19/2021

## 2021-01-19 NOTE — PROGRESS NOTES
"Outpatient Oncology Nutrition     Reason for Visit:     Oncology Nutrition Screening and Patient Education during patient's initial chemotherapy infusion appointment.    Patient Name:  Jair Flores    :  1945    MRN:  [id number]    Date of Encounter: 2021    Nutrition Assessment     Cancer Dx: high grade urothelial carcinoma, stage III    Type of Cancer Treatment:     Surgery: right radical nephrectomy with ureterectomy (20)     Chemotherapy: Cisplatin / Gemzar - days 1,8 - every 21 days x 4     Radiation:    Patient Active Problem List:    Patient Active Problem List   Diagnosis   • History of bladder cancer   • Insomnia   • Benign essential hypertension   • Hyperlipidemia LDL goal <70   • Osteoarthritis   • Right carotid bruit   • Primary osteoarthritis involving multiple joints   • Bladder cancer (CMS/HCC)   • History of disease   • Gout   • GERD without esophagitis   • Carotid artery plaque, left   • Obstructive sleep apnea   • S/P AVR (aortic valve replacement)   • Atrial fibrillation with RVR (CMS/HCC)   • History of CVA (cerebrovascular accident) - S/P stent   • Coronary artery disease involving native coronary artery of native heart   • Prolonged QT interval   • Acute systolic congestive heart failure (CMS/HCC)   • Malignant neoplasm of right ureter (CMS/HCC)       Food / Nutrition Related History   Patient reports he typically eat 3 meals daily.    Hydration Status   Discussed the importance of hydration and reviewed hydrating fluids.     Goal: 80-88 ounces daily    How many 8 ounces glasses of water do you consume per day? Patient reports drinking mostly water but does not keep track of how much he drinks.    Enteral Feeding       Anthropometric Measurements     Height:    Ht Readings from Last 1 Encounters:   21 177.8 cm (70\")       Weight:    Wt Readings from Last 1 Encounters:   21 79.4 kg (175 lb)       BMI:  25.1 - Overweight Usual Body Weight: ~180#    Weight " Change: patient reports ~5# (2.8%) weight loss after surgery 11/30/20 but has been maintaining recently    Review of Lab Data (Time Frame - 1 month / 2 month)   Labs reviewed (1/19/21) - Creatinine 1.39 mg/dL    Medication Review   Meds noted     Nutrition Focused Physical Findings       Nutrition Impact Symptoms      Patient denies significant nutritional complaints at this time    Physical Activity      Not my normal self, but able to be up and about with fairly normal activities    Current Nutritional Intake     Oral diet:  Regular     Oral nutritional supplements: homemade smoothies with added plant based protein powder    Intake: Patient reports his oral intake is a little less than normal    Malnutrition Risk Assessment     Recent weight loss over the past 6 months:  Yes    How much weight loss:  1 = 2-13 lbs    Eating poorly because of a decreased appetite:  1 = Yes    Malnutrition Screening Score:     MST = 2 more Patient at risk for malnutrition     Nutrition Diagnosis     Problem Inadequate oral intake    Etiology Cancer diagnosis and recent surgeries   Signs / Symptoms 5# weight loss and decreased oral intake per patient recall     Nutrition Intervention   Discussed the importance of good nutrition during his treatment course focusing on adequate calorie, protein, nutrient and fluid intake.  Advised him to be consuming smaller more frequent meals/snacks throughout the day to aid with potential nausea management.  Emphasized the importance of protein and its role in the diet; reviewed high protein foods; and recommended he have a protein source at each meal/snack.  Advised him to have a moderate amount of protein in his diet due to nephrectomy (1-1.3 grams of protein per kg).  Encouraged him to continue to consume homemade smoothies to aid with calorie and protein intake.  Advised him to use the baking soda / salt mouth rinse to aid with potential taste changes and for overall oral care.  Also suggested he  use plastic utensils and to avoid foods / beverages in metal containers to aid with potential metallic taste.    Goal   To increase oral intake for weight maintenance / gain.  To aid with nutrition impact symptom management as needed.    Monitoring / Evaluation   Answered his questions and he voiced understanding of information discussed.  RD's contact information provided and encouraged to call with questions.  Will monitor as needed during his treatment course.    Pati Wilcox MS, RD, LD

## 2021-01-22 NOTE — TELEPHONE ENCOUNTER
Patient wife calling triage line to report that patient has done well since infusion but this morning after a bowel movement, noticed a small amount of blood on the toilet tissue.  It was smaller than a little fingernail.  Stool was not hard.  Patient did not see any blood in the toilet bowl.  He is on eliquis bid for afib.  I talked with ANTIONE Hernandez and she said that since it was a very small amount that patient may have a hemmorhoid or tiny tear in his rectum that caused the bleeding. Pt should continue on his eliquis and keep close watch and if bleeding is large amount or clots, they should report to ED.  I called wife back and talked to her at length about close observation and keeping patient hydrated and making sure that stools are soft and not hard and she verbalized understanding

## 2021-01-25 NOTE — PROGRESS NOTES
DATE OF VISIT: 1/26/2021    REASON FOR VISIT: Followup for right ureter carcinoma     HISTORY OF PRESENT ILLNESS: The patient is a very pleasant 75 y.o. 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  The patient was started adjuvant chemotherapy cisplatin gemcitabine January 19, 2021. The patient is here today for scheduled follow-up visit with treatment cycle 1 day 8.    SUBJECTIVE: The patient is here today with his wife. He has been doing fair. He complains of hiccups that have been bothersome and prevented him from sleeping. He has had some mild constipation, but this was relieved with use of Miralax as needed. He complains of fatigue that started Friday after he completed his steroids. He has not had much nausea. His appetite has been decreased, however he has been getting in an adequate amount of nutrition through smoothies and soup. He weight has been stable.     PAST MEDICAL HISTORY/SOCIAL HISTORY/FAMILY HISTORY: Reviewed by me and unchanged from my documentation done on 01/26/21.    Review of Systems   Constitutional: Positive for fatigue. Negative for activity change, appetite change, chills, fever and unexpected weight change.   HENT: Negative for hearing loss, mouth sores, nosebleeds, sore throat and trouble swallowing.         Hiccups   Eyes: Negative for visual disturbance.   Respiratory: Negative for cough, chest tightness, shortness of breath and wheezing.    Cardiovascular: Negative for chest pain, palpitations and leg swelling.   Gastrointestinal: Positive for constipation. Negative for abdominal distention, abdominal pain, blood in stool, diarrhea, nausea, rectal pain and vomiting.   Endocrine: Negative for cold intolerance and heat intolerance.   Genitourinary: Negative for difficulty urinating, dysuria, frequency and urgency.   Musculoskeletal: Negative for arthralgias, back pain, gait problem, joint swelling and myalgias.   Skin: Negative for  rash.   Neurological: Positive for weakness. Negative for dizziness, tremors, syncope, light-headedness, numbness and headaches.   Hematological: Negative for adenopathy. Does not bruise/bleed easily.   Psychiatric/Behavioral: Negative for confusion, sleep disturbance and suicidal ideas. The patient is not nervous/anxious.          Current Outpatient Medications:   •  allopurinol (ZYLOPRIM) 100 MG tablet, Take 1 tablet by mouth Daily., Disp: 90 tablet, Rfl: 3  •  amLODIPine (NORVASC) 5 MG tablet, Take 1 tablet by mouth Daily., Disp: 30 tablet, Rfl: 11  •  apixaban (ELIQUIS) 5 MG tablet tablet, Take 1 tablet by mouth Every 12 (Twelve) Hours. Indications: Atrial Fibrillation, Disp: 180 tablet, Rfl: 3  •  Ascorbic Acid (VITAMIN C) 500 MG chewable tablet, Chew 800 mg Daily., Disp: , Rfl:   •  aspirin EC 81 MG EC tablet, Take 1 tablet by mouth Daily. (Patient taking differently: Take 81 mg by mouth Every Morning.), Disp: 100 tablet, Rfl: 4  •  atorvastatin (LIPITOR) 80 MG tablet, TAKE 1 TABLET BY MOUTH EVERY NIGHT., Disp: 90 tablet, Rfl: 3  •  baclofen (LIORESAL) 10 MG tablet, Take 1 tablet by mouth 3 (Three) Times a Day. PRN hiccups, Disp: 90 tablet, Rfl: 2  •  dexamethasone (DECADRON) 4 MG tablet, Take 2 tablets in the morning with food on Days 2, 3 and 4 and Days 9, 10 and 11., Disp: 12 tablet, Rfl: 3  •  Fluzone High-Dose Quadrivalent 0.7 ML suspension prefilled syringe, , Disp: , Rfl:   •  guaiFENesin (MUCINEX) 600 MG 12 hr tablet, Take 1,200 mg by mouth. prn, Disp: , Rfl:   •  ID Now COVID-19 kit, See Admin Instructions. for testing, Disp: , Rfl:   •  metoprolol succinate XL (TOPROL-XL) 100 MG 24 hr tablet, Take 1 tablet by mouth Daily., Disp: 90 tablet, Rfl: 3  •  Misc. Devices (NASAL SPRAY BOTTLE) misc, 1 spray As Needed., Disp: , Rfl:   •  Multiple Vitamins-Minerals (HM MULTIVITAMIN ADULT GUMMY) chewable tablet, Chew 1 tablet Daily., Disp: , Rfl:   •  ondansetron (ZOFRAN) 8 MG tablet, Take 1 tablet by mouth 3  "(Three) Times a Day As Needed for Nausea or Vomiting., Disp: 30 tablet, Rfl: 5  •  oxybutynin (DITROPAN) 5 MG tablet, Take 5 mg by mouth., Disp: , Rfl:   •  sacubitril-valsartan (Entresto) 49-51 MG tablet, Take 1 tablet by mouth 2 (Two) Times a Day., Disp: 180 tablet, Rfl: 3    PHYSICAL EXAMINATION:   /69   Pulse 75   Temp 97.5 °F (36.4 °C) (Temporal)   Resp 18   Ht 177.8 cm (70\")   Wt 79.8 kg (176 lb)   SpO2 97%   BMI 25.25 kg/m²    Pain Score    01/26/21 0906   PainSc: 0-No pain       ECOG Performance Status: 1 - Symptomatic but completely ambulatory  General Appearance:  alert, cooperative, no apparent distress and appears stated age   Neurologic/Psychiatric: A&O x 3, gait steady, appropriate affect, strength 5/5 in all muscle groups   HEENT:  Normocephalic, without obvious abnormality, mucous membranes moist   Neck: Supple, symmetrical, trachea midline, no adenopathy;  No thyromegaly, masses, or tenderness   Lungs:   Clear to auscultation bilaterally; respirations regular, even, and unlabored bilaterally   Heart:  Regular rate and rhythm, no murmurs appreciated   Abdomen:   Soft, non-tender, non-distended and no organomegaly   Lymph nodes: No cervical, supraclavicular, inguinal or axillary adenopathy noted   Extremities: Normal, atraumatic; no clubbing, cyanosis, or edema    Skin: No rashes, ulcers, or suspicious lesions noted     Hospital Outpatient Visit on 01/19/2021   Component Date Value Ref Range Status   • Glucose 01/19/2021 108* 65 - 99 mg/dL Final   • BUN 01/19/2021 16  8 - 23 mg/dL Final   • Creatinine 01/19/2021 1.39* 0.76 - 1.27 mg/dL Final   • Sodium 01/19/2021 144  136 - 145 mmol/L Final   • Potassium 01/19/2021 4.4  3.5 - 5.2 mmol/L Final   • Chloride 01/19/2021 105  98 - 107 mmol/L Final   • CO2 01/19/2021 28.0  22.0 - 29.0 mmol/L Final   • Calcium 01/19/2021 9.1  8.6 - 10.5 mg/dL Final   • Total Protein 01/19/2021 6.8  6.0 - 8.5 g/dL Final   • Albumin 01/19/2021 3.80  3.50 - 5.20 " g/dL Final   • ALT (SGPT) 01/19/2021 24  1 - 41 U/L Final   • AST (SGOT) 01/19/2021 27  1 - 40 U/L Final   • Alkaline Phosphatase 01/19/2021 90  39 - 117 U/L Final   • Total Bilirubin 01/19/2021 0.3  0.0 - 1.2 mg/dL Final   • eGFR Non African Amer 01/19/2021 50* >60 mL/min/1.73 Final   • Globulin 01/19/2021 3.0  gm/dL Final   • A/G Ratio 01/19/2021 1.3  g/dL Final   • BUN/Creatinine Ratio 01/19/2021 11.5  7.0 - 25.0 Final   • Anion Gap 01/19/2021 11.0  5.0 - 15.0 mmol/L Final   • Magnesium 01/19/2021 2.0  1.6 - 2.4 mg/dL Final   • WBC 01/19/2021 6.80  3.40 - 10.80 10*3/mm3 Final   • RBC 01/19/2021 4.20  4.14 - 5.80 10*6/mm3 Final   • Hemoglobin 01/19/2021 11.2* 13.0 - 17.7 g/dL Final   • Hematocrit 01/19/2021 38.0  37.5 - 51.0 % Final   • RDW 01/19/2021 19.3* 12.3 - 15.4 % Final   • MCV 01/19/2021 90.5  79.0 - 97.0 fL Final   • MCH 01/19/2021 26.8  26.6 - 33.0 pg Final   • MCHC 01/19/2021 29.6* 31.5 - 35.7 g/dL Final   • MPV 01/19/2021 7.4  6.0 - 12.0 fL Final   • Platelets 01/19/2021 240  140 - 450 10*3/mm3 Final   • Neutrophil % 01/19/2021 68.7  42.7 - 76.0 % Final   • Lymphocyte % 01/19/2021 22.9  19.6 - 45.3 % Final   • Monocyte % 01/19/2021 8.4  5.0 - 12.0 % Final   • Neutrophils, Absolute 01/19/2021 4.70  1.70 - 7.00 10*3/mm3 Final   • Lymphocytes, Absolute 01/19/2021 1.60  0.70 - 3.10 10*3/mm3 Final   • Monocytes, Absolute 01/19/2021 0.60  0.10 - 0.90 10*3/mm3 Final        Nm Pet Skull Base To Mid Thigh    Result Date: 1/11/2021  Narrative: EXAMINATION: NM PET, SKULL BASE TO MID THIGH-01/08/2021:  INDICATION: Staging scans; C67.5-Malignant neoplasm of bladder neck; C67.9-Malignant neoplasm of bladder, unspecified, bladder cancer.  TECHNIQUE: With fasting blood glucose level of 94 mg/dL, a total of 12.44 mCi of FDG was administered via the left wrist. Following appropriate delay, PET-CT imaging was obtained from the skull vertex to the mid thighs bilaterally and fused multiplanar images were reconstructed. The  CT scan is an unenhanced study and used for reference to the PET scan only and should not be considered a diagnostic CT scan. PET-CT imaging was reviewed in the axial, coronal, and sagittal planes as well as within the cine mode.  The radiation dose reduction device was turned on as low as reasonably achievable for each scan per ALARA protocol.  COMPARISON: Initial exam for treatment with no prior studies available for comparison.  FINDINGS: There is normal variant activity identified within the oropharynx and muscles of phonation. Normal variant activity identified in the region of the vocal cords. Normal variant activity identified in the thyroid. There is no evidence of hypermetabolic activity seen within the neck to suggest evidence of metastatic disease. Within the chest there is normal variant activity seen within the heart. There is no bulky adenopathy or hypermetabolic adenopathy identified within the mediastinum or hilar regions to suggest evidence of metastatic disease. Minimal atelectatic changes seen in the lung bases bilaterally. No pulmonary mass or nodule.  The right kidney has been surgically removed. The left kidney is unremarkable. Normal variant activity seen within the GI and  tract. There is no evidence of adenopathy identified within the retroperitoneum or abdomen. There is a soft tissue mass identified anterior to the bladder demonstrating hypermetabolic activity concerning for malignancy, maximum SUV of 10.3. There is some wall thickening identified of the bladder. There is no adenopathy seen in the pelvic sidewalls. Mild enlargement of the prostate. No inguinal adenopathy identified. The bony structures are unremarkable. Upper thighs are unremarkable.      Impression: Soft tissue mass seen anterior to the bladder concerning for malignancy. There is also mild wall thickening of the bladder, however, the activity within the bladder obscures image detail of involvement of the bladder wall  itself. There is no adenopathy in the pelvic sidewalls. The remainder of the PET/CT scan is unremarkable.   D:  01/08/2021 E:  01/08/2021  This report was finalized on 1/11/2021 4:57 PM by Dr. Pati More MD.        ASSESSMENT: The patient is a very pleasant 75 y.o. male  with high-grade urothelial carcinoma    PROBLEM LIST:   1.  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  Status post right nephrectomy with ureterectomy done at [facility] November 30, 2020  B.  Pathology showed positive posterior margin  C.  Molecular testing revealed PD-L1 combined positive score at 10%, TSC 1 mutation.    D. Started adjuvant chemotherapy with cisplatin Gemzar January 19, 2021  2.  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  Status post right nephrectomy done November 30, 2020 with a clear surgical margins  3.  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 Status post multiple resections with mitomycin-C injection in the past  B.  Status post resection for local recurrence November 30, 2020  4.  Atrial fibrillation  5.  Valvular heart disease  6. Hiccups    PLAN:  1.  We will proceed with chemotherapy as scheduled today cycle number 1 day 8.  2.  The patient will follow up with us in my nurse practitioner next week for cycle number 1 day 8 to evaluate for treatment tolerance and potential side effects.  3.  The patient will follow up with us in 2 weeks for cycle 2-day 1.  4.  We will plan to repeat the patient's imaging studies following completion of chemotherapy. We are planning for four cycles total of treatment.   5.  We will consider adjuvant radiation following completion of chemotherapy due to positive margins on final pathology.   6.  I will continue to monitor the patient's blood work including blood counts, kidney function, liver functions, and electrolytes.  7. We reviewed again the potential side effects  of this regimen including fatigue, vomiting and nausea, hair loss, nephropathy, neuropathy, hearing loss, myelosuppression, and risk of infusion reaction.  8.  We will continue the patient on Zofran 8 mg every 8 hours as needed for chemotherapy-induced nausea.  9.  The patient be at high risk of bleeding while on chemotherapy given the fact that he is also on full anticoagulation using Eliquis for atrial fibrillation.  10. We will consider immunotherapy in the future if needed for progressive disease as his NexGen sequencing revealed positive PD-L1.   11. We will start the patient on Baclofen 10 mg three times per day as needed for hiccups. He will get a dose in infusion today.     Polina Wilson, APRN  1/26/2021

## 2021-01-26 NOTE — PROGRESS NOTES
Onc Nutrition    Patient: Jair Flores  YOB: 1945    Cancer Dx: high grade urothelial carcinoma, stage III  Surgery: right radical nephrectomy with ureterectomy (11/30/20)  Chemotherapy: Cisplatin / Gemzar - days 1,8 - every 21 days x 4    Weight: 176# - stable     Brief follow up with patient during his infusion appointment.  Patient denies significant nutritional complaints at this time and reports his biggest complaint is hiccups.  Note Baclofen has been prescribed today.  He states he has been eating and drinking well.    Encouraged him to continue with his good oral intake of food and fluids.  He denies nutritional questions at this time.  Encouraged him to call RD if questions arise.  He voiced understanding of information discussed.  Will continue to monitor as needed during his treatment course.    Pati Wilcox RD  01/26/21

## 2021-02-09 NOTE — PROGRESS NOTES
DATE OF VISIT: 2/9/2021    REASON FOR VISIT: Followup for right ureter carcinoma     HISTORY OF PRESENT ILLNESS: The patient is a very pleasant 75 y.o. 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  The patient was started adjuvant chemotherapy cisplatin gemcitabine January 19, 2021. The patient is here today for scheduled follow-up visit with treatment cycle 2 day 1.    SUBJECTIVE: The patient is here today with his wife.  He is complaining of fatigue.  Is maintaining good oral intake.  He had significant hematuria for which she had stopped Eliquis and has been off anticoagulation for 6 days.    PAST MEDICAL HISTORY/SOCIAL HISTORY/FAMILY HISTORY: Reviewed by me and unchanged from my documentation done on 02/09/21.    Review of Systems   Constitutional: Positive for fatigue. Negative for activity change, appetite change, chills, fever and unexpected weight change.   HENT: Negative for hearing loss, mouth sores, nosebleeds, sore throat and trouble swallowing.         Hiccups   Eyes: Negative for visual disturbance.   Respiratory: Negative for cough, chest tightness, shortness of breath and wheezing.    Cardiovascular: Negative for chest pain, palpitations and leg swelling.   Gastrointestinal: Positive for constipation. Negative for abdominal distention, abdominal pain, blood in stool, diarrhea, nausea, rectal pain and vomiting.   Endocrine: Negative for cold intolerance and heat intolerance.   Genitourinary: Negative for difficulty urinating, dysuria, frequency and urgency.   Musculoskeletal: Negative for arthralgias, back pain, gait problem, joint swelling and myalgias.   Skin: Negative for rash.   Neurological: Positive for weakness. Negative for dizziness, tremors, syncope, light-headedness, numbness and headaches.   Hematological: Negative for adenopathy. Does not bruise/bleed easily.   Psychiatric/Behavioral: Negative for confusion, sleep disturbance and suicidal  ideas. The patient is not nervous/anxious.          Current Outpatient Medications:   •  allopurinol (ZYLOPRIM) 100 MG tablet, Take 1 tablet by mouth Daily., Disp: 90 tablet, Rfl: 3  •  amLODIPine (NORVASC) 5 MG tablet, Take 1 tablet by mouth Daily., Disp: 30 tablet, Rfl: 11  •  apixaban (ELIQUIS) 5 MG tablet tablet, Take 1 tablet by mouth Every 12 (Twelve) Hours. Indications: Atrial Fibrillation, Disp: 180 tablet, Rfl: 3  •  Ascorbic Acid (VITAMIN C) 500 MG chewable tablet, Chew 800 mg Daily., Disp: , Rfl:   •  aspirin EC 81 MG EC tablet, Take 1 tablet by mouth Daily. (Patient taking differently: Take 81 mg by mouth Every Morning.), Disp: 100 tablet, Rfl: 4  •  atorvastatin (LIPITOR) 80 MG tablet, TAKE 1 TABLET BY MOUTH EVERY NIGHT., Disp: 90 tablet, Rfl: 3  •  baclofen (LIORESAL) 10 MG tablet, Take 1 tablet by mouth 3 (Three) Times a Day. PRN hiccups, Disp: 90 tablet, Rfl: 2  •  dexamethasone (DECADRON) 4 MG tablet, Take 2 tablets in the morning with food on Days 2, 3 and 4 and Days 9, 10 and 11., Disp: 12 tablet, Rfl: 3  •  Fluzone High-Dose Quadrivalent 0.7 ML suspension prefilled syringe, , Disp: , Rfl:   •  guaiFENesin (MUCINEX) 600 MG 12 hr tablet, Take 1,200 mg by mouth. prn, Disp: , Rfl:   •  ID Now COVID-19 kit, See Admin Instructions. for testing, Disp: , Rfl:   •  metoprolol succinate XL (TOPROL-XL) 100 MG 24 hr tablet, Take 1 tablet by mouth Daily., Disp: 90 tablet, Rfl: 3  •  Misc. Devices (NASAL SPRAY BOTTLE) misc, 1 spray As Needed., Disp: , Rfl:   •  Multiple Vitamins-Minerals (HM MULTIVITAMIN ADULT GUMMY) chewable tablet, Chew 1 tablet Daily., Disp: , Rfl:   •  ondansetron (ZOFRAN) 8 MG tablet, Take 1 tablet by mouth 3 (Three) Times a Day As Needed for Nausea or Vomiting., Disp: 30 tablet, Rfl: 5  •  oxybutynin (DITROPAN) 5 MG tablet, Take 5 mg by mouth., Disp: , Rfl:   •  sacubitril-valsartan (Entresto) 49-51 MG tablet, Take 1 tablet by mouth 2 (Two) Times a Day., Disp: 180 tablet, Rfl:  "3    PHYSICAL EXAMINATION:   BP 98/55   Pulse 84   Temp 98.4 °F (36.9 °C) (Temporal)   Resp 18   Ht 177.8 cm (70\")   Wt 76.7 kg (169 lb)   SpO2 98%   BMI 24.25 kg/m²    Pain Score    02/09/21 0825   PainSc: 0-No pain       ECOG Performance Status: 1 - Symptomatic but completely ambulatory  General Appearance:  alert, cooperative, no apparent distress and appears stated age   Neurologic/Psychiatric: A&O x 3, gait steady, appropriate affect, strength 5/5 in all muscle groups   HEENT:  Normocephalic, without obvious abnormality, mucous membranes moist   Neck: Supple, symmetrical, trachea midline, no adenopathy;  No thyromegaly, masses, or tenderness   Lungs:   Clear to auscultation bilaterally; respirations regular, even, and unlabored bilaterally   Heart:  Regular rate and rhythm, no murmurs appreciated   Abdomen:   Soft, non-tender, non-distended and no organomegaly   Lymph nodes: No cervical, supraclavicular, inguinal or axillary adenopathy noted   Extremities: Normal, atraumatic; no clubbing, cyanosis, or edema    Skin: No rashes, ulcers, or suspicious lesions noted     No visits with results within 2 Week(s) from this visit.   Latest known visit with results is:   Hospital Outpatient Visit on 01/26/2021   Component Date Value Ref Range Status   • WBC 01/26/2021 6.20  3.40 - 10.80 10*3/mm3 Final   • RBC 01/26/2021 3.59* 4.14 - 5.80 10*6/mm3 Final   • Hemoglobin 01/26/2021 9.8* 13.0 - 17.7 g/dL Final   • Hematocrit 01/26/2021 30.3* 37.5 - 51.0 % Final   • RDW 01/26/2021 17.5* 12.3 - 15.4 % Final   • MCV 01/26/2021 84.3  79.0 - 97.0 fL Final   • MCH 01/26/2021 27.3  26.6 - 33.0 pg Final   • MCHC 01/26/2021 32.4  31.5 - 35.7 g/dL Final   • MPV 01/26/2021 6.8  6.0 - 12.0 fL Final   • Platelets 01/26/2021 122* 140 - 450 10*3/mm3 Final   • Neutrophil % 01/26/2021 81.2* 42.7 - 76.0 % Final   • Lymphocyte % 01/26/2021 16.2* 19.6 - 45.3 % Final   • Monocyte % 01/26/2021 2.6* 5.0 - 12.0 % Final   • Neutrophils, " Absolute 01/26/2021 5.00  1.70 - 7.00 10*3/mm3 Final   • Lymphocytes, Absolute 01/26/2021 1.00  0.70 - 3.10 10*3/mm3 Final   • Monocytes, Absolute 01/26/2021 0.20  0.10 - 0.90 10*3/mm3 Final        No results found.    ASSESSMENT: The patient is a very pleasant 75 y.o. male  with high-grade urothelial carcinoma    PROBLEM LIST:   1.  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  Status post right nephrectomy with ureterectomy done at [facility] November 30, 2020  B.  Pathology showed positive posterior margin  C.  Molecular testing revealed PD-L1 combined positive score at 10%, TSC 1 mutation.    D. Started adjuvant chemotherapy with cisplatin Gemzar January 19, 2021  2.  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  Status post right nephrectomy done November 30, 2020 with a clear surgical margins  3.  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 Status post multiple resections with mitomycin-C injection in the past  B.  Status post resection for local recurrence November 30, 2020  4.  Atrial fibrillation  5.  Valvular heart disease  6. Hiccups    PLAN:  1.  We will proceed with chemotherapy as scheduled today cycle number 2 day 1.  2.  The patient will follow up with us in my nurse practitioner next week for cycle number 1 day 8 to evaluate for treatment tolerance and potential side effects.  3.  The patient will follow up with us in 1 week for cycle 2-day 8.  4.  We will plan to repeat the patient's imaging studies following completion of chemotherapy. We are planning for four cycles total of treatment.   5.  We will consider adjuvant radiation following completion of chemotherapy due to positive margins on final pathology.   6.  I will continue to monitor the patient's blood work including blood counts, kidney function, liver functions, and electrolytes.  7. We reviewed again the potential side effects of  this regimen including fatigue, vomiting and nausea, hair loss, nephropathy, neuropathy, hearing loss, myelosuppression, and risk of infusion reaction.  8.  We will continue the patient on Zofran 8 mg every 8 hours as needed for chemotherapy-induced nausea.  9.  The patient be at high risk of bleeding while on chemotherapy given the fact that he is also on full anticoagulation using Eliquis for atrial fibrillation.  I will decrease Eliquis dose to 2.5 mg twice daily.  10. We will consider immunotherapy in the future if needed for progressive disease as his NexGen sequencing revealed positive PD-L1.   11. We will continue the patient on Baclofen 10 mg three times per day as needed for hiccups. He will get a dose in infusion today.     Rosemarie Montoya MD  2/9/2021

## 2021-02-09 NOTE — DISCHARGE INSTRUCTIONS
Dr. Montoya has called in a new prescription for Eliquis 2.5mg. Take this medication once a day for a week and monitor for bleeding. If no bleeding, you may increase to 2 times a day after the first week. If you do experience bleeding, please call Dr. Montoya's nurse to discuss.

## 2021-02-09 NOTE — PLAN OF CARE
Outpatient Infusion • [address] [facility] • [address] • [place] [id number] • [phone]      CHEMOTHERAPY EDUCATION SHEET    NAME:  Jair Flores      : 1945           DATE: 21    Booklets Given: Chemotherapy and You []  Eating Hints []    Sexuality/Fertility Books []     Chemotherapy/Biotherapy Education Sheets: (list all that apply)   Carboplatin + Gemcitabine                                                                                                                                                                Chemotherapy Regimen:  Carboplatin IV on Day 1 and Gemcitabine IV on Day 1 and 8 every 21 days    TOPICS EDUCATION PROVIDED EDUCATION REINFORCED COMMENTS   ANEMIA:  role of RBC, cause, s/s, ways to manage, role of transfusion [x] [] Discussed role of RBC and risk of anemia. Reviewed s/sx of anemia, including fatigue. Encouraged exercise to combat fatigue.    THROMBOCYTOPENIA:  role of platelet, cause, s/s, ways to prevent bleeding, things to avoid, when to seek help [x] [] Decreased platelets can increase risk of bleeding. Counseled on signs/symptoms of thrombocytopenia and when to call MD.   NEUTROPENIA:  role of WBC, cause, infection precautions, s/s of infection, when to call MD [x] [] Low WBC can increase risk of infection. Counseled on preventing infection and to call MD if temperature reaches 100.4 or higher. Discussed appropriate hand hygiene, avoiding sick contacts, and use of neutropenic precautions when needed.    NUTRITION & APPETITE CHANGES:  importance of maintaining healthy diet & weight, ways to manage to improve intake, dietary consult, exercise regimen [x] [] Discussed risk of decreased appetite, reviewed plan for small more frequent meals.   DIARRHEA:  causes, s/s of dehydration, ways to manage, dietary changes, when to call MD [x] [] Discussed diarrhea risk. Can use OTC loperamide, but call MD if 4-6 episodes in 24 hours not relieved by OTC loperamide    CONSTIPATION:  causes, ways to manage, dietary changes, when to call MD [] []    NAUSEA & VOMITING:  cause, use of antiemetics, dietary changes, when to call MD [x] [] Discussed antiemetic pretreatment to prevent n/v during infusion. Reviewed risk of N/V, counseled on prn antiemetic agents and to call MD if taking the max dose and unrelieved    MOUTH SORES:  causes, oral care, ways to manage [x] [] Discussed preventative measures such as salt/soda rinse, ice during infusion, use of soft brissel tooth brush, avoidance of acidic foods, and avoiding alcohol based mouth rinses, and when to call MD.    ALOPECIA:  cause, ways to manage, resources [x] [] Hair loss is temporary - usually begins 3-6 weeks after the start of therapy. Can be gradual thinning or fall out in clumps. Hair will grow back after treatment is completed although the color and/or texture may be different   INFERTILITY & SEXUALITY:  causes, fertility preservation options, sexuality changes, ways to manage, importance of birth control [x] [] Discussed safe sex practices.   NERVOUS SYSTEM CHANGES:  causes, s/s, neuropathies, cognitive changes, ways to manage [x] []    PAIN:  causes, ways to manage [x] [] Discussed possibility of musclosketal pain and when to call MD if OTC medications do not work    SKIN & NAIL CHANGES:  cause, s/s, ways to manage [x] []    ORGAN TOXICITIES:  cause, s/s, need for diagnostic tests, labs, when to notify MD [x] [] Discussed lab monitoring inlcuding liver function, renal function, and blood counts.     SURVIVORSHIP:  distress, distress assessment, secondary malignancies, early/late effects, follow-up, social issues, social support [] []    HOME CARE:  use of spill kits, storing of PO chemo, how to manage bodily fluids [x] [] Counseled on management of soiled linens and proper flush technique.  Discussed how to manage all the side effects at home and advised when to contact the MD office   MISCELLANEOUS:  drug interactions,  administration, vesicant, et [x] [] No DDI found for this patient.  Discussed length of infusion and frequency.     Referrals:      Notes:  Discussed aforementioned material with patient in infusion. All questions and concerns addressed. Provided patient with my contact information and instructions to call should additional questions arise. Provided patient with personalized treatment calendar and written educational material.

## 2021-02-09 NOTE — PROGRESS NOTES
Onc Nutrition    Patient: Jair Flores  YOB: 1945    Cancer Dx: high grade urothelial carcinoma, stage III  Surgery: right radical nephrectomy with ureterectomy (11/30/20)  Chemotherapy: Carboplatin / Gemzar - days 1,8 - every 21 days (2/4)    Weight: 169# / 6# (3%) weight loss x 2 weeks  Weight Change: patient reports ~5# (2.8%) weight loss after surgery 11/30/20 but has been maintaining recently (prior to starting chemo)    Follow up with patient during his infusion appointment.  Patient states he feels as if he is eating and drinking fairly well.  He reports having mild nausea which he manages with occasional Zofran and mild constipation which he manages with Miralax as needed.  He denies taste changes and mouth sores.  His biggest complaint is fatigue.  Note Cisplatin is being replaced with Carboplatin due to renal function.    Encouraged him to continue with his good oral intake of small frequent snacks throughout the day and homemade protein smoothies.  Advised for a moderate amount of protein in his diet due to nephrectomy.  Also advised him to be sipping on fluids throughout the day with a goal of ~85 ounces daily for adequate hydration.  Suggested he partake in daily physical activity with light hand weights to help maintain/build muscle mass and to help fight fatigue.      Answered his questions and he voiced understanding of information discussed.  Encouraged to call RD with questions.  Will monitor as needed during his treatment course.    Pati Wilcox RD  02/09/21

## 2021-02-15 PROBLEM — A41.9 SEPSIS (HCC): Status: ACTIVE | Noted: 2021-01-01

## 2021-02-15 NOTE — TELEPHONE ENCOUNTER
[facility] patient's wife called patient is in the ER with she wants Dr. Montoya to be aware of this. He was twitching, having tremors, very weak, he could not even stay awake. They think he may have some kind of infection. She is at home could not go in with him.

## 2021-02-15 NOTE — TELEPHONE ENCOUNTER
Notified Dr. Montoya of patient being in ER.  Called and let patient wife know as well that Dr. Montoya is aware.

## 2021-02-16 PROBLEM — R31.0 GROSS HEMATURIA: Status: ACTIVE | Noted: 2021-01-01

## 2021-02-16 PROBLEM — N39.0 ACUTE UTI (URINARY TRACT INFECTION): Status: ACTIVE | Noted: 2021-01-01

## 2021-02-24 PROBLEM — D61.810 PANCYTOPENIA DUE TO CHEMOTHERAPY (HCC): Status: ACTIVE | Noted: 2021-01-01

## 2021-03-01 NOTE — PLAN OF CARE
Problem: Adult Inpatient Plan of Care  Goal: Plan of Care Review  Outcome: Ongoing, Progressing  Flowsheets (Taken 3/1/2021 0453)  Plan of Care Reviewed With: patient  Outcome Summary: VSS. Room air. Normal sinus. Patient slept most of the night. Was able to transfer from bed to [facility] twice last night. No complaints of pain  Goal: Patient-Specific Goal (Individualized)  Outcome: Ongoing, Progressing  Goal: Absence of Hospital-Acquired Illness or Injury  Outcome: Ongoing, Progressing  Intervention: Identify and Manage Fall Risk  Recent Flowsheet Documentation  Taken 3/1/2021 0400 by Giuliana Arriaga RN  Safety Promotion/Fall Prevention:   activity supervised   assistive device/personal items within reach   clutter free environment maintained   fall prevention program maintained   nonskid shoes/slippers when out of bed   room organization consistent   safety round/check completed   toileting scheduled  Taken 3/1/2021 0200 by Giuliana Arriaga RN  Safety Promotion/Fall Prevention:   activity supervised   assistive device/personal items within reach   clutter free environment maintained   fall prevention program maintained   nonskid shoes/slippers when out of bed   room organization consistent   safety round/check completed   toileting scheduled  Taken 3/1/2021 0000 by Giuliana Arriaga RN  Safety Promotion/Fall Prevention:   activity supervised   assistive device/personal items within reach   clutter free environment maintained   fall prevention program maintained   nonskid shoes/slippers when out of bed   room organization consistent   safety round/check completed   toileting scheduled  Taken 2/28/2021 2200 by Giuliana Arriaga, RN  Safety Promotion/Fall Prevention:   activity supervised   assistive device/personal items within reach   clutter free environment maintained   fall prevention program maintained   nonskid shoes/slippers when out of bed   room organization consistent   safety round/check completed    toileting scheduled  Taken 2/28/2021 2000 by Giuliana Arriaga RN  Safety Promotion/Fall Prevention:   activity supervised   assistive device/personal items within reach   clutter free environment maintained   fall prevention program maintained   nonskid shoes/slippers when out of bed   room organization consistent   safety round/check completed   toileting scheduled  Intervention: Prevent Skin Injury  Recent Flowsheet Documentation  Taken 3/1/2021 0400 by Giuliana Arriaga RN  Body Position: position maintained  Taken 3/1/2021 0200 by Giuliana Arriaga RN  Body Position: position maintained  Taken 3/1/2021 0000 by Giuliana Arriaga RN  Body Position: position maintained  Taken 2/28/2021 2200 by Giuliana Arriaga RN  Body Position: position maintained  Taken 2/28/2021 2000 by Giuliana Arriaga RN  Body Position: position maintained  Intervention: Prevent and Manage VTE (venous thromboembolism) Risk  Recent Flowsheet Documentation  Taken 2/28/2021 2000 by Giuliana Arriaga RN  VTE Prevention/Management:   bleeding risk factor(s) identified   dorsiflexion/plantar flexion performed  Goal: Optimal Comfort and Wellbeing  Outcome: Ongoing, Progressing  Intervention: Provide Person-Centered Care  Recent Flowsheet Documentation  Taken 2/28/2021 2000 by Giuliana Arriaga RN  Trust Relationship/Rapport:   care explained   questions answered   thoughts/feelings acknowledged  Goal: Readiness for Transition of Care  Outcome: Ongoing, Progressing   Goal Outcome Evaluation:  Plan of Care Reviewed With: patient     Outcome Summary: VSS. Room air. Normal sinus. Patient slept most of the night. Was able to transfer from bed to [facility] twice last night. No complaints of pain

## 2021-03-01 NOTE — PLAN OF CARE
Goal Outcome Evaluation:  Plan of Care Reviewed With: (P) patient  Progress: (P) improving  Outcome Summary: (P) Pt A&Ox4. Pt performed bed mobility with CGA and transfers Min Ax1 and VC for hand placements with RW. Pt performed grooming activities with CGA and dependent with toileting. Recommend d/c to IPR.

## 2021-03-01 NOTE — THERAPY TREATMENT NOTE
Patient Name: Jair Flores  : 1945    MRN: [id number]                              Today's Date: 3/1/2021       Admit Date: 2/15/2021    Visit Dx:     ICD-10-CM ICD-9-CM   1. Altered mental status, unspecified altered mental status type  R41.82 780.97   2. Urinary tract infection without hematuria, site unspecified  N39.0 599.0   3. History of bladder cancer  Z85.51 V10.51   4. S/P chemotherapy, time since less than 4 weeks  Z92.21 V66.2   5. Chronic anemia  D64.9 285.9     Patient Active Problem List   Diagnosis   • History of bladder cancer   • Insomnia   • Benign essential hypertension   • Hyperlipidemia LDL goal <70   • Osteoarthritis   • Right carotid bruit   • Primary osteoarthritis involving multiple joints   • Bladder cancer (CMS/HCC)   • History of disease   • Gout   • GERD without esophagitis   • Carotid artery plaque, left   • Obstructive sleep apnea   • S/P AVR (aortic valve replacement)   • Atrial fibrillation with RVR (CMS/HCC)   • History of CVA (cerebrovascular accident) - S/P stent   • Coronary artery disease involving native coronary artery of native heart   • Prolonged QT interval   • Acute systolic congestive heart failure (CMS/HCC)   • Malignant neoplasm of right ureter (CMS/HCC)   • Sepsis (CMS/HCC)   • Gross hematuria   • Acute UTI (urinary tract infection)   • Pancytopenia due to chemotherapy (CMS/HCC)     Past Medical History:   Diagnosis Date   • Acute gout of foot 2019   • Atrial fibrillation (CMS/HCC)     after valve replaced   • Cancer (CMS/HCC)     bladder; cysto surveillance 2017; free of disease   • Cataract     right eye   • Degenerative arthritis     left; knee replacement; successful   • Elevated cholesterol    • Flash pulmonary edema (CMS/HCC) 2019    Episode of flash pulmonary edema thought to be related to volume overload at the time of left knee surgery in  performed in [place] he has had no recurrence his EKG does show inferior Q waves which are  unchanged.   • Hard to intubate     has been told he is difficult to intubate   • Hematuria 4/29/2019   • Hemorrhoids 09/12/2011    hemorrhoid ablation   • Hypertension    • BEATRIZ on CPAP    • Skin cancer     head   • Stroke (CMS/HCC) 05/2019    no residual   • Wears glasses      Past Surgical History:   Procedure Laterality Date   • AORTIC VALVE REPAIR/REPLACEMENT  09/2019   • CARDIAC CATHETERIZATION N/A 7/10/2019    Procedure: Right and Left Heart Cath;  Surgeon: Francisco Bo MD;  Location: [facility] CATH INVASIVE LOCATION;  Service: Cardiology   • COLONOSCOPY      routinely every 10 years   • CYSTOSCOPY      x3   • CYSTOSCOPY  07/23/2017    bladder cancer; cysto surveillance; free of disease   • CYSTOSCOPY BLADDER BIOPSY N/A 4/24/2020    Procedure: CYSTOSCOPY BLADDER BIOPSY;  Surgeon: Bobby Salmeron MD;  Location: [facility] OR;  Service: Urology;  Laterality: N/A;   • EXCISIONAL HEMORRHOIDECTOMY      hemorrhoid ablation; hemorrhoid onset 09/12/2011   • EYE SURGERY      as a child after cutting eye with barbwire fence   • INTERVENTIONAL RADIOLOGY PROCEDURE Bilateral 6/24/2019    Procedure: Carotid Cerebral Angiogram with possible stent placement;  Surgeon: Abad Mata MD;  Location: [facility] CATH INVASIVE LOCATION;  Service: Interventional Radiology   • KNEE ARTHROPLASTY, PARTIAL REPLACEMENT Left    • WA TCAT IV STENT CRV CRTD ART EMBOLIC PROTECJ N/A 6/24/2019    Procedure: Carotid Stent;  Surgeon: Abad Mata MD;  Location: [facility] CATH INVASIVE LOCATION;  Service: Interventional Radiology   • SKIN CANCER EXCISION      head   • TONSILLECTOMY     • TRANSURETHRAL RESECTION OF BLADDER TUMOR N/A 3/29/2019    Procedure: TRANSURETHRAL RESECTION OF BLADDER TUMOR WITH MITOMYCIN;  Surgeon: Jair Braxton MD;  Location:  [facility];  Service: Urology   • TRANSURETHRAL RESECTION OF BLADDER TUMOR N/A 4/24/2020    Procedure: TRANSURETHRAL RESECTION OF BLADDER TUMOR;  Surgeon: Bobby Salmeron MD;  Location:  [facility];   Service: Urology;  Laterality: N/A;     General Information     Row Name 03/01/21 1150          OT Time and Intention    Document Type  therapy note (daily note)  (Pended)   -RS     Mode of Treatment  individual therapy;occupational therapy  (Pended)   -RS     Row Name 03/01/21 1150          General Information    Existing Precautions/Restrictions  fall  (Pended)  RECENT BLADDER CA/R NEPHRECTOMY/CHEMO, HAS PICC LINE  -RS     Row Name 03/01/21 1150          Cognition    Orientation Status (Cognition)  oriented x 4  (Pended)   -RS     Row Name 03/01/21 1150          Safety Issues, Functional Mobility    Safety Issues Affecting Function (Mobility)  awareness of need for assistance;insight into deficits/self-awareness;safety precaution awareness;safety precautions follow-through/compliance;sequencing abilities  (Pended)   -RS     Impairments Affecting Function (Mobility)  balance;coordination;endurance/activity tolerance;pain;postural/trunk control;range of motion (ROM);strength;cognition  (Pended)   -RS     Cognitive Impairments, Mobility Safety/Performance  insight into deficits/self-awareness;safety precaution awareness;safety precaution follow-through;problem-solving/reasoning;sequencing abilities;attention;awareness, need for assistance  (Pended)   -RS       User Key  (r) = Recorded By, (t) = Taken By, (c) = Cosigned By    Initials Name Provider Type    RS Venkata Mo OT Student          Mobility/ADL's     Row Name 03/01/21 1150          Bed Mobility    Scooting/Bridging Day (Bed Mobility)  verbal cues;contact guard  (Pended)   -RS     Supine-Sit Day (Bed Mobility)  contact guard;verbal cues  (Pended)   -RS     Assistive Device (Bed Mobility)  bed rails;head of bed elevated  (Pended)   -RS     Row Name 03/01/21 1150          Transfers    Transfers  sit-stand transfer;bed-chair transfer;toilet transfer  (Pended)   -RS     Comment (Transfers)  VC for hand placement and sequencing. Posterior lean  noted.  (Pended)   -RS     Bed-Chair [place] (Transfers)  minimum assist (75% patient effort);verbal cues;1 person assist  (Pended)   -RS     Assistive Device (Bed-Chair Transfers)  walker, front-wheeled  (Pended)  RW  -RS     Sit-Stand [place] (Transfers)  verbal cues;minimum assist (75% patient effort);1 person assist  (Pended)  BUE support  -RS     [place] Level (Toilet Transfer)  1 person assist;minimum assist (75% patient effort);verbal cues  (Pended)   -RS     Assistive Device (Toilet Transfer)  other (see comments);walker, front-wheeled;commode, bedside without drop arms  (Pended)   -RS     Row Name 03/01/21 1150          Sit-Stand Transfer    Assistive Device (Sit-Stand Transfers)  --  (Pended)  BUE support  -RS     Row Name 03/01/21 1150          Toilet Transfer    Type (Toilet Transfer)  sit-stand;stand-sit  (Pended)   -RS     Row Name 03/01/21 1150          Activities of Daily Living    BADL Assessment/Intervention  grooming;toileting  (Pended)   -RS     Row Name 03/01/21 1150          Grooming Assessment/Training    [place] Level (Grooming)  shave face;oral care regimen;contact guard assist  (Pended)   -RS     Position (Grooming)  supported sitting  (Pended)   -RS     Row Name 03/01/21 1150          Toileting Assessment/Training    [place] Level (Toileting)  adjust/manage clothing;perform perineal hygiene;dependent (less than 25% patient effort);verbal cues  (Pended)   -RS     Position (Toileting)  supported standing  (Pended)   -RS       User Key  (r) = Recorded By, (t) = Taken By, (c) = Cosigned By    Initials Name Provider Type    RS Venkata Mo OT Student        Obj/Interventions     Row Name 03/01/21 1150          Balance    Balance Assessment  sitting static balance;standing static balance;standing dynamic balance  (Pended)   -RS     Static Sitting Balance  WFL  (Pended)   -RS     Dynamic Sitting Balance  WFL  (Pended)   -RS     Static Standing Balance  mild impairment   (Pended)   -RS     Dynamic Standing Balance  moderate impairment  (Pended)   -RS       User Key  (r) = Recorded By, (t) = Taken By, (c) = Cosigned By    Initials Name Provider Type    Venkata Kennedy OT Student        Goals/Plan     Row Name 03/01/21 1329          Bed Mobility Goal 1 (OT)    Activity/Assistive Device (Bed Mobility Goal 1, OT)  bed mobility activities, all  (Pended)   -RS     [place] Level/Cues Needed (Bed Mobility Goal 1, OT)  contact guard assist  (Pended)   -RS     Time Frame (Bed Mobility Goal 1, OT)  by discharge  (Pended)   -RS     Progress/Outcomes (Bed Mobility Goal 1, OT)  goal revised this date;goal met  (Pended)   -RS     Row Name 03/01/21 1329          Transfer Goal 1 (OT)    Activity/Assistive Device (Transfer Goal 1, OT)  transfers, all  (Pended)   -RS     [place] Level/Cues Needed (Transfer Goal 1, OT)  verbal cues required;minimum assist (75% or more patient effort)  (Pended)   -RS     Time Frame (Transfer Goal 1, OT)  by discharge  (Pended)   -RS     Progress/Outcome (Transfer Goal 1, OT)  goal revised this date;goal met  (Pended)   -RS     Row Name 03/01/21 1329          Dressing Goal 1 (OT)    Activity/Device (Dressing Goal 1, OT)  lower body dressing  (Pended)   -RS     [place]/Cues Needed (Dressing Goal 1, OT)  maximum assist (25-49% patient effort)  (Pended)   -RS     Time Frame (Dressing Goal 1, OT)  long term goal (LTG);by discharge  (Pended)   -RS     Progress/Outcome (Dressing Goal 1, OT)  goal ongoing  (Pended)   -RS     Row Name 03/01/21 1329          Toileting Goal 1 (OT)    Activity/Device (Toileting Goal 1, OT)  toileting skills, all  (Pended)   -RS     [place] Level/Cues Needed (Toileting Goal 1, OT)  maximum assist (25-49% patient effort)  (Pended)   -RS     Time Frame (Toileting Goal 1, OT)  long term goal (LTG);by discharge  (Pended)   -RS     Progress/Outcome (Toileting Goal 1, OT)  goal ongoing  (Pended)   -RS     Row Name 03/01/21 1329           Self-Feeding Goal 1 (OT)    Activity/Device (Self-Feeding Goal 1, OT)  self-feeding skills, all  (Pended)   -RS     [place] Level/Cues Needed (Self-Feeding Goal 1, OT)  set-up required  (Pended)   -RS     Time Frame (Self-Feeding Goal 1, OT)  long term goal (LTG);by discharge  (Pended)   -RS     Progress/Outcomes (Self-Feeding Goal 1, OT)  goal ongoing  (Pended)   -RS       User Key  (r) = Recorded By, (t) = Taken By, (c) = Cosigned By    Initials Name Provider Type    RS Venkata Mo OT Student        Clinical Impression     Row Name 03/01/21 1156          Pain Scale: Numbers Pre/Post-Treatment    Pretreatment Pain Rating  0/10 - no pain  (Pended)   -RS     Posttreatment Pain Rating  0/10 - no pain  (Pended)   -RS     Pain Intervention(s)  Ambulation/increased activity;Repositioned  (Pended)   -RS     Row Name 03/01/21 1154          Plan of Care Review    Plan of Care Reviewed With  patient  (Pended)   -RS     Progress  improving  (Pended)   -RS     Outcome Summary  Pt A&Ox4. Pt performed bed mobility with CGA and transfers Min Ax1 and VC for hand placements with RW. Pt performed grooming activities with CGA and dependent with toileting. Recommend d/c to IPR.  (Pended)   -RS     Row Name 03/01/21 1159          Therapy Plan Review/Discharge Plan (OT)    Anticipated Discharge Disposition (OT)  inpatient rehabilitation facility  (Pended)   -RS     Row Name 03/01/21 1156          Vital Signs    Pre Systolic BP Rehab  --  (Pended)  VSS  -RS     O2 Delivery Pre Treatment  room air  (Pended)   -RS     O2 Delivery Intra Treatment  room air  (Pended)   -RS     O2 Delivery Post Treatment  room air  (Pended)   -RS     Pre Patient Position  Supine  (Pended)   -RS     Intra Patient Position  Standing  (Pended)   -RS     Post Patient Position  Sitting  (Pended)   -RS     Row Name 03/01/21 1153          Positioning and Restraints    Pre-Treatment Position  in bed  (Pended)   -RS     Post Treatment Position  chair   (Pended)   -RS     In Chair  notified nsg;sitting;call light within reach;encouraged to call for assist;exit alarm on;with family/caregiver;waffle cushion  (Pended)   -RS       User Key  (r) = Recorded By, (t) = Taken By, (c) = Cosigned By    Initials Name Provider Type    Venkata Kennedy OT Student        Outcome Measures     Row Name 03/01/21 1330          How much help from another is currently needed...    Putting on and taking off regular lower body clothing?  2  (Pended)   -RS     Bathing (including washing, rinsing, and drying)  2  (Pended)   -RS     Toileting (which includes using toilet bed pan or urinal)  2  (Pended)   -RS     Putting on and taking off regular upper body clothing  3  (Pended)   -RS     Taking care of personal grooming (such as brushing teeth)  3  (Pended)   -RS     Eating meals  3  (Pended)   -RS     AM-PAC 6 Clicks Score (OT)  15  (Pended)   -RS     Row Name 03/01/21 1330          Functional Assessment    Outcome Measure Options  AM-PAC 6 Clicks Daily Activity (OT)  (Pended)   -RS       User Key  (r) = Recorded By, (t) = Taken By, (c) = Cosigned By    Initials Name Provider Type    Venkata Kennedy OT Student        Occupational Therapy Education                 Title: PT OT SLP Therapies (Done)     Topic: Occupational Therapy (Done)     Point: ADL training (Done)     Description:   Instruct learner(s) on proper safety adaptation and remediation techniques during self care or transfers.   Instruct in proper use of assistive devices.              Learning Progress Summary           Patient Acceptance, E,TB,D, VU by RS at 3/1/2021 1331    Acceptance, E, NR by TARA at 2/25/2021 1318    Acceptance, E, VU,NR by CS at 2/23/2021 1549    Acceptance, E, NR by GIO at 2/20/2021 0820   Family Acceptance, E,TB,D, VU by RS at 3/1/2021 1331                   Point: Home exercise program (Done)     Description:   Instruct learner(s) on appropriate technique for monitoring, assisting and/or progressing  therapeutic exercises/activities.              Learning Progress Summary           Patient Acceptance, E, VU,NR by CS at 2/23/2021 1549                   Point: Precautions (Done)     Description:   Instruct learner(s) on prescribed precautions during self-care and functional transfers.              Learning Progress Summary           Patient Acceptance, E,TB,D, VU by  at 3/1/2021 1331    Acceptance, E, NR by  at 2/25/2021 1318    Acceptance, E, VU,NR by  at 2/23/2021 1549   Family Acceptance, E,TB,D, VU by RS at 3/1/2021 1331                   Point: Body mechanics (Done)     Description:   Instruct learner(s) on proper positioning and spine alignment during self-care, functional mobility activities and/or exercises.              Learning Progress Summary           Patient Acceptance, E,TB,D, VU by  at 3/1/2021 1331    Acceptance, E, NR by  at 2/25/2021 1318    Acceptance, E, VU,NR by  at 2/23/2021 1549   Family Acceptance, E,TB,D, VU by RS at 3/1/2021 1331                               User Key     Initials Effective Dates Name Provider Type Discipline     07/18/19 -  Camila Clements, OT Occupational Therapist OT     01/29/20 -  Carlie Prakash OT Occupational Therapist OT     10/21/20 -  Kurtis Pickens OT Occupational Therapist OT     12/04/20 -  Venkata Mo OT Student OT              OT Recommendation and Plan     Plan of Care Review  Plan of Care Reviewed With: (P) patient  Progress: (P) improving  Outcome Summary: (P) Pt A&Ox4. Pt performed bed mobility with CGA and transfers Min Ax1 and VC for hand placements with RW. Pt performed grooming activities with CGA and dependent with toileting. Recommend d/c to IPR.     Time Calculation:   Time Calculation- OT     Row Name 03/01/21 1120             Time Calculation- OT    OT Start Time  1120  (Pended)   -      OT Received On  03/01/21  (Pended)   -      OT Goal Re-Cert Due Date  03/02/21  (Pended)   -         Timed Charges    20815 - OT  Self Care/Mgmt Minutes  27  (Pended)   -RS        User Key  (r) = Recorded By, (t) = Taken By, (c) = Cosigned By    Initials Name Provider Type    RS Venkata Mo OT Student        Therapy Charges for Today     Code Description Service Date Service Provider Modifiers Qty    [id number]  OT SELF CARE/MGMT/TRAIN EA 15 MIN 3/1/2021 Venkata Mo GO 2               Venkata Mo  3/1/2021

## 2021-03-01 NOTE — PROGRESS NOTES
Case Management Discharge Note      Final Note: Accepted by [facility] under skilled care today. [phone]. Facility uses Epic and has access to his discharge records. [facility] is arranged for 16:30 today. [facility] tele is [phone].    Will need completed covid test.     Provided Post Acute Provider List?: N/A  Provided Post Acute Provider Quality & Resource List?: N/A    Selected Continued Care - Admitted Since 2/15/2021     Destination Coordination complete    Service Provider Selected Services Address Phone Fax Patient Preferred    [facility]  Skilled Nursing [address] [place] [id number] [phone] --       Internal Comment last updated by Aleta Blankenship, RN 3/1/2021 6529    Facility billing his Medicare as primary as this is what both Medicare and [place] have listed as primary. Patient and his wife are aware and in agreement with this.                      Durable Medical Equipment    No services have been selected for the patient.              Dialysis/Infusion    No services have been selected for the patient.              Home Medical Care    No services have been selected for the patient.              Therapy    No services have been selected for the patient.              Community Resources    No services have been selected for the patient.                       Final Discharge Disposition Code: 03 - skilled nursing facility (SNF)

## 2021-03-01 NOTE — PROGRESS NOTES
DATE OF VISIT: 3/1/2021    Chief Complaint: Followup for right kidney cancer     SUBJECTIVE: The patient has been doing fairly well.  He  denied any fever or  chills, no night sweats, denied any headaches    REVIEW OF SYSTEMS: All the other 9 systems are reviewed by me and negative  except what is mentioned in HPI.     PAST MEDICAL HISTORY/SOCIAL HISTORY/FAMILY HISTORY: Unchanged from my prior documentation done on February 27, 2021      Current Facility-Administered Medications:   •  acetaminophen (TYLENOL) tablet 650 mg, 650 mg, Oral, Q4H PRN, 650 mg at 02/25/21 0921 **OR** acetaminophen (TYLENOL) 160 MG/5ML solution 650 mg, 650 mg, Oral, Q4H PRN **OR** acetaminophen (TYLENOL) suppository 650 mg, 650 mg, Rectal, Q4H PRN, Murali Mccormack MD  •  allopurinol (ZYLOPRIM) tablet 100 mg, 100 mg, Oral, Daily, Murali Mccormack MD, 100 mg at 02/28/21 0836  •  amLODIPine (NORVASC) tablet 5 mg, 5 mg, Oral, Q24H, Murali Mccormack MD, 5 mg at 02/28/21 0836  •  aspirin EC tablet 81 mg, 81 mg, Oral, Daily, Murali Mccormack MD, 81 mg at 02/28/21 0836  •  atorvastatin (LIPITOR) tablet 80 mg, 80 mg, Oral, Nightly, Murali Mccormack MD, 80 mg at 02/28/21 2012  •  bisacodyl (DULCOLAX) suppository 10 mg, 10 mg, Rectal, Daily PRN, Tory Baltazar MD, 10 mg at 02/20/21 0917  •  bismuth subsalicylate (PEPTO BISMOL) 262 MG/15ML suspension 30 mL, 30 mL, Oral, Q6H PRN, Johanna Valladares MD  •  diphenhydrAMINE (BENADRYL) injection 6 mg, 6 mg, Intravenous, Nightly, Johanna Valladares MD, 6 mg at 02/28/21 2012  •  lactulose (CHRONULAC) 10 GM/15ML solution 20 g, 20 g, Oral, TID PRN, Rosemarie Montoya MD  •  Lidocaine HCl Urethral/Mucosal 2% (XYLOCAINE) gel syringe, , Urethral, PRN, Negin Lopez PA-C, Given at 02/23/21 0628  •  loperamide (IMODIUM) capsule 2 mg, 2 mg, Oral, 4x Daily PRN, Johanna Valladares MD, 2 mg at 02/27/21 1151  •  magnesium sulfate 4 gram infusion - Mg less than or equal to 1mg/dL, 4 g, Intravenous, PRN **OR** magnesium sulfate 3 gram  infusion (1gm x 3) - Mg 1.1 - 1.5 mg/dL, 1 g, Intravenous, PRN **OR** Magnesium Sulfate 2 gram infusion- Mg 1.6 - 1.9 mg/dL, 2 g, Intravenous, PRN, Negin Lopez PA-C, Last Rate: 25 mL/hr at 02/24/21 0742, 2 g at 02/24/21 0742  •  melatonin tablet 2.5 mg, 2.5 mg, Oral, Nightly, Johanna Valladares MD, 2.5 mg at 02/28/21 2012  •  melatonin tablet 5 mg, 5 mg, Oral, Nightly PRN, Ольга Hays, APRN, 5 mg at 02/26/21 0016  •  metoprolol tartrate (LOPRESSOR) tablet 25 mg, 25 mg, Oral, Q12H, Murali Mccormack MD, 25 mg at 02/28/21 2012  •  ondansetron (ZOFRAN) injection 4 mg, 4 mg, Intravenous, Q6H PRN, Murali Mccormack MD  •  ondansetron (ZOFRAN) tablet 8 mg, 8 mg, Oral, Q8H PRN, Murali Mccormack MD  •  polyethylene glycol (MIRALAX) packet 17 g, 17 g, Oral, Daily PRN, Tory Baltazar MD, 17 g at 02/18/21 1653  •  potassium chloride (MICRO-K) CR capsule 40 mEq, 40 mEq, Oral, PRN, 40 mEq at 02/26/21 2033 **OR** potassium chloride (KLOR-CON) packet 40 mEq, 40 mEq, Oral, PRN, 40 mEq at 02/24/21 0944 **OR** potassium chloride 10 mEq in 100 mL IVPB, 10 mEq, Intravenous, Q1H PRN, Negin Lopez PA-C  •  potassium phosphate 45 mmol in sodium chloride 0.9 % 500 mL infusion, 45 mmol, Intravenous, PRN **OR** potassium phosphate 30 mmol in sodium chloride 0.9 % 250 mL infusion, 30 mmol, Intravenous, PRN **OR** potassium phosphate 15 mmol in sodium chloride 0.9 % 100 mL infusion, 15 mmol, Intravenous, PRN, 15 mmol at 02/24/21 0125 **OR** sodium phosphates 45 mmol in sodium chloride 0.9 % 250 mL IVPB, 45 mmol, Intravenous, PRN **OR** sodium phosphates 30 mmol in sodium chloride 0.9 % 250 mL IVPB, 30 mmol, Intravenous, PRN **OR** sodium phosphates 15 mmol in sodium chloride 0.9 % 250 mL IVPB, 15 mmol, Intravenous, PRN, Negin Lopez PA-C  •  sodium chloride 0.9 % flush 10 mL, 10 mL, Intravenous, Q12H, Murali Mccormack MD, 10 mL at 02/27/21 2013  •  sodium chloride 0.9 % flush 10 mL, 10 mL, Intravenous, PRN, Murali Mccormack MD  •   "sodium chloride 0.9 % infusion, 9 mL/hr, Intravenous, Continuous, Johanna Valladares MD  •  tamsulosin (FLOMAX) 24 hr capsule 0.4 mg, 0.4 mg, Oral, Daily, Sreedhar Ramirez MD, 0.4 mg at 02/28/21 0836    PHYSICAL EXAMINATION:   /71 (BP Location: Left arm, Patient Position: Lying)   Pulse 92   Temp 98.1 °F (36.7 °C) (Oral)   Resp 16   Ht 177.8 cm (70\")   Wt 72.6 kg (160 lb)   SpO2 94%   BMI 22.96 kg/m²                ECOG Performance Status: 3 - Symptomatic, >50% confined to bed  GENERAL: Age appropriate. No acute distress.   NEURO/PSYCH: A&O x 3, strength 5/5 in all muscle groups  HEENT: Head atraumatic, normocephalic.   NECK: Supple. No JVD. No lymphadenopathy.   LUNGS: Clear to auscultation bilaterally. No wheezing. No rhonchi.   HEART: Regular rate and rhythm. S1, S2, no murmurs.   ABDOMEN: Soft, nontender, nondistended. Bowel sounds positive. No  hepatosplenomegaly.   EXTREMITIES: No clubbing, cyanosis, or edema.   SKIN: No rashes. No purpura.       No results displayed because visit has over 200 results.          No results found.    ASSESSMENT: The patient is a very pleasant 75 y.o. male  with right kidney cancer      PLAN:  1.  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  We will hold off on further chemotherapy at this point.  2.  Weakness: Continue physical therapy.    Okay to discharge patient he will follow-up with [facility] office in 3 to 4 weeks.  Discussed with the patient's wife to coordinate care.    Rosemarie Montoya MD  3/1/2021    "

## 2021-03-01 NOTE — PROGRESS NOTES
"   LOS: 14 days    Patient Care Team:  Osmar Clemons MD as PCP - General  Osmar Clemons MD as PCP - Family Medicine    Reason For Visit:  F/U [facility].  Subjective   No new events, no obvious distress.  Renal function improving good urine output.  Awaiting insurance approval for transfer to the rehab center.  Review of Systems:   Denies any nausea vomiting, chest pain shortness of breath, no dysuria no hematuria.  Objective     allopurinol, 100 mg, Oral, Daily  amLODIPine, 5 mg, Oral, Q24H  aspirin, 81 mg, Oral, Daily  atorvastatin, 80 mg, Oral, Nightly  diphenhydrAMINE, 6 mg, Intravenous, Nightly  melatonin, 2.5 mg, Oral, Nightly  metoprolol tartrate, 25 mg, Oral, Q12H  sodium chloride, 10 mL, Intravenous, Q12H  tamsulosin, 0.4 mg, Oral, Daily      sodium chloride, 9 mL/hr          Vital Signs:  Blood pressure 117/81, pulse 92, temperature 98.6 °F (37 °C), temperature source Oral, resp. rate 16, height 177.8 cm (70\"), weight 72.6 kg (160 lb), SpO2 94 %.    Flowsheet Rows      First Filed Value   Admission Height  177.8 cm (70\") Documented at 02/15/2021 0706   Admission Weight  72.6 kg (160 lb) Documented at 02/15/2021 0706          02/28 0701 - 03/01 0700  In: 960 [P.O.:960]  Out: 1950 [Urine:1950]    Physical Exam:    General Appearance: Awake alert oriented x3 no obvious distress sitting in a chair at this time.  Mild anxiety is noted  Eyes: PER, conjunctivae and sclerae normal, no icterus  Lungs: respirations regular and unlabored, no crepitus, clear to auscultation  Heart/CV: regular rhythm & normal rate, no murmur, no gallop, no rub.  Abdomen: not distended, soft, non-tender, no masses,  bowel sounds present  Skin: No rash, Warm and dry.   Extremities: No edema, no cyanosis.   no suprapubic fullness.  Radiology: Solitary left kidney with mild dilatation of collecting system, right kidney surgically removed in the past.    Labs: CPK OK.   Results from last 7 days   Lab Units 03/01/21  0632 02/25/21  0808 " 02/23/21  1019   WBC 10*3/mm3 3.81  --  3.57   HEMOGLOBIN g/dL 9.0* 8.2* 8.7*   HEMATOCRIT % 28.9* 26.3* 27.0*   PLATELETS 10*3/mm3 174  --  96*     Results from last 7 days   Lab Units 03/01/21  0632 02/27/21  0657 02/25/21  0808 02/23/21  1802   SODIUM mmol/L 137 137 142 144   POTASSIUM mmol/L 3.7 4.0 3.6 3.3*   CHLORIDE mmol/L 102 103 107 107   CO2 mmol/L 26.0 25.0 28.0 30.0*   BUN mg/dL 34* 37* 46* 56*   CREATININE mg/dL 1.51* 1.58* 1.49* 1.62*   CALCIUM mg/dL 8.3* 8.3* 8.3* 8.5*   PHOSPHORUS mg/dL 3.4 2.9 2.8 2.3*   MAGNESIUM mg/dL  --   --   --  1.8   ALBUMIN g/dL 2.60* 2.50* 2.50* 2.60*     Results from last 7 days   Lab Units 03/01/21  0632   GLUCOSE mg/dL 98                       Estimated Creatinine Clearance: 44.1 mL/min (A) (by C-G formula based on SCr of 1.51 mg/dL (H)).      Assessment       Bladder cancer (CMS/HCC)    Atrial fibrillation with RVR (CMS/HCC)    Sepsis (CMS/HCC)    Gross hematuria    Acute UTI (urinary tract infection)    Pancytopenia due to chemotherapy (CMS/HCC)    Impression:   1.  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 Baseline cr 1.0. Cr peaked in 5's. Etiology multifactorial various risk factor for tubular injury including gross bleeding, Cisplatin and infection. Also possibility of TMA due to gemcitabine use. Which can manifest with systemic MAHA or renal limited TMA        2.  LDH mildly elevated. Haptoglobin pending suspicion low for MAHA    3.  Anemia and thrombocytopenia: related to bleeding? Vs TMA? Vs chemo related myelosuppression.  Not a candidate for YULIANA due to bladder tumor    4.  Gross hematuria: Worsened by AC currently on Hold. Required CBI    5.  Hx of recurrent urothelial cancer: Recently was getting gemcitabine and carboplatin followed by urology.      Recommendations:   Stable renal function solitary kidney.  Discussed about oral fluid intake with the patient  Follow-up in 6 to 8 weeks in outpatient clinic  Strict I/o  Discussed with Dr. Derek Cummins Jr.  yesterday.  Urology has signed off  Okay to go to rehab.            Dixie Anand MD  03/01/21  13:09 EST

## 2021-03-01 NOTE — DISCHARGE SUMMARY
[facility] Medicine Services  DISCHARGE SUMMARY    Patient Name: Jair Flores  : 1945  MRN: [id number]    Date of Admission: 2/15/2021  7:10 AM  Date of Discharge:  3/1/2021  Primary Care Physician: Osmar Clemons MD    Consults     Date and Time Order Name Status Description    2021 1401 Inpatient Nephrology Consult      2021 1001 Inpatient Urology Consult      2/15/2021 1341 Inpatient Hematology & Oncology Consult Completed           Hospital Course     Presenting Problem:   Sepsis (CMS/HCC) [A41.9]  Sepsis (CMS/HCC) [A41.9]    Active Hospital Problems    Diagnosis  POA   • Pancytopenia due to chemotherapy (CMS/HCC) [D61.810]  Yes   • Gross hematuria [R31.0]  Yes   • Acute UTI (urinary tract infection) [N39.0]  Yes   • Sepsis (CMS/HCC) [A41.9]  Yes   • Atrial fibrillation with RVR (CMS/HCC) [I48.91]  Yes   • Bladder cancer (CMS/HCC) [C67.9]  Yes      Resolved Hospital Problems   No resolved problems to display.          Hospital Course:  Jair Flores is a 75 y.o. male With a PMH of right ureteral carcinoma, CAD, atrial fibrillation, hx of CVA, HLD, OA, who was admitted on 2/15/2021 for Sepsis 2/2 UTI. Hospital course complicated by development of ARF, gross hematuria, and acute blood loss anemia. Nephrology, oncology, and urology following.      ARF -- improving  -Renal ultrasound with mild dilatation of left renal collecting system, right renal fossa unremarkable  -Likely postobstructive d/t gross hematuria  -Creatinine reliably trending down, cont to monitor   --outpatient with Dr Cheng JACKSON in 6 weeks      Diarrhea (new 21)  - C. Diff negative  - supportive tx  --improved with imodium      Gross hematuria  -resolved  -Stopped Eliquis indefinitely given hematuria and bladder Ca      Acute blood loss anemia  -Secondary to hematuria  -Status post 2 units PRBCs on      Right upper quadrant pain likely transient biliary colic -- resolved  -Alk  phos/ast/alt slightly elevated, T bili within normal limits  -Right upper quadrant ultrasound showed gallbladder with gallstones and some sludge, but no common bile duct dilation      Sepsis 2/2 UTI - resolved  -Tachycardic with fever on admission  -CXR with bibasilar atelectasis, no evidence of acute disease  -Urine cultures no growth and blood cultures NGTD   -S/p Invanz course     Hx of right ureter carcinoma  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 on November 30, 2020  -Follows up with Dr. Montoya and had a chemotherapy session 1 week PTA.  Was due for next infusion while here but held d/t acute infecton - heme-onc/Dr. Montoya following  --will need to follow up with Dr Montoya outpatient in 3-4 weeks     HTN  -Continue amlodipine and BB      PAF  -EKG normal sinus rhythm  -Continue metoprolol 50 mg daily  -Stopped Eliquis indefinitely given hematuria and bladder Ca      Hyponatremia    - resolved     Hypokalemia  - resolved      Pancytopenia  --d/t chemo  - stable     Hx of AVR    Patient has remained clinically stable and will be discharged to rehab today.       Discharge Follow Up Recommendations for outpatient labs/diagnostics:   follow up with pcp after dc from rehab   Follow up with Dr. Montoya 3-4 weeks   Follow up with Dr. Anand 6 weeks   Follow up with urology per their recommendations     Day of Discharge     HPI:   Patient is sitting [facility] in [facility]. He is tolerating diet. No acute events overnight per nursing. Plan for rehab today.     Review of Systems  Gen- No fevers, chills  CV- No chest pain, palpitations  Resp- No cough, dyspnea  GI- No N/V/D, abd pain        Vital Signs:   Temp:  [98 °F (36.7 °C)-98.6 °F (37 °C)] 98.1 °F (36.7 °C)  Heart Rate:  [] 92  Resp:  [16-18] 16  BP:  (107-132)/(71-88) 107/71     Physical Exam:  Constitutional: No acute distress, awake, alert  HENT: NCAT, mucous membranes moist  Respiratory: Clear to auscultation bilaterally, respiratory effort normal room air   Cardiovascular: RRR, no murmurs, rubs, or gallops  Gastrointestinal: Positive bowel sounds, soft, nontender, nondistended  Musculoskeletal: No bilateral ankle edema  Psychiatric: Appropriate affect, cooperative  Neurologic: Oriented x 3, strength symmetric in all extremities, Cranial Nerves grossly intact to confrontation, speech clear  Skin: No rashes      Pertinent  and/or Most Recent Results     LAB RESULTS:      Lab 03/01/21  0632 02/25/21  0808 02/23/21  1019   WBC 3.81  --  3.57   HEMOGLOBIN 9.0* 8.2* 8.7*   HEMATOCRIT 28.9* 26.3* 27.0*   PLATELETS 174  --  96*   MCV 86.5  --  83.9         Lab 03/01/21  0632 02/27/21  0657 02/25/21  0808 02/23/21  1802 02/22/21 1942   SODIUM 137 137 142 144 143   POTASSIUM 3.7 4.0 3.6 3.3* 3.6   CHLORIDE 102 103 107 107 107   CO2 26.0 25.0 28.0 30.0* 28.0   ANION GAP 9.0 9.0 7.0 7.0 8.0   BUN 34* 37* 46* 56* 66*   CREATININE 1.51* 1.58* 1.49* 1.62* 1.87*   GLUCOSE 98 90 99 157* 124*   CALCIUM 8.3* 8.3* 8.3* 8.5* 8.6   MAGNESIUM  --   --   --  1.8  --    PHOSPHORUS 3.4 2.9 2.8 2.3* 2.8         Lab 03/01/21  0632 02/27/21  0657 02/25/21  0808 02/23/21  1802 02/22/21 1942   ALBUMIN 2.60* 2.50* 2.50* 2.60* 2.40*                     Brief Urine Lab Results  (Last result in the past 365 days)      Color   Clarity   Blood   Leuk Est   Nitrite   Protein   CREAT   Urine HCG        02/16/21 1758             <4.2           Microbiology Results (last 10 days)     Procedure Component Value - Date/Time    Clostridium Difficile Toxin - Stool, Per Rectum [[id number]]  (Normal) Collected: 02/24/21 1618    Lab Status: Final result Specimen: Stool from Per Rectum Updated: 02/24/21 2314    Narrative:      The following orders were created for panel order Clostridium Difficile Toxin -  Stool, Per Rectum.  Procedure                               Abnormality         Status                     ---------                               -----------         ------                     Clostridium Difficile To...[[id number]]  Normal              Final result                 Please view results for these tests on the individual orders.    Clostridium Difficile Toxin, PCR - Stool, Per Rectum [[id number]]  (Normal) Collected: 02/24/21 1618    Lab Status: Final result Specimen: Stool from Per Rectum Updated: 02/24/21 4601     C. Difficile Toxins by PCR Not Detected    Narrative:      Performance characteristics of test not established for patients <2 years of age.  Negative for Toxigenic C. Difficile          Us Gallbladder    Result Date: 2/19/2021  EXAMINATION: US GALLBLADDER- 02/19/2021  INDICATION: RUQ pain and tenderness; R41.82-Altered mental status, unspecified; N39.0-Urinary tract infection, site not specified; Z85.51-Personal history of malignant neoplasm of bladder; Z92.21-Personal history of antineoplastic chemotherapy; D64.9-Anemia, unspecified  TECHNIQUE: Sonographic imaging was obtained of the right upper quadrant in both the sagittal and transverse planes.  COMPARISON: NONE  FINDINGS: Visualized portions of the pancreas is homogeneous in appearance. The liver is heterogeneous in appearance and coarse in echotexture. No underlying mass or intrahepatic biliary ductal dilatation. Left lobe of the liver is not well seen.  The gallbladder reveals sludge with multiple echogenic foci suggesting stones and possible small polyp. There is no wall thickening. No pericholecystic fluid. The common bile duct measures 7 mm. This is within normal limits for the patient's age. There is a small amount of fluid seen along the inferior edge of the liver. The right kidney has been surgically removed.      There are stones and sludge identified within the gallbladder. Small amount of fluid seen inferiorly to the right  lobe of the liver. The liver is coarse in echotexture with no underlying mass or lesion. No biliary ductal dilatation.   D:  02/19/2021 E:  02/19/2021  This report was finalized on 2/19/2021 11:46 AM by Dr. Pati More MD.      Xr Abdomen Kub    Result Date: 2/22/2021   EXAMINATION: XR ABDOMEN KUB - 02/18/2021  INDICATION: R41.82-Altered mental status, unspecified; N39.0-Urinary tract infection, site not specified; Z85.51-Personal history of malignant neoplasm of bladder; Z92.21-Personal history of antineoplastic chemotherapy; D64.9-Anemia, unspecified. Constipation.  COMPARISON: None.  FINDINGS: KUB abdomen reveals nonspecific nonobstructive bowel gas pattern with gas and stool to the level of the rectum where there is a large rectal stool burden concerning for constipation. Degenerative changes of the spine and pelvis without acute osseous findings.      Nonobstructive bowel gas pattern with large rectal stool burden concerning for constipation.  DICTATED:   02/18/2021 EDITED/ls :   02/18/2021  This report was finalized on 2/22/2021 5:34 PM by Dr. Elias Sharma.        Results for orders placed during the hospital encounter of 06/24/19   Duplex Carotid - Left Ultrasound CAR    Narrative · The stented left internal carotid artery is widely patent (normal   velocities are recorded).          Results for orders placed during the hospital encounter of 06/24/19   Duplex Carotid - Left Ultrasound CAR    Narrative · The stented left internal carotid artery is widely patent (normal   velocities are recorded).          Results for orders placed during the hospital encounter of 01/08/20   Adult Transthoracic Echo Limited W/ Cont if Necessary Per Protocol    Narrative · This is a LIMITED study to assess LV function post LifeVest placement.  · Global and segmental LV wall motion is normal. The calculated LV   ejection fraction is 63%.  · The bioprosthetic aortic valve is functionally normal.          Plan for Follow-up  of Pending Labs/Results:     Discharge Details        Discharge Medications      New Medications      Instructions Start Date   acetaminophen 325 MG tablet  Commonly known as: TYLENOL   650 mg, Oral, Every 4 Hours PRN      loperamide 2 MG capsule  Commonly known as: IMODIUM   2 mg, Oral, 4 Times Daily PRN      melatonin 5 MG tablet tablet   2.5 mg, Oral, Nightly      metoprolol tartrate 25 MG tablet  Commonly known as: LOPRESSOR   25 mg, Oral, Every 12 Hours Scheduled      tamsulosin 0.4 MG capsule 24 hr capsule  Commonly known as: FLOMAX   0.4 mg, Oral, Daily   Start Date: March 2, 2021        Changes to Medications      Instructions Start Date   aspirin 81 MG EC tablet  What changed: when to take this   81 mg, Oral, Daily         Continue These Medications      Instructions Start Date   allopurinol 100 MG tablet  Commonly known as: ZYLOPRIM   100 mg, Oral, Daily      amLODIPine 5 MG tablet  Commonly known as: NORVASC   5 mg, Oral, Daily      atorvastatin 80 MG tablet  Commonly known as: LIPITOR   80 mg, Oral, Nightly      baclofen 10 MG tablet  Commonly known as: LIORESAL   10 mg, Oral, 3 Times Daily, PRN hiccups      dexamethasone 4 MG tablet  Commonly known as: DECADRON   Dexamethasone 8mg PO once daily on Days 2,3 and 4 of each cycle      guaiFENesin 600 MG 12 hr tablet  Commonly known as: MUCINEX   1,200 mg, Oral, prn      HM Multivitamin Adult Gummy chewable tablet   1 tablet, Oral, Daily      Nasal Spray Bottle misc   1 spray, Does not apply, As Needed      ondansetron 8 MG tablet  Commonly known as: Zofran   8 mg, Oral, Every 8 Hours PRN      Vitamin C 500 MG chewable tablet   800 mg, Oral, Daily         Stop These Medications    apixaban 2.5 MG tablet tablet  Commonly known as: ELIQUIS     CARBOPLATIN IV     Entresto 49-51 MG tablet  Generic drug: sacubitril-valsartan     Fluzone High-Dose Quadrivalent 0.7 ML suspension prefilled syringe  Generic drug: Influenza Vac High-Dose Quad     metoprolol succinate   MG 24 hr tablet  Commonly known as: TOPROL-XL            Allergies   Allergen Reactions   • Penicillins Anaphylaxis     Tolerates ertapenem   • Rocephin [Ceftriaxone] Anaphylaxis   • Ciprofloxacin Other (See Comments)     Tendonitis   • Sotalol Other (See Comments)     Prolonged QT         Discharge Disposition:  Rehab Facility or Unit (DC - External)    Diet:  Hospital:  Diet Order   Procedures   • Diet Regular       Activity:  Activity Instructions     Activity as Tolerated      Measure Blood Pressure            Restrictions or Other Recommendations:         CODE STATUS:    Code Status and Medical Interventions:   Ordered at: 02/15/21 1341     Level Of Support Discussed With:    Patient    Health Care Surrogate     Code Status:    CPR     Medical Interventions (Level of Support Prior to Arrest):    Full       Future Appointments   Date Time Provider Department Center   3/15/2021  1:15 PM Rosemarie Montoya MD [facility]   3/15/2021  2:00 PM CHAIR [facility]       Additional Instructions for the Follow-ups that You Need to Schedule     Discharge Follow-up with PCP   As directed       Currently Documented PCP:    Osmar Clemons MD    PCP Phone Number:    [phone]     Follow Up Details: follow up with pcp after dc from rehab         Discharge Follow-up with Specified Provider: Follow up with Dr. Montoya 3-4 weeks   As directed      To: Follow up with Dr. Montoya 3-4 weeks         Discharge Follow-up with Specified Provider: Follow up with Dr. Anand 6 weeks   As directed      To: Follow up with Dr. Anand 6 weeks         Discharge Follow-up with Specified Provider: Follow up with urology per their recommendations   As directed      To: Follow up with urology per their recommendations                     ANTIONE Aguirre  03/01/21      Time Spent on Discharge:  I spent  35  minutes on this discharge activity which included: face-to-face encounter with the patient, reviewing the data in the  system, coordination of the care with the nursing staff as well as consultants, documentation, and entering orders.

## 2021-03-09 NOTE — OUTREACH NOTE
Call Center TCM Note      Responses   [facility] patient discharged from?  Non-   Does the patient have one of the following disease processes/diagnoses(primary or secondary)?  Sepsis   TCM attempt successful?  Yes   Call start time  1404   Call end time  1409   General alerts for this patient  DC from rehab on 3/5   Discharge diagnosis  Sepsis    Is patient permission given to speak with other caregiver?  Yes   List who call center can speak with  spouse- Carey   Person spoke with today (if not patient) and relationship  spouse- Carey/ Jair   Does the patient have all medications ordered at discharge?  Yes   Is the patient taking all medications as directed (includes completed medication regime)?  Yes   Does the patient have a primary care provider?   Yes   Does the patient have an appointment with their PCP within 7 days of discharge?  Greater than 7 days   Comments regarding PCP  f/u with Dr. Clemons on 3/17/21   Nursing Interventions  Verified appointment date/time/provider   Has the patient kept scheduled appointments due by today?  N/A   What is the Home health agency?   [facility]   Home health comments  waiting on order from discharging MD at [facility] before Home Health can see patient.   Psychosocial issues?  No   Did the patient receive a copy of their discharge instructions?  Yes   What is the patient's perception of their health status since discharge?  Improving   Is the patient/caregiver able to teach back the hierarchy of who to call/visit for symptoms/problems? PCP, Specialist, Home health nurse, Urgent Care, ED, 911  Yes   TCM call completed?  Yes   Wrap up additional comments  Per spouse, patient is doing well and improving, they are waiting to be seen by home health.          Monica Bo RN    3/9/2021, 14:09 EST

## 2021-03-09 NOTE — OUTREACH NOTE
Prep Survey      Responses   Spiritism facility patient discharged from?  Non-BH   Is LACE score < 7 ?  Non-BH Discharge   Emergency Room discharge w/ pulse ox?  No   Eligibility  [facility]    Date of Admission  03/01/21   Date of Discharge  03/05/21   Discharge diagnosis  Sepsis    Does the patient have one of the following disease processes/diagnoses(primary or secondary)?  Sepsis   Does the patient have Home health ordered?  Yes   What is the Home health agency?   [facility]   Is there a DME ordered?  No   General alerts for this patient  DC from rehab on 3/5   Prep survey completed?  Yes          Pati Rangel RN

## 2021-03-10 NOTE — TELEPHONE ENCOUNTER
PAM WITH [facility] REPORTING THAT PATIENT IS HAVING HOME HEALTH FOR PT AND OT.

## 2021-03-15 NOTE — TELEPHONE ENCOUNTER
Called Aaron and got VM again. I left a message giving her verbal orders and to return call if she had any questions.

## 2021-03-15 NOTE — TELEPHONE ENCOUNTER
Caller: MARY WEIR    Relationship to patient: WIFE    Best call back number: [phone]    PT TO RESCHED MISSED APPT 3/15.    PLEASE RETURN CALL THANK YOU

## 2021-03-15 NOTE — TELEPHONE ENCOUNTER
TIMBO FROM [facility]YS[facility]ERAP[facility] STATED THEY JUST COMPLETED PATIENT'S PT HOME EVALUATION.    NEED VERBAL ORDERS FOR PHYSICAL THERAPY FOR ONCE A WEEK FOR 4 WEEKS.    CALL [phone]

## 2021-03-17 NOTE — ASSESSMENT & PLAN NOTE
Mixed hyperlipidemia aortic valve disease with replacement and CVA secondary to carotid vascular disease on atorvastatin 80 mg daily continue therapy denies myalgia arthralgia

## 2021-03-17 NOTE — ASSESSMENT & PLAN NOTE
Bladder carcinoma resected 2015, with recurrent bladder cancer March 2019, cystoscopy and carcinoma in situ January 2021 and chemotherapy January 2021 currently stable

## 2021-03-17 NOTE — PATIENT INSTRUCTIONS
Continue follow-up with urology  Continue follow-up with hematology oncology  Continue all current therapy  Encouraged walking exercise and healthy cardiac diet  Return visit in 3 months or as needed

## 2021-03-17 NOTE — PROGRESS NOTES
[facility]     Jair Flores  1945   [id number]      Patient Care Team:  Osmar Clemons MD as PCP - General  Osmar Clemons MD as PCP - Family Medicine    Chief Complaint::   Chief Complaint   Patient presents with   • Hospital Follow Up Visit     discharged from [facility] on 3/1/21            HPI  Patient 75-year-old male with recent admission to [facility] with sepsis treated with IV and oral therapy and discharged on March 1, 2021 with a past history of bladder carcinoma with recurrence, with aortic valve replacement, with ureter and kidney on the right removed secondary to carcinoma with recent cystoscopy showing carcinoma in situ having undergone chemotherapy in January 2021 with a history of CVA with TPA therapy and resolution with essential hypertension and mixed hyperlipidemia.  Patient overall feels well denies headache or dizzy vision or hearing change denies dysphagia is a non-smoker denies cough or wheeze denies chest pain pressure palpitations denies nausea vomiting he is gaining strength and he feels improved with reasonable sleep and reasonable diet and appetite on current medications of aspirin 81 mg Imodium as needed atorvastatin 80 metoprolol tartrate 25 twice daily amlodipine 5 mg daily allopurinol 100 mg daily multivitamins and vitamin C.      Patient Active Problem List   Diagnosis   • History of bladder cancer   • Insomnia   • Benign essential hypertension   • Hyperlipidemia LDL goal <70   • Osteoarthritis   • Right carotid bruit   • Primary osteoarthritis involving multiple joints   • Bladder cancer (CMS/HCC)   • History of disease   • Gout   • GERD without esophagitis   • Carotid artery plaque, left   • Obstructive sleep apnea   • S/P AVR (aortic valve replacement)   • Atrial fibrillation with RVR (CMS/HCC)   • History of CVA (cerebrovascular accident) - S/P stent   • Coronary artery disease involving native coronary artery of native heart   • Prolonged QT interval   •  Acute systolic congestive heart failure (CMS/HCC)   • Malignant neoplasm of right ureter (CMS/HCC)   • Sepsis (CMS/HCC)   • Gross hematuria   • Acute UTI (urinary tract infection)   • Pancytopenia due to chemotherapy (CMS/HCC)        Past Medical History:   Diagnosis Date   • Acute gout of foot 4/29/2019   • Atrial fibrillation (CMS/HCC)     after valve replaced   • Cancer (CMS/HCC)     bladder; cysto surveillance 07/23/2017; free of disease   • Cataract     right eye   • Degenerative arthritis     left; knee replacement; successful   • Elevated cholesterol    • Flash pulmonary edema (CMS/HCC) 4/29/2019    Episode of flash pulmonary edema thought to be related to volume overload at the time of left knee surgery in 2005 performed in [place] he has had no recurrence his EKG does show inferior Q waves which are unchanged.   • Hard to intubate     has been told he is difficult to intubate   • Hematuria 4/29/2019   • Hemorrhoids 09/12/2011    hemorrhoid ablation   • Hypertension    • BEATRIZ on CPAP    • Skin cancer     head   • Stroke (CMS/HCC) 05/2019    no residual   • Wears glasses        Past Surgical History:   Procedure Laterality Date   • AORTIC VALVE REPAIR/REPLACEMENT  09/2019   • CARDIAC CATHETERIZATION N/A 7/10/2019    Procedure: Right and Left Heart Cath;  Surgeon: Francisco Bo MD;  Location:  [facility];  Service: Cardiology   • COLONOSCOPY      routinely every 10 years   • CYSTOSCOPY      x3   • CYSTOSCOPY  07/23/2017    bladder cancer; cysto surveillance; free of disease   • CYSTOSCOPY BLADDER BIOPSY N/A 4/24/2020    Procedure: CYSTOSCOPY BLADDER BIOPSY;  Surgeon: Bobby Salmeron MD;  Location:  [facility];  Service: Urology;  Laterality: N/A;   • EXCISIONAL HEMORRHOIDECTOMY      hemorrhoid ablation; hemorrhoid onset 09/12/2011   • EYE SURGERY      as a child after cutting eye with barbwire fence   • INTERVENTIONAL RADIOLOGY PROCEDURE Bilateral 6/24/2019    Procedure: Carotid Cerebral  Angiogram with possible stent placement;  Surgeon: Abad Mata MD;  Location:  [facility];  Service: Interventional Radiology   • KNEE ARTHROPLASTY, PARTIAL REPLACEMENT Left    • DE TCAT IV STENT CRV CRTD ART EMBOLIC PROTECJ N/A 6/24/2019    Procedure: Carotid Stent;  Surgeon: Abad Mata MD;  Location:  [facility];  Service: Interventional Radiology   • SKIN CANCER EXCISION      head   • TONSILLECTOMY     • TRANSURETHRAL RESECTION OF BLADDER TUMOR N/A 3/29/2019    Procedure: TRANSURETHRAL RESECTION OF BLADDER TUMOR WITH MITOMYCIN;  Surgeon: Jair Braxton MD;  Location:  [facility];  Service: Urology   • TRANSURETHRAL RESECTION OF BLADDER TUMOR N/A 4/24/2020    Procedure: TRANSURETHRAL RESECTION OF BLADDER TUMOR;  Surgeon: Bobby Salmeron MD;  Location:  [facility];  Service: Urology;  Laterality: N/A;       Family History   Problem Relation Age of Onset   • Hypertension Mother    • Hypertension Father    • Brain cancer Sister        Social History     Socioeconomic History   • Marital status:      Spouse name: Not on file   • Number of children: Not on file   • Years of education: Not on file   • Highest education level: Not on file   Tobacco Use   • Smoking status: Never Smoker   • Smokeless tobacco: Never Used   Substance and Sexual Activity   • Alcohol use: Not Currently   • Drug use: Defer   • Sexual activity: Defer       Allergies   Allergen Reactions   • Penicillins Anaphylaxis     Tolerates ertapenem   • Rocephin [Ceftriaxone] Anaphylaxis   • Ciprofloxacin Other (See Comments)     Tendonitis   • Sotalol Other (See Comments)     Prolonged QT       Review of Systems   Constitutional: Positive for fatigue. Negative for chills and fever.   HENT: Negative for congestion, ear pain and sinus pressure.    Respiratory: Negative for cough, chest tightness, shortness of breath and wheezing.    Cardiovascular: Negative for chest pain and palpitations.  "  Gastrointestinal: Negative for abdominal pain, blood in stool and constipation.   Skin: Negative for color change.   Allergic/Immunologic: Negative for environmental allergies.   Neurological: Positive for light-headedness. Negative for dizziness, speech difficulty and headache.   Psychiatric/Behavioral: Negative for decreased concentration. The patient is not nervous/anxious.             Vital Signs  Vitals:    03/17/21 1440   BP: 108/80   BP Location: Left arm   Patient Position: Sitting   Cuff Size: Adult   Pulse: 80   Temp: 97.1 °F (36.2 °C)   Weight: 73.9 kg (163 lb)   Height: 177.8 cm (70\")     Body mass index is 23.39 kg/m².  Patient's Body mass index is 23.39 kg/m². BMI is within normal parameters. No follow-up required..     Advance Care Planning         Current Outpatient Medications:   •  acetaminophen (TYLENOL) 325 MG tablet, Take 2 tablets by mouth Every 4 (Four) Hours As Needed for Mild Pain ., Disp:  , Rfl:   •  allopurinol (ZYLOPRIM) 100 MG tablet, Take 1 tablet by mouth Daily., Disp: 90 tablet, Rfl: 3  •  amLODIPine (NORVASC) 5 MG tablet, Take 1 tablet by mouth Daily., Disp: 30 tablet, Rfl: 11  •  Ascorbic Acid (VITAMIN C) 500 MG chewable tablet, Chew 800 mg Daily., Disp: , Rfl:   •  aspirin EC 81 MG EC tablet, Take 1 tablet by mouth Daily. (Patient taking differently: Take 81 mg by mouth Every Morning.), Disp: 100 tablet, Rfl: 4  •  atorvastatin (LIPITOR) 80 MG tablet, TAKE 1 TABLET BY MOUTH EVERY NIGHT., Disp: 90 tablet, Rfl: 3  •  guaiFENesin (MUCINEX) 600 MG 12 hr tablet, Take 1,200 mg by mouth. prn, Disp: , Rfl:   •  loperamide (IMODIUM) 2 MG capsule, Take 1 capsule by mouth 4 (Four) Times a Day As Needed for Diarrhea., Disp:  , Rfl:   •  melatonin 5 MG tablet tablet, Take 0.5 tablets by mouth Every Night., Disp:  , Rfl:   •  metoprolol tartrate (LOPRESSOR) 25 MG tablet, Take 1 tablet by mouth Every 12 (Twelve) Hours., Disp:  , Rfl:   •  Multiple Vitamins-Minerals (HM MULTIVITAMIN ADULT " GUMMY) chewable tablet, Chew 1 tablet Daily., Disp: , Rfl:   •  levoFLOXacin (LEVAQUIN) 250 MG tablet, Take 250 mg by mouth Daily., Disp: , Rfl:     Physical Exam     ACE III MINI         HEENT: No asymmetry pharynx is clear  NECK: No mass bruit or thyromegaly  CHEST: Clear to P&A without rales or wheezing  CARDIAC: Regular rhythm without gallop or rub soft murmur of aortic valve  ABD: Liver spleen normal positive bowel sounds no bruit  : Deferred  NEURO: Intact with no residual from the CVA  PSYCH: Normal  EXTREM: Normal with trace edema  Skin: Clear     Results Review:    Recent Results (from the past 672 hour(s))   Hemoglobin & Hematocrit, Blood    Collection Time: 02/17/21  9:04 PM    Specimen: Blood   Result Value Ref Range    Hemoglobin 8.5 (L) 13.0 - 17.7 g/dL    Hematocrit 26.4 (L) 37.5 - 51.0 %   Comprehensive Metabolic Panel    Collection Time: 02/18/21  6:16 AM    Specimen: Blood   Result Value Ref Range    Glucose 122 (H) 65 - 99 mg/dL    BUN 94 (H) 8 - 23 mg/dL    Creatinine 4.92 (H) 0.76 - 1.27 mg/dL    Sodium 135 (L) 136 - 145 mmol/L    Potassium 4.1 3.5 - 5.2 mmol/L    Chloride 103 98 - 107 mmol/L    CO2 19.0 (L) 22.0 - 29.0 mmol/L    Calcium 8.0 (L) 8.6 - 10.5 mg/dL    Total Protein 5.1 (L) 6.0 - 8.5 g/dL    Albumin 2.30 (L) 3.50 - 5.20 g/dL    ALT (SGPT) 45 (H) 1 - 41 U/L    AST (SGOT) 84 (H) 1 - 40 U/L    Alkaline Phosphatase 143 (H) 39 - 117 U/L    Total Bilirubin 0.3 0.0 - 1.2 mg/dL    eGFR Non African Amer 12 (L) >60 mL/min/1.73    eGFR  African Amer      Globulin 2.8 gm/dL    A/G Ratio 0.8 g/dL    BUN/Creatinine Ratio 19.1 7.0 - 25.0    Anion Gap 13.0 5.0 - 15.0 mmol/L   Phosphorus    Collection Time: 02/18/21  6:16 AM    Specimen: Blood   Result Value Ref Range    Phosphorus 6.8 (H) 2.5 - 4.5 mg/dL   Protime-INR    Collection Time: 02/18/21  6:16 AM    Specimen: Blood   Result Value Ref Range    Protime 20.3 (H) 11.5 - 14.0 Seconds    INR 1.77 (H) 0.85 - 1.16   CK    Collection Time:  02/18/21  6:16 AM    Specimen: Blood   Result Value Ref Range    Creatine Kinase 116 20 - 200 U/L   CBC Auto Differential    Collection Time: 02/18/21  6:16 AM    Specimen: Blood   Result Value Ref Range    WBC 7.94 3.40 - 10.80 10*3/mm3    RBC 2.87 (L) 4.14 - 5.80 10*6/mm3    Hemoglobin 7.6 (L) 13.0 - 17.7 g/dL    Hematocrit 23.3 (L) 37.5 - 51.0 %    MCV 81.2 79.0 - 97.0 fL    MCH 26.5 (L) 26.6 - 33.0 pg    MCHC 32.6 31.5 - 35.7 g/dL    RDW 16.6 (H) 12.3 - 15.4 %    RDW-SD 49.0 37.0 - 54.0 fl    MPV 9.0 6.0 - 12.0 fL    Platelets 59 (L) 140 - 450 10*3/mm3   Manual Differential    Collection Time: 02/18/21  6:16 AM    Specimen: Blood   Result Value Ref Range    Neutrophil % 94.0 (H) 42.7 - 76.0 %    Lymphocyte % 4.0 (L) 19.6 - 45.3 %    Monocyte % 2.0 (L) 5.0 - 12.0 %    Eosinophil % 0.0 (L) 0.3 - 6.2 %    Basophil % 0.0 0.0 - 1.5 %    Neutrophils Absolute 7.46 (H) 1.70 - 7.00 10*3/mm3    Lymphocytes Absolute 0.32 (L) 0.70 - 3.10 10*3/mm3    Monocytes Absolute 0.16 0.10 - 0.90 10*3/mm3    Eosinophils Absolute 0.00 0.00 - 0.40 10*3/mm3    Basophils Absolute 0.00 0.00 - 0.20 10*3/mm3    nRBC 0.0 0.0 - 0.2 /100 WBC    RBC Morphology Normal Normal    WBC Morphology Normal Normal    Platelet Morphology Normal Normal   Prepare RBC, 2 Units    Collection Time: 02/18/21 12:10 PM   Result Value Ref Range    Product Code J7629S23     Unit Number [id number]     UNIT  ABO O     UNIT  RH POS     Crossmatch Interpretation Compatible     Dispense Status PT     Blood Expiration Date [id number]     Blood Type Barcode 5100     Product Code S4721L94     Unit Number [id number]     UNIT  ABO O     UNIT  RH POS     Crossmatch Interpretation Compatible     Dispense Status PT     Blood Expiration Date [id number]     Blood Type Barcode 5100    Uric Acid    Collection Time: 02/19/21  5:44 AM    Specimen: Blood   Result Value Ref Range    Uric Acid 6.0 3.4 - 7.0 mg/dL   Comprehensive Metabolic Panel    Collection Time: 02/19/21   5:44 AM    Specimen: Blood   Result Value Ref Range    Glucose 118 (H) 65 - 99 mg/dL     (H) 8 - 23 mg/dL    Creatinine 4.19 (H) 0.76 - 1.27 mg/dL    Sodium 136 136 - 145 mmol/L    Potassium 3.9 3.5 - 5.2 mmol/L    Chloride 104 98 - 107 mmol/L    CO2 20.0 (L) 22.0 - 29.0 mmol/L    Calcium 8.1 (L) 8.6 - 10.5 mg/dL    Total Protein 5.0 (L) 6.0 - 8.5 g/dL    Albumin 2.20 (L) 3.50 - 5.20 g/dL    ALT (SGPT) 97 (H) 1 - 41 U/L    AST (SGOT) 175 (H) 1 - 40 U/L    Alkaline Phosphatase 255 (H) 39 - 117 U/L    Total Bilirubin 0.3 0.0 - 1.2 mg/dL    eGFR Non African Amer 14 (L) >60 mL/min/1.73    eGFR  African Amer      Globulin 2.8 gm/dL    A/G Ratio 0.8 g/dL    BUN/Creatinine Ratio 24.6 7.0 - 25.0    Anion Gap 12.0 5.0 - 15.0 mmol/L   CBC Auto Differential    Collection Time: 02/19/21  5:44 AM    Specimen: Blood   Result Value Ref Range    WBC 5.15 3.40 - 10.80 10*3/mm3    RBC 2.89 (L) 4.14 - 5.80 10*6/mm3    Hemoglobin 7.7 (L) 13.0 - 17.7 g/dL    Hematocrit 23.7 (L) 37.5 - 51.0 %    MCV 82.0 79.0 - 97.0 fL    MCH 26.6 26.6 - 33.0 pg    MCHC 32.5 31.5 - 35.7 g/dL    RDW 16.8 (H) 12.3 - 15.4 %    RDW-SD 50.4 37.0 - 54.0 fl    MPV 10.7 6.0 - 12.0 fL    Platelets 52 (L) 140 - 450 10*3/mm3    Neutrophil % 89.9 (H) 42.7 - 76.0 %    Lymphocyte % 6.8 (L) 19.6 - 45.3 %    Monocyte % 2.7 (L) 5.0 - 12.0 %    Eosinophil % 0.0 (L) 0.3 - 6.2 %    Basophil % 0.0 0.0 - 1.5 %    Immature Grans % 0.6 (H) 0.0 - 0.5 %    Neutrophils, Absolute 4.63 1.70 - 7.00 10*3/mm3    Lymphocytes, Absolute 0.35 (L) 0.70 - 3.10 10*3/mm3    Monocytes, Absolute 0.14 0.10 - 0.90 10*3/mm3    Eosinophils, Absolute 0.00 0.00 - 0.40 10*3/mm3    Basophils, Absolute 0.00 0.00 - 0.20 10*3/mm3    Immature Grans, Absolute 0.03 0.00 - 0.05 10*3/mm3    nRBC 0.0 0.0 - 0.2 /100 WBC   Peripheral Blood Smear    Collection Time: 02/19/21 12:00 PM    Specimen: Blood   Result Value Ref Range    Performed by: Dr. Placido Michele M.D.     Pathologist Interpretation        Anemia (Hct 23.7) normochromic, moderate anisocytosis. Thrombocytopenia (52,000). No platelet clumps. WBC 5.150 with left shift to the neutrophils. Lymphocytes and monocytes mature,no blasts.   Comprehensive Metabolic Panel    Collection Time: 02/20/21  6:31 AM    Specimen: Blood   Result Value Ref Range    Glucose 137 (H) 65 - 99 mg/dL     (H) 8 - 23 mg/dL    Creatinine 3.63 (H) 0.76 - 1.27 mg/dL    Sodium 138 136 - 145 mmol/L    Potassium 4.1 3.5 - 5.2 mmol/L    Chloride 104 98 - 107 mmol/L    CO2 19.0 (L) 22.0 - 29.0 mmol/L    Calcium 8.4 (L) 8.6 - 10.5 mg/dL    Total Protein 5.5 (L) 6.0 - 8.5 g/dL    Albumin 2.50 (L) 3.50 - 5.20 g/dL    ALT (SGPT) 86 (H) 1 - 41 U/L    AST (SGOT) 125 (H) 1 - 40 U/L    Alkaline Phosphatase 257 (H) 39 - 117 U/L    Total Bilirubin 0.4 0.0 - 1.2 mg/dL    eGFR Non African Amer 16 (L) >60 mL/min/1.73    Globulin 3.0 gm/dL    A/G Ratio 0.8 g/dL    BUN/Creatinine Ratio 28.1 (H) 7.0 - 25.0    Anion Gap 15.0 5.0 - 15.0 mmol/L   Lactate Dehydrogenase    Collection Time: 02/20/21  6:31 AM    Specimen: Blood   Result Value Ref Range     (H) 135 - 225 U/L   CBC Auto Differential    Collection Time: 02/20/21  6:31 AM    Specimen: Blood   Result Value Ref Range    WBC 4.99 3.40 - 10.80 10*3/mm3    RBC 3.00 (L) 4.14 - 5.80 10*6/mm3    Hemoglobin 8.1 (L) 13.0 - 17.7 g/dL    Hematocrit 23.9 (L) 37.5 - 51.0 %    MCV 79.7 79.0 - 97.0 fL    MCH 27.0 26.6 - 33.0 pg    MCHC 33.9 31.5 - 35.7 g/dL    RDW 16.8 (H) 12.3 - 15.4 %    RDW-SD 48.5 37.0 - 54.0 fl    MPV 10.9 6.0 - 12.0 fL    Platelets 49 (C) 140 - 450 10*3/mm3    Neutrophil % 83.8 (H) 42.7 - 76.0 %    Lymphocyte % 9.2 (L) 19.6 - 45.3 %    Monocyte % 5.8 5.0 - 12.0 %    Eosinophil % 0.2 (L) 0.3 - 6.2 %    Basophil % 0.0 0.0 - 1.5 %    Immature Grans % 1.0 (H) 0.0 - 0.5 %    Neutrophils, Absolute 4.18 1.70 - 7.00 10*3/mm3    Lymphocytes, Absolute 0.46 (L) 0.70 - 3.10 10*3/mm3    Monocytes, Absolute 0.29 0.10 - 0.90 10*3/mm3     Eosinophils, Absolute 0.01 0.00 - 0.40 10*3/mm3    Basophils, Absolute 0.00 0.00 - 0.20 10*3/mm3    Immature Grans, Absolute 0.05 0.00 - 0.05 10*3/mm3    nRBC 0.0 0.0 - 0.2 /100 WBC   Haptoglobin    Collection Time: 02/20/21  9:47 AM    Specimen: Blood   Result Value Ref Range    Haptoglobin 439 (H) 30 - 200 mg/dL   Renal Function Panel    Collection Time: 02/21/21 11:45 AM    Specimen: Blood   Result Value Ref Range    Glucose 167 (H) 65 - 99 mg/dL    BUN 86 (H) 8 - 23 mg/dL    Creatinine 2.50 (H) 0.76 - 1.27 mg/dL    Sodium 140 136 - 145 mmol/L    Potassium 3.5 3.5 - 5.2 mmol/L    Chloride 106 98 - 107 mmol/L    CO2 24.0 22.0 - 29.0 mmol/L    Calcium 8.5 (L) 8.6 - 10.5 mg/dL    Albumin 2.50 (L) 3.50 - 5.20 g/dL    Phosphorus 3.9 2.5 - 4.5 mg/dL    Anion Gap 10.0 5.0 - 15.0 mmol/L    BUN/Creatinine Ratio 34.4 (H) 7.0 - 25.0    eGFR Non African Amer 25 (L) >60 mL/min/1.73   Calcium, Ionized    Collection Time: 02/21/21  3:42 PM    Specimen: Blood   Result Value Ref Range    Ionized Calcium 1.21 1.12 - 1.32 mmol/L   CBC (No Diff)    Collection Time: 02/22/21  6:58 AM    Specimen: Blood   Result Value Ref Range    WBC 2.99 (L) 3.40 - 10.80 10*3/mm3    RBC 3.04 (L) 4.14 - 5.80 10*6/mm3    Hemoglobin 8.1 (L) 13.0 - 17.7 g/dL    Hematocrit 25.0 (L) 37.5 - 51.0 %    MCV 82.2 79.0 - 97.0 fL    MCH 26.6 26.6 - 33.0 pg    MCHC 32.4 31.5 - 35.7 g/dL    RDW 16.8 (H) 12.3 - 15.4 %    RDW-SD 50.3 37.0 - 54.0 fl    MPV 10.7 6.0 - 12.0 fL    Platelets 79 (L) 140 - 450 10*3/mm3   Comprehensive Metabolic Panel    Collection Time: 02/22/21  6:58 AM    Specimen: Blood   Result Value Ref Range    Glucose 117 (H) 65 - 99 mg/dL    BUN 68 (H) 8 - 23 mg/dL    Creatinine 1.89 (H) 0.76 - 1.27 mg/dL    Sodium 142 136 - 145 mmol/L    Potassium 3.3 (L) 3.5 - 5.2 mmol/L    Chloride 107 98 - 107 mmol/L    CO2 25.0 22.0 - 29.0 mmol/L    Calcium 8.3 (L) 8.6 - 10.5 mg/dL    Total Protein 5.2 (L) 6.0 - 8.5 g/dL    Albumin 2.40 (L) 3.50 - 5.20 g/dL     ALT (SGPT) 89 (H) 1 - 41 U/L    AST (SGOT) 103 (H) 1 - 40 U/L    Alkaline Phosphatase 229 (H) 39 - 117 U/L    Total Bilirubin 0.5 0.0 - 1.2 mg/dL    eGFR Non African Amer 35 (L) >60 mL/min/1.73    Globulin 2.8 gm/dL    A/G Ratio 0.9 g/dL    BUN/Creatinine Ratio 36.0 (H) 7.0 - 25.0    Anion Gap 10.0 5.0 - 15.0 mmol/L   Magnesium    Collection Time: 02/22/21  6:58 AM    Specimen: Blood   Result Value Ref Range    Magnesium 1.9 1.6 - 2.4 mg/dL   Phosphorus    Collection Time: 02/22/21  6:58 AM    Specimen: Blood   Result Value Ref Range    Phosphorus 3.5 2.5 - 4.5 mg/dL   Renal Function Panel    Collection Time: 02/22/21  7:42 PM    Specimen: Blood   Result Value Ref Range    Glucose 124 (H) 65 - 99 mg/dL    BUN 66 (H) 8 - 23 mg/dL    Creatinine 1.87 (H) 0.76 - 1.27 mg/dL    Sodium 143 136 - 145 mmol/L    Potassium 3.6 3.5 - 5.2 mmol/L    Chloride 107 98 - 107 mmol/L    CO2 28.0 22.0 - 29.0 mmol/L    Calcium 8.6 8.6 - 10.5 mg/dL    Albumin 2.40 (L) 3.50 - 5.20 g/dL    Phosphorus 2.8 2.5 - 4.5 mg/dL    Anion Gap 8.0 5.0 - 15.0 mmol/L    BUN/Creatinine Ratio 35.3 (H) 7.0 - 25.0    eGFR Non African Amer 35 (L) >60 mL/min/1.73   CBC (No Diff)    Collection Time: 02/23/21 10:19 AM    Specimen: Blood   Result Value Ref Range    WBC 3.57 3.40 - 10.80 10*3/mm3    RBC 3.22 (L) 4.14 - 5.80 10*6/mm3    Hemoglobin 8.7 (L) 13.0 - 17.7 g/dL    Hematocrit 27.0 (L) 37.5 - 51.0 %    MCV 83.9 79.0 - 97.0 fL    MCH 27.0 26.6 - 33.0 pg    MCHC 32.2 31.5 - 35.7 g/dL    RDW 16.8 (H) 12.3 - 15.4 %    RDW-SD 51.6 37.0 - 54.0 fl    MPV 11.3 6.0 - 12.0 fL    Platelets 96 (L) 140 - 450 10*3/mm3   Renal Function Panel    Collection Time: 02/23/21  6:02 PM    Specimen: Blood   Result Value Ref Range    Glucose 157 (H) 65 - 99 mg/dL    BUN 56 (H) 8 - 23 mg/dL    Creatinine 1.62 (H) 0.76 - 1.27 mg/dL    Sodium 144 136 - 145 mmol/L    Potassium 3.3 (L) 3.5 - 5.2 mmol/L    Chloride 107 98 - 107 mmol/L    CO2 30.0 (H) 22.0 - 29.0 mmol/L    Calcium 8.5  (L) 8.6 - 10.5 mg/dL    Albumin 2.60 (L) 3.50 - 5.20 g/dL    Phosphorus 2.3 (L) 2.5 - 4.5 mg/dL    Anion Gap 7.0 5.0 - 15.0 mmol/L    BUN/Creatinine Ratio 34.6 (H) 7.0 - 25.0    eGFR Non African Amer 42 (L) >60 mL/min/1.73   Magnesium    Collection Time: 02/23/21  6:02 PM    Specimen: Blood   Result Value Ref Range    Magnesium 1.8 1.6 - 2.4 mg/dL   Clostridium Difficile Toxin, PCR - Stool, Per Rectum    Collection Time: 02/24/21  4:18 PM    Specimen: Per Rectum; Stool   Result Value Ref Range    C. Difficile Toxins by PCR Not Detected Not Detected   Renal Function Panel    Collection Time: 02/25/21  8:08 AM    Specimen: Blood   Result Value Ref Range    Glucose 99 65 - 99 mg/dL    BUN 46 (H) 8 - 23 mg/dL    Creatinine 1.49 (H) 0.76 - 1.27 mg/dL    Sodium 142 136 - 145 mmol/L    Potassium 3.6 3.5 - 5.2 mmol/L    Chloride 107 98 - 107 mmol/L    CO2 28.0 22.0 - 29.0 mmol/L    Calcium 8.3 (L) 8.6 - 10.5 mg/dL    Albumin 2.50 (L) 3.50 - 5.20 g/dL    Phosphorus 2.8 2.5 - 4.5 mg/dL    Anion Gap 7.0 5.0 - 15.0 mmol/L    BUN/Creatinine Ratio 30.9 (H) 7.0 - 25.0    eGFR Non African Amer 46 (L) >60 mL/min/1.73   Hemoglobin & Hematocrit, Blood    Collection Time: 02/25/21  8:08 AM    Specimen: Blood   Result Value Ref Range    Hemoglobin 8.2 (L) 13.0 - 17.7 g/dL    Hematocrit 26.3 (L) 37.5 - 51.0 %   Renal Function Panel    Collection Time: 02/27/21  6:57 AM    Specimen: Blood   Result Value Ref Range    Glucose 90 65 - 99 mg/dL    BUN 37 (H) 8 - 23 mg/dL    Creatinine 1.58 (H) 0.76 - 1.27 mg/dL    Sodium 137 136 - 145 mmol/L    Potassium 4.0 3.5 - 5.2 mmol/L    Chloride 103 98 - 107 mmol/L    CO2 25.0 22.0 - 29.0 mmol/L    Calcium 8.3 (L) 8.6 - 10.5 mg/dL    Albumin 2.50 (L) 3.50 - 5.20 g/dL    Phosphorus 2.9 2.5 - 4.5 mg/dL    Anion Gap 9.0 5.0 - 15.0 mmol/L    BUN/Creatinine Ratio 23.4 7.0 - 25.0    eGFR Non African Amer 43 (L) >60 mL/min/1.73   CBC (No Diff)    Collection Time: 03/01/21  6:32 AM    Specimen: Blood   Result  Value Ref Range    WBC 3.81 3.40 - 10.80 10*3/mm3    RBC 3.34 (L) 4.14 - 5.80 10*6/mm3    Hemoglobin 9.0 (L) 13.0 - 17.7 g/dL    Hematocrit 28.9 (L) 37.5 - 51.0 %    MCV 86.5 79.0 - 97.0 fL    MCH 26.9 26.6 - 33.0 pg    MCHC 31.1 (L) 31.5 - 35.7 g/dL    RDW 16.5 (H) 12.3 - 15.4 %    RDW-SD 51.0 37.0 - 54.0 fl    MPV 10.3 6.0 - 12.0 fL    Platelets 174 140 - 450 10*3/mm3   Renal Function Panel    Collection Time: 03/01/21  6:32 AM    Specimen: Blood   Result Value Ref Range    Glucose 98 65 - 99 mg/dL    BUN 34 (H) 8 - 23 mg/dL    Creatinine 1.51 (H) 0.76 - 1.27 mg/dL    Sodium 137 136 - 145 mmol/L    Potassium 3.7 3.5 - 5.2 mmol/L    Chloride 102 98 - 107 mmol/L    CO2 26.0 22.0 - 29.0 mmol/L    Calcium 8.3 (L) 8.6 - 10.5 mg/dL    Albumin 2.60 (L) 3.50 - 5.20 g/dL    Phosphorus 3.4 2.5 - 4.5 mg/dL    Anion Gap 9.0 5.0 - 15.0 mmol/L    BUN/Creatinine Ratio 22.5 7.0 - 25.0    eGFR Non African Amer 45 (L) >60 mL/min/1.73   COVID-19,CEPHEID,[facility] IN-HOUSE(OR EMERGENT/ADD-ON),NP SWAB IN TRANSPORT MEDIA 3-4 HR TAT - Swab, Nasopharynx    Collection Time: 03/01/21  1:35 PM    Specimen: Nasopharynx; Swab   Result Value Ref Range    COVID19 Not Detected Not Detected - Ref. Range     Procedures    Medication Review: Medications reviewed and noted    Patient wellness counseling  Exercise: Encouraged continued walking exercise  Diet: Healthy cardiac diet  Smoking: Non-smoker  Alcohol: None alcohol  Screening:    Assessment/Plan:    Problem List Items Addressed This Visit        Cardiac and Vasculature    Benign essential hypertension - Primary    Overview     Essential hypertension with initial blood pressure 108/80 repeated 120/80 left and right sitting and right arm supine on metoprolol tartrate 25 mg twice daily and amlodipine 5 mg daily continue therapy denies edema         Relevant Medications    amLODIPine (NORVASC) 5 MG tablet    metoprolol tartrate (LOPRESSOR) 25 MG tablet    Hyperlipidemia LDL goal <70    Overview      · Not on statin therapy         Current Assessment & Plan       Mixed hyperlipidemia aortic valve disease with replacement and CVA secondary to carotid vascular disease on atorvastatin 80 mg daily continue therapy denies myalgia arthralgia         Relevant Medications    atorvastatin (LIPITOR) 80 MG tablet    S/P AVR (aortic valve replacement)    Overview     · Echo (06/2019): Severe aortic insufficiency.  Normal LVEF 61-65%  · [facility] (7/2/2019): Normal LVEF 55%.  Prolapse of right coronary leaflet with moderate to severe aortic insufficiency  · AVR by Julián Mustafa in [place] (9/12/2019): 27 mm [employer] bovine bioprosthetic valve   · BRENDON (11/25/2019): LVEF 30%.  Mild to moderate MR.  LA moderate to severely dilated.  Mild to moderate LVH.  No thrombus.  23 mm bovine pericardial bioprosthetic valve functionally normal         Current Assessment & Plan     Aortic valve replacement September 2019 currently stable            Hematology and Neoplasia    Malignant neoplasm of right ureter (CMS/HCC)    Overview     Right kidney and ureter removed November 2020            Infectious Diseases    Sepsis (CMS/[facility])    Overview     Recent urologic sepsis with hospitalization at [facility] discharged March 1, 2021 no longer on antibiotics currently stable without fever chills and no sequela                Patient Instructions   Continue follow-up with urology  Continue follow-up with hematology oncology  Continue all current therapy  Encouraged walking exercise and healthy cardiac diet  Return visit in 3 months or as needed       Plan of care reviewed with patient at the conclusion of today's visit. Education was provided regarding diagnosis, management, and any prescribed or recommended OTC medications.Patient verbalizes understanding of and agreement with management plan.         Osmar Clemons MD      Note: Part of this note may be an electronic transcription/translation of spoken language to printed text using the Dragon  Dictation system.

## 2021-03-18 NOTE — TELEPHONE ENCOUNTER
TIMBO WITH [facility] CALLED TO REPORT THAT PATIENT HAS BECOME INCREASINGLY DIZZY AND DID NOT TAKE HIS AMLODIPINE OR METOPROLOL THIS MORNING. SHE TOOK HIS BP THIS AFTERNOON AND IT READ 118/80. SHE IS REQUESTING SOMEONE CALL TO CLARIFY WITH PATIENT AND HIS WIFE INSTRUCTIONS ON HOW TO TAKE HIS MEDICATIONS PROPERLY.

## 2021-03-18 NOTE — TELEPHONE ENCOUNTER
Message noted with symptomatic low range blood pressure and agree with not taking the medication today, I would suggest checking blood pressure twice daily morning and mid afternoon and if blood pressure is greater than 135/90 with take both the amlodipine as prescribed in the metoprolol prescribed otherwise continue to hold the medication.  Please inform spouse patient

## 2021-03-23 NOTE — PROGRESS NOTES
DATE OF VISIT: 3/23/2021    REASON FOR VISIT: Followup for right ureter carcinoma     HISTORY OF PRESENT ILLNESS: The patient is a very pleasant 75 y.o. 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  The patient was started adjuvant chemotherapy cisplatin gemcitabine January 19, 2021.  He completed 2 cycles and then stopped March 2021 secondary to poor tolerance to multiple side effects.  The patient is here today for scheduled follow-up visit.    SUBJECTIVE: The patient is here today with his wife.  He is doing fairly well.  He is eating better and appetite is coming back.  His urine is more clear denies any active bleeding.  He came along with since he left the hospital and spent 3 days at rehab.  Continue to do physical therapy at home.    PAST MEDICAL HISTORY/SOCIAL HISTORY/FAMILY HISTORY: Reviewed by me and unchanged from my documentation done on 03/23/21.    Review of Systems   Constitutional: Positive for fatigue. Negative for activity change, appetite change, chills, fever and unexpected weight change.   HENT: Negative for hearing loss, mouth sores, nosebleeds, sore throat and trouble swallowing.         Hiccups   Eyes: Negative for visual disturbance.   Respiratory: Negative for cough, chest tightness, shortness of breath and wheezing.    Cardiovascular: Negative for chest pain, palpitations and leg swelling.   Gastrointestinal: Positive for constipation. Negative for abdominal distention, abdominal pain, blood in stool, diarrhea, nausea, rectal pain and vomiting.   Endocrine: Negative for cold intolerance and heat intolerance.   Genitourinary: Negative for difficulty urinating, dysuria, frequency and urgency.   Musculoskeletal: Negative for arthralgias, back pain, gait problem, joint swelling and myalgias.   Skin: Negative for rash.   Neurological: Positive for weakness. Negative for dizziness, tremors, syncope, light-headedness, numbness and headaches.  "  Hematological: Negative for adenopathy. Does not bruise/bleed easily.   Psychiatric/Behavioral: Negative for confusion, sleep disturbance and suicidal ideas. The patient is not nervous/anxious.          Current Outpatient Medications:   •  acetaminophen (TYLENOL) 325 MG tablet, Take 2 tablets by mouth Every 4 (Four) Hours As Needed for Mild Pain ., Disp:  , Rfl:   •  allopurinol (ZYLOPRIM) 100 MG tablet, Take 1 tablet by mouth Daily., Disp: 90 tablet, Rfl: 3  •  amLODIPine (NORVASC) 5 MG tablet, Take 1 tablet by mouth Daily., Disp: 30 tablet, Rfl: 11  •  Ascorbic Acid (VITAMIN C) 500 MG chewable tablet, Chew 800 mg Daily., Disp: , Rfl:   •  aspirin EC 81 MG EC tablet, Take 1 tablet by mouth Daily. (Patient taking differently: Take 81 mg by mouth Every Morning.), Disp: 100 tablet, Rfl: 4  •  atorvastatin (LIPITOR) 80 MG tablet, TAKE 1 TABLET BY MOUTH EVERY NIGHT., Disp: 90 tablet, Rfl: 3  •  guaiFENesin (MUCINEX) 600 MG 12 hr tablet, Take 1,200 mg by mouth. prn, Disp: , Rfl:   •  levoFLOXacin (LEVAQUIN) 250 MG tablet, Take 250 mg by mouth Daily., Disp: , Rfl:   •  loperamide (IMODIUM) 2 MG capsule, Take 1 capsule by mouth 4 (Four) Times a Day As Needed for Diarrhea., Disp:  , Rfl:   •  melatonin 5 MG tablet tablet, Take 0.5 tablets by mouth Every Night., Disp:  , Rfl:   •  metoprolol tartrate (LOPRESSOR) 25 MG tablet, Take 1 tablet by mouth Every 12 (Twelve) Hours., Disp:  , Rfl:   •  Multiple Vitamins-Minerals (HM MULTIVITAMIN ADULT GUMMY) chewable tablet, Chew 1 tablet Daily., Disp: , Rfl:     PHYSICAL EXAMINATION:   Ht 177.8 cm (70\")   Wt 74.8 kg (165 lb)   BMI 23.68 kg/m²    Pain Score    03/23/21 1549   PainSc: 0-No pain       ECOG Performance Status: 1 - Symptomatic but completely ambulatory  General Appearance:  alert, cooperative, no apparent distress and appears stated age   Neurologic/Psychiatric: A&O x 3, gait steady, appropriate affect, strength 5/5 in all muscle groups   HEENT:  Normocephalic, " without obvious abnormality, mucous membranes moist   Neck: Supple, symmetrical, trachea midline, no adenopathy;  No thyromegaly, masses, or tenderness   Lungs:   Clear to auscultation bilaterally; respirations regular, even, and unlabored bilaterally   Heart:  Regular rate and rhythm, no murmurs appreciated   Abdomen:   Soft, non-tender, non-distended and no organomegaly   Lymph nodes: No cervical, supraclavicular, inguinal or axillary adenopathy noted   Extremities: Normal, atraumatic; no clubbing, cyanosis, or edema    Skin: No rashes, ulcers, or suspicious lesions noted     Lab on 03/23/2021   Component Date Value Ref Range Status   • WBC 03/23/2021 8.90  3.40 - 10.80 10*3/mm3 Final   • RBC 03/23/2021 3.51* 4.14 - 5.80 10*6/mm3 Final   • Hemoglobin 03/23/2021 9.5* 13.0 - 17.7 g/dL Final   • Hematocrit 03/23/2021 29.8* 37.5 - 51.0 % Final   • RDW 03/23/2021 21.0* 12.3 - 15.4 % Final   • MCV 03/23/2021 84.9  79.0 - 97.0 fL Final   • MCH 03/23/2021 27.0  26.6 - 33.0 pg Final   • MCHC 03/23/2021 31.8  31.5 - 35.7 g/dL Final   • MPV 03/23/2021 6.5  6.0 - 12.0 fL Final   • Platelets 03/23/2021 230  140 - 450 10*3/mm3 Final   • Neutrophil % 03/23/2021 43.1  42.7 - 76.0 % Final   • Lymphocyte % 03/23/2021 49.1* 19.6 - 45.3 % Final   • Monocyte % 03/23/2021 7.8  5.0 - 12.0 % Final   • Neutrophils, Absolute 03/23/2021 3.80  1.70 - 7.00 10*3/mm3 Final   • Lymphocytes, Absolute 03/23/2021 4.40* 0.70 - 3.10 10*3/mm3 Final   • Monocytes, Absolute 03/23/2021 0.70  0.10 - 0.90 10*3/mm3 Final        No results found.    ASSESSMENT: The patient is a very pleasant 75 y.o. male  with high-grade urothelial carcinoma    PROBLEM LIST:   1.  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  Status post right nephrectomy with ureterectomy done at [facility] November 30, 2020  B.  Pathology showed positive posterior margin  C.  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    D. Started adjuvant chemotherapy with cisplatin Gemzar January 19, 2021, status post 2 cycles completed March 2021 stopped secondary to multiple side effects and poor tolerance.  2.  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  Status post right nephrectomy done November 30, 2020 with a clear surgical margins  3.  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 Status post multiple resections with mitomycin-C injection in the past  B.  Status post resection for local recurrence November 30, 2020  4.  Atrial fibrillation  5.  Valvular heart disease  6.  Hypertension    PLAN:  1.  I had long discussion today with the patient and his wife about how to proceed from here.  The patient still has 2 more cycles of chemotherapy however he has been tolerating chemotherapy very poorly with renal failure and neutropenia.  He was admitted after cycle 2-day 1 with urosepsis.  2.  After long discussion with the patient he is not interested in further chemotherapy which I think is very reasonable at this point.  3.  He will follow-up with me in 6 weeks  4.  I will repeat the patient scans prior to return.  5.  I discussed the case with his treating oncologist at [place] as well as his urologist neither field radiation was needed at this point.   6.  I will continue to monitor the patient's blood work including blood counts, kidney function, liver functions, and electrolytes.  7.  The patient will continue to hold Eliquis secondary to hematuria visible as the need for multiple urology procedures.  8.  We will continue the patient on Zofran 8 mg every 8 hours as needed for chemotherapy-induced nausea.  9.  We will consider immunotherapy in the future if needed for progressive disease as his Nex[employer] sequencing revealed positive PD-L1.   10.  The patient will continue to monitor his blood pressure at home.  He is on as needed treatment.  He will follow-up with  Dr. Clemons.    Rosemarie Montoya MD  3/23/2021

## 2021-04-03 PROBLEM — N12 PYELONEPHRITIS: Status: ACTIVE | Noted: 2021-01-01

## 2021-04-03 NOTE — H&P
[facility] Medicine Services  HISTORY AND PHYSICAL    Patient Name: Jair Flores  : 1945  MRN: [id number]  Primary Care Physician: Osmar Clemons MD  Date of admission: 4/3/2021    Subjective   Subjective     Chief Complaint:  Nausea and Fatigue     HPI:  Jair Flores is a 75 y.o. 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  The patient reports that he started feeling nauseous yesterday afternoon. This was accompanied by significant fatigue, generalized weakness, and chills. His symptoms persisted this morning, and he was brought to the ED by his wife for further evaluation. His current symptoms are similar to his symptoms from previous admission for urosepsis. The patient's wife reports that his urine is dark and appears to have sediment. He denies any dysuria, urinary frequency, urinary urgency, vomiting, diarrhea, chest pain, shortness of breath, cough, or loss of taste smell.     He was recently admitted to [facility] from 2/15/2021 to 3/1/2021 due to urosepsis and acute renal failure. The patient follows with urologist Dr. Cummins as outpatient, and was recently treated for UTI. He reports that he completed Levaquin regimen 2 weeks ago. In the ED, CT of the abdomen and pelvis revealed new mild to moderate left perinephric edema or inflammation, potentially representing infection with an abnormal appearance of the bladder as described but unchanged from previous study.  Chest x-ray revealed an unchanged small area of residual left basilar linear scarring with no evidence of active chest disease.  Labs reviewed and significant for proBNP 1714, glucose 136, sodium 135, creatinine  1.47, EGFR 47, and hemoglobin 10.3.  Urinalysis with turbid appearance, 2+ blood, 3+ leukocytes, 2+ protein, and WBC too numerous to count. The patient will be admitted by hospital medicine for further evaluation and treatment.     Current COVID Risks are:  [x] Fever []  Cough [] Shortness of breath [x] Fatigue [] Change in taste or smell    [] Exposure to COVID positive patient  [] High risk facility   []  NONE    Review of Systems   Constitutional: Positive for chills, fatigue and fever.   HENT: Negative for congestion, trouble swallowing and voice change.    Eyes: Negative for photophobia, discharge and visual disturbance.   Respiratory: Negative for cough, shortness of breath and wheezing.    Cardiovascular: Positive for leg swelling. Negative for chest pain and palpitations.   Gastrointestinal: Positive for nausea. Negative for abdominal pain, diarrhea and vomiting.   Endocrine: Negative for polydipsia, polyphagia and polyuria.   Genitourinary: Negative for difficulty urinating, frequency and urgency.   Musculoskeletal: Negative for back pain, myalgias and neck pain.   Skin: Negative for color change, pallor and rash.   Neurological: Positive for weakness. Negative for dizziness, syncope, speech difficulty, light-headedness and headaches.   Hematological: Negative.    Psychiatric/Behavioral: Negative for agitation, confusion and suicidal ideas. The patient is not nervous/anxious.       All other systems reviewed and are negative.     Personal History     Past Medical History:   Diagnosis Date   • Acute gout of foot 4/29/2019   • Atrial fibrillation (CMS/HCC)     after valve replaced   • Cancer (CMS/HCC)     bladder; cysto surveillance 07/23/2017; free of disease   • Cataract     right eye   • Degenerative arthritis     left; knee replacement; successful   • Elevated cholesterol    • Flash pulmonary edema (CMS/HCC) 4/29/2019    Episode of flash pulmonary edema thought to be related to volume overload at the time  of left knee surgery in 2005 performed in [place] he has had no recurrence his EKG does show inferior Q waves which are unchanged.   • Hard to intubate     has been told he is difficult to intubate   • Hematuria 4/29/2019   • Hemorrhoids 09/12/2011    hemorrhoid ablation   • Hypertension    • BEATRIZ on CPAP    • Skin cancer     head   • Stroke (CMS/HCC) 05/2019    no residual   • Wears glasses        Past Surgical History:   Procedure Laterality Date   • AORTIC VALVE REPAIR/REPLACEMENT  09/2019   • CARDIAC CATHETERIZATION N/A 7/10/2019    Procedure: Right and Left Heart Cath;  Surgeon: Francisco Bo MD;  Location:  [facility];  Service: Cardiology   • COLONOSCOPY      routinely every 10 years   • CYSTOSCOPY      x3   • CYSTOSCOPY  07/23/2017    bladder cancer; cysto surveillance; free of disease   • CYSTOSCOPY BLADDER BIOPSY N/A 4/24/2020    Procedure: CYSTOSCOPY BLADDER BIOPSY;  Surgeon: Bobby Salmeron MD;  Location:  [facility];  Service: Urology;  Laterality: N/A;   • EXCISIONAL HEMORRHOIDECTOMY      hemorrhoid ablation; hemorrhoid onset 09/12/2011   • EYE SURGERY      as a child after cutting eye with barbwire fence   • INTERVENTIONAL RADIOLOGY PROCEDURE Bilateral 6/24/2019    Procedure: Carotid Cerebral Angiogram with possible stent placement;  Surgeon: Abad Mata MD;  Location:  [facility];  Service: Interventional Radiology   • KNEE ARTHROPLASTY, PARTIAL REPLACEMENT Left    • MN TCAT IV STENT CRV CRTD ART EMBOLIC PROTECJ N/A 6/24/2019    Procedure: Carotid Stent;  Surgeon: Abad Mata MD;  Location:  [facility];  Service: Interventional Radiology   • SKIN CANCER EXCISION      head   • TONSILLECTOMY     • TRANSURETHRAL RESECTION OF BLADDER TUMOR N/A 3/29/2019    Procedure: TRANSURETHRAL RESECTION OF BLADDER TUMOR WITH MITOMYCIN;  Surgeon: Jair Braxton MD;  Location:  [facility];  Service: Urology   • TRANSURETHRAL RESECTION OF BLADDER TUMOR  N/A 4/24/2020    Procedure: TRANSURETHRAL RESECTION OF BLADDER TUMOR;  Surgeon: Bobby Salmeron MD;  Location: [facility];  Service: Urology;  Laterality: N/A;       Family History: family history includes Brain cancer in his sister; Hypertension in his father and mother. Otherwise pertinent FHx was reviewed and unremarkable.     Social History:  reports that he has never smoked. He has never used smokeless tobacco. He reports previous alcohol use. Drug use questions deferred to the physician.  Social History     Social History Narrative   • Not on file       Medications:  HM Multivitamin Adult Gummy, Vitamin C, acetaminophen, allopurinol, amLODIPine, aspirin, atorvastatin, guaiFENesin, loperamide, melatonin, and metoprolol tartrate    Allergies   Allergen Reactions   • Penicillins Anaphylaxis     Tolerates ertapenem   • Rocephin [Ceftriaxone] Anaphylaxis   • Ciprofloxacin Other (See Comments)     Tendonitis   • Sotalol Other (See Comments)     Prolonged QT       Objective   Objective     Vital Signs:   Temp:  [99.1 °F (37.3 °C)-99.6 °F (37.6 °C)] 99.6 °F (37.6 °C)  Heart Rate:  [] 98  Resp:  [18] 18  BP: (136-165)/() 165/118    Physical Exam   Constitutional: Awake, alert, NAD   Eyes: PERRLA, sclerae anicteric, no conjunctival injection  HENT: NCAT, mucous membranes moist  Neck: Supple, no thyromegaly, no lymphadenopathy, trachea midline  Respiratory: Clear to auscultation bilaterally, diminished in bases, nonlabored respirations   Cardiovascular: RRR, no murmurs, rubs, or gallops, cap refill brisk   Gastrointestinal: Positive bowel sounds, soft, nontender, nondistended  Musculoskeletal: trace bilateral ankle edema, no clubbing or cyanosis to extremities  Psychiatric: Appropriate affect, cooperative  Neurologic: Oriented x 3, strength symmetric in all extremities, Cranial Nerves grossly intact to confrontation, speech clear  Skin: warm, dry, no visible rashes     Results Reviewed:  I have personally  reviewed most recent indicated data and agree with findings including:  [x]  Laboratory  [x]  Radiology  []  EKG/Telemetry  []  Pathology  [x]  Cardiac/Vascular Studies  [x]  Old records  []  Other:  Most pertinent findings include:      LAB RESULTS:      Lab 04/03/21  1416   WBC 9.30   HEMOGLOBIN 10.3*   HEMATOCRIT 33.7*   PLATELETS 161   NEUTROS ABS 6.33   IMMATURE GRANS (ABS) 0.02   LYMPHS ABS 2.08   MONOS ABS 0.82   EOS ABS 0.02   MCV 90.1   PROCALCITONIN 0.20   LACTATE 1.0         Lab 04/03/21  1416   SODIUM 135*   POTASSIUM 3.9   CHLORIDE 99   CO2 25.0   ANION GAP 11.0   BUN 14   CREATININE 1.47*   GLUCOSE 136*   CALCIUM 9.0         Lab 04/03/21  1416   TOTAL PROTEIN 7.4   ALBUMIN 3.80   GLOBULIN 3.6   ALT (SGPT) 9   AST (SGOT) 16   BILIRUBIN 0.6   ALK PHOS 89   LIPASE 21         Lab 04/03/21  1416   PROBNP 1,714.0*   TROPONIN T <0.010                 Brief Urine Lab Results  (Last result in the past 365 days)      Color   Clarity   Blood   Leuk Est   Nitrite   Protein   CREAT   Urine HCG        04/03/21 1251 Yellow Turbid Moderate (2+) Large (3+) Negative 100 mg/dL (2+)             Microbiology Results (last 10 days)     ** No results found for the last 240 hours. **          CT Abdomen Pelvis Without Contrast    Result Date: 4/3/2021  EXAMINATION: CT ABDOMEN/PELVIS WO CONTRAST - 04/03/2021  INDICATION: Nausea and fatigue.  TECHNIQUE: 5 mm unenhanced images through the abdomen and pelvis.  The radiation dose reduction device was turned on for each scan per the ALARA (As Low as Reasonably Achievable) protocol.  COMPARISON: Whole-body PET/CT scan 01/08/2021.  FINDINGS: History indicates fatigue, nausea, history of renal failure and ureteral cancer. Previous whole body PET/CT scan report indicated bladder soft tissue mass. CT showed left ureteral and collecting system dilatation.  Today's study shows persistent left ureteral and collecting system dilatation with dilated ureter down to the level of the left  ureteropelvic junction. There is mild posterior bladder wall thickening. No stone is identified.  Today's exam shows mild new perinephric fat stranding which may represent edema or active inflammation. There is a left renal midpole cyst, present on the prior study. Right kidney is surgically absent.  Elsewhere, included lower lungs appear grossly clear. There are multiple small hepatic cysts. No significant abnormalities are seen of the gallbladder, pancreas, adrenal glands, or spleen. No free air, ascites or adenopathy is seen. Bowel loops are normal in caliber and grossly normal in appearance.  In the pelvis, there is a complex appearance of the bladder, which appears to contain both calcification and fat in the anterior wall, as well as posterior wall thickening, but this is stable from the prior PET/CT scan study. Bladder is not significantly distended. No intrapelvic free fluid or inflammatory change is seen. Bony structures appear to be intact.      Impression: 1. Persistent relatively mild dilatation left renal collecting system and left ureter down to the level of the bladder as on 01/08/2021/PET/CT scan.  2. New mild to moderate left perinephric edema or inflammation, potentially representing infection. Please correlate with urinalysis.  3. Abnormal appearance of bladder as described, but unchanged from previous study.  3. Stable hepatic cysts. No new intra-abdominal or intrapelvic disease is seen elsewhere.  DICTATED:   04/03/2021 EDITED/ls :   04/03/2021       XR Chest 1 View    Result Date: 4/3/2021  EXAMINATION: XR CHEST 1 VW - 04/03/2021  INDICATION: Nausea and fatigue.  COMPARISON: 02/17/2021  FINDINGS: Sternotomy wires are noted. Heart is upper normal size. Vasculature appears normal. There are mild chronic-appearing lung changes and trace linear scarring at the left lung base. Previously noted right basilar atelectasis has resolved. No edema, effusion or pneumothorax is seen.      Impression: Small  area of residual left basilar linear scarring, unchanged. No evidence of active chest disease is seen.  DICTATED:   04/03/2021 EDITED/ls :   04/03/2021          Results for orders placed during the hospital encounter of 01/08/20    Adult Transthoracic Echo Limited W/ Cont if Necessary Per Protocol    Interpretation Summary  · This is a LIMITED study to assess LV function post LifeVest placement.  · Global and segmental LV wall motion is normal. The calculated LV ejection fraction is 63%.  · The bioprosthetic aortic valve is functionally normal.      Assessment/Plan   Assessment & Plan       Pyelonephritis    History of bladder cancer    Benign essential hypertension    Hyperlipidemia LDL goal <70    Obstructive sleep apnea    S/P AVR (aortic valve replacement)    Atrial fibrillation with RVR (CMS/[facility])    History of CVA (cerebrovascular accident) - S/P stent    Coronary artery disease involving native coronary artery of native heart    Acute systolic congestive heart failure (CMS/[facility])      Jair Flores is a 75 y.o. 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 for urosepsis who presents to the ED with complaints of nausea and fatigue.    Pyelonephritis  Suspected UTI  -Follows with Dr. Cummins as outpatient, completed course of Levaquin 1 week ago  -Patient's wife reports recent urine culture grew Morganella   -CT of abd/pelvis with new mild to moderate left perinephric edema or inflammation potentially representing infection   -Urine culture pending   -Blood cultures pending   -noted to have true allergy to cephalosporin/PCN  -Start IV Invanz  -Urology consult in ED  -AM labs    Renal insufficiency   -recent admission for ARF likely postobstructive d/t gross hematuria   -Creatinine 1.47 on  admission, appears to be stable from previous admission  -avoid nephrotoxic agents  -CMP in AM     Hx of right ureter carcinoma  -s/p right nephrectomy with ureterectomy at [facility] on 11/30/2020.  -Follows with Dr. Montoya, previously on chemotherapy, but decision was made to stop therapy due to acute renal failure and neutropenia.    Hyponatremia, mild  -Sodium 135 on admission  -monitor    HTN  -continue home Norvasc, metoprolol    PAF  -currently NSR  -continue metoprolol  -Not chronically anticoagulated, Eliquis stopped indefinitely due to hematuria and bladder CA    Hx CAD  Hx of AVR   -continue home ASA, Lipitor      DVT prophylaxis:  SCDs      CODE STATUS:  Discussed with patient at bedside.   Code Status and Medical Interventions:   Ordered at: 04/03/21 1831     Level Of Support Discussed With:    Patient     Code Status:    CPR     Medical Interventions (Level of Support Prior to Arrest):    Full         This note has been completed as part of a split-shared workflow.     Electronically signed by ANTIONE Arteaga, 04/03/21, 6:31 PM EDT.        Attending   Admission Attestation       I have seen and examined the patient, performing an independent face-to-face diagnostic evaluation with plan of care reviewed and developed with the advanced practice clinician (APC).      Brief Summary Statement:   Jair Flores is a 75 y.o. male with PMH of ureteral carcinoma and history of urosepsis  presented to ED with urinary incontince and urinary frequency. Per his wife he was febrile last night. Currently he is pain free. He was found to have pyuria and stranding noted around ureter on CT.     Remainder of detailed HPI is as noted by APC and has been reviewed and/or edited by me for completeness.    Attending Physical Exam:  Constitutional: No acute distress, awake, alert, male sitting up in bed   HENT: NCAT, mucous membranes moist  Respiratory: Clear to auscultation bilaterally, respiratory effort  normal   Cardiovascular: RRR, no murmurs, rubs, or gallops  Gastrointestinal: Positive bowel sounds, soft, nontender, nondistended  Musculoskeletal: No bilateral ankle edema  Psychiatric: Appropriate affect, cooperative  Neurologic: Oriented x 3, strength symmetric in all extremities, Cranial Nerves grossly intact to confrontation, speech clear  Skin: No rashes      Brief Assessment/Plan :  See detailed assessment and plan developed with APC which I have reviewed and/or edited for completeness.        Admission Status: I believe that this patient meets INPATIENT status due to UTI.  I feel patient’s risk for adverse outcomes and need for care warrant INPATIENT evaluation and I predict the patient’s care encounter to likely last beyond 2 midnights.        Aleta Cotto,   04/03/21

## 2021-04-03 NOTE — ED PROVIDER NOTES
Subjective   Pt is a 76 yo male presenting to ED with complaints of nausea and fatigue. PMHx significant for HTN, HLD, Afib (Eliquis), Pulm Edema, CVA, BEATRIZ, Gout and Ureteral cancer. Pt reports yesterday afternoon began feeling fatigued with nausea that persisted this morning. He tried drinking fluids hoping it was dehydration. Pt had a temp of 100.4 this morning. Pt denies headache, dizziness, CP, SOB, cough, V/D, abdominal pain, leg swelling or flank pain. Pt has noticed dark cloudy urine but denies burning or frequency. Pt finished a course of Levaquin about a week ago for UTI. (Wife reports culture grew Morganella). Pt with hx of right ureteral cancer diagnosed 11/2020 and underwent chemo but had to discontinue in March due to intolerance. Pt had side effects of renal failure and neutropenia as well as Urosepsis in February. Pt states this fatigue and nausea does feel similar to prior start of urosepsis. Pt denies tobacco, drug or ETOH use. Pt lives with wife. Pt has had x2 Covid vaccines (last 2 weeks ago).           Review of Systems   Constitutional: Positive for chills and fatigue. Negative for fever.   HENT: Negative for congestion, sore throat and trouble swallowing.    Eyes: Negative for visual disturbance.   Respiratory: Negative for cough and shortness of breath.    Cardiovascular: Negative for chest pain and leg swelling.   Gastrointestinal: Positive for nausea. Negative for abdominal pain, blood in stool, constipation, diarrhea and vomiting.   Genitourinary: Negative for difficulty urinating, dysuria and flank pain.        Cloudy urine     Musculoskeletal: Negative for arthralgias and back pain.   Skin: Negative.  Negative for rash and wound.   Allergic/Immunologic: Negative.    Neurological: Negative for dizziness, syncope, weakness, numbness and headaches.   Psychiatric/Behavioral: Negative for confusion.   All other systems reviewed and are negative.      Past Medical History:   Diagnosis Date   •  Acute gout of foot 4/29/2019   • Atrial fibrillation (CMS/HCC)     after valve replaced   • Cancer (CMS/HCC)     bladder; cysto surveillance 07/23/2017; free of disease   • Cataract     right eye   • Degenerative arthritis     left; knee replacement; successful   • Elevated cholesterol    • Flash pulmonary edema (CMS/HCC) 4/29/2019    Episode of flash pulmonary edema thought to be related to volume overload at the time of left knee surgery in 2005 performed in [place] he has had no recurrence his EKG does show inferior Q waves which are unchanged.   • Hard to intubate     has been told he is difficult to intubate   • Hematuria 4/29/2019   • Hemorrhoids 09/12/2011    hemorrhoid ablation   • Hypertension    • BEATRIZ on CPAP    • Skin cancer     head   • Stroke (CMS/HCC) 05/2019    no residual   • Wears glasses        Allergies   Allergen Reactions   • Penicillins Anaphylaxis     Tolerates ertapenem   • Rocephin [Ceftriaxone] Anaphylaxis   • Ciprofloxacin Other (See Comments)     Tendonitis   • Sotalol Other (See Comments)     Prolonged QT       Past Surgical History:   Procedure Laterality Date   • AORTIC VALVE REPAIR/REPLACEMENT  09/2019   • CARDIAC CATHETERIZATION N/A 7/10/2019    Procedure: Right and Left Heart Cath;  Surgeon: Francisco Bo MD;  Location:  [facility];  Service: Cardiology   • COLONOSCOPY      routinely every 10 years   • CYSTOSCOPY      x3   • CYSTOSCOPY  07/23/2017    bladder cancer; cysto surveillance; free of disease   • CYSTOSCOPY BLADDER BIOPSY N/A 4/24/2020    Procedure: CYSTOSCOPY BLADDER BIOPSY;  Surgeon: Bobby Salmeron MD;  Location: [facility] OR;  Service: Urology;  Laterality: N/A;   • EXCISIONAL HEMORRHOIDECTOMY      hemorrhoid ablation; hemorrhoid onset 09/12/2011   • EYE SURGERY      as a child after cutting eye with barbwire fence   • INTERVENTIONAL RADIOLOGY PROCEDURE Bilateral 6/24/2019    Procedure: Carotid Cerebral Angiogram with possible stent placement;   Surgeon: Abad Mata MD;  Location:  [facility];  Service: Interventional Radiology   • KNEE ARTHROPLASTY, PARTIAL REPLACEMENT Left    • LA TCAT IV STENT CRV CRTD ART EMBOLIC PROTECJ N/A 6/24/2019    Procedure: Carotid Stent;  Surgeon: Abad Mata MD;  Location:  [facility];  Service: Interventional Radiology   • SKIN CANCER EXCISION      head   • TONSILLECTOMY     • TRANSURETHRAL RESECTION OF BLADDER TUMOR N/A 3/29/2019    Procedure: TRANSURETHRAL RESECTION OF BLADDER TUMOR WITH MITOMYCIN;  Surgeon: Jair Braxton MD;  Location:  [facility] OR;  Service: Urology   • TRANSURETHRAL RESECTION OF BLADDER TUMOR N/A 4/24/2020    Procedure: TRANSURETHRAL RESECTION OF BLADDER TUMOR;  Surgeon: Bobby Salmeron MD;  Location:  [facility] OR;  Service: Urology;  Laterality: N/A;       Family History   Problem Relation Age of Onset   • Hypertension Mother    • Hypertension Father    • Brain cancer Sister        Social History     Socioeconomic History   • Marital status:      Spouse name: Not on file   • Number of children: Not on file   • Years of education: Not on file   • Highest education level: Not on file   Tobacco Use   • Smoking status: Never Smoker   • Smokeless tobacco: Never Used   Substance and Sexual Activity   • Alcohol use: Not Currently   • Drug use: Defer   • Sexual activity: Defer           Objective   Physical Exam  Vitals and nursing note reviewed.   Constitutional:       Appearance: He is well-developed.   HENT:      Head: Atraumatic.      Nose: Nose normal.   Eyes:      General: Lids are normal.      Conjunctiva/sclera: Conjunctivae normal.      Pupils: Pupils are equal, round, and reactive to light.   Cardiovascular:      Rate and Rhythm: Normal rate and regular rhythm.      Heart sounds: Normal heart sounds.   Pulmonary:      Effort: Pulmonary effort is normal.      Breath sounds: Normal breath sounds.   Abdominal:      General: There is no distension.       Palpations: Abdomen is soft.      Tenderness: There is no abdominal tenderness. There is no guarding or rebound.   Musculoskeletal:         General: No tenderness or deformity. Normal range of motion.      Cervical back: Normal range of motion and neck supple.   Skin:     General: Skin is warm and dry.   Neurological:      Mental Status: He is alert and oriented to person, place, and time.      Sensory: No sensory deficit.   Psychiatric:         Behavior: Behavior normal.         Procedures           ED Course  ED Course as of Apr 03 1854   Sat Apr 03, 2021   1459 Procalcitonin: 0.20 [RT]   1500 Lactate: 1.0 [RT]   1500 WBC: 9.30 [RT]   1500 Hemoglobin(!): 10.3 [RT]   1500 Hematocrit(!): 33.7 [RT]   1500 Neutrophil Rel %: 68.1 [RT]   1500 Creatinine(!): 1.47 [RT]   1500 WBC, UA(!): Too Numerous to Count [RT]   1500 Leukocytes, UA(!): Large (3+) [RT]   1620 Discussed admission with [facility]    [RT]      ED Course User Index  [RT] Lanie Brown PA      Re-examined patient several times in ED. Pt resting and feeling slightly better after fluids/meds. Discussed results and tx plan with patient and wife who are agreeable with plan for admission.     Discussed patient with Dr. David who is agreeable with ED course and admission.     Discussed admission with hospitalist Dr. Cotto.     Reviewed old records.   Noted prior admissions and treatment with Invanz for Urosepsis. Noted multiple drug allergies.     Recent Results (from the past 24 hour(s))   Urinalysis With Microscopic If Indicated (No Culture) - Urine, Clean Catch    Collection Time: 04/03/21 12:51 PM    Specimen: Urine, Clean Catch   Result Value Ref Range    Color, UA Yellow Yellow, Straw    Appearance, UA Turbid (A) Clear    pH, UA 7.0 5.0 - 8.0    Specific Gravity, UA 1.011 1.001 - 1.030    Glucose, UA Negative Negative    Ketones, UA Negative Negative    Bilirubin, UA Negative Negative    Blood, UA Moderate (2+) (A) Negative    Protein,  mg/dL (2+)  (A) Negative    Leuk Esterase, UA Large (3+) (A) Negative    Nitrite, UA Negative Negative    Urobilinogen, UA 0.2 E.U./dL 0.2 - 1.0 E.U./dL   Urinalysis, Microscopic Only - Urine, Clean Catch    Collection Time: 04/03/21 12:51 PM    Specimen: Urine, Clean Catch   Result Value Ref Range    RBC, UA 13-20 (A) None Seen, 0-2 /HPF    WBC, UA Too Numerous to Count (A) None Seen, 0-2 /HPF    Bacteria, UA Trace None Seen, Trace /HPF    Squamous Epithelial Cells, UA None Seen None Seen, 0-2 /HPF    Hyaline Casts, UA 0-6 0 - 6 /LPF    Methodology Manual Light Microscopy    Comprehensive Metabolic Panel    Collection Time: 04/03/21  2:16 PM    Specimen: Blood   Result Value Ref Range    Glucose 136 (H) 65 - 99 mg/dL    BUN 14 8 - 23 mg/dL    Creatinine 1.47 (H) 0.76 - 1.27 mg/dL    Sodium 135 (L) 136 - 145 mmol/L    Potassium 3.9 3.5 - 5.2 mmol/L    Chloride 99 98 - 107 mmol/L    CO2 25.0 22.0 - 29.0 mmol/L    Calcium 9.0 8.6 - 10.5 mg/dL    Total Protein 7.4 6.0 - 8.5 g/dL    Albumin 3.80 3.50 - 5.20 g/dL    ALT (SGPT) 9 1 - 41 U/L    AST (SGOT) 16 1 - 40 U/L    Alkaline Phosphatase 89 39 - 117 U/L    Total Bilirubin 0.6 0.0 - 1.2 mg/dL    eGFR Non African Amer 47 (L) >60 mL/min/1.73    Globulin 3.6 gm/dL    A/G Ratio 1.1 g/dL    BUN/Creatinine Ratio 9.5 7.0 - 25.0    Anion Gap 11.0 5.0 - 15.0 mmol/L   Lipase    Collection Time: 04/03/21  2:16 PM    Specimen: Blood   Result Value Ref Range    Lipase 21 13 - 60 U/L   Lactic Acid, Plasma    Collection Time: 04/03/21  2:16 PM    Specimen: Blood   Result Value Ref Range    Lactate 1.0 0.5 - 2.0 mmol/L   Light Blue Top    Collection Time: 04/03/21  2:16 PM   Result Value Ref Range    Extra Tube hold for add-on    Green Top (Gel)    Collection Time: 04/03/21  2:16 PM   Result Value Ref Range    Extra Tube Hold for add-ons.    Lavender Top    Collection Time: 04/03/21  2:16 PM   Result Value Ref Range    Extra Tube hold for add-on    Gray Top - Ice    Collection Time: 04/03/21   2:16 PM   Result Value Ref Range    Extra Tube Hold for add-ons.    CBC Auto Differential    Collection Time: 04/03/21  2:16 PM    Specimen: Blood   Result Value Ref Range    WBC 9.30 3.40 - 10.80 10*3/mm3    RBC 3.74 (L) 4.14 - 5.80 10*6/mm3    Hemoglobin 10.3 (L) 13.0 - 17.7 g/dL    Hematocrit 33.7 (L) 37.5 - 51.0 %    MCV 90.1 79.0 - 97.0 fL    MCH 27.5 26.6 - 33.0 pg    MCHC 30.6 (L) 31.5 - 35.7 g/dL    RDW 19.6 (H) 12.3 - 15.4 %    RDW-SD 65.4 (H) 37.0 - 54.0 fl    MPV 9.4 6.0 - 12.0 fL    Platelets 161 140 - 450 10*3/mm3    Neutrophil % 68.1 42.7 - 76.0 %    Lymphocyte % 22.4 19.6 - 45.3 %    Monocyte % 8.8 5.0 - 12.0 %    Eosinophil % 0.2 (L) 0.3 - 6.2 %    Basophil % 0.3 0.0 - 1.5 %    Immature Grans % 0.2 0.0 - 0.5 %    Neutrophils, Absolute 6.33 1.70 - 7.00 10*3/mm3    Lymphocytes, Absolute 2.08 0.70 - 3.10 10*3/mm3    Monocytes, Absolute 0.82 0.10 - 0.90 10*3/mm3    Eosinophils, Absolute 0.02 0.00 - 0.40 10*3/mm3    Basophils, Absolute 0.03 0.00 - 0.20 10*3/mm3    Immature Grans, Absolute 0.02 0.00 - 0.05 10*3/mm3    nRBC 0.0 0.0 - 0.2 /100 WBC   Procalcitonin    Collection Time: 04/03/21  2:16 PM    Specimen: Blood   Result Value Ref Range    Procalcitonin 0.20 0.00 - 0.25 ng/mL   BNP    Collection Time: 04/03/21  2:16 PM    Specimen: Blood   Result Value Ref Range    proBNP 1,714.0 (H) 0.0 - 900.0 pg/mL   Troponin    Collection Time: 04/03/21  2:16 PM    Specimen: Blood   Result Value Ref Range    Troponin T <0.010 0.000 - 0.030 ng/mL     Note: In addition to lab results from this visit, the labs listed above may include labs taken at another facility or during a different encounter within the last 24 hours. Please correlate lab times with ED admission and discharge times for further clarification of the services performed during this visit.    CT Abdomen Pelvis Without Contrast   Preliminary Result   1. Persistent relatively mild dilatation left renal collecting system   and left ureter down to the  level of the bladder as on 01/08/2021/PET/CT   scan.   2. New mild to moderate left perinephric edema or inflammation,   potentially representing infection. Please correlate with urinalysis.   3. Abnormal appearance of bladder as described, but unchanged from   previous study.   3. Stable hepatic cysts. No new intra-abdominal or intrapelvic disease   is seen elsewhere.              XR Chest 1 View   Preliminary Result   Small area of residual left basilar linear scarring,   unchanged. No evidence of active chest disease is seen.       DICTATED:   04/03/2021   EDITED/ls :   04/03/2021                 Vitals:    04/03/21 1431 04/03/21 1500 04/03/21 1522 04/03/21 1623   BP:  137/83     BP Location:       Patient Position:       Pulse: 76  71    Resp:       Temp:    99.6 °F (37.6 °C)   TempSrc:    Oral   SpO2: 96%  94%    Weight:       Height:         Medications   Sodium Chloride (PF) 0.9 % 10 mL (has no administration in time range)   ertapenem (INVanz) 1 g/100 mL 0.9% NS VTB (mbp) (has no administration in time range)   sodium chloride 0.9 % bolus 500 mL (500 mL Intravenous New Bag 4/3/21 1452)   ondansetron (ZOFRAN) injection 4 mg (4 mg Intravenous Given 4/3/21 1450)     ECG/EMG Results (last 24 hours)     Procedure Component Value Units Date/Time    ECG 12 Lead [[id number]] Collected: 04/03/21 1451     Updated: 04/03/21 1451        ECG 12 Lead                                                  MDM    Final diagnoses:   Pyelonephritis   Fatigue, unspecified type   Nausea   Chronic kidney disease, unspecified CKD stage   Renal cell carcinoma, unspecified laterality (CMS/HCC)   History of sepsis       ED Disposition  ED Disposition     ED Disposition Condition Comment    Decision to Admit            No follow-up provider specified.       Medication List      No changes were made to your prescriptions during this visit.          Lanie Brown PA  04/03/21 0919

## 2021-04-04 PROBLEM — N18.30 CKD (CHRONIC KIDNEY DISEASE) STAGE 3, GFR 30-59 ML/MIN (HCC): Status: ACTIVE | Noted: 2021-01-01

## 2021-04-04 NOTE — PLAN OF CARE
Goal Outcome Evaluation:  Plan of Care Reviewed With: patient  Progress: no change  Outcome Summary: VSS, room air. Pt denies any pain or other complaints. Tolerating PO and voiding. Spouse at bedside.

## 2021-04-04 NOTE — PROGRESS NOTES
[facility] Medicine Services  PROGRESS NOTE    Patient Name: Jair Flores  : 1945  MRN: [id number]    Date of Admission: 4/3/2021  Primary Care Physician: Osmar Clemons MD    Subjective   Subjective     CC:  F/U nausea, fatigue, chills    HPI:  Patient seen this morning. Slept well. Complains of dry mouth. Continues to feel tired. Denies nausea currently. No abdominal pain.    ROS:  Gen-no fevers, no chills  CV-no chest pain, no palpitations  Resp-no cough, no dyspnea  GI-no N/V/D, no abd pain    All other systems reviewed and negative except any additional pertinent positives and negatives as discussed in HPI.      Objective   Objective     Vital Signs:   Temp:  [98.4 °F (36.9 °C)-99.7 °F (37.6 °C)] 98.4 °F (36.9 °C)  Heart Rate:  [] 82  Resp:  [16-22] 16  BP: (130-165)/() 152/84        Physical Exam:  Gen-no acute distress  HENT-NCAT, mucous membranes moist  CV-RRR, S1 S2 normal, no m/r/g  Resp-CTAB, no wheezes or rales  Abd-soft, NT, ND, +BS  Ext-no edema  Neuro-A&Ox3, no focal deficits  Skin-no rashes  Psych-appropriate mood      Results Reviewed:  Results from last 7 days   Lab Units 21  0835 21  1416   WBC 10*3/mm3 7.81 9.30   HEMOGLOBIN g/dL 9.5* 10.3*   HEMATOCRIT % 30.3* 33.7*   PLATELETS 10*3/mm3 141 161   PROCALCITONIN ng/mL  --  0.20     Results from last 7 days   Lab Units 21  1416   SODIUM mmol/L 135*   POTASSIUM mmol/L 3.9   CHLORIDE mmol/L 99   CO2 mmol/L 25.0   BUN mg/dL 14   CREATININE mg/dL 1.47*   GLUCOSE mg/dL 136*   CALCIUM mg/dL 9.0   ALT (SGPT) U/L 9   AST (SGOT) U/L 16   TROPONIN T ng/mL <0.010   PROBNP pg/mL 1,714.0*     Estimated Creatinine Clearance: 46.3 mL/min (A) (by CRISTY-G formula based on SCr of 1.47 mg/dL (H)).    Microbiology Results Abnormal     Procedure Component Value - Date/Time    COVID PRE-OP / PRE-PROCEDURE SCREENING ORDER (NO ISOLATION) - Swab, Nasopharynx [[id number]]  (Normal) Collected: 21 5309     Lab Status: Final result Specimen: Swab from Nasopharynx Updated: 04/03/21 1904    Narrative:      The following orders were created for panel order COVID PRE-OP / PRE-PROCEDURE SCREENING ORDER (NO ISOLATION) - Swab, Nasopharynx.  Procedure                               Abnormality         Status                     ---------                               -----------         ------                     COVID-19, ABBOTT IN-HOUS...[[id number]]  Normal              Final result                 Please view results for these tests on the individual orders.    COVID-19, ABBOTT IN-HOUSE,NASAL Swab (NO TRANSPORT MEDIA) 2 HR TAT - Swab, Nasopharynx [[id number]]  (Normal) Collected: 04/03/21 1827    Lab Status: Final result Specimen: Swab from Nasopharynx Updated: 04/03/21 1904     COVID19 Presumptive Negative    Narrative:      Fact sheet for providers: https://www.fda.gov/media/504891/download     Fact sheet for patients: https://www.fda.gov/media/921648/download    Test performed by PCR.  If inconsistent with clinical signs and symptoms patient should be tested with different authorized molecular test.          Imaging Results (Last 24 Hours)     Procedure Component Value Units Date/Time    CT Abdomen Pelvis Without Contrast [[id number]] Collected: 04/03/21 1531     Updated: 04/03/21 2310    Narrative:      EXAMINATION: CT ABDOMEN/PELVIS WO CONTRAST - 04/03/2021      INDICATION: Nausea and fatigue.     TECHNIQUE: 5 mm unenhanced images through the abdomen and pelvis.     The radiation dose reduction device was turned on for each scan per the  ALARA (As Low as Reasonably Achievable) protocol.     COMPARISON: Whole-body PET/CT scan 01/08/2021.     FINDINGS: History indicates fatigue, nausea, history of renal failure  and ureteral cancer. Previous whole body PET/CT scan report indicated  bladder soft tissue mass. CT showed left ureteral and collecting system  dilatation.     Today's study shows persistent left ureteral and  collecting system  dilatation with dilated ureter down to the level of the left  ureteropelvic junction. There is mild posterior bladder wall thickening.  No stone is identified.     Today's exam shows mild new perinephric fat stranding which may  represent edema or active inflammation. There is a left renal midpole  cyst, present on the prior study. Right kidney is surgically absent.     Elsewhere, included lower lungs appear grossly clear. There are multiple  small hepatic cysts. No significant abnormalities are seen of the  gallbladder, pancreas, adrenal glands, or spleen. No free air, ascites  or adenopathy is seen. Bowel loops are normal in caliber and grossly  normal in appearance.     In the pelvis, there is a complex appearance of the bladder, which  appears to contain both calcification and fat in the anterior wall, as  well as posterior wall thickening, but this is stable from the prior  PET/CT scan study. Bladder is not significantly distended. No  intrapelvic free fluid or inflammatory change is seen. Bony structures  appear to be intact.       Impression:      1. Persistent relatively mild dilatation of the left renal collecting  system and left ureter down to the level of the bladder as on  01/08/2021/PET/CT scan.     2. New mild to moderate left perinephric edema or inflammation,  potentially representing infection. Please correlate with urinalysis.     3. Abnormal appearance of bladder as described, but unchanged from  previous study.     3. Stable hepatic cysts. No new intra-abdominal or intrapelvic disease  is seen elsewhere.     DICTATED:   04/03/2021  EDITED/ls :   04/03/2021        This report was finalized on 4/3/2021 11:07 PM by Dr. Jamie Herrera MD.       XR Chest 1 View [[id number]] Collected: 04/03/21 1429     Updated: 04/03/21 2212    Narrative:      EXAMINATION: XR CHEST 1 VW - 04/03/2021     INDICATION: Nausea and fatigue.     COMPARISON: 02/17/2021     FINDINGS: Sternotomy wires are noted.  Heart is upper normal size.  Vasculature appears normal. There are mild chronic-appearing lung  changes and trace linear scarring at the left lung base. Previously  noted right basilar atelectasis has resolved. No edema, effusion or  pneumothorax is seen.       Impression:      Small area of residual left basilar linear scarring,  unchanged. No evidence of active chest disease is seen.     DICTATED:   04/03/2021  EDITED/ls :   04/03/2021         This report was finalized on 4/3/2021 10:09 PM by Dr. Jamie Herrera MD.             Results for orders placed during the hospital encounter of 01/08/20    Adult Transthoracic Echo Limited W/ Cont if Necessary Per Protocol    Interpretation Summary  · This is a LIMITED study to assess LV function post LifeVest placement.  · Global and segmental LV wall motion is normal. The calculated LV ejection fraction is 63%.  · The bioprosthetic aortic valve is functionally normal.      I have reviewed the medications:  Scheduled Meds:allopurinol, 100 mg, Oral, Daily  amLODIPine, 5 mg, Oral, Daily  aspirin, 81 mg, Oral, Daily  atorvastatin, 80 mg, Oral, Nightly  ertapenem, 1 g, Intravenous, Q24H  guaiFENesin, 1,200 mg, Oral, Q12H  melatonin, 2.5 mg, Oral, Nightly  metoprolol tartrate, 25 mg, Oral, Q12H  multivitamin, 1 tablet, Oral, Daily  senna-docusate sodium, 2 tablet, Oral, BID  sodium chloride, 10 mL, Intravenous, Q12H      Continuous Infusions:   PRN Meds:.•  acetaminophen **OR** acetaminophen **OR** acetaminophen  •  senna-docusate sodium **AND** polyethylene glycol **AND** bisacodyl **AND** bisacodyl  •  loperamide  •  ondansetron **OR** ondansetron  •  Sodium Chloride (PF)  •  sodium chloride    Assessment/Plan   Assessment & Plan     Active Hospital Problems    Diagnosis  POA   • **Pyelonephritis [N12]  Yes   • CKD (chronic kidney disease) stage 3, GFR 30-59 ml/min (CMS/HCC) [N18.30]  Yes   • Atrial fibrillation with RVR (CMS/HCC) [I48.91]  Yes   • History of CVA (cerebrovascular  accident) - S/P stent [Z86.73]  Not Applicable   • Acute systolic congestive heart failure (CMS/HCC) [I50.21]  Yes   • Coronary artery disease involving native coronary artery of native heart [I25.10]  Yes   • S/P AVR (aortic valve replacement) [Z95.2]  Not Applicable   • Obstructive sleep apnea [G47.33]  Yes   • Benign essential hypertension [I10]  Yes   • Hyperlipidemia LDL goal <70 [E78.5]  Yes   • History of bladder cancer [Z85.51]  Not Applicable      Resolved Hospital Problems   No resolved problems to display.        Brief Hospital Course to date:  Jair Flores is a 75 y.o. 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 Wife reported a fever at home. Recently hospitalized at [facility] 2/15-3/1/21 due to urosepsis and acute renal failure, was treated with a course of Invanz however urine/blood cultures were negative. He was also recently treated with a course of Levaquin as outpatient for a UTI. Patient was tachycardic on arrival at 110 and hypertensive to 163/110. Labs were fairly unremarkable with stable Cr 1.47, normal WBC, normal procalcitonin. UA with moderate blood, large leukocytes, TNTC WBC, trace bacteria. CT A/P showed new mild to moderate left perinephric edema/inflammation. Admitted for further management.     *All problems are new to me today.    Left pyelonephritis/UTI  --Continue Invanz. Just completed a course of Levaquin 1 week ago, wife reports that urine culture grew Morganella. Previous urine cultures in our system have been no growth.  --Continue IV fluids.  --Urology consulted. No intervention at this time. They recommended coverage for yeast seen on her UA--will start PO Diflucan.    CKD 3  --Cr has been 1.5-1.7 since last discharge.  --Slightly up to 1.7  today, continue to monitor closely.     Hx of right ureteral carcinoma  --s/p right nephrectomy and ureterectomy at [place] [facility] in Nov 2020.  --Follows with Dr. Montoya, during last visit they decided to stop chemotherapy secondary to poor tolerance/side effects.    PAF  --Eliquis stopped indefinitely last admission due to hematuria.  --Currently NSR.  --Continue Metoprolol.    HTN  CAD  Hx of AVR  --Continue ASA, Lipitor, Metoprolol.      DVT Prophylaxis:  mechanical      Disposition: I expect the patient to be discharged home TBD    CODE STATUS:   Code Status and Medical Interventions:   Ordered at: 04/03/21 1831     Level Of Support Discussed With:    Patient     Code Status:    CPR     Medical Interventions (Level of Support Prior to Arrest):    Full       Giuliana Fontana MD  04/04/21

## 2021-04-04 NOTE — PLAN OF CARE
Goal Outcome Evaluation:  Plan of Care Reviewed With: patient     Outcome Summary: Pt is alert and oriented. Has had no reports of nausea. Urinal at bedside, did report urgency when voiding. Reported feeling an extreme dry mouth, mouth moisterizer and water given. VSS, will continue to monitor.

## 2021-04-04 NOTE — CONSULTS
Consult    Patient Name: Jair Flores  Medical Record Number: [id number]  YOB: 1945    Date of consultation: 4/4/2021    Referring Provider: Giuliana Fontana MD  Reason for Consultation: Left pyelonephritis    Patient Care Team:  Osmar Clemons MD as PCP - General  Osmar Clemons MD as PCP - Family Medicine    Chief complaint   Chief Complaint   Patient presents with   • Nausea       Subjective .     History of present illness:    75-year-old white male who was admitted for left pyelonephritis.  Much of the urologic history is provided by his wife.  He has a history of urothelial cancer initially involving the bladder in 2015.  He was previously under the care of Dr. Braxton.  He underwent surveillance cystoscopy by Dr. Salmeron in 2020 and had an abnormal appearance of the dome of the bladder.  He went to Dr. Raines at [place] for a second opinion and ultimately underwent a robotic assisted laparoscopic right nephro ureterectomy 11/2020.  He has been under the care of Dr. Rosemarie Montoya with medical oncology and was treated with 2 cycles of cis-platinum and gemcitabine.  He developed hemorrhagic cystitis and was admitted to [facility] 2/2020 for a couple weeks.  He was seen by Dr. Cummins.  He went to [facility] for 3 days of rehabilitation.  After discharge he had cloudy urine and a urine culture showed Morganella treated with Levaquin per Dr. Cummins.    He started feeling poorly a couple days ago.  He has had malaise and low-grade fever.  He came to the emergency room and apparently had to wait a long time without fluids.  He was admitted for left pyelonephritis and OSEI.  A CT scan showed inflammation of the left kidney and persistent mild dilatation of the left collecting system unchanged from previous PET/CT scan 1/2021..  Labs included creatinine 1.47 with repeat 1.73 today and WBC 7.81.  A urinalysis showed blood 2+, leukocyte esterase 3+, nitrite negative, WBC too numerous to count,  RBC 13-20, yeast 2+, and bacteria none.  A recent urine culture 3/23/2021 was negative.    He is very tired but feels reasonably well otherwise.  He denies abdominal pain or flank pain.  He is voiding okay.    Past Medical History:   Diagnosis Date   • Acute gout of foot 4/29/2019   • Atrial fibrillation (CMS/HCC)     after valve replaced   • Cancer (CMS/HCC)     bladder; cysto surveillance 07/23/2017; free of disease   • Cataract     right eye   • Degenerative arthritis     left; knee replacement; successful   • Elevated cholesterol    • Flash pulmonary edema (CMS/HCC) 4/29/2019    Episode of flash pulmonary edema thought to be related to volume overload at the time of left knee surgery in 2005 performed in [place] he has had no recurrence his EKG does show inferior Q waves which are unchanged.   • Hard to intubate     has been told he is difficult to intubate   • Hematuria 4/29/2019   • Hemorrhoids 09/12/2011    hemorrhoid ablation   • Hypertension    • BEATRIZ on CPAP    • Skin cancer     head   • Stroke (CMS/HCC) 05/2019    no residual   • Wears glasses      Past Surgical History:   Procedure Laterality Date   • AORTIC VALVE REPAIR/REPLACEMENT  09/2019   • CARDIAC CATHETERIZATION N/A 7/10/2019    Procedure: Right and Left Heart Cath;  Surgeon: Francisco Bo MD;  Location:  [facility];  Service: Cardiology   • COLONOSCOPY      routinely every 10 years   • CYSTOSCOPY      x3   • CYSTOSCOPY  07/23/2017    bladder cancer; cysto surveillance; free of disease   • CYSTOSCOPY BLADDER BIOPSY N/A 4/24/2020    Procedure: CYSTOSCOPY BLADDER BIOPSY;  Surgeon: Bobby Salmeron MD;  Location: [facility] OR;  Service: Urology;  Laterality: N/A;   • EXCISIONAL HEMORRHOIDECTOMY      hemorrhoid ablation; hemorrhoid onset 09/12/2011   • EYE SURGERY      as a child after cutting eye with barbwire fence   • INTERVENTIONAL RADIOLOGY PROCEDURE Bilateral 6/24/2019    Procedure: Carotid Cerebral Angiogram with possible stent  placement;  Surgeon: Abad Mata MD;  Location:  [facility];  Service: Interventional Radiology   • KNEE ARTHROPLASTY, PARTIAL REPLACEMENT Left    • IN TCAT IV STENT CRV CRTD ART EMBOLIC PROTECJ N/A 6/24/2019    Procedure: Carotid Stent;  Surgeon: Abad Mata MD;  Location:  [facility];  Service: Interventional Radiology   • SKIN CANCER EXCISION      head   • TONSILLECTOMY     • TRANSURETHRAL RESECTION OF BLADDER TUMOR N/A 3/29/2019    Procedure: TRANSURETHRAL RESECTION OF BLADDER TUMOR WITH MITOMYCIN;  Surgeon: Jair Braxton MD;  Location:  [facility] OR;  Service: Urology   • TRANSURETHRAL RESECTION OF BLADDER TUMOR N/A 4/24/2020    Procedure: TRANSURETHRAL RESECTION OF BLADDER TUMOR;  Surgeon: Bobby Salmeron MD;  Location:  [facility] OR;  Service: Urology;  Laterality: N/A;     Family History   Problem Relation Age of Onset   • Hypertension Mother    • Hypertension Father    • Brain cancer Sister      Social History     Tobacco Use   • Smoking status: Never Smoker   • Smokeless tobacco: Never Used   Substance Use Topics   • Alcohol use: Not Currently   • Drug use: Defer     Medications Prior to Admission   Medication Sig Dispense Refill Last Dose   • acetaminophen (TYLENOL) 325 MG tablet Take 2 tablets by mouth Every 4 (Four) Hours As Needed for Mild Pain .   Past Week at Unknown time   • allopurinol (ZYLOPRIM) 100 MG tablet Take 1 tablet by mouth Daily. 90 tablet 3 4/3/2021 at Unknown time   • amLODIPine (NORVASC) 5 MG tablet Take 1 tablet by mouth Daily. 30 tablet 11 4/3/2021 at Unknown time   • Ascorbic Acid (VITAMIN C) 500 MG chewable tablet Chew 800 mg Daily.   4/3/2021 at Unknown time   • aspirin EC 81 MG EC tablet Take 1 tablet by mouth Daily. (Patient taking differently: Take 81 mg by mouth Every Morning.) 100 tablet 4 4/3/2021 at Unknown time   • atorvastatin (LIPITOR) 80 MG tablet TAKE 1 TABLET BY MOUTH EVERY NIGHT. 90 tablet 3 4/2/2021 at Unknown time   •  "guaiFENesin (MUCINEX) 600 MG 12 hr tablet Take 1,200 mg by mouth. prn   4/3/2021 at Unknown time   • melatonin 5 MG tablet tablet Take 0.5 tablets by mouth Every Night.   4/2/2021 at Unknown time   • metoprolol tartrate (LOPRESSOR) 25 MG tablet Take 1 tablet by mouth Every 12 (Twelve) Hours.   4/3/2021 at Unknown time   • Multiple Vitamins-Minerals (HM MULTIVITAMIN ADULT GUMMY) chewable tablet Chew 1 tablet Daily.   4/3/2021 at Unknown time   • loperamide (IMODIUM) 2 MG capsule Take 1 capsule by mouth 4 (Four) Times a Day As Needed for Diarrhea.   More than a month at Unknown time     Allergies:  Penicillins, Rocephin [ceftriaxone], Ciprofloxacin, and Sotalol    Review of Systems  The following systems were reviewed and negative;  respiratory, cardiovascular, gastrointestinal, genitourinary, musculoskeletal and behavioral/psych    Objective     Vital Signs   /84 (BP Location: Left arm, Patient Position: Lying)   Pulse 82   Temp 98.4 °F (36.9 °C) (Oral)   Resp 16   Ht 177.8 cm (70\")   Wt 74.2 kg (163 lb 9.6 oz)   SpO2 94%   BMI 23.47 kg/m²     Physical Exam:  General Appearance: No acute distress, sitting up in bed but sleepy, pleasant, appears comfortable  Back: No flank or CVA tenderness  Lungs: Respirations regular, even and  unlabored  Heart: Regular rhythm and normal rate  Abdomen: Soft, nondistended, nontender  Genital: Penis, scrotum, and testicles normal  Rectal: Deferred  Extremities: Moves all extremities well, no edema, no cyanosis, no redness  Neurologic: Neurologically grossly intact    Results Review:  Lab Results (last 24 hours)     Procedure Component Value Units Date/Time    Comprehensive Metabolic Panel [[id number]]  (Abnormal) Collected: 04/04/21 0835    Specimen: Blood Updated: 04/04/21 0917     Glucose 124 mg/dL      BUN 18 mg/dL      Creatinine 1.73 mg/dL      Sodium 137 mmol/L      Potassium 3.7 mmol/L      Chloride 100 mmol/L      CO2 26.0 mmol/L      Calcium 8.2 mg/dL      Total " Protein 6.3 g/dL      Albumin 3.20 g/dL      ALT (SGPT) 8 U/L      AST (SGOT) 14 U/L      Alkaline Phosphatase 74 U/L      Total Bilirubin 0.5 mg/dL      eGFR Non African Amer 39 mL/min/1.73      Globulin 3.1 gm/dL      A/G Ratio 1.0 g/dL      BUN/Creatinine Ratio 10.4     Anion Gap 11.0 mmol/L     Narrative:      GFR Normal >60  Chronic Kidney Disease <60  Kidney Failure <15      Magnesium [[id number]]  (Normal) Collected: 04/04/21 0835    Specimen: Blood Updated: 04/04/21 0917     Magnesium 1.7 mg/dL     CBC Auto Differential [[id number]]  (Abnormal) Collected: 04/04/21 0835    Specimen: Blood Updated: 04/04/21 0857     WBC 7.81 10*3/mm3      RBC 3.44 10*6/mm3      Hemoglobin 9.5 g/dL      Hematocrit 30.3 %      MCV 88.1 fL      MCH 27.6 pg      MCHC 31.4 g/dL      RDW 19.1 %      RDW-SD 62.0 fl      MPV 8.7 fL      Platelets 141 10*3/mm3      Neutrophil % 61.2 %      Lymphocyte % 27.0 %      Monocyte % 11.3 %      Eosinophil % 0.1 %      Basophil % 0.1 %      Immature Grans % 0.3 %      Neutrophils, Absolute 4.78 10*3/mm3      Lymphocytes, Absolute 2.11 10*3/mm3      Monocytes, Absolute 0.88 10*3/mm3      Eosinophils, Absolute 0.01 10*3/mm3      Basophils, Absolute 0.01 10*3/mm3      Immature Grans, Absolute 0.02 10*3/mm3      nRBC 0.0 /100 WBC     Urinalysis, Microscopic Only - Urine, Clean Catch [[id number]]  (Abnormal) Collected: 04/03/21 1852    Specimen: Urine, Clean Catch Updated: 04/03/21 1925     RBC, UA 13-20 /HPF      WBC, UA Too Numerous to Count /HPF      Bacteria, UA None Seen /HPF      Squamous Epithelial Cells, UA None Seen /HPF      Yeast, UA Moderate/2+ Budding Yeast /HPF      Hyaline Casts, UA 0-6 /LPF      Methodology Manual Light Microscopy    Urine Culture - Urine, Urine, Clean Catch [[id number]] Collected: 04/03/21 1852    Specimen: Urine, Clean Catch Updated: 04/03/21 1925    Urinalysis With Culture If Indicated - Urine, Clean Catch [[id number]]  (Abnormal) Collected: 04/03/21 0282    Specimen:  Urine, Clean Catch Updated: 04/03/21 1924     Color, UA Yellow     Appearance, UA Cloudy     pH, UA 8.0     Specific Gravity, UA 1.008     Glucose, UA Negative     Ketones, UA Negative     Bilirubin, UA Negative     Blood, UA Moderate (2+)     Protein,  mg/dL (2+)     Leuk Esterase, UA Large (3+)     Nitrite, UA Negative     Urobilinogen, UA 0.2 E.U./dL    COVID PRE-OP / PRE-PROCEDURE SCREENING ORDER (NO ISOLATION) - Swab, Nasopharynx [[id number]]  (Normal) Collected: 04/03/21 1827    Specimen: Swab from Nasopharynx Updated: 04/03/21 1904    Narrative:      The following orders were created for panel order COVID PRE-OP / PRE-PROCEDURE SCREENING ORDER (NO ISOLATION) - Swab, Nasopharynx.  Procedure                               Abnormality         Status                     ---------                               -----------         ------                     COVID-19, ABBOTT IN-HOUS...[[id number]]  Normal              Final result                 Please view results for these tests on the individual orders.    COVID-19, ABBOTT IN-HOUSE,NASAL Swab (NO TRANSPORT MEDIA) 2 HR TAT - Swab, Nasopharynx [[id number]]  (Normal) Collected: 04/03/21 1827    Specimen: Swab from Nasopharynx Updated: 04/03/21 1904     COVID19 Presumptive Negative    Narrative:      Fact sheet for providers: https://www.fda.gov/media/088668/download     Fact sheet for patients: https://www.fda.gov/media/890618/download    Test performed by PCR.  If inconsistent with clinical signs and symptoms patient should be tested with different authorized molecular test.    Viola Draw [[id number]] Collected: 04/03/21 1416    Specimen: Blood Updated: 04/03/21 1530    Narrative:      The following orders were created for panel order Viola Draw.  Procedure                               Abnormality         Status                     ---------                               -----------         ------                     Light Blue Top[[id number]]                                    Final result               Green Top (Gel)[[id number]]                                  Final result               Lavender Top[[id number]]                                     Final result               Gold Top - SST[[id number]]                                                              Gray Top - Ice[[id number]]                                   Final result                 Please view results for these tests on the individual orders.    Light Blue Top [[id number]] Collected: 04/03/21 1416    Specimen: Blood Updated: 04/03/21 1530     Extra Tube hold for add-on     Comment: Auto resulted       Green Top (Gel) [[id number]] Collected: 04/03/21 1416    Specimen: Blood Updated: 04/03/21 1530     Extra Tube Hold for add-ons.     Comment: Auto resulted.       Lavender Top [[id number]] Collected: 04/03/21 1416    Specimen: Blood Updated: 04/03/21 1530     Extra Tube hold for add-on     Comment: Auto resulted       Gray Top - Ice [[id number]] Collected: 04/03/21 1416    Specimen: Blood Updated: 04/03/21 1530     Extra Tube Hold for add-ons.     Comment: Auto resulted.           Imaging Results (Last 72 Hours)     Procedure Component Value Units Date/Time    CT Abdomen Pelvis Without Contrast [[id number]] Collected: 04/03/21 1531     Updated: 04/03/21 2310    Narrative:      EXAMINATION: CT ABDOMEN/PELVIS WO CONTRAST - 04/03/2021      INDICATION: Nausea and fatigue.     TECHNIQUE: 5 mm unenhanced images through the abdomen and pelvis.     The radiation dose reduction device was turned on for each scan per the  ALARA (As Low as Reasonably Achievable) protocol.     COMPARISON: Whole-body PET/CT scan 01/08/2021.     FINDINGS: History indicates fatigue, nausea, history of renal failure  and ureteral cancer. Previous whole body PET/CT scan report indicated  bladder soft tissue mass. CT showed left ureteral and collecting system  dilatation.     Today's study shows persistent left ureteral and collecting  system  dilatation with dilated ureter down to the level of the left  ureteropelvic junction. There is mild posterior bladder wall thickening.  No stone is identified.     Today's exam shows mild new perinephric fat stranding which may  represent edema or active inflammation. There is a left renal midpole  cyst, present on the prior study. Right kidney is surgically absent.     Elsewhere, included lower lungs appear grossly clear. There are multiple  small hepatic cysts. No significant abnormalities are seen of the  gallbladder, pancreas, adrenal glands, or spleen. No free air, ascites  or adenopathy is seen. Bowel loops are normal in caliber and grossly  normal in appearance.     In the pelvis, there is a complex appearance of the bladder, which  appears to contain both calcification and fat in the anterior wall, as  well as posterior wall thickening, but this is stable from the prior  PET/CT scan study. Bladder is not significantly distended. No  intrapelvic free fluid or inflammatory change is seen. Bony structures  appear to be intact.       Impression:      1. Persistent relatively mild dilatation of the left renal collecting  system and left ureter down to the level of the bladder as on  01/08/2021/PET/CT scan.     2. New mild to moderate left perinephric edema or inflammation,  potentially representing infection. Please correlate with urinalysis.     3. Abnormal appearance of bladder as described, but unchanged from  previous study.     3. Stable hepatic cysts. No new intra-abdominal or intrapelvic disease  is seen elsewhere.     DICTATED:   04/03/2021  EDITED/ls :   04/03/2021        This report was finalized on 4/3/2021 11:07 PM by Dr. Jamie Herrera MD.       XR Chest 1 View [[id number]] Collected: 04/03/21 1429     Updated: 04/03/21 2212    Narrative:      EXAMINATION: XR CHEST 1 VW - 04/03/2021     INDICATION: Nausea and fatigue.     COMPARISON: 02/17/2021     FINDINGS: Sternotomy wires are noted. Heart is  upper normal size.  Vasculature appears normal. There are mild chronic-appearing lung  changes and trace linear scarring at the left lung base. Previously  noted right basilar atelectasis has resolved. No edema, effusion or  pneumothorax is seen.       Impression:      Small area of residual left basilar linear scarring,  unchanged. No evidence of active chest disease is seen.     DICTATED:   04/03/2021  EDITED/ls :   04/03/2021         This report was finalized on 4/3/2021 10:09 PM by Dr. Jamie Herrera MD.              I reviewed the patient's new clinical results.  I reviewed the patient's new imaging results and agree with the interpretation.      Assessment and Recommendations  75-year-old male with a history of urothelial cancer initially involving the bladder in 2015 and subsequently the right upper tract status post right nephro ureterectomy 11/2020.  He was treated with 2 cycles of cis-platinum and gemcitabine and developed hemorrhagic cystitis 2/2021.  He subsequently had a UTI treated with Levaquin.    He was admitted for left pyelonephritis and OSEI.  The CT scan shows inflammation of the left kidney and mild dilatation of the left collecting system which is unchanged from previous PET CT scan 1/2021.  He does not have leukocytosis.  He has acute kidney injury possibly from dehydration.  He started Invanz.  The urinalysis showed yeast and I would recommend an antifungal as well.    Plan: No surgical intervention is indicated at this time.  Continue broad-spectrum antibiotics pending cultures.  Consider coverage for yeast.  Monitor renal function.      Pyelonephritis    History of bladder cancer    Benign essential hypertension    Hyperlipidemia LDL goal <70    Obstructive sleep apnea    S/P AVR (aortic valve replacement)    Atrial fibrillation with RVR (CMS/HCC)    History of CVA (cerebrovascular accident) - S/P stent    Coronary artery disease involving native coronary artery of native heart    Acute  systolic congestive heart failure (CMS/HCC)    CKD (chronic kidney disease) stage 3, GFR 30-59 ml/min (CMS/[facility])      I discussed the patients findings and my recommendations with patient and family    Wisam Villeda MD  04/04/21  11:14 EDT

## 2021-04-05 NOTE — PLAN OF CARE
Goal Outcome Evaluation:  Plan of Care Reviewed With: patient, spouse  Progress: improving  Outcome Summary: VSS, room air. Denies pain or nausea. Pt ambulating well on own. ID consult today, to follow up outpatient. DC home today with spouse.

## 2021-04-05 NOTE — PROGRESS NOTES
Discharge Planning Assessment  [facility]     Patient Name: Jair Flores  MRN: [id number]  Today's Date: 4/5/2021    Admit Date: 4/3/2021    Discharge Needs Assessment     Row Name 04/05/21 1306       Living Environment    Lives With  spouse Pt resides in [facility]    Name(s) of Who Lives With Patient  wife- Carey    Current Living Arrangements  home/apartment/condo    Primary Care Provided by  self    Provides Primary Care For  no one    Family Caregiver if Needed  spouse    Family Caregiver Names  Carey    Quality of Family Relationships  helpful;involved;supportive    Able to Return to Prior Arrangements  yes       Resource/Environmental Concerns    Resource/Environmental Concerns  none       Transition Planning    Patient/Family Anticipates Transition to  home with family    Patient/Family Anticipated Services at Transition  none    Transportation Anticipated  family or friend will provide       Discharge Needs Assessment    Readmission Within the Last 30 Days  no previous admission in last 30 days    Equipment Currently Used at Home  cpap;walker, rolling;cane, straight    Concerns to be Addressed  discharge planning    Anticipated Changes Related to Illness  none    Equipment Needed After Discharge  none    Provided Post Acute Provider List?  N/A    Provided Post Acute Provider Quality & Resource List?  N/A        Discharge Plan     Row Name 04/05/21 1306       Plan    Plan  home    Patient/Family in Agreement with Plan  yes    Plan Comments  CM spoke with pt and wife Carey at bedside. Pt resides in [facility] and still works as he is a . Pt has a cane and walker he uses as needed. Pt also has a CPAP machine. Pt is not current with home health or outpatient medical services. Pt has used [facility] in the past and was pleased with services received. Pt has Medicare and [employer] insurance and denies concerns or disruption in coverage. Pt has prescription drug coverage and denies  issues obtaining or affording current medications. Pt declines having a living will or POA and declines additional information. Pt and wife are requesting an ID consult and primary RN has spoke with Dr. Fontana and this has been ordered. Pt and wife plan to return home and decline discharge needs at this time. Pt will have private transporation home and denies needs at this time. CM will continue to follow. Ele Mckinley,RN [phone]    Final Discharge Disposition Code  01 - home or self-care        Continued Care and Services - Admitted Since 4/3/2021    Coordination has not been started for this encounter.     Selected Continued Care - Prior Encounters Includes selections from prior encounters from 1/3/2021 to 4/5/2021    Discharged on 3/1/2021 Admission date: 2/15/2021 - Discharge disposition: Skilled Nursing Facility (DC - External)    Destination     Service Provider Selected Services Address Phone Fax Patient Preferred    [facility]  Skilled Nursing [address] [address] [phone] --       Internal Comment last updated by Aleta Blankenship RN 3/1/2021 1334    Facility billing his Medicare as primary as this is what both Medicare and [place] have listed. Patient and his wife are aware and in agreement with this.                                  Demographic Summary     Row Name 04/05/21 1305       General Information    Referral Source  admission list    Reason for Consult  discharge planning    Preferred Language  English     Used During This Interaction  no    General Information Comments  PCP- Osmar Clemons       Contact Information    Permission Granted to Share Info With      Contact Information Comments  [phone]- Wife Carey        Functional Status     Row Name 04/05/21 1305       Functional Status    Usual Activity Tolerance  moderate       Functional Status, IADL    Medications  independent    Meal Preparation  assistive equipment and  person    Housekeeping  completely dependent    Laundry  completely dependent    Shopping  assistive equipment and person    IADL Comments  Pt's wife assists as needed. 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 Pt has prescription drug coverage and denies issues obtaining or affording current medications.        Psychosocial    No documentation.       Abuse/Neglect    No documentation.       Legal    No documentation.       Substance Abuse    No documentation.       Patient Forms    No documentation.           Ele Mckinley RN

## 2021-04-05 NOTE — DISCHARGE SUMMARY
[facility] Medicine Services  DISCHARGE SUMMARY    Patient Name: Jair Flores  : 1945  MRN: [id number]    Date of Admission: 4/3/2021  1:39 PM  Date of Discharge:  21  Primary Care Physician: Osmar Clemons MD    Consults     Date and Time Order Name Status Description    2021 10:01 AM Inpatient Infectious Diseases Consult      2021 12:33 AM Inpatient Urology Consult Completed           Hospital Course     Presenting Problem:   Pyelonephritis [N12]    Active Hospital Problems    Diagnosis  POA   • **Pyelonephritis [N12]  Yes   • CKD (chronic kidney disease) stage 3, GFR 30-59 ml/min (CMS/[facility]) [N18.30]  Yes   • Atrial fibrillation with RVR (CMS/[facility]) [I48.91]  Yes   • History of CVA (cerebrovascular accident) - S/P stent [Z86.73]  Not Applicable   • Coronary artery disease involving native coronary artery of native heart [I25.10]  Yes   • S/P AVR (aortic valve replacement) [Z95.2]  Not Applicable   • Obstructive sleep apnea [G47.33]  Yes   • Benign essential hypertension [I10]  Yes   • Hyperlipidemia LDL goal <70 [E78.5]  Yes   • History of bladder cancer [Z85.51]  Not Applicable      Resolved Hospital Problems   No resolved problems to display.          Hospital Course:  Jair Flores is a 75 y.o. 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 Wife reported a fever at home. Recently hospitalized at [facility] 2/15-3/1/21 due to urosepsis and acute renal failure, was treated with a course of Invanz however urine/blood cultures were negative. He was also recently treated with a course of Levaquin as outpatient for a UTI. Patient was tachycardic on arrival at 110 and hypertensive to 163/110. Labs were fairly unremarkable with stable Cr  1.47, normal WBC, normal procalcitonin. UA with moderate blood, large leukocytes, TNTC WBC, trace bacteria. CT A/P showed new mild to moderate left perinephric edema/inflammation. Admitted for further management.      Left pyelonephritis/UTI  --Treated with IV Invanz. Just completed a course of Levaquin 1 week ago, wife reports that urine culture grew Morganella. Previous urine cultures in our system have been no growth. Urine culture this admission with <25,000 normal cande.  --Urology consulted, seen by Dr. Villeda. No intervention indicated at this time. They recommended coverage for yeast seen on his UA--started PO Diflucan.  --Wife requested Infectious Disease consult due to recurrent UTI and multiple antibiotic allergies--consulted Dr. Todd Jung today and I discussed the case with him. Although patient is clinically doing fairly well (nontoxic, vitals normal), he certainly has radiographic evidence of pyelonephritis. Recommends working up the urine culture (speciating it out), and continue on IV Invanz and Diflucan at discharge. He will see patient in the office tomorrow.     Hypokalemia  --Replaced orally.      CKD 3  --Cr has been 1.5-1.7 since last discharge.  --Stable at 1.7 today, continue to monitor closely. I believe the patient was referred to see Nephrology as outpatient after his last admission (was supposed to see Dr. Anand 6 weeks after discharge). He should keep this follow up appointment.     Hx of right ureteral carcinoma  --s/p right nephrectomy and ureterectomy at [place] in Nov 2020.  --Follows with Dr. Montoya, during last visit they decided to stop chemotherapy secondary to poor tolerance/side effects.     PAF  --Eliquis stopped indefinitely last admission due to hematuria.  --Currently NSR.  --Continue Metoprolol.     HTN  CAD  Hx of AVR  --Continue ASA, Lipitor, Metoprolol.      Discharge Follow Up Recommendations for outpatient labs/diagnostics:  F/U with PCP in 1 week  F/U with  Dr. Todd Jung/[facility] tomorrow 4/6/21 as instructed         Day of Discharge     HPI:   Patient seen this morning, no complaints.     Review of Systems  Gen-no fevers, no chills  CV-no chest pain, no palpitations  Resp-no cough, no dyspnea  GI-no N/V/D, no abd pain    All other systems reviewed and negative except any additional pertinent positives and negatives as discussed in HPI.      Vital Signs:   Temp:  [98.2 °F (36.8 °C)-99.6 °F (37.6 °C)] 98.3 °F (36.8 °C)  Heart Rate:  [64-88] 68  Resp:  [16-18] 18  BP: (107-123)/(69-81) 107/69     Physical Exam:  Gen-no acute distress  HENT-NCAT, mucous membranes moist  CV-RRR, S1 S2 normal, no m/r/g  Resp-CTAB, no wheezes or rales  Abd-soft, NT, ND, +BS  Ext-no edema  Neuro-A&Ox3, no focal deficits  Skin-no rashes  Psych-appropriate mood      Pertinent  and/or Most Recent Results     LAB RESULTS:      Lab 04/05/21  0652 04/04/21  0835 04/03/21  1416   WBC 7.40 7.81 9.30   HEMOGLOBIN 9.7* 9.5* 10.3*   HEMATOCRIT 31.2* 30.3* 33.7*   PLATELETS 133* 141 161   NEUTROS ABS  --  4.78 6.33   IMMATURE GRANS (ABS)  --  0.02 0.02   LYMPHS ABS  --  2.11 2.08   MONOS ABS  --  0.88 0.82   EOS ABS  --  0.01 0.02   MCV 91.2 88.1 90.1   PROCALCITONIN  --   --  0.20   LACTATE  --   --  1.0         Lab 04/05/21  0652 04/04/21  0835 04/03/21  1416   SODIUM 135* 137 135*   POTASSIUM 3.4* 3.7 3.9   CHLORIDE 99 100 99   CO2 27.0 26.0 25.0   ANION GAP 9.0 11.0 11.0   BUN 19 18 14   CREATININE 1.73* 1.73* 1.47*   GLUCOSE 105* 124* 136*   CALCIUM 8.6 8.2* 9.0   MAGNESIUM  --  1.7  --          Lab 04/04/21  0835 04/03/21  1416   TOTAL PROTEIN 6.3 7.4   ALBUMIN 3.20* 3.80   GLOBULIN 3.1 3.6   ALT (SGPT) 8 9   AST (SGOT) 14 16   BILIRUBIN 0.5 0.6   ALK PHOS 74 89   LIPASE  --  21         Lab 04/03/21  1416   PROBNP 1,714.0*   TROPONIN T <0.010                 Brief Urine Lab Results  (Last result in the past 365 days)      Color   Clarity   Blood   Leuk Est   Nitrite   Protein   CREAT   Urine HCG         04/03/21 1852 Yellow Cloudy Moderate (2+) Large (3+) Negative 100 mg/dL (2+)             Microbiology Results (last 10 days)     Procedure Component Value - Date/Time    Urine Culture - Urine, Urine, Clean Catch [[id number]] Collected: 04/03/21 1852    Lab Status: Preliminary result Specimen: Urine, Clean Catch Updated: 04/05/21 1456     Urine Culture <25,000 CFU/mL Mixed Cande Isolated    Narrative:      Specimen contains mixed organisms of questionable pathogenicity which indicates contamination with commensal cande.  Further identification is unlikely to provide clinically useful information.  Suggest recollection.  4/5/21 Dr Todd Jung requested workup    COVID PRE-OP / PRE-PROCEDURE SCREENING ORDER (NO ISOLATION) - Swab, Nasopharynx [[id number]]  (Normal) Collected: 04/03/21 1827    Lab Status: Final result Specimen: Swab from Nasopharynx Updated: 04/03/21 1904    Narrative:      The following orders were created for panel order COVID PRE-OP / PRE-PROCEDURE SCREENING ORDER (NO ISOLATION) - Swab, Nasopharynx.  Procedure                               Abnormality         Status                     ---------                               -----------         ------                     COVID-19, ABBOTT IN-HOUS...[[id number]]  Normal              Final result                 Please view results for these tests on the individual orders.    COVID-19, ABBOTT IN-HOUSE,NASAL Swab (NO TRANSPORT MEDIA) 2 HR TAT - Swab, Nasopharynx [[id number]]  (Normal) Collected: 04/03/21 1827    Lab Status: Final result Specimen: Swab from Nasopharynx Updated: 04/03/21 1904     COVID19 Presumptive Negative    Narrative:      Fact sheet for providers: https://www.fda.gov/media/646963/download     Fact sheet for patients: https://www.fda.gov/media/812340/download    Test performed by PCR.  If inconsistent with clinical signs and symptoms patient should be tested with different authorized molecular test.    Blood Culture - Blood, Arm,  Right [[id number]] Collected: 04/03/21 1435    Lab Status: Preliminary result Specimen: Blood from Arm, Right Updated: 04/05/21 1500     Blood Culture No growth at 2 days    Blood Culture - Blood, Arm, Left [[id number]] Collected: 04/03/21 1335    Lab Status: Preliminary result Specimen: Blood from Arm, Left Updated: 04/05/21 1500     Blood Culture No growth at 2 days          CT Abdomen Pelvis Without Contrast    Result Date: 4/3/2021  EXAMINATION: CT ABDOMEN/PELVIS WO CONTRAST - 04/03/2021  INDICATION: Nausea and fatigue.  TECHNIQUE: 5 mm unenhanced images through the abdomen and pelvis.  The radiation dose reduction device was turned on for each scan per the ALARA (As Low as Reasonably Achievable) protocol.  COMPARISON: Whole-body PET/CT scan 01/08/2021.  FINDINGS: History indicates fatigue, nausea, history of renal failure and ureteral cancer. Previous whole body PET/CT scan report indicated bladder soft tissue mass. CT showed left ureteral and collecting system dilatation.  Today's study shows persistent left ureteral and collecting system dilatation with dilated ureter down to the level of the left ureteropelvic junction. There is mild posterior bladder wall thickening. No stone is identified.  Today's exam shows mild new perinephric fat stranding which may represent edema or active inflammation. There is a left renal midpole cyst, present on the prior study. Right kidney is surgically absent.  Elsewhere, included lower lungs appear grossly clear. There are multiple small hepatic cysts. No significant abnormalities are seen of the gallbladder, pancreas, adrenal glands, or spleen. No free air, ascites or adenopathy is seen. Bowel loops are normal in caliber and grossly normal in appearance.  In the pelvis, there is a complex appearance of the bladder, which appears to contain both calcification and fat in the anterior wall, as well as posterior wall thickening, but this is stable from the prior PET/CT scan study.  Bladder is not significantly distended. No intrapelvic free fluid or inflammatory change is seen. Bony structures appear to be intact.      1. Persistent relatively mild dilatation of the left renal collecting system and left ureter down to the level of the bladder as on 01/08/2021/PET/CT scan.  2. New mild to moderate left perinephric edema or inflammation, potentially representing infection. Please correlate with urinalysis.  3. Abnormal appearance of bladder as described, but unchanged from previous study.  3. Stable hepatic cysts. No new intra-abdominal or intrapelvic disease is seen elsewhere.  DICTATED:   04/03/2021 EDITED/ls :   04/03/2021   This report was finalized on 4/3/2021 11:07 PM by Dr. Jamie Herrera MD.      XR Chest 1 View    Result Date: 4/3/2021  EXAMINATION: XR CHEST 1 VW - 04/03/2021  INDICATION: Nausea and fatigue.  COMPARISON: 02/17/2021  FINDINGS: Sternotomy wires are noted. Heart is upper normal size. Vasculature appears normal. There are mild chronic-appearing lung changes and trace linear scarring at the left lung base. Previously noted right basilar atelectasis has resolved. No edema, effusion or pneumothorax is seen.      Small area of residual left basilar linear scarring, unchanged. No evidence of active chest disease is seen.  DICTATED:   04/03/2021 EDITED/ls :   04/03/2021    This report was finalized on 4/3/2021 10:09 PM by Dr. Jamie Herrera MD.        Results for orders placed during the hospital encounter of 06/24/19    Duplex Carotid - Left Ultrasound CAR    Interpretation Summary  · The stented left internal carotid artery is widely patent (normal velocities are recorded).      Results for orders placed during the hospital encounter of 06/24/19    Duplex Carotid - Left Ultrasound CAR    Interpretation Summary  · The stented left internal carotid artery is widely patent (normal velocities are recorded).      Results for orders placed during the hospital encounter of 01/08/20    Adult  Transthoracic Echo Limited W/ Cont if Necessary Per Protocol    Interpretation Summary  · This is a LIMITED study to assess LV function post LifeVest placement.  · Global and segmental LV wall motion is normal. The calculated LV ejection fraction is 63%.  · The bioprosthetic aortic valve is functionally normal.      Pending Labs     Order Current Status    Blood Culture - Blood, Arm, Left Preliminary result    Blood Culture - Blood, Arm, Right Preliminary result    Urine Culture - Urine, Urine, Clean Catch Preliminary result        Discharge Details        Discharge Medications      New Medications      Instructions Start Date   ertapenem 1 gm/100ml solution IV  Commonly known as: INVanz   1 g, Intravenous, Every 24 Hours   Start Date: April 6, 2021     fluconazole 200 MG tablet  Commonly known as: DIFLUCAN   200 mg, Oral, Every 24 Hours   Start Date: April 6, 2021        Changes to Medications      Instructions Start Date   aspirin 81 MG EC tablet  What changed: when to take this   81 mg, Oral, Daily         Continue These Medications      Instructions Start Date   acetaminophen 325 MG tablet  Commonly known as: TYLENOL   650 mg, Oral, Every 4 Hours PRN      allopurinol 100 MG tablet  Commonly known as: ZYLOPRIM   100 mg, Oral, Daily      amLODIPine 5 MG tablet  Commonly known as: NORVASC   5 mg, Oral, Daily      atorvastatin 80 MG tablet  Commonly known as: LIPITOR   80 mg, Oral, Nightly      guaiFENesin 600 MG 12 hr tablet  Commonly known as: MUCINEX   1,200 mg, Oral, prn      HM Multivitamin Adult Gummy chewable tablet   1 tablet, Oral, Daily      loperamide 2 MG capsule  Commonly known as: IMODIUM   2 mg, Oral, 4 Times Daily PRN      melatonin 5 MG tablet tablet   2.5 mg, Oral, Nightly      metoprolol tartrate 25 MG tablet  Commonly known as: LOPRESSOR   25 mg, Oral, Every 12 Hours Scheduled      ondansetron 8 MG tablet  Commonly known as: ZOFRAN   Oral, Every 8 Hours PRN      tamsulosin 0.4 MG capsule 24 hr  capsule  Commonly known as: FLOMAX   1 capsule, Oral, Daily      Vitamin C 500 MG chewable tablet   800 mg, Oral, Daily             Allergies   Allergen Reactions   • Penicillins Anaphylaxis     Tolerates ertapenem   • Rocephin [Ceftriaxone] Anaphylaxis   • Ciprofloxacin Other (See Comments)     Tendonitis - received levofloxacin OP 2021   • Sotalol Other (See Comments)     Prolonged QT         Discharge Disposition:  Home or Self Care    Diet:  Hospital:  Diet Order   Procedures   • Diet Regular; Cardiac, Consistent Carbohydrate       Activity:  Activity Instructions     Activity as Tolerated                   CODE STATUS:    Code Status and Medical Interventions:   Ordered at: 04/03/21 1831     Level Of Support Discussed With:    Patient     Code Status:    CPR     Medical Interventions (Level of Support Prior to Arrest):    Full       Future Appointments   Date Time Provider Department Center   5/3/2021  2:15 PM [facility] CT 1 BH [place] CT SO [facility]   5/4/2021  3:15 PM Rosemarie Montoya MD [facility] ONC [facility]   6/24/2021  2:30 PM Osmar Clemons MD [facility] IM [facility] [place]       Additional Instructions for the Follow-ups that You Need to Schedule     Discharge Follow-up with PCP   As directed       Currently Documented PCP:    Osmar Clemons MD    PCP Phone Number:    [phone]     Follow Up Details: 1 week         Discharge Follow-up with Specified Provider: Dr. Todd Jung at [facility] tomorrow as instructed   As directed      To: Dr. Todd Jung at [facility] tomorrow as instructed                     Giuliana Fontana MD  04/05/21      Time Spent on Discharge:  I spent  35 minutes on this discharge activity which included: face-to-face encounter with the patient, reviewing the data in the system, coordination of the care with the nursing staff as well as consultants, documentation, and entering orders.

## 2021-04-05 NOTE — PROGRESS NOTES
[facility] Medicine Services  PROGRESS NOTE    Patient Name: Jair Flores  : 1945  MRN: [id number]    Date of Admission: 4/3/2021  Primary Care Physician: Osmar Clemons MD    Subjective   Subjective     CC:  F/U nausea, fatigue, chills    HPI:  Patient seen this morning. No complaints whatsoever.     ROS:  Gen-no fevers, no chills  CV-no chest pain, no palpitations  Resp-no cough, no dyspnea  GI-no N/V/D, no abd pain    All other systems reviewed and negative except any additional pertinent positives and negatives as discussed in HPI.      Objective   Objective     Vital Signs:   Temp:  [97.8 °F (36.6 °C)-99.6 °F (37.6 °C)] 98.3 °F (36.8 °C)  Heart Rate:  [64-88] 77  Resp:  [16-18] 18  BP: (107-127)/(69-81) 107/69        Physical Exam:  Gen-no acute distress  HENT-NCAT, mucous membranes moist  CV-RRR, S1 S2 normal, no m/r/g  Resp-CTAB, no wheezes or rales  Abd-soft, NT, ND, +BS  Ext-no edema  Neuro-A&Ox3, no focal deficits  Skin-no rashes  Psych-appropriate mood      Results Reviewed:  Results from last 7 days   Lab Units 21  0652 21  0835 21  1416   WBC 10*3/mm3 7.40 7.81 9.30   HEMOGLOBIN g/dL 9.7* 9.5* 10.3*   HEMATOCRIT % 31.2* 30.3* 33.7*   PLATELETS 10*3/mm3 133* 141 161   PROCALCITONIN ng/mL  --   --  0.20     Results from last 7 days   Lab Units 21  0652 21  0835 21  1416   SODIUM mmol/L 135* 137 135*   POTASSIUM mmol/L 3.4* 3.7 3.9   CHLORIDE mmol/L 99 100 99   CO2 mmol/L 27.0 26.0 25.0   BUN mg/dL 19 18 14   CREATININE mg/dL 1.73* 1.73* 1.47*   GLUCOSE mg/dL 105* 124* 136*   CALCIUM mg/dL 8.6 8.2* 9.0   ALT (SGPT) U/L  --  8 9   AST (SGOT) U/L  --  14 16   TROPONIN T ng/mL  --   --  <0.010   PROBNP pg/mL  --   --  1,714.0*     Estimated Creatinine Clearance: 39.3 mL/min (A) (by C-G formula based on SCr of 1.73 mg/dL (H)).    Microbiology Results Abnormal     Procedure Component Value - Date/Time    Urine Culture - Urine, Urine,  Clean Catch [[id number]] Collected: 04/03/21 1852    Lab Status: Final result Specimen: Urine, Clean Catch Updated: 04/04/21 1841     Urine Culture <25,000 CFU/mL Mixed Cande Isolated    Narrative:      Specimen contains mixed organisms of questionable pathogenicity which indicates contamination with commensal cande.  Further identification is unlikely to provide clinically useful information.  Suggest recollection.    Blood Culture - Blood, Arm, Left [[id number]] Collected: 04/03/21 1335    Lab Status: Preliminary result Specimen: Blood from Arm, Left Updated: 04/04/21 1501     Blood Culture No growth at 24 hours    Blood Culture - Blood, Arm, Right [[id number]] Collected: 04/03/21 1435    Lab Status: Preliminary result Specimen: Blood from Arm, Right Updated: 04/04/21 1501     Blood Culture No growth at 24 hours    COVID PRE-OP / PRE-PROCEDURE SCREENING ORDER (NO ISOLATION) - Swab, Nasopharynx [[id number]]  (Normal) Collected: 04/03/21 1827    Lab Status: Final result Specimen: Swab from Nasopharynx Updated: 04/03/21 1904    Narrative:      The following orders were created for panel order COVID PRE-OP / PRE-PROCEDURE SCREENING ORDER (NO ISOLATION) - Swab, Nasopharynx.  Procedure                               Abnormality         Status                     ---------                               -----------         ------                     COVID-19, [facility]BBOTT IN-HOUS...[[id number]]  Normal              Final result                 Please view results for these tests on the individual orders.    COVID-19, [facility]BBOTT IN-HOUSE,NASAL Swab (NO TRANSPORT MEDIA) 2 HR TAT - Swab, Nasopharynx [[id number]]  (Normal) Collected: 04/03/21 1827    Lab Status: Final result Specimen: Swab from Nasopharynx Updated: 04/03/21 1904     COVID19 Presumptive Negative    Narrative:      Fact sheet for providers: https://www.fda.gov/media/157203/download     Fact sheet for patients: https://www.fda.gov/media/860713/download    Test performed by  PCR.  If inconsistent with clinical signs and symptoms patient should be tested with different authorized molecular test.          Imaging Results (Last 24 Hours)     ** No results found for the last 24 hours. **          Results for orders placed during the hospital encounter of 01/08/20    Adult Transthoracic Echo Limited W/ Cont if Necessary Per Protocol    Interpretation Summary  · This is a LIMITED study to assess LV function post LifeVest placement.  · Global and segmental LV wall motion is normal. The calculated LV ejection fraction is 63%.  · The bioprosthetic aortic valve is functionally normal.      I have reviewed the medications:  Scheduled Meds:allopurinol, 100 mg, Oral, Daily  amLODIPine, 5 mg, Oral, Daily  aspirin, 81 mg, Oral, Daily  atorvastatin, 80 mg, Oral, Nightly  ertapenem, 1 g, Intravenous, Q24H  fluconazole, 200 mg, Oral, Q24H  guaiFENesin, 1,200 mg, Oral, Q12H  melatonin, 2.5 mg, Oral, Nightly  metoprolol tartrate, 25 mg, Oral, Q12H  multivitamin, 1 tablet, Oral, Daily  potassium chloride, 40 mEq, Oral, Once  senna-docusate sodium, 2 tablet, Oral, BID  sodium chloride, 10 mL, Intravenous, Q12H      Continuous Infusions:   PRN Meds:.•  acetaminophen **OR** acetaminophen **OR** acetaminophen  •  senna-docusate sodium **AND** polyethylene glycol **AND** bisacodyl **AND** bisacodyl  •  loperamide  •  ondansetron **OR** ondansetron  •  Sodium Chloride (PF)  •  sodium chloride    Assessment/Plan   Assessment & Plan     Active Hospital Problems    Diagnosis  POA   • **Pyelonephritis [N12]  Yes   • CKD (chronic kidney disease) stage 3, GFR 30-59 ml/min (CMS/HCC) [N18.30]  Yes   • Atrial fibrillation with RVR (CMS/[facility]) [I48.91]  Yes   • History of CVA (cerebrovascular accident) - S/P stent [Z86.73]  Not Applicable   • Acute systolic congestive heart failure (CMS/[facility]) [I50.21]  Yes   • Coronary artery disease involving native coronary artery of native heart [I25.10]  Yes   • S/P AVR (aortic valve  replacement) [Z95.2]  Not Applicable   • Obstructive sleep apnea [G47.33]  Yes   • Benign essential hypertension [I10]  Yes   • Hyperlipidemia LDL goal <70 [E78.5]  Yes   • History of bladder cancer [Z85.51]  Not Applicable      Resolved Hospital Problems   No resolved problems to display.        Brief Hospital Course to date:  Jair Flores is a 75 y.o. 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 Wife reported a fever at home. Recently hospitalized at [facility] 2/15-3/1/21 due to urosepsis and acute renal failure, was treated with a course of Invanz however urine/blood cultures were negative. He was also recently treated with a course of Levaquin as outpatient for a UTI. Patient was tachycardic on arrival at 110 and hypertensive to 163/110. Labs were fairly unremarkable with stable Cr 1.47, normal WBC, normal procalcitonin. UA with moderate blood, large leukocytes, TNTC WBC, trace bacteria. CT A/P showed new mild to moderate left perinephric edema/inflammation. Admitted for further management.     Left pyelonephritis/UTI  --Continue Invanz. Just completed a course of Levaquin 1 week ago, wife reports that urine culture grew Morganella. Previous urine cultures in our system have been no growth. Urine culture this admission with <25,000 normal cande.  --Urology consulted. No intervention at this time. They recommended coverage for yeast seen on his UA--started PO Diflucan.  --Wife requesting Infectious Disease consult due to recurrent UTI and multiple antibiotic allergies--will consult Dr. Todd Jung today.    Hypokalemia  --Replace orally.     CKD 3  --Cr has been 1.5-1.7 since last discharge.  --Stable at 1.7 today, continue to monitor closely.     Hx of right ureteral  carcinoma  --s/p right nephrectomy and ureterectomy at [place] in Nov 2020.  --Follows with Dr. Montoya, during last visit they decided to stop chemotherapy secondary to poor tolerance/side effects.    PAF  --Eliquis stopped indefinitely last admission due to hematuria.  --Currently NSR.  --Continue Metoprolol.    HTN  CAD  Hx of AVR  --Continue ASA, Lipitor, Metoprolol.      DVT Prophylaxis:  mechanical      Disposition: I expect the patient to be discharged home possibly later today once ID has seen patient    CODE STATUS:   Code Status and Medical Interventions:   Ordered at: 04/03/21 1831     Level Of Support Discussed With:    Patient     Code Status:    CPR     Medical Interventions (Level of Support Prior to Arrest):    Full       Giuliana Fontana MD  04/05/21

## 2021-04-05 NOTE — PLAN OF CARE
Goal Outcome Evaluation:  Plan of Care Reviewed With: patient     Outcome Summary: Pt denies pain or nausea. VSS on roomair. Urinal in reach, monitoring.

## 2021-04-06 NOTE — OUTREACH NOTE
Call Center TCM Note      Responses   [facility] patient discharged from?  [facility]   Does the patient have one of the following disease processes/diagnoses(primary or secondary)?  Other   TCM attempt successful?  Yes   Call start time  1019   Call end time  1022   Discharge diagnosis  Left pyelonephritis/UTI, CKD   Is patient permission given to speak with other caregiver?  Yes   List who call center can speak with  spouse- Carey   Person spoke with today (if not patient) and relationship  spouse- Carey   Medication alerts for this patient  IV antibiotics at [facility]   Meds reviewed with patient/caregiver?  Yes   Is the patient having any side effects they believe may be caused by any medication additions or changes?  No   Does the patient have all medications ordered at discharge?  Yes   Is the patient taking all medications as directed (includes completed medication regime)?  Yes   Comments regarding appointments  f/u with Dr. Lauren this afternoon (4/6)   Does the patient have a primary care provider?   Yes   Does the patient have an appointment with their PCP within 7 days of discharge?  Yes   Comments regarding PCP  f/u with ANTIONE Cortez on 4/9   Has the patient kept scheduled appointments due by today?  N/A   Psychosocial issues?  No   Did the patient receive a copy of their discharge instructions?  Yes   Nursing interventions  Reviewed instructions with patient   What is the patient's perception of their health status since discharge?  Improving   Is the patient/caregiver able to teach back the hierarchy of who to call/visit for symptoms/problems? PCP, Specialist, Home health nurse, Urgent Care, ED, 911  Yes   TCM call completed?  Yes   Wrap up additional comments  Per spouse, patient is improving, they will be going to see Dr. Lauren this afternoon, spouse confirmed f/u appt with PCP on 4/9.          Monica Bo RN    4/6/2021, 10:22 EDT

## 2021-04-06 NOTE — OUTREACH NOTE
Prep Survey      Responses   [facility] patient discharged from?  [facility]   Is LACE score < 7 ?  No   Emergency Room discharge w/ pulse ox?  No   Eligibility  [facility]   Date of Admission  04/03/21   Date of Discharge  04/05/21   Discharge Disposition  Home or Self Care   Discharge diagnosis  Left pyelonephritis/UTI, CKD   Does the patient have one of the following disease processes/diagnoses(primary or secondary)?  Other   Does the patient have Home health ordered?  No   Is there a DME ordered?  No   Prep survey completed?  Yes          Eboni Raines RN

## 2021-04-06 NOTE — CONSULTS
INFECTIOUS DISEASE CONSULT/INITIAL HOSPITAL VISIT    Jair Flores  1945  [id number]    Date of Consult: 4/5/2021    Admission Date: 4/3/2021      Requesting Provider: No ref. provider found  Evaluating Physician: Todd Jung MD    Reason for Consultation: Left pyelonephritis    History of present illness:    Patient is a 75 y.o. male Who is seen today for evaluation of left pyelonephritis in the setting of urothelial cancer which initially involved the bladder in 2015 but subsequent to involve the right ureter and kidney, requiring a right nephroureterectomy at [facility] in 11/20.  He subsequently received cis-platinum/gemcitabine chemotherapy complicated by severe hemorrhagic cystitis with obstructive uropathy and acute renal failure.  His chemotherapy was then discontinued. Early last month he noted the onset of cloudy urine with some urinary frequency and discomfort, prompting initiation of Levaquin therapy.  By his wife's report, he grew Morganella from his urine culture.  I do not have these results at present.  He has now been off of Levaquin for approximately 2 weeks.  On Thursday he began feeling poorly with malaise and fatigue.  On Friday he developed subjective fevers, nausea, and chills along with increased urinary frequency and bladder spasms.  He denies back pain.  He presented to the emergency room on 4/3 where he was found to have pyuria with yeast in the urine.  He was started on intravenous Invanz and fluconazole therapy.  His maximum temperature has been 99.6° since admission.  His urine culture has grown less than 25,000 colonies of mixed cande which are currently not scheduled to be identified.He has decreased urinary frequency and dysuria.   He would like to go home today.    Past Medical History:   Diagnosis Date   • Acute gout of foot 4/29/2019   • Atrial fibrillation (CMS/HCC)     after valve replaced   • Cancer (CMS/HCC)     bladder; cysto surveillance 07/23/2017;  free of disease   • Cataract     right eye   • Degenerative arthritis     left; knee replacement; successful   • Elevated cholesterol    • Flash pulmonary edema (CMS/HCC) 4/29/2019    Episode of flash pulmonary edema thought to be related to volume overload at the time of left knee surgery in 2005 performed in [place] he has had no recurrence his EKG does show inferior Q waves which are unchanged.   • Hard to intubate     has been told he is difficult to intubate   • Hematuria 4/29/2019   • Hemorrhoids 09/12/2011    hemorrhoid ablation   • Hypertension    • BEATRIZ on CPAP    • Skin cancer     head   • Stroke (CMS/HCC) 05/2019    no residual   • Wears glasses        Past Surgical History:   Procedure Laterality Date   • AORTIC VALVE REPAIR/REPLACEMENT  09/2019   • CARDIAC CATHETERIZATION N/A 7/10/2019    Procedure: Right and Left Heart Cath;  Surgeon: Francisco Bo MD;  Location:  [facility];  Service: Cardiology   • COLONOSCOPY      routinely every 10 years   • CYSTOSCOPY      x3   • CYSTOSCOPY  07/23/2017    bladder cancer; cysto surveillance; free of disease   • CYSTOSCOPY BLADDER BIOPSY N/A 4/24/2020    Procedure: CYSTOSCOPY BLADDER BIOPSY;  Surgeon: Bobby Salmeron MD;  Location:  [facility];  Service: Urology;  Laterality: N/A;   • EXCISIONAL HEMORRHOIDECTOMY      hemorrhoid ablation; hemorrhoid onset 09/12/2011   • EYE SURGERY      as a child after cutting eye with barbwire fence   • INTERVENTIONAL RADIOLOGY PROCEDURE Bilateral 6/24/2019    Procedure: Carotid Cerebral Angiogram with possible stent placement;  Surgeon: Abad Mata MD;  Location:  [facility];  Service: Interventional Radiology   • KNEE ARTHROPLASTY, PARTIAL REPLACEMENT Left    • NH TCAT IV STENT CRV CRTD ART EMBOLIC PROTECJ N/A 6/24/2019    Procedure: Carotid Stent;  Surgeon: Abad Mata MD;  Location:  [facility];  Service: Interventional Radiology   • SKIN CANCER EXCISION      head    • TONSILLECTOMY     • TRANSURETHRAL RESECTION OF BLADDER TUMOR N/A 3/29/2019    Procedure: TRANSURETHRAL RESECTION OF BLADDER TUMOR WITH MITOMYCIN;  Surgeon: Jair Braxton MD;  Location:  [facility];  Service: Urology   • TRANSURETHRAL RESECTION OF BLADDER TUMOR N/A 4/24/2020    Procedure: TRANSURETHRAL RESECTION OF BLADDER TUMOR;  Surgeon: Bobby Salmeron MD;  Location:  [facility];  Service: Urology;  Laterality: N/A;       Family History   Problem Relation Age of Onset   • Hypertension Mother    • Hypertension Father    • Brain cancer Sister        Social History     Socioeconomic History   • Marital status:      Spouse name: Not on file   • Number of children: Not on file   • Years of education: Not on file   • Highest education level: Not on file   Tobacco Use   • Smoking status: Never Smoker   • Smokeless tobacco: Never Used   Substance and Sexual Activity   • Alcohol use: Not Currently   • Drug use: Defer   • Sexual activity: Defer       Allergies   Allergen Reactions   • Penicillins Anaphylaxis     Tolerates ertapenem   • Rocephin [Ceftriaxone] Anaphylaxis   • Ciprofloxacin Other (See Comments)     Tendonitis - received levofloxacin OP 2021   • Sotalol Other (See Comments)     Prolonged QT         Medication:  No current facility-administered medications for this encounter.    Current Outpatient Medications:   •  acetaminophen (TYLENOL) 325 MG tablet, Take 2 tablets by mouth Every 4 (Four) Hours As Needed for Mild Pain ., Disp:  , Rfl:   •  allopurinol (ZYLOPRIM) 100 MG tablet, Take 1 tablet by mouth Daily., Disp: 90 tablet, Rfl: 3  •  amLODIPine (NORVASC) 5 MG tablet, Take 1 tablet by mouth Daily., Disp: 30 tablet, Rfl: 11  •  Ascorbic Acid (VITAMIN C) 500 MG chewable tablet, Chew 800 mg Daily., Disp: , Rfl:   •  aspirin EC 81 MG EC tablet, Take 1 tablet by mouth Daily. (Patient taking differently: Take 81 mg by mouth Every Morning.), Disp: 100 tablet, Rfl: 4  •  atorvastatin (LIPITOR) 80 MG  tablet, TAKE 1 TABLET BY MOUTH EVERY NIGHT., Disp: 90 tablet, Rfl: 3  •  guaiFENesin (MUCINEX) 600 MG 12 hr tablet, Take 1,200 mg by mouth. prn, Disp: , Rfl:   •  melatonin 5 MG tablet tablet, Take 0.5 tablets by mouth Every Night., Disp:  , Rfl:   •  metoprolol tartrate (LOPRESSOR) 25 MG tablet, Take 1 tablet by mouth Every 12 (Twelve) Hours., Disp:  , Rfl:   •  Multiple Vitamins-Minerals (HM MULTIVITAMIN ADULT GUMMY) chewable tablet, Chew 1 tablet Daily., Disp: , Rfl:   •  ondansetron (ZOFRAN) 8 MG tablet, Take  by mouth Every 8 (Eight) Hours As Needed for Nausea or Vomiting., Disp: , Rfl:   •  tamsulosin (FLOMAX) 0.4 MG capsule 24 hr capsule, Take 1 capsule by mouth Daily., Disp: , Rfl:   •  [START ON 4/6/2021] ertapenem (INVanz) 1 g/100 mL 0.9% NS VTB (mbp), Infuse 100 mL into a venous catheter Daily. Indications: Urinary Tract Infection, Disp: 500 mL, Rfl: 0  •  [START ON 4/6/2021] fluconazole (DIFLUCAN) 200 MG tablet, Take 1 tablet by mouth Daily for 5 doses. Indications: Urinary Tract Infection, Disp: 5 tablet, Rfl: 0  •  loperamide (IMODIUM) 2 MG capsule, Take 1 capsule by mouth 4 (Four) Times a Day As Needed for Diarrhea., Disp:  , Rfl:     Antibiotics:  Anti-Infectives (From admission, onward)    Ordered     Dose/Rate Route Frequency Start Stop    04/05/21 1615  fluconazole (DIFLUCAN) 200 MG tablet     Ordering Provider: Giuliana Fontana MD    200 mg Oral Every 24 Hours 04/06/21 0000 04/11/21 2359    04/05/21 1615  ertapenem (INVanz) 1 g/100 mL 0.9% NS VTB (mbp)     Ordering Provider: Giuliana Fontana MD    1 g Intravenous Every 24 Hours 04/06/21 0000      04/03/21 1617  ertapenem (INVanz) 1 g/100 mL 0.9% NS VTB (mbp)     Ordering Provider: Lanie Brown PA    1 g  over 30 Minutes Intravenous Once 04/03/21 1618 04/03/21 4293            Review of Systems:  Constitutional-- He has had low-grade fever since admission to 99.6°.  He complains of malaise and fatigue.  HEENT-- No new vision, hearing or throat  complaints.  CV-- He has a history of valvular heart disease and underwent a bovine AVR at the [facility].  He had a brief episode of proximal atrial fibrillation.  Resp-- No SOB/cough  GI- He hadf some nausea but denies vomiting and diarrhea  -- He has complained of urinary urgency and dysuria.  Denies flank pain  MS-- no swelling or pain in the bones or joints of arms/legs.  No new back pain.  Neuro-- No acute focal weakness or numbness in the arms or legs.   Skin--No rashes or lesions      Physical Exam:   Vital Signs  Temp (24hrs), Av.5 °F (36.9 °C), Min:97.5 °F (36.4 °C), Max:99.6 °F (37.6 °C)    Temp  Min: 97.5 °F (36.4 °C)  Max: 99.6 °F (37.6 °C)  BP  Min: 107/69  Max: 123/81  Pulse  Min: 64  Max: 85  Resp  Min: 16  Max: 18  SpO2  Min: 93 %  Max: 97 %    GENERAL: Awake and alert, in no acute distress.   HEENT: Normocephalic, atraumatic.  PERRL. EOMI. No conjunctival injection. No icterus. Oropharynx clear without evidence of thrush or exudate.   NECK: Supple   HEART: Regular rate and rhythm.  1-2/6 systolic murmur  LUNGS: Clear to auscultation bilaterally without wheezing, rales, rhonchi. Normal respiratory effort. Nonlabored.  ABDOMEN: Soft, nontender, nondistended. No rebound or guarding.  EXT:  No cyanosis, clubbing or edema. No cord.  :  Without English catheter.  MSK:  No joint swelling or erythema  SKIN: Warm and dry without cutaneous eruptions on Inspection/palpation.    NEURO: Oriented to PPT.  Motor 5/5 strength bilaterally  PSYCHIATRIC: Normal insight and judgement. Cooperative with PE  Back: No CVA tenderness    Laboratory Data    Results from last 7 days   Lab Units 21  0652 21  0835 21  1416   WBC 10*3/mm3 7.40 7.81 9.30   HEMOGLOBIN g/dL 9.7* 9.5* 10.3*   HEMATOCRIT % 31.2* 30.3* 33.7*   PLATELETS 10*3/mm3 133* 141 161     Results from last 7 days   Lab Units 21  0652   SODIUM mmol/L 135*   POTASSIUM mmol/L 3.4*   CHLORIDE mmol/L 99   CO2 mmol/L 27.0   BUN  mg/dL 19   CREATININE mg/dL 1.73*   GLUCOSE mg/dL 105*   CALCIUM mg/dL 8.6     Results from last 7 days   Lab Units 04/04/21  0835   ALK PHOS U/L 74   BILIRUBIN mg/dL 0.5   ALT (SGPT) U/L 8   AST (SGOT) U/L 14             Results from last 7 days   Lab Units 04/03/21  1416   LACTATE mmol/L 1.0             Estimated Creatinine Clearance: 39.3 mL/min (A) (by C-G formula based on SCr of 1.73 mg/dL (H)).      Microbiology:  Blood Culture   Date Value Ref Range Status   04/03/2021 No growth at 2 days  Preliminary     No results found for: BCIDPCR, CXREFLEX, CSFCX, CULTURETIS  No results found for: CULTURES, HSVCX, URCX  No results found for: EYECULTURE, GCCX, HSVCULTURE, LABHSV  No results found for: LEGIONELLA, MRSACX, MUMPSCX, MYCOPLASCX  No results found for: NOCARDIACX, STOOLCX  Urine Culture   Date Value Ref Range Status   04/03/2021 <25,000 CFU/mL Mixed Yudy Isolated  Preliminary     No results found for: VIRALCULTU, WOUNDCX        Radiology:  Imaging Results (Last 72 Hours)     Procedure Component Value Units Date/Time    CT Abdomen Pelvis Without Contrast [[id number]] Collected: 04/03/21 1531     Updated: 04/03/21 2310    Narrative:      EXAMINATION: CT ABDOMEN/PELVIS WO CONTRAST - 04/03/2021      INDICATION: Nausea and fatigue.     TECHNIQUE: 5 mm unenhanced images through the abdomen and pelvis.     The radiation dose reduction device was turned on for each scan per the  ALARA (As Low as Reasonably Achievable) protocol.     COMPARISON: Whole-body PET/CT scan 01/08/2021.     FINDINGS: History indicates fatigue, nausea, history of renal failure  and ureteral cancer. Previous whole body PET/CT scan report indicated  bladder soft tissue mass. CT showed left ureteral and collecting system  dilatation.     Today's study shows persistent left ureteral and collecting system  dilatation with dilated ureter down to the level of the left  ureteropelvic junction. There is mild posterior bladder wall thickening.  No stone  is identified.     Today's exam shows mild new perinephric fat stranding which may  represent edema or active inflammation. There is a left renal midpole  cyst, present on the prior study. Right kidney is surgically absent.     Elsewhere, included lower lungs appear grossly clear. There are multiple  small hepatic cysts. No significant abnormalities are seen of the  gallbladder, pancreas, adrenal glands, or spleen. No free air, ascites  or adenopathy is seen. Bowel loops are normal in caliber and grossly  normal in appearance.     In the pelvis, there is a complex appearance of the bladder, which  appears to contain both calcification and fat in the anterior wall, as  well as posterior wall thickening, but this is stable from the prior  PET/CT scan study. Bladder is not significantly distended. No  intrapelvic free fluid or inflammatory change is seen. Bony structures  appear to be intact.       Impression:      1. Persistent relatively mild dilatation of the left renal collecting  system and left ureter down to the level of the bladder as on  01/08/2021/PET/CT scan.     2. New mild to moderate left perinephric edema or inflammation,  potentially representing infection. Please correlate with urinalysis.     3. Abnormal appearance of bladder as described, but unchanged from  previous study.     3. Stable hepatic cysts. No new intra-abdominal or intrapelvic disease  is seen elsewhere.     DICTATED:   04/03/2021  EDITED/ls :   04/03/2021        This report was finalized on 4/3/2021 11:07 PM by Dr. Jamie Herrera MD.       XR Chest 1 View [[id number]] Collected: 04/03/21 1429     Updated: 04/03/21 2212    Narrative:      EXAMINATION: XR CHEST 1 VW - 04/03/2021     INDICATION: Nausea and fatigue.     COMPARISON: 02/17/2021     FINDINGS: Sternotomy wires are noted. Heart is upper normal size.  Vasculature appears normal. There are mild chronic-appearing lung  changes and trace linear scarring at the left lung base.  Previously  noted right basilar atelectasis has resolved. No edema, effusion or  pneumothorax is seen.       Impression:      Small area of residual left basilar linear scarring,  unchanged. No evidence of active chest disease is seen.     DICTATED:   2021  EDITED/ls :   2021         This report was finalized on 4/3/2021 10:09 PM by Dr. Jamie Herrera MD.               Impression:   1.  Left pyelonephritis-his urine culture is grown mixed cande but I will have the urine culture isolate is identified.  His urinalysis suggests involvement with yeast.  I will plan to continue him on intravenous Invanz and fluconazole therapy. His recent outpatient urine culture grew Morganella but I am attempting to have this result forwarded to here.  2. Right urothelial carcinoma-status post right nephrectomy and ureterectomy at [facility] in .  3.  Bladder cancer-status post surgical intervention with treatment plans for BCG therapy in the future  4.  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  Paroxysmal atrial fibrillation-now off of anticoagulation  6.  Valvular heart disease/aortic stenosis-status post bovine AVR,   7.  History of hemorrhagic cystitis-secondary to Cis-platinum/gemcitabine and now off of chemotherapy      PLAN/RECOMMENDATIONS:   Thank you for asking us to see Jair Flores, I recommend the followin.  Urine culture-I have asked the microbiology lab to identify all of the isolates on his urine culture  2.  Continue Invanz 1 g IV daily  3.  Continue fluconazole 200 mg by mouth daily  4.  Discharge to home  5.  Follow-up with me tomorrow  at 1515-this appointment has been made    I discussed his complex situation in detail with Dr. Fontana.  I coordinated his care.  I will plan to see him for acute outpatient care in the office tomorrow.  I will plan for him to receive several days of outpatient intravenous antibiotic therapy along with oral  fluconazole pending further culture data.       Todd Jung MD  4/5/2021  20:59 EDT

## 2021-04-14 NOTE — OUTREACH NOTE
Medical Week 2 Survey      Responses   [facility] patient discharged from?  [place]   Does the patient have one of the following disease processes/diagnoses(primary or secondary)?  Other   Week 2 attempt successful?  No   Unsuccessful attempts  Attempt 1          Tsering Valadez RN

## 2021-04-16 NOTE — OUTREACH NOTE
Medical Week 2 Survey      Responses   [facility] patient discharged from?  [place]   Does the patient have one of the following disease processes/diagnoses(primary or secondary)?  Other   Week 2 attempt successful?  Yes   Call start time  1605   Discharge diagnosis  Left pyelonephritis/UTI, CKD   Call end time  1606   Is patient permission given to speak with other caregiver?  Yes   List who call center can speak with  spouse- Carey   Person spoke with today (if not patient) and relationship  spouse- Carey   Is the patient taking all medications as directed (includes completed medication regime)?  Yes   Has the patient kept scheduled appointments due by today?  Yes   What is the patient's perception of their health status since discharge?  Improving   Is the patient/caregiver able to teach back the hierarchy of who to call/visit for symptoms/problems? PCP, Specialist, Home health nurse, Urgent Care, ED, 911  Yes   Week 2 Call Completed?  Yes   Wrap up additional comments  Per spouse, patient is improving, no questions or concerns at this time.          Monica Bo RN

## 2021-05-04 NOTE — TELEPHONE ENCOUNTER
Caller: MARY     Relationship to patient: WIFE    Best call back number: [phone]    Chief complaint: MISSED HIS APPT    Type of visit: F/U    Requested date:  ANYTIME IN THE NEXT 2 WEEKS    If rescheduling, when is the original appointment:  5/04/21    Additional notes:  PLEASE CALL WITH NEW APPT DAY & TIME

## 2021-05-10 NOTE — PROGRESS NOTES
DATE OF VISIT: 5/10/2021    REASON FOR VISIT: Followup for right ureter carcinoma     HISTORY OF PRESENT ILLNESS: The patient is a very pleasant 76 y.o. 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  The patient was started adjuvant chemotherapy cisplatin gemcitabine January 19, 2021.  He completed 2 cycles and then stopped March 2021 secondary to poor tolerance to multiple side effects.  The patient is here today for scheduled follow-up visit.    SUBJECTIVE: The patient is here today with his wife.  Since the last time I saw him he was admitted to the hospital for 2 days with urinary tract infection.  He is doing significantly better.  He is off antibiotics currently.  Denies any bleeding.  He is still off Eliquis.    PAST MEDICAL HISTORY/SOCIAL HISTORY/FAMILY HISTORY: Reviewed by me and unchanged from my documentation done on 05/10/21.    Review of Systems   Constitutional: Positive for fatigue. Negative for activity change, appetite change, chills, fever and unexpected weight change.   HENT: Negative for hearing loss, mouth sores, nosebleeds, sore throat and trouble swallowing.         Hiccups   Eyes: Negative for visual disturbance.   Respiratory: Negative for cough, chest tightness, shortness of breath and wheezing.    Cardiovascular: Negative for chest pain, palpitations and leg swelling.   Gastrointestinal: Positive for constipation. Negative for abdominal distention, abdominal pain, blood in stool, diarrhea, nausea, rectal pain and vomiting.   Endocrine: Negative for cold intolerance and heat intolerance.   Genitourinary: Negative for difficulty urinating, dysuria, frequency and urgency.   Musculoskeletal: Negative for arthralgias, back pain, gait problem, joint swelling and myalgias.   Skin: Negative for rash.   Neurological: Positive for weakness. Negative for dizziness, tremors, syncope, light-headedness, numbness and headaches.   Hematological: Negative  for adenopathy. Does not bruise/bleed easily.   Psychiatric/Behavioral: Negative for confusion, sleep disturbance and suicidal ideas. The patient is not nervous/anxious.          Current Outpatient Medications:   •  acetaminophen (TYLENOL) 325 MG tablet, Take 2 tablets by mouth Every 4 (Four) Hours As Needed for Mild Pain ., Disp:  , Rfl:   •  allopurinol (ZYLOPRIM) 100 MG tablet, Take 1 tablet by mouth Daily., Disp: 90 tablet, Rfl: 3  •  amLODIPine (NORVASC) 5 MG tablet, Take 1 tablet by mouth Daily., Disp: 30 tablet, Rfl: 11  •  amLODIPine (NORVASC) 5 MG tablet, Take 1 tablet by mouth Daily., Disp: 30 tablet, Rfl: 2  •  Ascorbic Acid (VITAMIN C) 500 MG chewable tablet, Chew 800 mg Daily., Disp: , Rfl:   •  aspirin EC 81 MG EC tablet, Take 1 tablet by mouth Daily. (Patient taking differently: Take 81 mg by mouth Every Morning.), Disp: 100 tablet, Rfl: 4  •  atorvastatin (LIPITOR) 80 MG tablet, TAKE 1 TABLET BY MOUTH EVERY NIGHT., Disp: 90 tablet, Rfl: 3  •  ertapenem (INVanz) 1 g/100 mL 0.9% NS VTB (mbp), Infuse 100 mL into a venous catheter Daily. Indications: Urinary Tract Infection, Disp: 500 mL, Rfl: 0  •  guaiFENesin (MUCINEX) 600 MG 12 hr tablet, Take 1,200 mg by mouth. prn, Disp: , Rfl:   •  loperamide (IMODIUM) 2 MG capsule, Take 1 capsule by mouth 4 (Four) Times a Day As Needed for Diarrhea., Disp:  , Rfl:   •  melatonin 5 MG tablet tablet, Take 0.5 tablets by mouth Every Night., Disp:  , Rfl:   •  metoprolol tartrate (LOPRESSOR) 25 MG tablet, Take 1 tablet by mouth Every 12 (Twelve) Hours., Disp: 60 tablet, Rfl: 11  •  Multiple Vitamins-Minerals (HM MULTIVITAMIN ADULT GUMMY) chewable tablet, Chew 1 tablet Daily., Disp: , Rfl:   •  ondansetron (ZOFRAN) 8 MG tablet, Take  by mouth Every 8 (Eight) Hours As Needed for Nausea or Vomiting., Disp: , Rfl:   •  tamsulosin (FLOMAX) 0.4 MG capsule 24 hr capsule, Take 1 capsule by mouth Daily., Disp: , Rfl:     PHYSICAL EXAMINATION:   /83   Pulse 61   Temp 98  "°F (36.7 °C) (Temporal)   Resp 18   Ht 177.8 cm (70\")   Wt 82.6 kg (182 lb)   SpO2 97%   BMI 26.11 kg/m²    Pain Score    05/10/21 1502   PainSc: 0-No pain       ECOG Performance Status: 1 - Symptomatic but completely ambulatory  General Appearance:  alert, cooperative, no apparent distress and appears stated age   Neurologic/Psychiatric: A&O x 3, gait steady, appropriate affect, strength 5/5 in all muscle groups   HEENT:  Normocephalic, without obvious abnormality, mucous membranes moist   Neck: Supple, symmetrical, trachea midline, no adenopathy;  No thyromegaly, masses, or tenderness   Lungs:   Clear to auscultation bilaterally; respirations regular, even, and unlabored bilaterally   Heart:  Regular rate and rhythm, no murmurs appreciated   Abdomen:   Soft, non-tender, non-distended and no organomegaly   Lymph nodes: No cervical, supraclavicular, inguinal or axillary adenopathy noted   Extremities: Normal, atraumatic; no clubbing, cyanosis, or edema    Skin: No rashes, ulcers, or suspicious lesions noted     No visits with results within 2 Week(s) from this visit.   Latest known visit with results is:   Admission on 04/03/2021, Discharged on 04/05/2021   Component Date Value Ref Range Status   • Glucose 04/03/2021 136* 65 - 99 mg/dL Final   • BUN 04/03/2021 14  8 - 23 mg/dL Final   • Creatinine 04/03/2021 1.47* 0.76 - 1.27 mg/dL Final   • Sodium 04/03/2021 135* 136 - 145 mmol/L Final   • Potassium 04/03/2021 3.9  3.5 - 5.2 mmol/L Final   • Chloride 04/03/2021 99  98 - 107 mmol/L Final   • CO2 04/03/2021 25.0  22.0 - 29.0 mmol/L Final   • Calcium 04/03/2021 9.0  8.6 - 10.5 mg/dL Final   • Total Protein 04/03/2021 7.4  6.0 - 8.5 g/dL Final   • Albumin 04/03/2021 3.80  3.50 - 5.20 g/dL Final   • ALT (SGPT) 04/03/2021 9  1 - 41 U/L Final   • AST (SGOT) 04/03/2021 16  1 - 40 U/L Final   • Alkaline Phosphatase 04/03/2021 89  39 - 117 U/L Final   • Total Bilirubin 04/03/2021 0.6  0.0 - 1.2 mg/dL Final   • eGFR Non "  Amer 04/03/2021 47* >60 mL/min/1.73 Final   • Globulin 04/03/2021 3.6  gm/dL Final   • A/G Ratio 04/03/2021 1.1  g/dL Final   • BUN/Creatinine Ratio 04/03/2021 9.5  7.0 - 25.0 Final   • Anion Gap 04/03/2021 11.0  5.0 - 15.0 mmol/L Final   • Lipase 04/03/2021 21  13 - 60 U/L Final   • Color, UA 04/03/2021 Yellow  Yellow, Straw Final   • Appearance, UA 04/03/2021 Turbid* Clear Final   • pH, UA 04/03/2021 7.0  5.0 - 8.0 Final   • Specific Gravity, UA 04/03/2021 1.011  1.001 - 1.030 Final   • Glucose, UA 04/03/2021 Negative  Negative Final   • Ketones, UA 04/03/2021 Negative  Negative Final   • Bilirubin, UA 04/03/2021 Negative  Negative Final   • Blood, UA 04/03/2021 Moderate (2+)* Negative Final   • Protein, UA 04/03/2021 100 mg/dL (2+)* Negative Final   • Leuk Esterase, UA 04/03/2021 Large (3+)* Negative Final   • Nitrite, UA 04/03/2021 Negative  Negative Final   • Urobilinogen, UA 04/03/2021 0.2 E.U./dL  0.2 - 1.0 E.U./dL Final   • Lactate 04/03/2021 1.0  0.5 - 2.0 mmol/L Final    Falsely depressed results may occur on samples drawn from patients receiving N-Acetylcysteine (NAC) or Metamizole.   • Extra Tube 04/03/2021 hold for add-on   Final    Auto resulted   • Extra Tube 04/03/2021 Hold for add-ons.   Final    Auto resulted.   • Extra Tube 04/03/2021 hold for add-on   Final    Auto resulted   • Extra Tube 04/03/2021 Hold for add-ons.   Final    Auto resulted.   • WBC 04/03/2021 9.30  3.40 - 10.80 10*3/mm3 Final   • RBC 04/03/2021 3.74* 4.14 - 5.80 10*6/mm3 Final   • Hemoglobin 04/03/2021 10.3* 13.0 - 17.7 g/dL Final   • Hematocrit 04/03/2021 33.7* 37.5 - 51.0 % Final   • MCV 04/03/2021 90.1  79.0 - 97.0 fL Final   • MCH 04/03/2021 27.5  26.6 - 33.0 pg Final   • MCHC 04/03/2021 30.6* 31.5 - 35.7 g/dL Final   • RDW 04/03/2021 19.6* 12.3 - 15.4 % Final   • RDW-SD 04/03/2021 65.4* 37.0 - 54.0 fl Final   • MPV 04/03/2021 9.4  6.0 - 12.0 fL Final   • Platelets 04/03/2021 161  140 - 450 10*3/mm3 Final   •  Neutrophil % 04/03/2021 68.1  42.7 - 76.0 % Final   • Lymphocyte % 04/03/2021 22.4  19.6 - 45.3 % Final   • Monocyte % 04/03/2021 8.8  5.0 - 12.0 % Final   • Eosinophil % 04/03/2021 0.2* 0.3 - 6.2 % Final   • Basophil % 04/03/2021 0.3  0.0 - 1.5 % Final   • Immature Grans % 04/03/2021 0.2  0.0 - 0.5 % Final   • Neutrophils, Absolute 04/03/2021 6.33  1.70 - 7.00 10*3/mm3 Final   • Lymphocytes, Absolute 04/03/2021 2.08  0.70 - 3.10 10*3/mm3 Final   • Monocytes, Absolute 04/03/2021 0.82  0.10 - 0.90 10*3/mm3 Final   • Eosinophils, Absolute 04/03/2021 0.02  0.00 - 0.40 10*3/mm3 Final   • Basophils, Absolute 04/03/2021 0.03  0.00 - 0.20 10*3/mm3 Final   • Immature Grans, Absolute 04/03/2021 0.02  0.00 - 0.05 10*3/mm3 Final   • nRBC 04/03/2021 0.0  0.0 - 0.2 /100 WBC Final   • RBC, UA 04/03/2021 13-20* None Seen, 0-2 /HPF Final   • WBC, UA 04/03/2021 Too Numerous to Count* None Seen, 0-2 /HPF Final   • Bacteria, UA 04/03/2021 Trace  None Seen, Trace /HPF Final   • Squamous Epithelial Cells, UA 04/03/2021 None Seen  None Seen, 0-2 /HPF Final   • Hyaline Casts, UA 04/03/2021 0-6  0 - 6 /LPF Final   • Methodology 04/03/2021 Manual Light Microscopy   Final   • Blood Culture 04/03/2021 No growth at 5 days   Final   • Blood Culture 04/03/2021 No growth at 5 days   Final   • Procalcitonin 04/03/2021 0.20  0.00 - 0.25 ng/mL Final   • proBNP 04/03/2021 1,714.0* 0.0 - 900.0 pg/mL Final   • Troponin T 04/03/2021 <0.010  0.000 - 0.030 ng/mL Final   • QT Interval 04/03/2021 378  ms Final   • QTC Interval 04/03/2021 430  ms Final   • COVID19 04/03/2021 Presumptive Negative  Presumptive Negative - Ref. Range Final   • Color, UA 04/03/2021 Yellow  Yellow, Straw Final   • Appearance, UA 04/03/2021 Cloudy* Clear Final   • pH, UA 04/03/2021 8.0  5.0 - 8.0 Final   • Specific Gravity, UA 04/03/2021 1.008  1.001 - 1.030 Final   • Glucose, UA 04/03/2021 Negative  Negative Final   • Ketones, UA 04/03/2021 Negative  Negative Final   • Bilirubin,  UA 04/03/2021 Negative  Negative Final   • Blood, UA 04/03/2021 Moderate (2+)* Negative Final   • Protein, UA 04/03/2021 100 mg/dL (2+)* Negative Final   • Leuk Esterase, UA 04/03/2021 Large (3+)* Negative Final   • Nitrite, UA 04/03/2021 Negative  Negative Final   • Urobilinogen, UA 04/03/2021 0.2 E.U./dL  0.2 - 1.0 E.U./dL Final   • RBC, UA 04/03/2021 13-20* None Seen, 0-2 /HPF Final   • WBC, UA 04/03/2021 Too Numerous to Count* None Seen, 0-2 /HPF Final   • Bacteria, UA 04/03/2021 None Seen  None Seen, Trace /HPF Final   • Squamous Epithelial Cells, UA 04/03/2021 None Seen  None Seen, 0-2 /HPF Final   • Yeast, UA 04/03/2021 Moderate/2+ Budding Yeast  None Seen /HPF Final   • Hyaline Casts, UA 04/03/2021 0-6  0 - 6 /LPF Final   • Methodology 04/03/2021 Manual Light Microscopy   Final   • Urine Culture 04/03/2021 <25,000 CFU/mL Morganella morganii ssp morganii*  Corrected   • Urine Culture 04/03/2021 <10,000 CFU/mL Staphylococcus, coagulase negative*  Corrected   • WBC 04/04/2021 7.81  3.40 - 10.80 10*3/mm3 Final   • RBC 04/04/2021 3.44* 4.14 - 5.80 10*6/mm3 Final   • Hemoglobin 04/04/2021 9.5* 13.0 - 17.7 g/dL Final   • Hematocrit 04/04/2021 30.3* 37.5 - 51.0 % Final   • MCV 04/04/2021 88.1  79.0 - 97.0 fL Final   • MCH 04/04/2021 27.6  26.6 - 33.0 pg Final   • MCHC 04/04/2021 31.4* 31.5 - 35.7 g/dL Final   • RDW 04/04/2021 19.1* 12.3 - 15.4 % Final   • RDW-SD 04/04/2021 62.0* 37.0 - 54.0 fl Final   • MPV 04/04/2021 8.7  6.0 - 12.0 fL Final   • Platelets 04/04/2021 141  140 - 450 10*3/mm3 Final   • Neutrophil % 04/04/2021 61.2  42.7 - 76.0 % Final   • Lymphocyte % 04/04/2021 27.0  19.6 - 45.3 % Final   • Monocyte % 04/04/2021 11.3  5.0 - 12.0 % Final   • Eosinophil % 04/04/2021 0.1* 0.3 - 6.2 % Final   • Basophil % 04/04/2021 0.1  0.0 - 1.5 % Final   • Immature Grans % 04/04/2021 0.3  0.0 - 0.5 % Final   • Neutrophils, Absolute 04/04/2021 4.78  1.70 - 7.00 10*3/mm3 Final   • Lymphocytes, Absolute 04/04/2021 2.11   0.70 - 3.10 10*3/mm3 Final   • Monocytes, Absolute 04/04/2021 0.88  0.10 - 0.90 10*3/mm3 Final   • Eosinophils, Absolute 04/04/2021 0.01  0.00 - 0.40 10*3/mm3 Final   • Basophils, Absolute 04/04/2021 0.01  0.00 - 0.20 10*3/mm3 Final   • Immature Grans, Absolute 04/04/2021 0.02  0.00 - 0.05 10*3/mm3 Final   • nRBC 04/04/2021 0.0  0.0 - 0.2 /100 WBC Final   • Glucose 04/04/2021 124* 65 - 99 mg/dL Final   • BUN 04/04/2021 18  8 - 23 mg/dL Final   • Creatinine 04/04/2021 1.73* 0.76 - 1.27 mg/dL Final   • Sodium 04/04/2021 137  136 - 145 mmol/L Final   • Potassium 04/04/2021 3.7  3.5 - 5.2 mmol/L Final   • Chloride 04/04/2021 100  98 - 107 mmol/L Final   • CO2 04/04/2021 26.0  22.0 - 29.0 mmol/L Final   • Calcium 04/04/2021 8.2* 8.6 - 10.5 mg/dL Final   • Total Protein 04/04/2021 6.3  6.0 - 8.5 g/dL Final   • Albumin 04/04/2021 3.20* 3.50 - 5.20 g/dL Final   • ALT (SGPT) 04/04/2021 8  1 - 41 U/L Final   • AST (SGOT) 04/04/2021 14  1 - 40 U/L Final   • Alkaline Phosphatase 04/04/2021 74  39 - 117 U/L Final   • Total Bilirubin 04/04/2021 0.5  0.0 - 1.2 mg/dL Final   • eGFR Non African Amer 04/04/2021 39* >60 mL/min/1.73 Final   • Globulin 04/04/2021 3.1  gm/dL Final   • A/G Ratio 04/04/2021 1.0  g/dL Final   • BUN/Creatinine Ratio 04/04/2021 10.4  7.0 - 25.0 Final   • Anion Gap 04/04/2021 11.0  5.0 - 15.0 mmol/L Final   • Magnesium 04/04/2021 1.7  1.6 - 2.4 mg/dL Final   • Glucose 04/05/2021 105* 65 - 99 mg/dL Final   • BUN 04/05/2021 19  8 - 23 mg/dL Final   • Creatinine 04/05/2021 1.73* 0.76 - 1.27 mg/dL Final   • Sodium 04/05/2021 135* 136 - 145 mmol/L Final   • Potassium 04/05/2021 3.4* 3.5 - 5.2 mmol/L Final   • Chloride 04/05/2021 99  98 - 107 mmol/L Final   • CO2 04/05/2021 27.0  22.0 - 29.0 mmol/L Final   • Calcium 04/05/2021 8.6  8.6 - 10.5 mg/dL Final   • eGFR Non African Amer 04/05/2021 39* >60 mL/min/1.73 Final   • BUN/Creatinine Ratio 04/05/2021 11.0  7.0 - 25.0 Final   • Anion Gap 04/05/2021 9.0  5.0 - 15.0  mmol/L Final   • WBC 04/05/2021 7.40  3.40 - 10.80 10*3/mm3 Final   • RBC 04/05/2021 3.42* 4.14 - 5.80 10*6/mm3 Final   • Hemoglobin 04/05/2021 9.7* 13.0 - 17.7 g/dL Final   • Hematocrit 04/05/2021 31.2* 37.5 - 51.0 % Final   • MCV 04/05/2021 91.2  79.0 - 97.0 fL Final   • MCH 04/05/2021 28.4  26.6 - 33.0 pg Final   • MCHC 04/05/2021 31.1* 31.5 - 35.7 g/dL Final   • RDW 04/05/2021 18.6* 12.3 - 15.4 % Final   • RDW-SD 04/05/2021 63.2* 37.0 - 54.0 fl Final   • MPV 04/05/2021 9.4  6.0 - 12.0 fL Final   • Platelets 04/05/2021 133* 140 - 450 10*3/mm3 Final        No results found.    ASSESSMENT: The patient is a very pleasant 76 y.o. male  with high-grade urothelial carcinoma    PROBLEM LIST:   1.  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  Status post right nephrectomy with ureterectomy done at [facility] November 30, 2020  B.  Pathology showed positive posterior margin  C.  Molecular testing revealed PD-L1 combined positive score at 10%, TSC 1 mutation.    D. Started adjuvant chemotherapy with cisplatin Gemzar January 19, 2021, status post 2 cycles completed March 2021 stopped secondary to multiple side effects and poor tolerance.  2.  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  Status post right nephrectomy done November 30, 2020 with a clear surgical margins  3.  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 Status post multiple resections with mitomycin-C injection in the past  B.  Status post resection for local recurrence November 30, 2020  4.  Atrial fibrillation  5.  Valvular heart disease  6.  Hypertension    PLAN:  1.  I will continue to hold off chemotherapy at this point.  2.  The patient scheduled to follow-up with [facility] next month with repeated CT scans.  I will follow up on the results.  3.  He will follow-up with me in 3 months with repeated labs.  4.  I Future treatment options  might include immunotherapy given the fact that he had PD-L1 positive disease.  5. I will continue to monitor the patient's blood work including blood counts, kidney function, liver functions, and electrolytes.  I will check his labs today and I will call him with any significant abnormalities.  6.  The patient will continue to hold Eliquis secondary to hematuria visible as the need for multiple urology procedures.  7.  We will continue the patient on Zofran 8 mg every 8 hours as needed for chemotherapy-induced nausea.  8.  The patient will continue to monitor his blood pressure at home.  He is on as needed treatment.  He will follow-up with Dr. Clemons.    Rosemarie Montoya MD  5/10/2021

## 2021-05-18 NOTE — PROGRESS NOTES
OFFICE FOLLOW UP     Date of Encounter:2021     Name: Jair Flores  : 1945  Address: [facility] [place] [address]    PCP: Osmar Clemons MD  [address]  [place] [id number]    Jair Flores is a 76 y.o. male.      Chief Complaint: Follow up of PAF, VHD, carotid stenosis    Problem List:   1. Aortic insufficiency  A.  Severe AI by TTE 2019, EF 61-65%  B.  BRENDON 19: EF 55%, prolapse of right coronary leaflet with mod-severe AI  C.  AVR with 27 mm Medtronic bovine pericardial AV, 2019 ([place])  2. Atrial fibrillation with RVR and CHF 2019  A. BRENDON 2019: EF 30%, mild-mod MR, moderate TR, LA mod-severely dilated, bioprosthetic aortic valve grossly normal   B. Sotalol not tolerated due to prolonged QT  C. Lifevest placed at discharge.  D. Echo, 2020: EF 63%  E. Eliquis and Entresto discontinued, 3/2021 due to hematuria and OSEI  3. Carotid artery stenosis   A. Recurrent left hemispheric ischemic CVA 2019  B. Carotid duplex 19: LAURYN 50-69%, LICA <50%  C. Echo 19: EF 61-65%, LA is mod dilated, Severe AI, negative bubble study   D. Negative TCD, no Afib on monitor   E. Readmission 6/10 with recurrent right foot and arm paresthesias   i. MRI brain 6/10/19: multiple punctate foci along left hemisphere, suggestive of embolic infarcts  F. Carotid MR ([facility] read): large LICA ulcerated plaque with very large hemorrhagic component and modest sized lipid rich core.  G. LICA stent placement 2019, AMARA Mata MD  4. Coronary artery disease, nonobstructive by cath, 2019.  5. Hypertension   6. Hyperlipidemia  7. BEATRIZ on CPAP   8. History of bladder cancer, s/p resection, followed by Dr. Fox SPEARS TURDHAVAL 2020 - necrotic material with papillary urothelial carcinoma              B. Subsequent renal cancer diagnosis, [place] 2020              C. Right robotic nephro-uretectomy, 2020  9. Surgical  history:              A. Left knee partial replacement              B. Bladder tumor resection   C. Right nephrectomy  10.  Sepsis with 2 admissions, Spring 2021   A. ABX Infusions completed with Dr. Jung  11.  CKD II with solitary kidney   A. Unknown etiology, possible tubular injury from gross bleeding, Cisplatin, and/or infection, 3/2021    Allergies:  Allergies   Allergen Reactions   • Penicillins Anaphylaxis     Tolerates ertapenem   • Rocephin [Ceftriaxone] Anaphylaxis   • Ciprofloxacin Other (See Comments)     Tendonitis - received levofloxacin OP 2021   • Sotalol Other (See Comments)     Prolonged QT       Current Medications:  Current Outpatient Medications   Medication Instructions   • acetaminophen (TYLENOL) 650 mg, Oral, Every 4 Hours PRN   • allopurinol (ZYLOPRIM) 100 mg, Oral, Daily   • amLODIPine (NORVASC) 5 mg, Oral, Daily   • aspirin 81 mg, Oral, Daily   • atorvastatin (LIPITOR) 80 mg, Oral, Nightly   • metoprolol tartrate (LOPRESSOR) 25 mg, Oral, Every 12 Hours Scheduled   • Multiple Vitamins-Minerals (HM MULTIVITAMIN ADULT GUMMY) chewable tablet 1 tablet, Oral, Daily   • Vitamin C 800 mg, Oral, Daily        History of Present Illness:    Jair Flores returns for scheduled follow up today accompanied by his wife. He has underwent right nephrectomy at [facility] and been admitted twice this year for sepsis. He developed OSEI on CKD as well and Entresto and Eliquis have been discontinued. He denies symptoms of angina or heart failure. He denies tachy palpitations suggesting atrial fibrillation. He is checking home blood pressures and heart rates and BP has been trending up from 140-150. His metoprolol has been reduced as well due to fatigue and low heart rates. He and his wife have received both COVID vaccines.          The following portions of the patient's history were reviewed and updated as appropriate: allergies, current medications and problem list.    ROS: Pertinent positives as listed in  "the HPI.  All other systems reviewed and negative.    Objective:    Vitals:    05/18/21 1414 05/18/21 1415   BP: 152/74 159/93   BP Location: Right arm Right arm   Patient Position: Sitting Standing   Pulse: 62 66   SpO2: 98%    Weight: 82.6 kg (182 lb)    Height: 177.8 cm (70\")      Body mass index is 26.11 kg/m².    Physical Exam:  GENERAL: Alert, cooperative, in no acute distress.   HEENT: Normocephalic, no adenopathy, no jugular venous distention  HEART: No discrete PMI is noted. Regular rhythm, normal rate, and no murmurs, gallops, or rubs.   LUNGS: Clear to auscultation bilaterally. No wheezing, rales or ronchi.  ABDOMEN: Soft, bowel sounds present, non-tender   NEUROLOGIC: No focal abnormalities involving strength or sensation are noted.   EXTREMITIES: No clubbing, cyanosis, or edema noted.      Diagnostic Data:    Lab Results   Component Value Date    GLUCOSE 127 (H) 05/10/2021    BUN 28 (H) 05/10/2021    CREATININE 1.55 (H) 05/10/2021    EGFRIFNONA 44 (L) 05/10/2021    EGFRIFAFRI  02/19/2021      Comment:      <15 Indicative of kidney failure.    BCR 18.1 05/10/2021    K 3.9 05/10/2021    CO2 25.0 05/10/2021    CALCIUM 9.7 05/10/2021    ALBUMIN 4.20 05/10/2021    AST 27 05/10/2021    ALT 12 05/10/2021      Lab Results   Component Value Date    WBC 6.80 05/10/2021    HGB 11.4 (L) 05/10/2021    HCT 34.2 (L) 05/10/2021    MCV 87.2 05/10/2021     05/10/2021           proBNP                                                          1,714                                 04/03/2021    Procedures        Assessment and Plan:   1.  VHD:  Denies angina and heart failure. We will check echocardiogram for valve function and EF.   2.  HTN:  Elevated at home and in office today. We will increase amlodipine to 10 mg daily. He will continue to check home blood pressures.   3.  CKD: Solitary kidney with Creatinine of 1.55 last week. Creatinine trending down but he is not followed by nephrology. We will await results of " echocardiogram and discuss follow up with nephrology at that time.   4.  PAF: No tachy palpitations or irregular heart rate appreciated. We need to check afib burden with 30 day MCOT as he is off anticoagulation due to hematuria and OSEI.   5.  History of cardiomyopathy: He has been off Entresto for about 3 months. EF in January 2020 was normal. Uncertain if improvement attributed to Entresto or recovery from valve replacement and/or resolution of tachycardia-mediated cardiomyopathy (afib with RVR). We will call results of echocardiogram to him.    I will see Jair Flores back in 6 months or sooner on an as needed basis.    Scribed for Francisco Bo MD by Nicki Mckeon RN. 05/18/2021 16:00 EDT.     EMR Dragon/Transcription Disclaimer:  Much of this encounter note is an electronic transcription/translation of spoken language to printed text.  The electronic translation of spoken language may permit erroneous, or at times, nonsensical words or phrases to be inadvertently transcribed.  Although I have reviewed the note for such errors, some may still exist.

## 2021-06-02 NOTE — TELEPHONE ENCOUNTER
Bina at the urology clinic at [place] calls.  Patient is scheduled for a procedure on 6/18/21 and because of his history of UTIs, they are faxing an order for a UA C&S for patient to collect today or tomorrow.  I guess we need to order this and then fax them results.  Jose has also contacted his ID doctor to treat if the UA is positive.

## 2021-06-21 PROBLEM — M89.8X9 BONE DISEASE, METABOLIC: Status: ACTIVE | Noted: 2021-01-01

## 2021-06-21 PROBLEM — C67.4 MALIGNANT NEOPLASM OF POSTERIOR WALL OF URINARY BLADDER (HCC): Status: ACTIVE | Noted: 2021-01-01

## 2021-06-21 NOTE — PROGRESS NOTES
DATE OF VISIT: 6/21/2021    REASON FOR VISIT: Followup for right ureter carcinoma     HISTORY OF PRESENT ILLNESS: The patient is a very pleasant 76 y.o. 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  The patient was started adjuvant chemotherapy cisplatin gemcitabine January 19, 2021.  He completed 2 cycles and then stopped March 2021 secondary to poor tolerance to multiple side effects.  The patient is here today for scheduled follow-up visit.    SUBJECTIVE: The patient is here today with his wife.  He is complaining of fatigue.  He was recently at [facility] where he had CT scan that revealed diffuse metastatic disease.  Is been devastated with the news.    PAST MEDICAL HISTORY/SOCIAL HISTORY/FAMILY HISTORY: Reviewed by me and unchanged from my documentation done on 06/21/21.    Review of Systems   Constitutional: Positive for fatigue. Negative for activity change, appetite change, chills, fever and unexpected weight change.   HENT: Negative for hearing loss, mouth sores, nosebleeds, sore throat and trouble swallowing.         Hiccups   Eyes: Negative for visual disturbance.   Respiratory: Negative for cough, chest tightness, shortness of breath and wheezing.    Cardiovascular: Negative for chest pain, palpitations and leg swelling.   Gastrointestinal: Positive for constipation. Negative for abdominal distention, abdominal pain, blood in stool, diarrhea, nausea, rectal pain and vomiting.   Endocrine: Negative for cold intolerance and heat intolerance.   Genitourinary: Negative for difficulty urinating, dysuria, frequency and urgency.   Musculoskeletal: Negative for arthralgias, back pain, gait problem, joint swelling and myalgias.   Skin: Negative for rash.   Neurological: Positive for weakness. Negative for dizziness, tremors, syncope, light-headedness, numbness and headaches.   Hematological: Negative for adenopathy. Does not bruise/bleed easily.  "  Psychiatric/Behavioral: Negative for confusion, sleep disturbance and suicidal ideas. The patient is not nervous/anxious.          Current Outpatient Medications:   •  ibuprofen (ADVIL,MOTRIN) 400 MG tablet, Take 400 mg by mouth Every 6 (Six) Hours As Needed., Disp: , Rfl:   •  oxybutynin (DITROPAN) 5 MG tablet, Take 5 mg by mouth 3 (Three) Times a Day., Disp: , Rfl:   •  acetaminophen (TYLENOL) 325 MG tablet, Take 2 tablets by mouth Every 4 (Four) Hours As Needed for Mild Pain ., Disp:  , Rfl:   •  allopurinol (ZYLOPRIM) 100 MG tablet, Take 1 tablet by mouth Daily., Disp: 90 tablet, Rfl: 3  •  amLODIPine (NORVASC) 10 MG tablet, Take 1 tablet by mouth Daily., Disp: 30 tablet, Rfl: 11  •  Ascorbic Acid (VITAMIN C) 500 MG chewable tablet, Chew 800 mg Daily., Disp: , Rfl:   •  aspirin EC 81 MG EC tablet, Take 1 tablet by mouth Daily. (Patient taking differently: Take 81 mg by mouth Every Morning.), Disp: 100 tablet, Rfl: 4  •  atorvastatin (LIPITOR) 80 MG tablet, TAKE 1 TABLET BY MOUTH EVERY NIGHT., Disp: 90 tablet, Rfl: 3  •  metoprolol tartrate (LOPRESSOR) 25 MG tablet, Take 1 tablet by mouth Every 12 (Twelve) Hours., Disp: 60 tablet, Rfl: 11  •  Multiple Vitamins-Minerals (HM MULTIVITAMIN ADULT GUMMY) chewable tablet, Chew 1 tablet Daily., Disp: , Rfl:     PHYSICAL EXAMINATION:   /83   Pulse 87   Temp 97.7 °F (36.5 °C) (Temporal)   Resp 18   Ht 177.8 cm (70\")   Wt 79.4 kg (175 lb)   SpO2 95%   BMI 25.11 kg/m²    Pain Score    06/21/21 1539   PainSc: 0-No pain       ECOG Performance Status: 1 - Symptomatic but completely ambulatory  General Appearance:  alert, cooperative, no apparent distress and appears stated age   Neurologic/Psychiatric: A&O x 3, gait steady, appropriate affect, strength 5/5 in all muscle groups   HEENT:  Normocephalic, without obvious abnormality, mucous membranes moist   Neck: Supple, symmetrical, trachea midline, no adenopathy;  No thyromegaly, masses, or tenderness   Lungs:   " Clear to auscultation bilaterally; respirations regular, even, and unlabored bilaterally   Heart:  Regular rate and rhythm, no murmurs appreciated   Abdomen:   Soft, non-tender, non-distended and no organomegaly   Lymph nodes: No cervical, supraclavicular, inguinal or axillary adenopathy noted   Extremities: Normal, atraumatic; no clubbing, cyanosis, or edema    Skin: No rashes, ulcers, or suspicious lesions noted     No visits with results within 2 Week(s) from this visit.   Latest known visit with results is:   Lab on 06/04/2021   Component Date Value Ref Range Status   • Color, UA 06/04/2021 Yellow  Yellow, Straw Final   • Appearance, UA 06/04/2021 Turbid* Clear Final   • pH, UA 06/04/2021 6.0  5.0 - 8.0 Final   • Specific Gravity, UA 06/04/2021 1.025  1.005 - 1.030 Final   • Glucose, UA 06/04/2021 Negative  Negative Final   • Ketones, UA 06/04/2021 Negative  Negative Final   • Bilirubin, UA 06/04/2021 Negative  Negative Final   • Blood, UA 06/04/2021 Large (3+)* Negative Final   • Protein, UA 06/04/2021 100 mg/dL (2+)* Negative Final   • Leuk Esterase, UA 06/04/2021 Large (3+)* Negative Final   • Nitrite, UA 06/04/2021 Negative  Negative Final   • Urobilinogen, UA 06/04/2021 0.2 E.U./dL  0.2 - 1.0 E.U./dL Final   • Urine Culture 06/04/2021 No growth   Final   • RBC, UA 06/04/2021 Unable to determine due to loaded field* None Seen, 0-2 /HPF Final   • WBC, UA 06/04/2021 Too Numerous to Count* None Seen, 0-2 /HPF Final   • Bacteria, UA 06/04/2021 Unable to determine due to loaded field* None Seen /HPF Final   • Squamous Epithelial Cells, UA 06/04/2021 Unable to determine due to loaded field* None Seen, 0-2 /HPF Final   • Hyaline Casts, UA 06/04/2021 Unable to determine due to loaded field  None Seen /LPF Final   • Methodology 06/04/2021 Manual Light Microscopy   Final        No results found.    ASSESSMENT: The patient is a very pleasant 76 y.o. male  with high-grade urothelial carcinoma    PROBLEM LIST:   1.   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  Status post right nephrectomy with ureterectomy done at [facility] November 30, 2020  B.  Pathology showed positive posterior margin  C.  Molecular testing revealed PD-L1 combined positive score at 10%, TSC 1 mutation.    D. Started adjuvant chemotherapy with cisplatin Gemzar January 19, 2021, status post 2 cycles completed March 2021 stopped secondary to multiple side effects and poor tolerance.  E.  Relapsed disease document CT scans done at [facility] June 1, 2018 2021 with diffuse liver, bone, and lymph nodes metastases.  F.  Will start Keytruda 2 mg IV every 2 weeks June 28, 2021  2.  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  Status post right nephrectomy done November 30, 2020 with a clear surgical margins  3.  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 Status post multiple resections with mitomycin-C injection in the past  B.  Status post resection for local recurrence November 30, 2020  4.  Atrial fibrillation  5.  Valvular heart disease  6.  Hypertension    PLAN:  1.  I will proceed with treatment using Keytruda 200 mg IV every 2 weeks.  This is in compliance with NCCN guidelines.  2.  I will monitor the patient blood work including blood counts kidney function liver function and electrolytes.  3.  He will follow-up with me in 4 weeks with cycle #2.  4. Future treatment options might include Enfortumab Vedotin   5. I will repeat the patient scans prior to cycle #4.  6.  The patient will continue to hold Eliquis secondary to hematuria visible as the need for multiple urology procedures.  7.  We will continue the patient on Zofran 8 mg every 8 hours as needed for chemotherapy-induced nausea.  8.  The patient will continue to monitor his blood pressure at home.  He is on as needed treatment.  He will follow-up with Dr. Clemons.    Rosemarie Montoya,  MD  6/21/2021

## 2021-06-22 NOTE — TELEPHONE ENCOUNTER
----- Message from Keith Li sent at 6/22/2021  1:08 PM EDT -----  Regarding: RE: Keytruda/xgeva [place] patient  I did not call with new apt., you can.  Thanks  ----- Message -----  From: Edie Loving RN  Sent: 6/22/2021  12:31 PM EDT  To: Keith Li  Subject: RE: Keytruda/xgeva [place] patient                 Ok - did you call him about it or do I need to?    ----- Message -----  From: Keith Li  Sent: 6/22/2021  12:08 PM EDT  To: Edie Loving RN  Subject: RE: Keytruda/xgeva [place] patient                 He isn't scheduled for 11:30 anymore., it is 1 p.m.  ----- Message -----  From: Edie Loving RN  Sent: 6/22/2021  11:32 AM EDT  To: Keith Li  Subject: RE: Keytruda/xgeva [place] patient                 I just saw this one.  I don't need to call the wife back for anything right now.  I gave her the 11:30 that Isamar did earlier this morning.  I sent another message that they may do drug change with pharmacy on another day.   ----- Message -----  From: Keith Li  Sent: 6/22/2021  11:29 AM EDT  To: Edie Loving RN  Subject: RE: Keytruda/xgeva [place] patient                 He needs to arrive at 1 p.m. in infusion on Friday.  Are you calling his wife back?   ----- Message -----  From: Edie Loving RN  Sent: 6/22/2021  10:23 AM EDT  To: Isamar Cespedes RegSched Rep  Subject: RE: Keytruda/xgeva [place] patient                 Isamar,    I moved the date and called patient wife.  They will do Friday at 11:30 for his first infusion.    Keith Patton can you adjust his f/u with [place] based on Careplan for 2nd cycle?  He will also have to do pharmacy drug change Friday as well.      Thanks so much girls.    Edie  ----- Message -----  From: Isamar Meza RegSched Rep  Sent: 6/22/2021   9:56 AM EDT  To: Edie Loving RN, Keith Li  Subject: RE: Keytruda/xgeva [place] patient                 I could move him to this Friday at 11:30 if this fits with the  availability of other appointments and pt is available that day     ----- Message -----  From: Edie Loving RN  Sent: 6/22/2021   8:58 AM EDT  To: Domenic Cespedeshanie Rickie Meza  Subject: FW: Keytruda/xgeva [place] patient                 Aleida Penn,    This patients treatment is approved.  Are you able to schedule him to start Keytruda and xgeva this week and then I will have Keith adjust his f/u with Jan.  Just let me know what date is good for your schedule and I will move the dates.  I don't know if this is the best way to do this so tell me if this doesn't work for you all on this.    ThanksEdie  ----- Message -----  From: Carlie Lopez  Sent: 6/22/2021   7:21 AM EDT  To: Edie Loving RN  Subject: RE: Keytruda/xgeva [place] patient                 Approved  ----- Message -----  From: Edie Loving RN  Sent: 6/21/2021   4:11 PM EDT  To: Mishel Hernandez Susan M Moss  Subject: Keytruda/xgeva [place] patient                     Keytruda and xgeva to start next week.  Dr. Montoya wants me to bring him in this week if you can get it approved soon.  Please let me know.     Thanks,    Edie

## 2021-06-22 NOTE — TELEPHONE ENCOUNTER
----- Message from Rickie Pruett sent at 6/22/2021  9:56 AM EDT -----  Regarding: RE: Keytruda/xgeva [place] patient  I could move him to this Friday at 11:30 if this fits with the availability of other appointments and pt is available that day     ----- Message -----  From: Edie Loving RN  Sent: 6/22/2021   8:58 AM EDT  To: Keith Li, Rickie Pruett  Subject: FW: Keytruda/xgeva [place] patient                 Aleida Penn,    This patients treatment is approved.  Are you able to schedule him to start Keytruda and xgeva this week and then I will have Keith adjust his f/u with Jan.  Just let me know what date is good for your schedule and I will move the dates.  I don't know if this is the best way to do this so tell me if this doesn't work for you all on this.    ThanksEdie  ----- Message -----  From: Carlie Lopez  Sent: 6/22/2021   7:21 AM EDT  To: Edie Loving RN  Subject: RE: Keytruda/xgeva [place] patient                 Approved  ----- Message -----  From: Edie Loving RN  Sent: 6/21/2021   4:11 PM EDT  To: Debra Wilson, Carlie Mena  Subject: Keytruda/xgeva [place] patient                     Keytruda and xgeva to start next week.  Dr. Montoya wants me to bring him in this week if you can get it approved soon.  Please let me know.     ThanksEdie

## 2021-06-22 NOTE — TELEPHONE ENCOUNTER
Caller: coleen christopher    Relationship to patient: Emergency Contact    Best call back number: [phone]    Chief complaint: [facility] CALLED TO CONFIRM APPT CHANGE FOR Friday. HOWEVER, IF ANYTHING BECOMES AVAILABLE BEFORE THEN THEY ARE MORE THAN HAPPY TO COME IN SOONER WITH VERY LITTLE NOTICE. THEY ARE VERY FLEXIBLE WITH THEIR SCHEDULE.     Type of visit: CHEMO EDUCATION/INFUSION     PLEASE CONTACT [facility] IF ANY OPENINGS BECOME AVAILABLE BEFORE Friday. OTHERWISE, THEY WILL KEEP THEIR SCHEDULED APPOINTMENT TIME.      THANKS   home

## 2021-06-24 NOTE — TELEPHONE ENCOUNTER
----- Message from Negra Storey RN sent at 6/24/2021  8:30 AM EDT -----  Regarding: PAGE/patient does not have recent CMP level in epic/xgeva orders not signed for tomorrow 6/25  Patient scheduled for new keytruda/xgeva. Please sign xgeva orders. Also, patient does not have a recent CMP level resulted in epic. Please have patient get CMP labs drawn prior to chemo education visit 6/25 at 1300. Thank you Negra [phone]

## 2021-06-25 NOTE — TELEPHONE ENCOUNTER
----- Message from Aleta Gonzalez, RN sent at 6/25/2021  2:16 PM EDT -----  Regarding: MARE Silverman  Pt. is starting Xgeva today. He is having dental fillings next week. Is it OK to give Xgeva today?   Please advise. Thanks, Aleta [phone]

## 2021-06-25 NOTE — PROGRESS NOTES
ON[facility] Nutrition    Cancer Dx: High grade urothelial carcinoma, stage IV / recent CT scanning @ [place] noting diffuse metastatic disease (liver, bone and LN)  Surgery: Right radical nephrectomy with ureterectomy (11/30/20)  Chemotherapy: Carboplatin / Gemzar - days 1,8 - every 21 days (2/4 completed - stopped March 2021 secondary to poor tolerance)  Immunotherapy:  Keytruda 200 mg IV every 3 weeks June 25, 2021    Weight 175 lbs / weight stable x 6 months    Nutritional Impact Symptoms:  Fatigue / Constipation noted per chart review    Follow up with patient during initial infusion of Keytruda / Xgeva.  Patient shares good appetite, no weight changes and denies the presence of any nutritional impact symptoms of N/V, appetite or taste changes, constipation or diarrhea. Discussed hydration goal of 85 ounces per day, which on most days is difficult for him to attain as he does not regularly hydrate.  Reviewed possible nutritional side effects of treatment including bowel changes, diarrhea, appetite changes.  Will follow as indicated.

## 2021-06-25 NOTE — PLAN OF CARE
Outpatient Infusion • [address] [facility] • [address] • [place] [id number] • [phone]      CHEMOTHERAPY EDUCATION SHEET    NAME:  Jair Flores      : 1945           DATE: 21    Booklets Given: Chemotherapy and You []  Eating Hints []    Sexuality/Fertility Books []     Chemotherapy/Biotherapy Education Sheets: (list all that apply)  Pembrolizumab                                                                                                                                                                Chemotherapy Regimen:  Pembrolizumab every 21 days    TOPICS EDUCATION PROVIDED EDUCATION REINFORCED COMMENTS   ANEMIA:  role of RBC, cause, s/s, ways to manage, role of transfusion [x] [] Patient advised on Hgb role in the body as well as the fatigue experienced when its low. Encouraged to follow lab schedule and to get 7-8 hours of sleep per night as well as to drink plenty of water.     THROMBOCYTOPENIA:  role of platelet, cause, s/s, ways to prevent bleeding, things to avoid, when to seek help [] []    NEUTROPENIA:  role of WBC, cause, infection precautions, s/s of infection, when to call MD [x] [] Pt advised that she will be at an increased risk of infection while on chemo. Pt advised to wash hand regularly and to avoid larger crowds. Patient advised to call clinic if Temp reading of 100.4 F is noted.    NUTRITION & APPETITE CHANGES:  importance of maintaining healthy diet & weight, ways to manage to improve intake, dietary consult, exercise regimen [] []    DIARRHEA:  causes, s/s of dehydration, ways to manage, dietary changes, when to call MD [x] [] Pt advised to utilize imodium for 24-48 hours if diarrhea develops while on chemo. If diarrhea persists unrelieved, then the patient should call the clinic for further evaluation.     CONSTIPATION:  causes, ways to manage, dietary changes, when to call MD [] []    NAUSEA & VOMITING:  cause, use of antiemetics, dietary changes, when to call  MD [x] [] Patient advised this regimen may cause n/v. Patient informed of the role of PRN zofran for relief. Pt informed to call clinic if zofran does not provide relief.     MOUTH SORES:  causes, oral care, ways to manage [] []    ALOPECIA:  cause, ways to manage, resources [] []    INFERTILITY & SEXUALITY:  causes, fertility preservation options, sexuality changes, ways to manage, importance of birth control [] []    NERVOUS SYSTEM CHANGES:  causes, s/s, neuropathies, cognitive changes, ways to manage [] []    PAIN:  causes, ways to manage [] [] ????   SKIN & NAIL CHANGES:  cause, s/s, ways to manage [x] [] Patient advised this medication may invoke a skin rash with itching. Patient instructed to call clinic if this was to become overly bothersome, but could try over the counter hydrocortisone cream for relief.    ORGAN TOXICITIES:  cause, s/s, need for diagnostic tests, labs, when to notify MD [x] [] Pt advised this medication may increase electrolytes as well as Scr, the patient was assured the labs will be followed before every cycle to ensure kidney toleration.    SURVIVORSHIP:  distress, distress assessment, secondary malignancies, early/late effects, follow-up, social issues, social support [] []    HOME CARE:  use of spill kits, storing of PO chemo, how to manage bodily fluids [x] [] Pt advised to wear gloves if cleaning up any body fluids they or their partner may come in contact with while on immunotherapy.   MISCELLANEOUS:  drug interactions, administration, vesicant, et [x] [] Patient informed this medication may cause a mild fever for 1-2 days after the infusion. Patient instructed to call clinic if this was to reach 100.4F.      Referrals:      Notes:   Discussed the regimen with the patient while in clinic. All the patients questions and concerns were answered. Provided patient pharmacy contact information as well as information sheets for the above regimen. Patient was consented for therapy and  provided with a personalized treatment calender of tentative treatment cycles.     Thank you,  Asim Paige   PharmD Candidate 2022 6/25/2021  @[id number]

## 2021-07-06 NOTE — TELEPHONE ENCOUNTER
Patients wife called back to report that patient has had increasing mid back pain which is now going down his right leg and mid femur enough that he is having to use a walker and his wife to ambulate.  He rates his pain as an 8/10.  Taking tylenol but not very effective in reducing the pain.  He does not have any loss of bladder or bowel control, no numbness or tingling or swelling.  She advised that [facility] sent out the disc on Friday and we should have imaging by today or tomorrow.  Discussed with Dr. Montoya who advised he needs palliative radiation.  Dr. Montoya called and spoke with Dr. Bernstein himself who advised he would have his office call patient and work them in today.  CAlled and let patient wife Carey know to be expecting call.  REferral placed in case needed.

## 2021-07-06 NOTE — PROGRESS NOTES
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  The the patient has developed back pain is being seen today for further evaluation of the back pain, with consideration of palliative radiotherapy at the request of Dr. Montoya.  Malignant neoplasm of right ureter (CMS/HCC)  Staging form: Renal Pelvis And Ureter, AJCC 8th Edition  - Clinical stage from 2020: Stage III (cT3, cN0, cM0) - Signed by Rosemarie Montoya MD on 2020           BRIEF HISTORY:  Jair Flores  is a very pleasant 76 y.o. male  with a history of previous thoracic spine fracture managed conservatively with Dr. Palmer as well as previous cardiac surgery who was then diagnosed with right ureter cancer in 2020.  The patient had nephrectomy at [facility], operated by [facility] in [facility].  The patient then received 2 cycles of Gemzar and cisplatin beginning in 2021.  Patient stopped chemo in 2021 due to intolerant to side effects.  The patient had an interim CT scan in 2021 that did not show any evidence of disease.  The patient returned to see Dr. DE LA ROSA in [place] recently with a repeat TURBT and CT scan.  The patient's TURBT showed showed no large aggressive lesions however the patient's CT scan showed progression in numerous lymph nodes and in the liver as well as the interval development of multiple sites of spinal disease centered largely in the thoracic spine.  The patient was started on Keytruda with Dr. Montoya.  More recently the patient's back pain has grown worse.  He has been taking Tylenol for this with little relief.  He reports that some heat to the back  is helped.  His wife reports that she has given him massage to some trigger points that is also helped.  Otherwise has not taken any narcotics.  Patient also reported right thigh pain that is improving significantly.  The patient's back pain is severe and prevented him from ambulating.  Patient denies any muscle weakness about his hips or thighs or legs.  He denies any difficulty with his bowels or bladder.      BMI:  Body mass index is 24.79 kg/m².      Social History     Substance and Sexual Activity   Alcohol Use Not Currently       Allergies   Allergen Reactions   • Penicillins Anaphylaxis     Tolerates ertapenem   • Rocephin [Ceftriaxone] Anaphylaxis   • Ciprofloxacin Other (See Comments)     Tendonitis - received levofloxacin OP 2021   • Sotalol Other (See Comments)     Prolonged QT       Social History     Tobacco Use   • Smoking status: Never Smoker   • Smokeless tobacco: Never Used   Vaping Use   • Vaping Use: Never used   Substance Use Topics   • Alcohol use: Not Currently   • Drug use: Defer       Current Outpatient Medications:   •  acetaminophen (TYLENOL) 325 MG tablet, Take 2 tablets by mouth Every 4 (Four) Hours As Needed for Mild Pain . (Patient taking differently: Take 1,000 mg by mouth.), Disp:  , Rfl:   •  allopurinol (ZYLOPRIM) 100 MG tablet, Take 1 tablet by mouth Daily., Disp: 90 tablet, Rfl: 3  •  amLODIPine (NORVASC) 10 MG tablet, Take 1 tablet by mouth Daily., Disp: 30 tablet, Rfl: 11  •  Ascorbic Acid (VITAMIN C) 500 MG chewable tablet, Chew 1,000 mg Daily., Disp: , Rfl:   •  aspirin EC 81 MG EC tablet, Take 1 tablet by mouth Daily. (Patient taking differently: Take 81 mg by mouth Every Morning.), Disp: 100 tablet, Rfl: 4  •  atorvastatin (LIPITOR) 80 MG tablet, TAKE 1 TABLET BY MOUTH EVERY NIGHT., Disp: 90 tablet, Rfl: 3  •  metoprolol tartrate (LOPRESSOR) 25 MG tablet, Take 1 tablet by mouth Every 12 (Twelve) Hours. (Patient taking differently: Take 25 mg by mouth 2 (Two) Times a Day.),  Disp: 60 tablet, Rfl: 11  •  Multiple Vitamins-Minerals (HM MULTIVITAMIN ADULT GUMMY) chewable tablet, Chew 1 tablet Daily., Disp: , Rfl:   •  ondansetron (ZOFRAN) 8 MG tablet, Take 1 tablet by mouth 3 (Three) Times a Day As Needed for Nausea or Vomiting., Disp: 30 tablet, Rfl: 5  •  oxybutynin (DITROPAN) 5 MG tablet, Take 5 mg by mouth 3 (Three) Times a Day., Disp: , Rfl:   •  ALPRAZolam (Xanax) 0.5 MG tablet, Take 1 tablet by mouth 2 (Two) Times a Day As Needed for Anxiety., Disp: 30 tablet, Rfl: 0  •  HYDROcodone-acetaminophen (NORCO) 5-325 MG per tablet, Take 1 tablet by mouth Every 6 (Six) Hours As Needed (Severe cancer pain)., Disp: 45 tablet, Rfl: 0  •  sennosides-docusate (senna-docusate sodium) 8.6-50 MG per tablet, Take 1 tablet by mouth Daily. While on norco for stool softening, Disp: 30 tablet, Rfl: 1    Past Medical History:   Diagnosis Date   • Acute gout of foot 4/29/2019   • Atrial fibrillation (CMS/HCC)     after valve replaced   • Bone cancer (CMS/HCC)    • Cancer (CMS/HCC)     bladder; cysto surveillance 07/23/2017; free of disease   • Cataract     right eye   • Degenerative arthritis     left; knee replacement; successful   • Elevated cholesterol    • Flash pulmonary edema (CMS/HCC) 4/29/2019    Episode of flash pulmonary edema thought to be related to volume overload at the time of left knee surgery in 2005 performed in [place] he has had no recurrence his EKG does show inferior Q waves which are unchanged.   • H/O aortic valve replacement    • Hard to intubate     has been told he is difficult to intubate   • Hematuria 4/29/2019   • Hemorrhoids 09/12/2011    hemorrhoid ablation   • Hypertension    • BEATRIZ on CPAP    • Skin cancer     head   • Stroke (CMS/HCC) 05/2019    no residual   • Wears glasses        family history includes Brain cancer in his sister; Hypertension in his father and mother.     Past Surgical History:   Procedure Laterality Date   • AORTIC VALVE REPAIR/REPLACEMENT  09/2019    • CARDIAC CATHETERIZATION N/A 7/10/2019    Procedure: Right and Left Heart Cath;  Surgeon: Francisco Bo MD;  Location:  [facility];  Service: Cardiology   • COLONOSCOPY      routinely every 10 years   • CYSTOSCOPY      x3   • CYSTOSCOPY  07/23/2017    bladder cancer; cysto surveillance; free of disease   • CYSTOSCOPY BLADDER BIOPSY N/A 4/24/2020    Procedure: CYSTOSCOPY BLADDER BIOPSY;  Surgeon: Bobby Salmeron MD;  Location:  [facility];  Service: Urology;  Laterality: N/A;   • EXCISIONAL HEMORRHOIDECTOMY      hemorrhoid ablation; hemorrhoid onset 09/12/2011   • EYE SURGERY      as a child after cutting eye with barbwire fence   • INTERVENTIONAL RADIOLOGY PROCEDURE Bilateral 6/24/2019    Procedure: Carotid Cerebral Angiogram with possible stent placement;  Surgeon: Abad Mata MD;  Location:  [facility];  Service: Interventional Radiology   • KNEE ARTHROPLASTY, PARTIAL REPLACEMENT Left    • DC TCAT IV STENT CRV CRTD ART EMBOLIC PROTECJ N/A 6/24/2019    Procedure: Carotid Stent;  Surgeon: Abad Mata MD;  Location:  [facility];  Service: Interventional Radiology   • SKIN CANCER EXCISION      head   • TONSILLECTOMY     • TRANSURETHRAL RESECTION OF BLADDER TUMOR N/A 3/29/2019    Procedure: TRANSURETHRAL RESECTION OF BLADDER TUMOR WITH MITOMYCIN;  Surgeon: Jair Braxton MD;  Location:  [facility];  Service: Urology   • TRANSURETHRAL RESECTION OF BLADDER TUMOR N/A 4/24/2020    Procedure: TRANSURETHRAL RESECTION OF BLADDER TUMOR;  Surgeon: Bobby Salmeron MD;  Location:  [facility];  Service: Urology;  Laterality: N/A;        Review of Systems   Musculoskeletal: Positive for back pain.        Right femor   Neurological: Positive for extremity weakness.   Psychiatric/Behavioral: Positive for decreased concentration and sleep disturbance. The patient is nervous/anxious.     A full 14 point review of systems was performed and was negative except as noted  in the HPI.         Objective   VITAL SIGNS:   Vitals:    07/06/21 1200   BP: 134/80   Pulse: 80   Resp: 18   Temp: 98 °F (36.7 °C)   SpO2: 91%   Weight: 78.4 kg (172 lb 12.8 oz)   PainSc:   5        KPS       70%    Physical Exam  Constitutional:       General: He is not in acute distress.     Appearance: He is well-developed.   HENT:      Head: Normocephalic and atraumatic.   Eyes:      Conjunctiva/sclera: Conjunctivae normal.      Pupils: Pupils are equal, round, and reactive to light.   Neck:      Trachea: No tracheal deviation.   Cardiovascular:      Comments: Well-perfused  Noncyanotic    Pulmonary:      Effort: Pulmonary effort is normal. No respiratory distress.      Breath sounds: No wheezing.   Abdominal:      General: There is no distension.      Palpations: Abdomen is soft.   Musculoskeletal:         General: Normal range of motion.      Cervical back: Normal range of motion.   Skin:     General: Skin is warm and dry.   Neurological:      Mental Status: He is alert.      Cranial Nerves: No cranial nerve deficit.      Coordination: Coordination normal.   Psychiatric:         Behavior: Behavior normal.         Judgment: Judgment normal.              The following portions of the patient's history were reviewed and updated as appropriate: allergies, current medications, past family history, past medical history, past social history, past surgical history and problem list.  EXAMINATION: CT ABDOMEN/PELVIS WO CONTRAST - 04/03/2021      INDICATION: Nausea and fatigue.     TECHNIQUE: 5 mm unenhanced images through the abdomen and pelvis.     The radiation dose reduction device was turned on for each scan per the  ALARA (As Low as Reasonably Achievable) protocol.     COMPARISON: Whole-body PET/CT scan 01/08/2021.     FINDINGS: History indicates fatigue, nausea, history of renal failure  and ureteral cancer. Previous whole body PET/CT scan report indicated  bladder soft tissue mass. CT showed left ureteral and  collecting system  dilatation.     Today's study shows persistent left ureteral and collecting system  dilatation with dilated ureter down to the level of the left  ureteropelvic junction. There is mild posterior bladder wall thickening.  No stone is identified.     Today's exam shows mild new perinephric fat stranding which may  represent edema or active inflammation. There is a left renal midpole  cyst, present on the prior study. Right kidney is surgically absent.     Elsewhere, included lower lungs appear grossly clear. There are multiple  small hepatic cysts. No significant abnormalities are seen of the  gallbladder, pancreas, adrenal glands, or spleen. No free air, ascites  or adenopathy is seen. Bowel loops are normal in caliber and grossly  normal in appearance.     In the pelvis, there is a complex appearance of the bladder, which  appears to contain both calcification and fat in the anterior wall, as  well as posterior wall thickening, but this is stable from the prior  PET/CT scan study. Bladder is not significantly distended. No  intrapelvic free fluid or inflammatory change is seen. Bony structures  appear to be intact.     IMPRESSION:  1. Persistent relatively mild dilatation of the left renal collecting  system and left ureter down to the level of the bladder as on  01/08/2021/PET/CT scan.     2. New mild to moderate left perinephric edema or inflammation,  potentially representing infection. Please correlate with urinalysis.     3. Abnormal appearance of bladder as described, but unchanged from  previous study.     3. Stable hepatic cysts. No new intra-abdominal or intrapelvic disease  is seen elsewhere.     DICTATED:   04/03/2021  EDITED/ls :   04/03/2021        This report was finalized on 4/3/2021 11:07 PM by Dr. Jamie Herrera MD.       The patient's report from CT scan outside hospital 6/18/2021 was reviewed showing multiple sites in the T-spine involved with malignancy and collapse.  Patient also  has right iliac adenopathy per the report.  The images have been requested but have not been received.    Assessment      IMPRESSION:     Mr. Benson is a very pleasant 76-year-old gentleman with metastatic ureter cancer status post resection followed by adjuvant gem/cis platinum.  The patient most recently has unfortunately recurred numerous visceral sites including the lymph nodes and liver.  The patient has evidence of disease in his thoracic spine as well.  The patient has pain in his spine which is causing him a fair amount of discomfort.  He reports that Tylenol has not been helping him request stronger medication.  Patient is also not sleeping very well and I have written him for Xanax to help him sleep and deal with some of the anxiety related to his diagnosis.  The patient also benefit from radiotherapy to the thoracic spine and potentially the pelvis as well.  The patient was read images and I did bill available for review however the reports are and the patient would likely benefit from proceeding with CT simulation today given his progressive pain.  Recommend least a dose of 20 Gray in 5 fractions to help with the patient's pain.  I discussed with the patient his wife that we may need additional imaging series before we are able to proceed with planning or that we might need to change the plan based on the images today as I have no available imaging at this time.    The patient has a NEAT composite score of 3 placing him at intermediate risk for mortality in the next 6 months.  He has a predicted overall survival greater than 3 months.  I discussed the case with Dr. Montoya personally.    Greater than 1 hour was spent preparing for and coordinating this visit. >50% of the time was spent in direct face to face conversation with the patient teaching, answering question, and providing explanations regarding the patient's case.  The decision to treat the patient with radiation is a complex one and carries the  risk of long-term side effects and complications.  The patient's malignancy represents a complicated life threatening condition that requires complex multidisciplinary management for treatment and followup.    RECOMMENDATIONS:      Thoracic spine pain  -Recommend palliative radiotherapy  -20 Gray in 5 fractions  -May require MRI if CT scan is in adequate for planning  -Recommend Norco fives for pain    Right thigh pain  -Could be related to pelvic lymph nodes or disease in lumbar spine  -We will evaluate on CT SIM scan today  -Diagnostic CT scan has been requested from [facility]  -Status post resection  -We will withhold contrast and limit contrast in the future      Insomnia/anxiety  -Recommend Xanax 0.5 mg as needed           Derek Chapa MD      Errors in dictation may reflect use of voice recognition software and not all errors in transcription may have been detected prior to signing.

## 2021-07-06 NOTE — TELEPHONE ENCOUNTER
[facility] patient's wife left a voicemail patient is having back and leg pain she needs a call back to discuss.

## 2021-07-13 NOTE — TELEPHONE ENCOUNTER
Mrs. Flores called with reports of continued pain and requesting an increase in pain meds. Dr. Chapa notified, informed wife to have him take Hydrocodone tabs x2 before tx and Dr. Chapa would see in clinic today. Wife verbalized understanding

## 2021-07-14 PROBLEM — J96.90 RESPIRATORY FAILURE (HCC): Status: ACTIVE | Noted: 2021-01-01

## 2021-07-14 NOTE — TELEPHONE ENCOUNTER
BONIFACIO has ordered a ct scan with contrast. Pt wife wanted to consult with juan before going forward and the OLGA.R would also like to be contacted. [phone]

## 2021-07-14 NOTE — TELEPHONE ENCOUNTER
----- Message from Rickie Dickerson sent at 7/14/2021 10:36 AM EDT -----  Regarding: Wife calling on his behalf  Distension of abdomen, short of breath, pulse 77, oxygen sat was 78, wife wants to know if she should have him admitted? Pt is too weak.

## 2021-07-14 NOTE — TELEPHONE ENCOUNTER
Left message with wife that Dr. Montoya had already spoke with the ER about patient.  Adivsed on the message that Dr. Montoya would be rounding this evening when he is back in [place] and will stop by to see patient.

## 2021-07-14 NOTE — TELEPHONE ENCOUNTER
Called patients wife back and she confirmed the message below reporting that he does not look good.  Advised that I do recommend he go to the ER.  She said she would take him now.  MD notified.

## 2021-07-15 PROBLEM — I48.0 PAROXYSMAL ATRIAL FIBRILLATION (HCC): Status: ACTIVE | Noted: 2019-11-25

## 2021-07-15 PROBLEM — R94.31 PROLONGED QT INTERVAL: Status: RESOLVED | Noted: 2019-11-25 | Resolved: 2021-01-01

## 2021-07-15 PROBLEM — K56.609 SBO (SMALL BOWEL OBSTRUCTION) (HCC): Status: ACTIVE | Noted: 2021-01-01

## 2021-07-15 PROBLEM — I50.22 CHRONIC SYSTOLIC CONGESTIVE HEART FAILURE (HCC): Status: ACTIVE | Noted: 2019-11-25

## 2021-07-15 PROBLEM — J96.01 ACUTE RESPIRATORY FAILURE WITH HYPOXIA (HCC): Status: ACTIVE | Noted: 2021-01-01

## 2021-07-23 PROBLEM — I50.31 ACUTE DIASTOLIC CHF (CONGESTIVE HEART FAILURE) (HCC): Status: ACTIVE | Noted: 2021-01-01

## 2021-07-24 PROBLEM — I27.20 PULMONARY HYPERTENSION (HCC): Status: ACTIVE | Noted: 2021-01-01

## 2021-07-30 NOTE — RADIATION COMPLETION NOTES
DATE OF COMPLETION: 7/13/2021  DIAGNOSIS: Metastatic ureter cancer    REFERRING: Dr. Montoya        Dear Dr. Montoya AaroncharlineJair completed radiation therapy today.      BACKGROUND: Jair Flores is a pleasant gentleman with metastatic ureter cancer.  The patient was referred for consideration of palliative radiotherapy to several bony sites and enlarged pelvic lymph nodes.  The patient was recovering from urinary tract infection and was considering starting immunotherapy with Dr. Montoya.    Treatment Summary     Dates of Therapy: 7/13/2021-7/13/2021  Treatment Site: T1-3, T5-8, right pelvis  The patient received 400 cGy of the plan 2000 cGy to each of the sites  Treatment Course and Tolerance:  the patient unfortunately suffered another urinary tract infection and was hospitalized in and out.  The patient was unable to complete his radiotherapy treatments due to the other care he was receiving which was much more acute.    The initial follow up visit will be in 1 month.    Jair Flores knows to call if any problems or concerns develop in the meantime.     Electronically signed by: Derek Chapa MD                    Cc: Osmar Clemons MD

## (undated) DEVICE — GLV SURG SENSICARE W/ALOE PF LF 8 STRL

## (undated) DEVICE — NDL HYPO ECLPS SFTY 18G 1 1/2IN

## (undated) DEVICE — PAD GRND REM POLYHESIVE A/ DISP

## (undated) DEVICE — PK CATH CARD 10

## (undated) DEVICE — EMBOSHIELD NAV6 EMBOLIC PROTECTION SYSTEM 7.2 MM X 190 CM: Brand: EMBOSHIELD  NAV6

## (undated) DEVICE — CATH TEMPO 5F BER 100CM: Brand: TEMPO

## (undated) DEVICE — CORD BOVIE 1P/U

## (undated) DEVICE — INTRO SHEATH ENGAGE W/50 GW .038 8F12

## (undated) DEVICE — Device

## (undated) DEVICE — KT MANIFOLD CATHLAB CUST

## (undated) DEVICE — CATH FOL BARDEX 2WY 18F 30CC

## (undated) DEVICE — CANNULA,ADULT,SOFT-TOUCH,7'TUBE,UC: Brand: PENDING

## (undated) DEVICE — CATH DIAG EXPO .052 PIG 6F 110CM

## (undated) DEVICE — LEX NEURO ANGIOGRAPHY: Brand: MEDLINE INDUSTRIES, INC.

## (undated) DEVICE — DRAINBAG,ANTI-REFLUX TOWER,L/F,2000ML,LL: Brand: MEDLINE

## (undated) DEVICE — EVAC BLDR UROVAC W ADAPT

## (undated) DEVICE — CATH DIAG EXPO M/ PK 6FR FL4/FR4 PIG 3PK

## (undated) DEVICE — GW J TP FIX CORE .035 150

## (undated) DEVICE — ANGIO-SEAL VIP VASCULAR CLOSURE DEVICE: Brand: ANGIO-SEAL

## (undated) DEVICE — STARCLOSE SE VASCULAR CLOSURE SYSTEM: Brand: STARCLOSE SE

## (undated) DEVICE — INTRO SHEATH FLX 7F80CM

## (undated) DEVICE — INTRO SHEATH ART/FEM ENGAGE .038 5F12CM

## (undated) DEVICE — GW FC J TFE/COAT .025 3MM 180CM

## (undated) DEVICE — CATHETER,FOLEY,SILI-ELAST,LTX,22FR,10ML: Brand: MEDLINE

## (undated) DEVICE — CATH DIAG EXPO .056 FR4 6F 100CM

## (undated) DEVICE — LP CUT RT/ANGL 24FR

## (undated) DEVICE — PK CYSTO-TUR BASIC 10

## (undated) DEVICE — ELECTRD ROLL BALL RESECTOSCP ANG

## (undated) DEVICE — RADIFOCUS GLIDEWIRE: Brand: GLIDEWIRE

## (undated) DEVICE — CO-SET DELIVERY SYSTEM FOR 123 ROOM TEMPATURE INJECTATE: Brand: CO-SET+

## (undated) DEVICE — GLV SURG SENSICARE MICRO PF LF 7 STRL

## (undated) DEVICE — GLV SURG SENSICARE PI MIC PF SZ8 LF STRL

## (undated) DEVICE — ST ACC MICROPUNCTURE .018 TRANSLSS/SS/TP 5F/10CM 21G/7CM

## (undated) DEVICE — LIMB HOLDER, WRIST/ANKLE: Brand: DEROYAL

## (undated) DEVICE — ROTATING HEMOSTATIC VALVE .096": Brand: RHV

## (undated) DEVICE — SWAN-GANZ THERMODILUTION CATHETER: Brand: SWAN-GANZ

## (undated) DEVICE — ANGIO-SEAL STS PLUS VASCULAR CLOSURE DEVICE: Brand: ANGIO-SEAL

## (undated) DEVICE — INTRO SHEATH ART/FEM ENGAGE .038 6F12CM